# Patient Record
Sex: FEMALE | Race: WHITE | NOT HISPANIC OR LATINO | Employment: UNEMPLOYED | URBAN - METROPOLITAN AREA
[De-identification: names, ages, dates, MRNs, and addresses within clinical notes are randomized per-mention and may not be internally consistent; named-entity substitution may affect disease eponyms.]

---

## 2017-01-17 ENCOUNTER — OFFICE VISIT (OUTPATIENT)
Dept: URGENT CARE | Facility: PHYSICIAN GROUP | Age: 47
End: 2017-01-17
Payer: COMMERCIAL

## 2017-01-17 VITALS
HEIGHT: 66 IN | TEMPERATURE: 99 F | DIASTOLIC BLOOD PRESSURE: 62 MMHG | SYSTOLIC BLOOD PRESSURE: 134 MMHG | HEART RATE: 92 BPM | OXYGEN SATURATION: 97 % | BODY MASS INDEX: 23.46 KG/M2 | RESPIRATION RATE: 18 BRPM | WEIGHT: 146 LBS

## 2017-01-17 DIAGNOSIS — J20.9 ACUTE BRONCHITIS, UNSPECIFIED ORGANISM: ICD-10-CM

## 2017-01-17 PROCEDURE — 99214 OFFICE O/P EST MOD 30 MIN: CPT | Performed by: NURSE PRACTITIONER

## 2017-01-17 RX ORDER — AZITHROMYCIN 250 MG/1
TABLET, FILM COATED ORAL
Qty: 6 TAB | Refills: 0 | Status: SHIPPED | OUTPATIENT
Start: 2017-01-17 | End: 2017-10-12

## 2017-01-17 RX ORDER — PROMETHAZINE HYDROCHLORIDE AND CODEINE PHOSPHATE 6.25; 1 MG/5ML; MG/5ML
5 SYRUP ORAL
Qty: 120 ML | Refills: 0 | Status: SHIPPED | OUTPATIENT
Start: 2017-01-17 | End: 2017-10-12

## 2017-01-17 ASSESSMENT — ENCOUNTER SYMPTOMS
FEVER: 0
COUGH: 1
SORE THROAT: 1
CHILLS: 0
RHINORRHEA: 1

## 2017-01-17 NOTE — MR AVS SNAPSHOT
"        Terri Krishnan   2017 12:15 PM   Office Visit   MRN: [id number]    Department:  [facility]   Dept Phone:  [phone]    Description:  Female : 1970   Provider:  NISHA PereiraPJOEY.           Reason for Visit     Cough cough, nasal and chest congestion, SOB and [id number]      Allergies as of 2017     Allergen Noted Reactions    Shrimp Flavor 2014   Anaphylaxis      You were diagnosed with     Acute bronchitis, unspecified organism   [[id number]]         Vital Signs     Blood Pressure Pulse Temperature Respirations Height Weight    134/62 mmHg 92 37.2 °C (99 °F) 18 1.676 m (5' 5.98\") 66.225 kg (146 lb)    Body Mass Index Oxygen Saturation Smoking Status             23.58 kg/m2 97% Current Every Day Smoker         Basic Information     Date Of Birth Sex Race Ethnicity Preferred Language    1970 Female White Non- English      Problem List              ICD-10-CM Priority Class Noted - Resolved    Surgical menopause E89.40   3/6/2014 - Present    Tobacco dependence F17.200   3/6/2014 - Present    Insomnia secondary to situational depression F43.21, F51.05   2016 - Present    Bilateral acute otitis media H66.93   10/19/2016 - Present    Need for vaccination Z23   10/19/2016 - Present      Health Maintenance        Date Due Completion Dates    IMM INFLUENZA (1) 2016 ---    MAMMOGRAM 2017, 2014    IMM DTaP/Tdap/Td Vaccine (2 - Td) 2025            Current Immunizations     Pneumococcal polysaccharide vaccine (PPSV-23) 10/19/2016  4:43 PM    Tdap Vaccine 2015      Below and/or attached are the medications your provider expects you to take. Review all of your home medications and newly ordered medications with your provider and/or pharmacist. Follow medication instructions as directed by your provider and/or pharmacist. Please keep your medication list with you and share with your provider. Update the information when " medications are discontinued, doses are changed, or new medications (including over-the-counter products) are added; and carry medication information at all times in the event of emergency situations     Allergies:  SHRIMP FLAVOR - Anaphylaxis               Medications  Valid as of: January 17, 2017 -  1:05 PM    Generic Name Brand Name Tablet Size Instructions for use    Amoxicillin (Tab) AMOXIL 875 MG Take 1 Tab by mouth 2 times a day.        Azithromycin (Tab) ZITHROMAX 250 MG Take as directed        Benzocaine-Menthol (Lozenge) CEPACOL 15-2.6 MG Spray 1 Lozenge in mouth/throat every 2 hours as needed.        Benzonatate (Cap) TESSALON 100 MG Take 1 Cap by mouth 3 times a day as needed for Cough.        Doxycycline Hyclate (Tab) VIBRAMYCIN 100 MG Take 1 Tab by mouth 2 times a day.        Estradiol (Tab) ESTRACE 1 MG Take 1 Tab by mouth every day.        Promethazine-Codeine (Syrup) PHENERGAN-CODEINE 6.25-10 MG/5ML Take 5 mL by mouth every bedtime.        Pseudoephedrine-APAP-DM   Take  by mouth.        .                 Medicines prescribed today were sent to:     [id number] - [place] - [address]    [address] [place] [address]    Phone: [phone] Fax: [phone]    Open 24 Hours?: No      Medication refill instructions:       If your prescription bottle indicates you have medication refills left, it is not necessary to call your provider’s office. Please contact your pharmacy and they will refill your medication.    If your prescription bottle indicates you do not have any refills left, you may request refills at any time through one of the following ways: The online Tryouts system (except Urgent Care), by calling your provider’s office, or by asking your pharmacy to contact your provider’s office with a refill request. Medication refills are processed only during regular business hours and may not be available until the next business day. Your provider may request  additional information or to have a follow-up visit with you prior to refilling your medication.   *Please Note: Medication refills are assigned a new Rx number when refilled electronically. Your pharmacy may indicate that no refills were authorized even though a new prescription for the same medication is available at the pharmacy. Please request the medicine by name with the pharmacy before contacting your provider for a refill.           BirdDog[employer] Access Code: Activation code not generated  Current Red-rabbitt Status: Active

## 2017-01-17 NOTE — PROGRESS NOTES
"Subjective:      Terri Krishnan is a 46 y.o. female who presents with Cough            Cough  This is a new problem. The current episode started 1 to 4 weeks ago. The problem has been unchanged. The problem occurs constantly. The cough is non-productive. Associated symptoms include nasal congestion, postnasal drip, rhinorrhea and a sore throat. Pertinent negatives include no chills or fever. She has tried OTC cough suppressant for the symptoms. The treatment provided mild relief. Her past medical history is significant for bronchitis.     Allergies, medications and history reviewed by me today    Review of Systems   Constitutional: Negative for fever and chills.   HENT: Positive for postnasal drip, rhinorrhea and sore throat.    Respiratory: Positive for cough.           Objective:     /62 mmHg  Pulse 92  Temp(Src) 37.2 °C (99 °F)  Resp 18  Ht 1.676 m (5' 5.98\")  Wt 66.225 kg (146 lb)  BMI 23.58 kg/m2  SpO2 97%     Physical Exam   Constitutional: She is oriented to person, place, and time. She appears well-developed and well-nourished. No distress.   HENT:   Head: Normocephalic and atraumatic.   Right Ear: External ear and ear canal normal. Tympanic membrane is not injected and not perforated. No middle ear effusion.   Left Ear: External ear and ear canal normal. Tympanic membrane is not injected and not perforated.  No middle ear effusion.   Nose: Mucosal edema present.   Mouth/Throat: Posterior oropharyngeal erythema present. No oropharyngeal exudate.   Eyes: Conjunctivae are normal. Right eye exhibits no discharge. Left eye exhibits no discharge.   Neck: Normal range of motion. Neck supple.   Cardiovascular: Normal rate, regular rhythm and normal heart sounds.    No murmur heard.  Pulmonary/Chest: Effort normal and breath sounds normal. No respiratory distress. She has no wheezes. She has no rales.   Musculoskeletal: Normal range of motion.   Normal movement of all 4 extremities.   Lymphadenopathy: "     She has no cervical adenopathy.        Right: No supraclavicular adenopathy present.        Left: No supraclavicular adenopathy present.   Neurological: She is alert and oriented to person, place, and time. Gait normal.   Skin: Skin is warm and dry.   Psychiatric: She has a normal mood and affect. Her behavior is normal. Thought content normal.               Assessment/Plan:     1. Acute bronchitis, unspecified organism  azithromycin (ZITHROMAX) 250 MG Tab    promethazine-codeine (PHENERGAN-CODEINE) 6.25-10 MG/5ML Syrup   Patient is cautioned on sedation potential of narcotic medication; no drinking, driving or operating heavy machinery while on this medication.  Differential diagnosis, natural history, supportive care, and indications for immediate follow-up discussed at length.

## 2017-02-02 RX ORDER — ESTRADIOL 1 MG/1
TABLET ORAL
Qty: 90 TAB | Refills: 1 | Status: SHIPPED | OUTPATIENT
Start: 2017-02-02 | End: 2017-09-21 | Stop reason: SDUPTHER

## 2017-09-21 RX ORDER — ESTRADIOL 1 MG/1
TABLET ORAL
Qty: 30 TAB | Refills: 1 | Status: SHIPPED | OUTPATIENT
Start: 2017-09-21 | End: 2017-10-12 | Stop reason: SDUPTHER

## 2017-09-22 NOTE — TELEPHONE ENCOUNTER
Phone Number Called: [phone] (home)     Message: called pt left message for pt to call back.     Left Message for patient to call back: yes

## 2017-09-25 NOTE — TELEPHONE ENCOUNTER
Phone Number Called: [phone] (home)     Message: Pt notified that medication was refilled. She I snot available to come in on Monday's or Tuesdays. So she scheduled an appointment with Dr. Enriquez.    Left Message for patient to call back: no

## 2017-09-26 ENCOUNTER — OFFICE VISIT (OUTPATIENT)
Dept: URGENT CARE | Facility: PHYSICIAN GROUP | Age: 47
End: 2017-09-26
Payer: COMMERCIAL

## 2017-09-26 VITALS
TEMPERATURE: 97.8 F | DIASTOLIC BLOOD PRESSURE: 72 MMHG | OXYGEN SATURATION: 97 % | HEART RATE: 137 BPM | BODY MASS INDEX: 19.7 KG/M2 | HEIGHT: 68 IN | SYSTOLIC BLOOD PRESSURE: 118 MMHG | RESPIRATION RATE: 12 BRPM | WEIGHT: 130 LBS

## 2017-09-26 DIAGNOSIS — R11.2 NON-INTRACTABLE VOMITING WITH NAUSEA, UNSPECIFIED VOMITING TYPE: ICD-10-CM

## 2017-09-26 DIAGNOSIS — R00.0 TACHYCARDIA: ICD-10-CM

## 2017-09-26 DIAGNOSIS — A08.4 VIRAL GASTROENTERITIS: ICD-10-CM

## 2017-09-26 LAB
APPEARANCE UR: NORMAL
BILIRUB UR STRIP-MCNC: NEGATIVE MG/DL
COLOR UR AUTO: NORMAL
FLUAV+FLUBV AG SPEC QL IA: NEGATIVE
GLUCOSE UR STRIP.AUTO-MCNC: NEGATIVE MG/DL
INT CON NEG: NEGATIVE
INT CON POS: POSITIVE
KETONES UR STRIP.AUTO-MCNC: 80 MG/DL
LEUKOCYTE ESTERASE UR QL STRIP.AUTO: NORMAL
NITRITE UR QL STRIP.AUTO: NEGATIVE
PH UR STRIP.AUTO: 7.5 [PH] (ref 5–8)
PROT UR QL STRIP: 30 MG/DL
RBC UR QL AUTO: NORMAL
SP GR UR STRIP.AUTO: 1.01
UROBILINOGEN UR STRIP-MCNC: 1 MG/DL

## 2017-09-26 PROCEDURE — 99999 PR NO CHARGE: CPT | Performed by: PHYSICIAN ASSISTANT

## 2017-09-26 PROCEDURE — 99214 OFFICE O/P EST MOD 30 MIN: CPT | Performed by: PHYSICIAN ASSISTANT

## 2017-09-26 PROCEDURE — 87804 INFLUENZA ASSAY W/OPTIC: CPT | Performed by: PHYSICIAN ASSISTANT

## 2017-09-26 PROCEDURE — 93000 ELECTROCARDIOGRAM COMPLETE: CPT | Performed by: PHYSICIAN ASSISTANT

## 2017-09-26 PROCEDURE — 81002 URINALYSIS NONAUTO W/O SCOPE: CPT | Performed by: PHYSICIAN ASSISTANT

## 2017-09-26 RX ORDER — ONDANSETRON 4 MG/1
4 TABLET, ORALLY DISINTEGRATING ORAL EVERY 8 HOURS PRN
Qty: 15 TAB | Refills: 0 | Status: SHIPPED | OUTPATIENT
Start: 2017-09-26 | End: 2017-10-03

## 2017-09-26 RX ORDER — ONDANSETRON 4 MG/1
4 TABLET, ORALLY DISINTEGRATING ORAL ONCE
Status: COMPLETED | OUTPATIENT
Start: 2017-09-26 | End: 2017-09-26

## 2017-09-26 RX ADMIN — ONDANSETRON 4 MG: 4 TABLET, ORALLY DISINTEGRATING ORAL at 15:06

## 2017-09-26 ASSESSMENT — ENCOUNTER SYMPTOMS
VISUAL CHANGE: 0
VERTIGO: 0
VOMITING: 1
DIZZINESS: 0
NUMBNESS: 0
FEVER: 1
ABDOMINAL PAIN: 0
CHILLS: 1
JOINT SWELLING: 0
CHANGE IN BOWEL HABIT: 1
ARTHRALGIAS: 1
CONSTIPATION: 0
BLOOD IN STOOL: 0
NECK PAIN: 0
SORE THROAT: 0
WEAKNESS: 1
COUGH: 1
SWOLLEN GLANDS: 0
FATIGUE: 1
MYALGIAS: 1
ANOREXIA: 1
DIARRHEA: 1
NAUSEA: 1

## 2017-09-26 ASSESSMENT — PAIN SCALES - GENERAL: PAINLEVEL: NO PAIN

## 2017-09-26 NOTE — PROGRESS NOTES
"Subjective:      Terri Krishnan is a 47 y.o. female who presents with Flu Like Symptoms (x 3 days; vomitting, diarrhea, fever, chills, lethargy)            Nausea   This is a new problem. Episode onset: 3 days. The problem occurs constantly. The problem has been unchanged. Associated symptoms include anorexia, arthralgias, a change in bowel habit, chills, coughing, fatigue, a fever, myalgias, nausea, vomiting and weakness. Pertinent negatives include no abdominal pain, chest pain, congestion, joint swelling, neck pain, numbness, rash, sore throat, swollen glands, urinary symptoms, vertigo or visual change. Nothing aggravates the symptoms. She has tried nothing for the symptoms. The treatment provided no relief.   Several co-workers with similar symptoms.  No suspected bad food, no recent antibiotics or foreign travel.      Review of Systems   Constitutional: Positive for chills, fatigue and fever.   HENT: Negative for congestion and sore throat.    Respiratory: Positive for cough.    Cardiovascular: Negative for chest pain.   Gastrointestinal: Positive for anorexia, change in bowel habit, diarrhea, nausea and vomiting. Negative for abdominal pain, blood in stool, constipation and melena.   Genitourinary: Negative.    Musculoskeletal: Positive for arthralgias and myalgias. Negative for joint swelling and neck pain.   Skin: Negative for rash.   Neurological: Positive for weakness. Negative for dizziness, vertigo and numbness.          Objective:     /72   Pulse (!) 137   Temp 36.6 °C (97.8 °F)   Resp 12   Ht 1.727 m (5' 8\")   Wt 59 kg (130 lb)   SpO2 97%   BMI 19.77 kg/m²      Physical Exam   Constitutional: She is oriented to person, place, and time. She appears well-developed and well-nourished.   HENT:   Head: Normocephalic and atraumatic.   Right Ear: External ear normal.   Left Ear: External ear normal.   Nose: Nose normal.   Mouth/Throat: Oropharynx is clear and moist. No oropharyngeal exudate. "   Tongue is moist   Eyes: Conjunctivae and EOM are normal. Pupils are equal, round, and reactive to light.   Neck: Normal range of motion. Neck supple.   Cardiovascular: Regular rhythm, normal heart sounds and intact distal pulses.  Exam reveals no gallop and no friction rub.    No murmur heard.  Elevated rate   Pulmonary/Chest: Effort normal and breath sounds normal. No respiratory distress. She has no wheezes. She has no rales. She exhibits no tenderness.   Abdominal: Soft. She exhibits no distension and no mass. There is no rebound and no guarding. No hernia.   Mild diffuse tenderness in all quadrants   Musculoskeletal: Normal range of motion.   Lymphadenopathy:     She has no cervical adenopathy.   Neurological: She is alert and oriented to person, place, and time.   Skin: Skin is warm and dry.   Good skin turgor.     Psychiatric: She has a normal mood and affect. Her behavior is normal. Judgment and thought content normal.   Nursing note and vitals reviewed.         Urinalysis:  Trace leukocytes, trace blood, no nitrates, spec gravity 1.015  Rapid Influenza:  Negative  EKG:  Rate 89, normal rhythm, No ST changes, no acute abnormalities     Assessment/Plan:     1. Non-intractable vomiting with nausea, unspecified vomiting type  Patient is feeling better after dose of Zofran in the clinic today.  Will discharge home to continue Zofran as needed.  Rest, increase fluids-recommend water/smart water, no evidence of severe dehydration.  Follow-up if symptoms change, get worse or new symptoms develop.      - ondansetron (ZOFRAN ODT) dispertab 4 mg; Take 1 Tab by mouth Once.  - POCT Urinalysis    2. Viral gastroenteritis      3. Tachycardia  Patient admits to increased anxiety when in a medical setting.  EKG with normal rate, rhythm, no concerning acute abnormalities noted.    - VITAL SIGNS  - EKG - Clinic performed

## 2017-09-26 NOTE — LETTER
September 26, 2017         Patient: Terri Krishnan   YOB: 1970   Date of Visit: 9/26/2017           To Whom it May Concern:    Terri Krishnan was seen in my clinic on 9/26/2017. She may return to work on 09/27/17.    If you have any questions or concerns, please don't hesitate to call.        Sincerely,           Ella Mccoy P.A.-C.  Electronically Signed

## 2017-10-04 ENCOUNTER — TELEPHONE (OUTPATIENT)
Dept: MEDICAL GROUP | Age: 47
End: 2017-10-04

## 2017-10-04 NOTE — TELEPHONE ENCOUNTER
ESTABLISHED PATIENT PRE-VISIT PLANNING     Note: Patient will not be contacted if there is no indication to call.     1.  Reviewed notes from the last few office visits within the medical group: Yes    2.  If any orders were placed at last visit or intended to be done for this visit (i.e. 6 mos follow-up), do we have Results/Consult Notes?        •  Labs - Labs were not ordered at last office visit.       •  Imaging - Imaging was not ordered at last office visit.       •  Referrals - No referrals were ordered at last office visit.    3. Is this appointment scheduled as a Hospital Follow-Up? No    4.  Immunizations were updated in [facility] using WebIZ?: Yes       •  Web Iz Recommendations: FLU    5.  Patient is due for the following Health Maintenance Topics:   Health Maintenance Due   Topic Date Due   • IMM INFLUENZA (1) 09/01/2017           6.  Patient was NOT informed to arrive 15 min prior to their scheduled appointment and bring in their medication bottles.

## 2017-10-05 ENCOUNTER — APPOINTMENT (OUTPATIENT)
Dept: MEDICAL GROUP | Age: 47
End: 2017-10-05
Payer: COMMERCIAL

## 2017-10-11 ENCOUNTER — TELEPHONE (OUTPATIENT)
Dept: MEDICAL GROUP | Age: 47
End: 2017-10-11

## 2017-10-11 NOTE — TELEPHONE ENCOUNTER
ESTABLISHED PATIENT PRE-VISIT PLANNING     Note: Patient will not be contacted if there is no indication to call.     1.  Reviewed notes from the last few office visits within the medical group: Yes    2.  If any orders were placed at last visit or intended to be done for this visit (i.e. 6 mos follow-up), do we have Results/Consult Notes?        •  Labs - Labs were not ordered at last office visit.   Note: If patient appointment is for lab review and patient did not complete labs,                check with provider if OK to reschedule patient until labs completed.       •  Imaging - Imaging was not ordered at last office visit.       •  Referrals - No referrals were ordered at last office visit.    3. Is this appointment scheduled as a Hospital Follow-Up? No    4.  Immunizations were updated in [employer] using WebIZ?: Yes       •  Web Iz Recommendations: FLU    5.  Patient is due for the following Health Maintenance Topics:   Health Maintenance Due   Topic Date Due   • IMM INFLUENZA (1) 09/01/2017           6.  Patient was NOT informed to arrive 15 min prior to their scheduled appointment and bring in their medication bottles.

## 2017-10-12 ENCOUNTER — OFFICE VISIT (OUTPATIENT)
Dept: MEDICAL GROUP | Age: 47
End: 2017-10-12
Payer: COMMERCIAL

## 2017-10-12 VITALS
HEIGHT: 66 IN | HEART RATE: 92 BPM | DIASTOLIC BLOOD PRESSURE: 58 MMHG | OXYGEN SATURATION: 96 % | WEIGHT: 132.2 LBS | BODY MASS INDEX: 21.24 KG/M2 | TEMPERATURE: 97.6 F | SYSTOLIC BLOOD PRESSURE: 92 MMHG

## 2017-10-12 DIAGNOSIS — F41.1 GAD (GENERALIZED ANXIETY DISORDER): ICD-10-CM

## 2017-10-12 DIAGNOSIS — F17.200 TOBACCO DEPENDENCE: ICD-10-CM

## 2017-10-12 DIAGNOSIS — E89.40 SURGICAL MENOPAUSE: ICD-10-CM

## 2017-10-12 DIAGNOSIS — I95.1 ORTHOSTATIC HYPOTENSION: ICD-10-CM

## 2017-10-12 PROBLEM — I95.0 IDIOPATHIC HYPOTENSION: Status: ACTIVE | Noted: 2017-10-12

## 2017-10-12 PROCEDURE — 99214 OFFICE O/P EST MOD 30 MIN: CPT | Performed by: FAMILY MEDICINE

## 2017-10-12 RX ORDER — IBUPROFEN 800 MG/1
800 TABLET ORAL EVERY 8 HOURS PRN
COMMUNITY
End: 2018-03-04

## 2017-10-12 RX ORDER — CITALOPRAM HYDROBROMIDE 10 MG/1
10 TABLET ORAL DAILY
Qty: 90 TAB | Refills: 0 | Status: SHIPPED | OUTPATIENT
Start: 2017-10-12 | End: 2018-03-04

## 2017-10-12 RX ORDER — AMOXICILLIN 500 MG/1
500 CAPSULE ORAL 3 TIMES DAILY
COMMUNITY
End: 2018-03-04

## 2017-10-12 RX ORDER — ESTRADIOL 1 MG/1
1 TABLET ORAL
Qty: 90 TAB | Refills: 0 | Status: SHIPPED | OUTPATIENT
Start: 2017-10-12 | End: 2018-03-04

## 2017-10-12 ASSESSMENT — PATIENT HEALTH QUESTIONNAIRE - PHQ9: CLINICAL INTERPRETATION OF PHQ2 SCORE: 0

## 2017-10-12 NOTE — PROGRESS NOTES
Subjective:   CC: annual wellness visit    HPI:     Terri Krishnan is a 47 y.o. female, established patient of the clinic, presents with the following concerns:     1. Surgical menopause  Hx of BETH-BSO at the age of 27 secondary to severe endometriosis. Pt has been taking Estrace 1 mg qd for quite some time.   Denies hx of CVD, hx of Breast/GYN cancer, hx thrombophilia. Pt has hx of chronic tobacco abuse. She smokes 0.5 ppd x 26 years.     2. Tobacco dependence  Chronic, smokes 0.5 ppd x 26 years, able to quit for 4 years but went into relapse after the death of her best friend.   She is not ready to quit at this time.     3. CARSON (generalized anxiety disorder)  Pt c/o acute worsening anxiety symptoms over the past 6 months. She c/o nervousness, feeling on edge, constantly worry about different things, afraid of going outside the house, teeth clenching, trouble with sleep initiation.     4. Dizziness  Pt had oral surgery last week. She is taking Amoxicillin 500 mg TID. She c/o severe dizziness with standing. She states that she has been trying to drink more fluid.   She denies fever, chills, worsening pain from surgical site, CP, chest palpitation, SOB, ZELAYA, hx of active bleeding. Pt has BETH/BSO for 26 years, so she does not have menstruation.     Current medicines (including changes today)  Current Outpatient Prescriptions   Medication Sig Dispense Refill   • amoxicillin (AMOXIL) 500 MG Cap Take 500 mg by mouth 3 times a day.     • ibuprofen (MOTRIN) 800 MG Tab Take 800 mg by mouth every 8 hours as needed.     • estradiol (ESTRACE) 1 MG Tab Take 1 Tab by mouth every day. 90 Tab 0   • citalopram (CELEXA) 10 MG tablet Take 1 Tab by mouth every day. 90 Tab 0     No current facility-administered medications for this visit.      She  has a past medical history of Surgical menopause and Tobacco dependence.    I personally reviewed patient's problem list, allergies, medications, family hx, social hx with patient and  "update EPIC.     REVIEW OF SYSTEMS:  CONSTITUTIONAL:  Denies night sweats, fatigue, malaise, lethargy, fever or chills.  RESPIRATORY:  Denies cough, wheeze, hemoptysis, or shortness of breath.  CARDIOVASCULAR:  Denies chest pains, palpitations, pedal edema     Objective:     Blood pressure (!) 92/58, pulse 92, temperature 36.4 °C (97.6 °F), height 1.683 m (5' 6.25\"), weight 60 kg (132 lb 3.2 oz), SpO2 96 %. Body mass index is 21.18 kg/m².    Physical Exam:  Constitutional: awake, alert, in no distress.  Skin: Warm, dry, good turgor, no rashes, bruises, ulcers in visible areas.  Eye: conjunctiva clear, lids neg for edema or lesions.  ENMT: TM and auditory canals wnl. Oral and nasal mucosa wnl. Lips without lesions, good dentition, oropharynx clear.  Neck: Trachea midline, no masses, no thyromegaly. No cervical or supraclavicular lymphadenopathy  Respiratory: Unlabored respiratory effort, lungs clear to auscultation, no wheezes, no rhonchi.  Cardiovascular: Normal S1, S2, no murmur, no pedal edema.  Abdomen: Soft, non-tender to palpation, no hernia, no hepatosplenomegaly.  Neuro: CN2-12 grossly intact.    Psych: Oriented x3, affect and mood wnl, intact judgement and insight.       Assessment and Plan:   The following treatment plan was discussed    1. Surgical menopause  Hx of surgical menopause, s/p BETH-BSO, pt is taking estrace 1 mg qd, will continue.   - encouraged smoking cessation to reduce risk of clotting with HRT.   - continue Estrace 1 mg qd.     2. Tobacco dependence  Chronic tobacco abuse, smokes 0.5 ppd x 26 years, able to quit for 4 years, but went into relapse due to recent death of a close friend.   Discussed risk and health complications with chronic tobacco abuse. Encouraged pt to quit.   She is to follow up with PCP should she needs assistance with smoking cessation.     3. CARSON (generalized anxiety disorder)  Hx consistent of CARSON, symptoms are bothersome to patient, she is willing to try medication, " but not therapy. Plan:   - citalopram (CELEXA) 10 MG tablet; Take 1 Tab by mouth every day.  Dispense: 90 Tab; Refill: 0  - exercise, stress reduction, mediation.   - f/u with PCP in 6-8 weeks      4. Orthostatic hypotension  Pt c/o dizziness with standing or sitting up. Positive orthostatic vital sign. Dizziness is reproducible with standing during OV. Her BP 92/58. Plan:   - pt was counseled on self management and prevention of orthostatic hypotension.   - f/u with PCP AMBER Irby M.D.      Followup: Return for As needed.    Please note that this dictation was created using voice recognition software. I have made every reasonable attempt to correct obvious errors, but I expect that there are errors of grammar and possibly content that I did not discover before finalizing the note.

## 2017-11-16 ENCOUNTER — TELEPHONE (OUTPATIENT)
Dept: MEDICAL GROUP | Age: 47
End: 2017-11-16

## 2017-11-16 NOTE — TELEPHONE ENCOUNTER
1. Caller Name: Terri Krishnan                                         Call Back Number: [phone] (home)       Patient approves a detailed voicemail message: N\A    Patient called and left a message that could not be understood.  I believe she needs an RX filled.  Called Patient back and left message that I could not understand her message and that I need details as to what she is looking for.

## 2017-11-22 NOTE — TELEPHONE ENCOUNTER
Pt advise pt to stop taking medication and schedule follow up appointment with PCP.   Ratna Irby M.D.

## 2017-11-22 NOTE — TELEPHONE ENCOUNTER
Phone Number Called: [phone] (home)       Message: LM for patient regarding note below.    Left Message for patient to call back: yes

## 2017-12-10 ENCOUNTER — APPOINTMENT (OUTPATIENT)
Dept: RADIOLOGY | Facility: IMAGING CENTER | Age: 47
End: 2017-12-10
Attending: NURSE PRACTITIONER
Payer: COMMERCIAL

## 2017-12-10 ENCOUNTER — OFFICE VISIT (OUTPATIENT)
Dept: URGENT CARE | Facility: PHYSICIAN GROUP | Age: 47
End: 2017-12-10
Payer: COMMERCIAL

## 2017-12-10 VITALS
HEIGHT: 66 IN | WEIGHT: 134 LBS | SYSTOLIC BLOOD PRESSURE: 122 MMHG | TEMPERATURE: 97.5 F | DIASTOLIC BLOOD PRESSURE: 78 MMHG | HEART RATE: 120 BPM | BODY MASS INDEX: 21.53 KG/M2 | OXYGEN SATURATION: 97 %

## 2017-12-10 DIAGNOSIS — S50.12XA CONTUSION OF LEFT FOREARM, INITIAL ENCOUNTER: ICD-10-CM

## 2017-12-10 DIAGNOSIS — S60.222A CONTUSION OF LEFT HAND, INITIAL ENCOUNTER: Primary | ICD-10-CM

## 2017-12-10 DIAGNOSIS — S60.222A CONTUSION OF LEFT HAND, INITIAL ENCOUNTER: ICD-10-CM

## 2017-12-10 PROCEDURE — 99214 OFFICE O/P EST MOD 30 MIN: CPT | Performed by: NURSE PRACTITIONER

## 2017-12-10 PROCEDURE — 73130 X-RAY EXAM OF HAND: CPT | Mod: 26,LT | Performed by: NURSE PRACTITIONER

## 2017-12-10 PROCEDURE — 73090 X-RAY EXAM OF FOREARM: CPT | Mod: TC,LT | Performed by: NURSE PRACTITIONER

## 2017-12-10 RX ORDER — HYDROCODONE BITARTRATE AND ACETAMINOPHEN 5; 325 MG/1; MG/1
1-2 TABLET ORAL EVERY 6 HOURS PRN
Qty: 20 TAB | Refills: 0 | Status: SHIPPED | OUTPATIENT
Start: 2017-12-10 | End: 2018-03-04

## 2017-12-10 NOTE — LETTER
December 10, 2017         Patient: Terri Krishnan   YOB: 1970   Date of Visit: 12/10/2017           To Whom it May Concern:    Terri Krishnan was seen in my clinic on 12/10/2017. She should be off work due to injury for 2-3 days.     If you have any questions or concerns, please don't hesitate to call.        Sincerely,           SALVADOR Purcell.  Electronically Signed

## 2017-12-10 NOTE — LETTER
December 10, 2017         Patient: Terri Krishnan   YOB: 1970   Date of Visit: 12/10/2017           To Whom it May Concern:    Terri Krishnan was seen in my clinic on 12/10/2017. She was here due to injury. She was brought in by her boyfriend    If you have any questions or concerns, please don't hesitate to call.        Sincerely,           SALVADOR Purcell.  Electronically Signed

## 2017-12-18 ENCOUNTER — TELEPHONE (OUTPATIENT)
Dept: URGENT CARE | Facility: PHYSICIAN GROUP | Age: 47
End: 2017-12-18

## 2017-12-18 NOTE — TELEPHONE ENCOUNTER
1. Caller Name: Terri Krishnan                                         Call Back Number: [phone] (home)       Patient approves a detailed voicemail message: yes    Pt called stating she still has hand swelling and thumb numbness.  She states you told her if it did not get better that she should go somewhere on [place] but cant remember what you said?  Please advise.

## 2017-12-28 ASSESSMENT — ENCOUNTER SYMPTOMS
MYALGIAS: 0
TINGLING: 0
LOSS OF CONSCIOUSNESS: 0
NUMBNESS: 0
FEVER: 0
NECK PAIN: 0
SENSORY CHANGE: 0
CHILLS: 0
BACK PAIN: 0
FOCAL WEAKNESS: 0

## 2017-12-28 NOTE — PROGRESS NOTES
"Subjective:      Terri Krishnan is a 47 y.o. female who presents with Hand Injury (Left hand injury/pt tripped over dog yesterday ) and Ear Fullness (Right ear fullness )            Medications, Allergies and Prior Medical Hx reviewed and updated in [facility].with patient/family today         Fall   The accident occurred 12 to 24 hours ago. The fall occurred while walking. She landed on hard floor. There was no blood loss. The point of impact was the left wrist. The pain is present in the left hand, left lower arm and left wrist. The pain is moderate. The symptoms are aggravated by flexion, use of injured limb, pressure on injury and rotation. Pertinent negatives include no fever, loss of consciousness, numbness or tingling. She has tried elevation and ice for the symptoms. The treatment provided mild relief.       Review of Systems   Constitutional: Negative for chills, fever and malaise/fatigue.   Musculoskeletal: Positive for joint pain. Negative for back pain, myalgias and neck pain.   Skin: Negative for rash.   Neurological: Negative for tingling, sensory change, focal weakness, loss of consciousness and numbness.          Objective:     /78   Pulse (!) 120   Temp 36.4 °C (97.5 °F)   Ht 1.683 m (5' 6.25\")   Wt 60.8 kg (134 lb)   SpO2 97%   BMI 21.47 kg/m²      Physical Exam   Constitutional: She appears well-developed and well-nourished. No distress.   HENT:   Head: Normocephalic and atraumatic.   Eyes: Conjunctivae are normal. Pupils are equal, round, and reactive to light.   Neck: Neck supple.   Cardiovascular: Normal rate.    Pulmonary/Chest: Effort normal. No respiratory distress.   Musculoskeletal:        Left wrist: She exhibits decreased range of motion, tenderness, bony tenderness and swelling (slight). She exhibits no effusion, no crepitus, no deformity and no laceration.        Left forearm: She exhibits tenderness, bony tenderness and swelling (slight). She exhibits no edema, no deformity " and no laceration.        Left hand: She exhibits decreased range of motion, tenderness and bony tenderness. She exhibits no deformity, no laceration and no swelling.   Circulation, sensation, movement distal to injury intact     Neurological: She is alert.   Awake, alert, answering questions appropriately, moving all extremeties   Skin: Skin is warm and dry. Capillary refill takes less than 2 seconds. No erythema.   Psychiatric: She has a normal mood and affect. Her behavior is normal.   Vitals reviewed.              Assessment/Plan:       1. Contusion of left hand, initial encounter  DX-FOREARM LEFT    DX-HAND 3+ LEFT    hydrocodone-acetaminophen (NORCO) 5-325 MG Tab per tablet   2. Contusion of left forearm, initial encounter  DX-FOREARM LEFT    DX-HAND 3+ LEFT    hydrocodone-acetaminophen (NORCO) 5-325 MG Tab per tablet     Do not drink alcohol or operate machinery with this medication  Pt reviewed on [place]  Aware,  no remarkable controlled substance prescription documentation noted    Xray of left forearm -  Reviewed film and radiology interpretation:   Negative left forearm series    Xray of left hand-  Reviewed film and radiology interpretation:    Negative left hand series    Rest, Ice, Elevation, Ibuprofen, Pt will go to the ER for worsening or changing symptoms as discussed,  Follow-up with orthopedics at the next available appt.  Follow-up with your primary care provider or return here if not improving in 7 days   Discharge instructions discussed with pt/family who verbalize understanding and agreement with poc

## 2018-03-04 ENCOUNTER — RESOLUTE PROFESSIONAL BILLING HOSPITAL PROF FEE (OUTPATIENT)
Dept: HOSPITALIST | Facility: MEDICAL CENTER | Age: 48
End: 2018-03-04
Payer: COMMERCIAL

## 2018-03-04 ENCOUNTER — HOSPITAL ENCOUNTER (INPATIENT)
Facility: MEDICAL CENTER | Age: 48
LOS: 16 days | DRG: 083 | End: 2018-03-20
Attending: EMERGENCY MEDICINE | Admitting: SURGERY
Payer: COMMERCIAL

## 2018-03-04 ENCOUNTER — APPOINTMENT (OUTPATIENT)
Dept: RADIOLOGY | Facility: MEDICAL CENTER | Age: 48
DRG: 083 | End: 2018-03-04
Attending: EMERGENCY MEDICINE
Payer: COMMERCIAL

## 2018-03-04 ENCOUNTER — APPOINTMENT (OUTPATIENT)
Dept: RADIOLOGY | Facility: MEDICAL CENTER | Age: 48
DRG: 083 | End: 2018-03-04
Attending: SURGERY
Payer: COMMERCIAL

## 2018-03-04 DIAGNOSIS — F10.931 ACUTE HYPERACTIVE ALCOHOL WITHDRAWAL DELIRIUM (HCC): ICD-10-CM

## 2018-03-04 DIAGNOSIS — I62.9 INTRACRANIAL HEMORRHAGE (HCC): ICD-10-CM

## 2018-03-04 PROBLEM — S06.30AA TRAUMATIC INTRACRANIAL HEMORRHAGE (HCC): Status: ACTIVE | Noted: 2018-03-04

## 2018-03-04 PROBLEM — F10.929 ACUTE ALCOHOL INTOXICATION (HCC): Status: ACTIVE | Noted: 2018-03-04

## 2018-03-04 PROBLEM — D69.6 THROMBOCYTOPENIA (HCC): Status: ACTIVE | Noted: 2018-03-04

## 2018-03-04 LAB
ALBUMIN SERPL BCP-MCNC: 3.8 G/DL (ref 3.2–4.9)
ALBUMIN/GLOB SERPL: 1.5 G/DL
ALP SERPL-CCNC: 204 U/L (ref 30–99)
ALT SERPL-CCNC: 202 U/L (ref 2–50)
ANION GAP SERPL CALC-SCNC: 22 MMOL/L (ref 0–11.9)
AST SERPL-CCNC: 1011 U/L (ref 12–45)
BASOPHILS # BLD AUTO: 1.3 % (ref 0–1.8)
BASOPHILS # BLD: 0.04 K/UL (ref 0–0.12)
BILIRUB SERPL-MCNC: 1.5 MG/DL (ref 0.1–1.5)
BUN SERPL-MCNC: 19 MG/DL (ref 8–22)
CALCIUM SERPL-MCNC: 8.2 MG/DL (ref 8.5–10.5)
CHLORIDE SERPL-SCNC: 98 MMOL/L (ref 96–112)
CO2 SERPL-SCNC: 19 MMOL/L (ref 20–33)
CREAT SERPL-MCNC: 0.68 MG/DL (ref 0.5–1.4)
EOSINOPHIL # BLD AUTO: 0.01 K/UL (ref 0–0.51)
EOSINOPHIL NFR BLD: 0.3 % (ref 0–6.9)
ERYTHROCYTE [DISTWIDTH] IN BLOOD BY AUTOMATED COUNT: 55 FL (ref 35.9–50)
ETHANOL BLD-MCNC: 0.43 G/DL
GLOBULIN SER CALC-MCNC: 2.6 G/DL (ref 1.9–3.5)
GLUCOSE SERPL-MCNC: 60 MG/DL (ref 65–99)
HCT VFR BLD AUTO: 31.7 % (ref 37–47)
HGB BLD-MCNC: 10.4 G/DL (ref 12–16)
IMM GRANULOCYTES # BLD AUTO: 0.02 K/UL (ref 0–0.11)
IMM GRANULOCYTES NFR BLD AUTO: 0.7 % (ref 0–0.9)
LYMPHOCYTES # BLD AUTO: 0.82 K/UL (ref 1–4.8)
LYMPHOCYTES NFR BLD: 27.3 % (ref 22–41)
MCH RBC QN AUTO: 33.9 PG (ref 27–33)
MCHC RBC AUTO-ENTMCNC: 32.8 G/DL (ref 33.6–35)
MCV RBC AUTO: 103.3 FL (ref 81.4–97.8)
MONOCYTES # BLD AUTO: 0.25 K/UL (ref 0–0.85)
MONOCYTES NFR BLD AUTO: 8.3 % (ref 0–13.4)
NEUTROPHILS # BLD AUTO: 1.86 K/UL (ref 2–7.15)
NEUTROPHILS NFR BLD: 62.1 % (ref 44–72)
NRBC # BLD AUTO: 0 K/UL
NRBC BLD-RTO: 0 /100 WBC
PLATELET # BLD AUTO: 51 K/UL (ref 164–446)
PMV BLD AUTO: 10.1 FL (ref 9–12.9)
POTASSIUM SERPL-SCNC: 3.2 MMOL/L (ref 3.6–5.5)
PROT SERPL-MCNC: 6.4 G/DL (ref 6–8.2)
RBC # BLD AUTO: 3.07 M/UL (ref 4.2–5.4)
SODIUM SERPL-SCNC: 139 MMOL/L (ref 135–145)
WBC # BLD AUTO: 3 K/UL (ref 4.8–10.8)

## 2018-03-04 PROCEDURE — 85730 THROMBOPLASTIN TIME PARTIAL: CPT

## 2018-03-04 PROCEDURE — 72125 CT NECK SPINE W/O DYE: CPT

## 2018-03-04 PROCEDURE — 85610 PROTHROMBIN TIME: CPT

## 2018-03-04 PROCEDURE — 30233R1 TRANSFUSION OF NONAUTOLOGOUS PLATELETS INTO PERIPHERAL VEIN, PERCUTANEOUS APPROACH: ICD-10-PCS | Performed by: SURGERY

## 2018-03-04 PROCEDURE — 770022 HCHG ROOM/CARE - ICU (200)

## 2018-03-04 PROCEDURE — 99291 CRITICAL CARE FIRST HOUR: CPT

## 2018-03-04 PROCEDURE — 85347 COAGULATION TIME ACTIVATED: CPT

## 2018-03-04 PROCEDURE — 80053 COMPREHEN METABOLIC PANEL: CPT

## 2018-03-04 PROCEDURE — 85025 COMPLETE CBC W/AUTO DIFF WBC: CPT

## 2018-03-04 PROCEDURE — 85576 BLOOD PLATELET AGGREGATION: CPT | Mod: 91

## 2018-03-04 PROCEDURE — 70450 CT HEAD/BRAIN W/O DYE: CPT

## 2018-03-04 PROCEDURE — 70486 CT MAXILLOFACIAL W/O DYE: CPT

## 2018-03-04 PROCEDURE — 36415 COLL VENOUS BLD VENIPUNCTURE: CPT

## 2018-03-04 PROCEDURE — 80307 DRUG TEST PRSMV CHEM ANLYZR: CPT

## 2018-03-04 PROCEDURE — 85384 FIBRINOGEN ACTIVITY: CPT

## 2018-03-04 RX ORDER — DOCUSATE SODIUM 100 MG/1
100 CAPSULE, LIQUID FILLED ORAL 2 TIMES DAILY
Status: DISCONTINUED | OUTPATIENT
Start: 2018-03-04 | End: 2018-03-13

## 2018-03-04 RX ORDER — SODIUM CHLORIDE 9 MG/ML
INJECTION, SOLUTION INTRAVENOUS CONTINUOUS
Status: DISCONTINUED | OUTPATIENT
Start: 2018-03-04 | End: 2018-03-09

## 2018-03-04 RX ORDER — OXYCODONE HYDROCHLORIDE 10 MG/1
10 TABLET ORAL
Status: DISCONTINUED | OUTPATIENT
Start: 2018-03-04 | End: 2018-03-20 | Stop reason: HOSPADM

## 2018-03-04 RX ORDER — OXYCODONE HYDROCHLORIDE 5 MG/1
5-10 TABLET ORAL
Status: DISCONTINUED | OUTPATIENT
Start: 2018-03-04 | End: 2018-03-04

## 2018-03-04 RX ORDER — BISACODYL 10 MG
10 SUPPOSITORY, RECTAL RECTAL
Status: DISCONTINUED | OUTPATIENT
Start: 2018-03-04 | End: 2018-03-14

## 2018-03-04 RX ORDER — AMOXICILLIN 250 MG
1 CAPSULE ORAL
Status: DISCONTINUED | OUTPATIENT
Start: 2018-03-04 | End: 2018-03-14

## 2018-03-04 RX ORDER — POLYETHYLENE GLYCOL 3350 17 G/17G
1 POWDER, FOR SOLUTION ORAL 2 TIMES DAILY
Status: DISCONTINUED | OUTPATIENT
Start: 2018-03-05 | End: 2018-03-14

## 2018-03-04 RX ORDER — ENEMA 19; 7 G/133ML; G/133ML
1 ENEMA RECTAL
Status: DISCONTINUED | OUTPATIENT
Start: 2018-03-04 | End: 2018-03-14

## 2018-03-04 RX ORDER — FAMOTIDINE 20 MG/1
20 TABLET, FILM COATED ORAL 2 TIMES DAILY
Status: DISCONTINUED | OUTPATIENT
Start: 2018-03-04 | End: 2018-03-04

## 2018-03-04 RX ORDER — OXYCODONE HYDROCHLORIDE 5 MG/1
5 TABLET ORAL
Status: DISCONTINUED | OUTPATIENT
Start: 2018-03-04 | End: 2018-03-20 | Stop reason: HOSPADM

## 2018-03-04 RX ORDER — ONDANSETRON 2 MG/ML
4 INJECTION INTRAMUSCULAR; INTRAVENOUS EVERY 4 HOURS PRN
Status: DISCONTINUED | OUTPATIENT
Start: 2018-03-04 | End: 2018-03-20 | Stop reason: HOSPADM

## 2018-03-04 RX ORDER — AMOXICILLIN 250 MG
1 CAPSULE ORAL NIGHTLY
Status: DISCONTINUED | OUTPATIENT
Start: 2018-03-05 | End: 2018-03-14

## 2018-03-04 ASSESSMENT — LIFESTYLE VARIABLES: DO YOU DRINK ALCOHOL: YES

## 2018-03-04 ASSESSMENT — PAIN SCALES - GENERAL: PAINLEVEL_OUTOF10: 10

## 2018-03-05 ENCOUNTER — APPOINTMENT (OUTPATIENT)
Dept: RADIOLOGY | Facility: MEDICAL CENTER | Age: 48
DRG: 083 | End: 2018-03-05
Attending: SURGERY
Payer: COMMERCIAL

## 2018-03-05 ENCOUNTER — APPOINTMENT (OUTPATIENT)
Dept: RADIOLOGY | Facility: MEDICAL CENTER | Age: 48
DRG: 083 | End: 2018-03-05
Attending: NEUROLOGICAL SURGERY
Payer: COMMERCIAL

## 2018-03-05 PROBLEM — Z53.09: Status: ACTIVE | Noted: 2018-03-05

## 2018-03-05 LAB
ABO GROUP BLD: NORMAL
ALBUMIN SERPL BCP-MCNC: 3.3 G/DL (ref 3.2–4.9)
ALBUMIN/GLOB SERPL: 1.6 G/DL
ALP SERPL-CCNC: 134 U/L (ref 30–99)
ALT SERPL-CCNC: 147 U/L (ref 2–50)
ANION GAP SERPL CALC-SCNC: 18 MMOL/L (ref 0–11.9)
APTT PPP: 28.5 SEC (ref 24.7–36)
AST SERPL-CCNC: 671 U/L (ref 12–45)
BASOPHILS # BLD AUTO: 0.5 % (ref 0–1.8)
BASOPHILS # BLD: 0.02 K/UL (ref 0–0.12)
BILIRUB SERPL-MCNC: 1.2 MG/DL (ref 0.1–1.5)
BUN SERPL-MCNC: 18 MG/DL (ref 8–22)
CALCIUM SERPL-MCNC: 7.3 MG/DL (ref 8.5–10.5)
CFT BLD TEG: 4.9 MIN (ref 5–10)
CFT BLD TEG: 5.2 MIN (ref 5–10)
CFT BLD TEG: 6.4 MIN (ref 5–10)
CHLORIDE SERPL-SCNC: 103 MMOL/L (ref 96–112)
CLOT ANGLE BLD TEG: 63.8 DEGREES (ref 53–72)
CLOT ANGLE BLD TEG: 71.5 DEGREES (ref 53–72)
CLOT ANGLE BLD TEG: 72.4 DEGREES (ref 53–72)
CLOT LYSIS 30M P MA LENFR BLD TEG: 0 % (ref 0–8)
CO2 SERPL-SCNC: 19 MMOL/L (ref 20–33)
CREAT SERPL-MCNC: 0.58 MG/DL (ref 0.5–1.4)
CT.EXTRINSIC BLD ROTEM: 1.2 MIN (ref 1–3)
CT.EXTRINSIC BLD ROTEM: 1.3 MIN (ref 1–3)
CT.EXTRINSIC BLD ROTEM: 2.1 MIN (ref 1–3)
EKG IMPRESSION: NORMAL
EOSINOPHIL # BLD AUTO: 0 K/UL (ref 0–0.51)
EOSINOPHIL NFR BLD: 0 % (ref 0–6.9)
ERYTHROCYTE [DISTWIDTH] IN BLOOD BY AUTOMATED COUNT: 57.8 FL (ref 35.9–50)
GLOBULIN SER CALC-MCNC: 2.1 G/DL (ref 1.9–3.5)
GLUCOSE BLD-MCNC: 125 MG/DL (ref 65–99)
GLUCOSE BLD-MCNC: 140 MG/DL (ref 65–99)
GLUCOSE BLD-MCNC: 54 MG/DL (ref 65–99)
GLUCOSE BLD-MCNC: 57 MG/DL (ref 65–99)
GLUCOSE BLD-MCNC: 75 MG/DL (ref 65–99)
GLUCOSE BLD-MCNC: 81 MG/DL (ref 65–99)
GLUCOSE SERPL-MCNC: 55 MG/DL (ref 65–99)
HCT VFR BLD AUTO: 23.1 % (ref 37–47)
HGB BLD-MCNC: 7.4 G/DL (ref 12–16)
IMM GRANULOCYTES # BLD AUTO: 0.02 K/UL (ref 0–0.11)
IMM GRANULOCYTES NFR BLD AUTO: 0.5 % (ref 0–0.9)
INR PPP: 1.02 (ref 0.87–1.13)
INR PPP: 1.16 (ref 0.87–1.13)
LYMPHOCYTES # BLD AUTO: 0.84 K/UL (ref 1–4.8)
LYMPHOCYTES NFR BLD: 22 % (ref 22–41)
MCF BLD TEG: 50.4 MM (ref 50–70)
MCF BLD TEG: 65 MM (ref 50–70)
MCF BLD TEG: 66.4 MM (ref 50–70)
MCH RBC QN AUTO: 34.3 PG (ref 27–33)
MCHC RBC AUTO-ENTMCNC: 32 G/DL (ref 33.6–35)
MCV RBC AUTO: 106.9 FL (ref 81.4–97.8)
MONOCYTES # BLD AUTO: 0.29 K/UL (ref 0–0.85)
MONOCYTES NFR BLD AUTO: 7.6 % (ref 0–13.4)
NEUTROPHILS # BLD AUTO: 2.64 K/UL (ref 2–7.15)
NEUTROPHILS NFR BLD: 69.4 % (ref 44–72)
NRBC # BLD AUTO: 0 K/UL
NRBC BLD-RTO: 0 /100 WBC
PA AA BLD-ACNC: 78.9 %
PA AA BLD-ACNC: 95.9 %
PA ADP BLD-ACNC: 65.2 %
PA ADP BLD-ACNC: 89.6 %
PLATELET # BLD AUTO: 130 K/UL (ref 164–446)
PMV BLD AUTO: 10.9 FL (ref 9–12.9)
POTASSIUM SERPL-SCNC: 3.6 MMOL/L (ref 3.6–5.5)
PROT SERPL-MCNC: 5.4 G/DL (ref 6–8.2)
PROTHROMBIN TIME: 13.1 SEC (ref 12–14.6)
PROTHROMBIN TIME: 14.5 SEC (ref 12–14.6)
RBC # BLD AUTO: 2.16 M/UL (ref 4.2–5.4)
RH BLD: NORMAL
SODIUM SERPL-SCNC: 140 MMOL/L (ref 135–145)
TEG ALGORITHM TGALG: ABNORMAL
TEG ALGORITHM TGALG: NORMAL
TEG ALGORITHM TGALG: NORMAL
WBC # BLD AUTO: 3.8 K/UL (ref 4.8–10.8)

## 2018-03-05 PROCEDURE — 86901 BLOOD TYPING SEROLOGIC RH(D): CPT

## 2018-03-05 PROCEDURE — 85025 COMPLETE CBC W/AUTO DIFF WBC: CPT

## 2018-03-05 PROCEDURE — 85384 FIBRINOGEN ACTIVITY: CPT

## 2018-03-05 PROCEDURE — 85610 PROTHROMBIN TIME: CPT

## 2018-03-05 PROCEDURE — 85576 BLOOD PLATELET AGGREGATION: CPT

## 2018-03-05 PROCEDURE — 93010 ELECTROCARDIOGRAM REPORT: CPT | Performed by: INTERNAL MEDICINE

## 2018-03-05 PROCEDURE — 700111 HCHG RX REV CODE 636 W/ 250 OVERRIDE (IP): Performed by: NEUROLOGICAL SURGERY

## 2018-03-05 PROCEDURE — 86900 BLOOD TYPING SEROLOGIC ABO: CPT

## 2018-03-05 PROCEDURE — 82962 GLUCOSE BLOOD TEST: CPT | Mod: 91

## 2018-03-05 PROCEDURE — 85347 COAGULATION TIME ACTIVATED: CPT

## 2018-03-05 PROCEDURE — A9270 NON-COVERED ITEM OR SERVICE: HCPCS | Performed by: SURGERY

## 2018-03-05 PROCEDURE — 700111 HCHG RX REV CODE 636 W/ 250 OVERRIDE (IP): Performed by: SURGERY

## 2018-03-05 PROCEDURE — 80053 COMPREHEN METABOLIC PANEL: CPT

## 2018-03-05 PROCEDURE — 700102 HCHG RX REV CODE 250 W/ 637 OVERRIDE(OP): Performed by: SURGERY

## 2018-03-05 PROCEDURE — 700101 HCHG RX REV CODE 250: Performed by: SURGERY

## 2018-03-05 PROCEDURE — 70450 CT HEAD/BRAIN W/O DYE: CPT

## 2018-03-05 PROCEDURE — 700105 HCHG RX REV CODE 258: Performed by: SURGERY

## 2018-03-05 PROCEDURE — 93005 ELECTROCARDIOGRAM TRACING: CPT | Performed by: SURGERY

## 2018-03-05 PROCEDURE — 700105 HCHG RX REV CODE 258: Performed by: NEUROLOGICAL SURGERY

## 2018-03-05 PROCEDURE — P9034 PLATELETS, PHERESIS: HCPCS | Mod: 91

## 2018-03-05 PROCEDURE — 700101 HCHG RX REV CODE 250

## 2018-03-05 PROCEDURE — 36430 TRANSFUSION BLD/BLD COMPNT: CPT

## 2018-03-05 PROCEDURE — 99291 CRITICAL CARE FIRST HOUR: CPT | Performed by: SURGERY

## 2018-03-05 PROCEDURE — 51798 US URINE CAPACITY MEASURE: CPT

## 2018-03-05 PROCEDURE — 73130 X-RAY EXAM OF HAND: CPT | Mod: LT

## 2018-03-05 PROCEDURE — 770022 HCHG ROOM/CARE - ICU (200)

## 2018-03-05 RX ORDER — LORAZEPAM 2 MG/ML
1 INJECTION INTRAMUSCULAR
Status: DISCONTINUED | OUTPATIENT
Start: 2018-03-05 | End: 2018-03-05

## 2018-03-05 RX ORDER — LORAZEPAM 2 MG/ML
1 INJECTION INTRAMUSCULAR
Status: DISCONTINUED | OUTPATIENT
Start: 2018-03-05 | End: 2018-03-14

## 2018-03-05 RX ORDER — LORAZEPAM 2 MG/ML
3 INJECTION INTRAMUSCULAR
Status: DISCONTINUED | OUTPATIENT
Start: 2018-03-05 | End: 2018-03-14

## 2018-03-05 RX ORDER — THIAMINE MONONITRATE (VIT B1) 100 MG
100 TABLET ORAL DAILY
Status: COMPLETED | OUTPATIENT
Start: 2018-03-06 | End: 2018-03-09

## 2018-03-05 RX ORDER — DEXTROSE MONOHYDRATE 25 G/50ML
25 INJECTION, SOLUTION INTRAVENOUS
Status: DISCONTINUED | OUTPATIENT
Start: 2018-03-05 | End: 2018-03-13

## 2018-03-05 RX ORDER — LORAZEPAM 1 MG/1
2 TABLET ORAL
Status: DISCONTINUED | OUTPATIENT
Start: 2018-03-05 | End: 2018-03-05

## 2018-03-05 RX ORDER — LORAZEPAM 2 MG/ML
2 INJECTION INTRAMUSCULAR
Status: DISCONTINUED | OUTPATIENT
Start: 2018-03-05 | End: 2018-03-05

## 2018-03-05 RX ORDER — DEXTROSE MONOHYDRATE 25 G/50ML
INJECTION, SOLUTION INTRAVENOUS
Status: COMPLETED
Start: 2018-03-05 | End: 2018-03-05

## 2018-03-05 RX ORDER — LORAZEPAM 2 MG/ML
1.5 INJECTION INTRAMUSCULAR
Status: DISCONTINUED | OUTPATIENT
Start: 2018-03-05 | End: 2018-03-05

## 2018-03-05 RX ORDER — DEXTROSE MONOHYDRATE 50 MG/ML
INJECTION, SOLUTION INTRAVENOUS CONTINUOUS
Status: DISCONTINUED | OUTPATIENT
Start: 2018-03-05 | End: 2018-03-07

## 2018-03-05 RX ORDER — LORAZEPAM 1 MG/1
0.5 TABLET ORAL EVERY 4 HOURS PRN
Status: DISCONTINUED | OUTPATIENT
Start: 2018-03-05 | End: 2018-03-05

## 2018-03-05 RX ORDER — FOLIC ACID 1 MG/1
1 TABLET ORAL DAILY
Status: COMPLETED | OUTPATIENT
Start: 2018-03-06 | End: 2018-03-09

## 2018-03-05 RX ORDER — LORAZEPAM 1 MG/1
1 TABLET ORAL EVERY 4 HOURS PRN
Status: DISCONTINUED | OUTPATIENT
Start: 2018-03-05 | End: 2018-03-05

## 2018-03-05 RX ORDER — LORAZEPAM 2 MG/ML
0.5 INJECTION INTRAMUSCULAR EVERY 4 HOURS PRN
Status: DISCONTINUED | OUTPATIENT
Start: 2018-03-05 | End: 2018-03-05

## 2018-03-05 RX ORDER — HYDROMORPHONE HYDROCHLORIDE 2 MG/ML
0.5 INJECTION, SOLUTION INTRAMUSCULAR; INTRAVENOUS; SUBCUTANEOUS
Status: DISCONTINUED | OUTPATIENT
Start: 2018-03-05 | End: 2018-03-11

## 2018-03-05 RX ORDER — LORAZEPAM 1 MG/1
3 TABLET ORAL
Status: DISCONTINUED | OUTPATIENT
Start: 2018-03-05 | End: 2018-03-05

## 2018-03-05 RX ORDER — LORAZEPAM 2 MG/ML
4 INJECTION INTRAMUSCULAR
Status: DISCONTINUED | OUTPATIENT
Start: 2018-03-05 | End: 2018-03-14

## 2018-03-05 RX ORDER — LORAZEPAM 2 MG/ML
2 INJECTION INTRAMUSCULAR
Status: DISCONTINUED | OUTPATIENT
Start: 2018-03-05 | End: 2018-03-14

## 2018-03-05 RX ORDER — LORAZEPAM 1 MG/1
4 TABLET ORAL
Status: DISCONTINUED | OUTPATIENT
Start: 2018-03-05 | End: 2018-03-05

## 2018-03-05 RX ADMIN — DEXTROSE MONOHYDRATE: 50 INJECTION, SOLUTION INTRAVENOUS at 14:41

## 2018-03-05 RX ADMIN — OXYCODONE HYDROCHLORIDE 10 MG: 10 TABLET ORAL at 12:35

## 2018-03-05 RX ADMIN — FOLIC ACID: 5 INJECTION, SOLUTION INTRAMUSCULAR; INTRAVENOUS; SUBCUTANEOUS at 13:35

## 2018-03-05 RX ADMIN — LORAZEPAM 1 MG: 2 INJECTION INTRAMUSCULAR; INTRAVENOUS at 21:35

## 2018-03-05 RX ADMIN — HYDROMORPHONE HYDROCHLORIDE 0.5 MG: 2 INJECTION INTRAMUSCULAR; INTRAVENOUS; SUBCUTANEOUS at 04:06

## 2018-03-05 RX ADMIN — DEXTROSE MONOHYDRATE 25 ML: 25 INJECTION, SOLUTION INTRAVENOUS at 08:15

## 2018-03-05 RX ADMIN — SODIUM CHLORIDE 500 MG: 9 INJECTION, SOLUTION INTRAVENOUS at 15:55

## 2018-03-05 RX ADMIN — SODIUM CHLORIDE 500 MG: 9 INJECTION, SOLUTION INTRAVENOUS at 09:30

## 2018-03-05 RX ADMIN — LORAZEPAM 4 MG: 2 INJECTION INTRAMUSCULAR; INTRAVENOUS at 15:27

## 2018-03-05 RX ADMIN — SODIUM CHLORIDE 1000 MG: 9 INJECTION, SOLUTION INTRAVENOUS at 21:25

## 2018-03-05 RX ADMIN — DEXTROSE MONOHYDRATE 25 ML: 25 INJECTION, SOLUTION INTRAVENOUS at 08:14

## 2018-03-05 RX ADMIN — SODIUM CHLORIDE: 9 INJECTION, SOLUTION INTRAVENOUS at 00:13

## 2018-03-05 RX ADMIN — LORAZEPAM 2 MG: 2 INJECTION INTRAMUSCULAR; INTRAVENOUS at 23:06

## 2018-03-05 RX ADMIN — SODIUM CHLORIDE 500 MG: 9 INJECTION, SOLUTION INTRAVENOUS at 00:14

## 2018-03-05 ASSESSMENT — LIFESTYLE VARIABLES
NAUSEA AND VOMITING: MILD NAUSEA WITH NO VOMITING
NAUSEA AND VOMITING: MILD NAUSEA WITH NO VOMITING
AUDITORY DISTURBANCES: NOT PRESENT
TOTAL SCORE: 8
DO YOU DRINK ALCOHOL: YES
TREMOR: MODERATE TREMOR WITH ARMS EXTENDED
VISUAL DISTURBANCES: NOT PRESENT
PAROXYSMAL SWEATS: NO SWEAT VISIBLE
EVER_SMOKED: YES
ORIENTATION AND CLOUDING OF SENSORIUM: ORIENTED AND CAN DO SERIAL ADDITIONS
AUDITORY DISTURBANCES: NOT PRESENT
AGITATION: NORMAL ACTIVITY
NAUSEA AND VOMITING: MILD NAUSEA WITH NO VOMITING
HEADACHE, FULLNESS IN HEAD: MODERATE
ANXIETY: NO ANXIETY (AT EASE)
TREMOR: MODERATE TREMOR WITH ARMS EXTENDED
ORIENTATION AND CLOUDING OF SENSORIUM: ORIENTED AND CAN DO SERIAL ADDITIONS
PAROXYSMAL SWEATS: NO SWEAT VISIBLE
ANXIETY: NO ANXIETY (AT EASE)
AGITATION: NORMAL ACTIVITY
AUDITORY DISTURBANCES: NOT PRESENT
AGITATION: NORMAL ACTIVITY
HEADACHE, FULLNESS IN HEAD: MODERATE
TOTAL SCORE: 8
TREMOR: MODERATE TREMOR WITH ARMS EXTENDED
VISUAL DISTURBANCES: NOT PRESENT
PAROXYSMAL SWEATS: NO SWEAT VISIBLE
TOTAL SCORE: 8
ANXIETY: NO ANXIETY (AT EASE)
VISUAL DISTURBANCES: NOT PRESENT
ORIENTATION AND CLOUDING OF SENSORIUM: ORIENTED AND CAN DO SERIAL ADDITIONS
HEADACHE, FULLNESS IN HEAD: MODERATE

## 2018-03-05 ASSESSMENT — PAIN SCALES - GENERAL
PAINLEVEL_OUTOF10: 3
PAINLEVEL_OUTOF10: 9
PAINLEVEL_OUTOF10: 3
PAINLEVEL_OUTOF10: 3
PAINLEVEL_OUTOF10: 10
PAINLEVEL_OUTOF10: 5
PAINLEVEL_OUTOF10: 3
PAINLEVEL_OUTOF10: 9
PAINLEVEL_OUTOF10: 9

## 2018-03-05 ASSESSMENT — COPD QUESTIONNAIRES
DO YOU EVER COUGH UP ANY MUCUS OR PHLEGM?: NO/ONLY WITH OCCASIONAL COLDS OR INFECTIONS
COPD SCREENING SCORE: 2
HAVE YOU SMOKED AT LEAST 100 CIGARETTES IN YOUR ENTIRE LIFE: YES
DURING THE PAST 4 WEEKS HOW MUCH DID YOU FEEL SHORT OF BREATH: NONE/LITTLE OF THE TIME
DO YOU EVER COUGH UP ANY MUCUS OR PHLEGM?: NO/ONLY WITH OCCASIONAL COLDS OR INFECTIONS
DURING THE PAST 4 WEEKS HOW MUCH DID YOU FEEL SHORT OF BREATH: NONE/LITTLE OF THE TIME
HAVE YOU SMOKED AT LEAST 100 CIGARETTES IN YOUR ENTIRE LIFE: YES

## 2018-03-05 ASSESSMENT — ENCOUNTER SYMPTOMS
FOCAL WEAKNESS: 0
HEADACHES: 1

## 2018-03-05 NOTE — PROGRESS NOTES
LATE ENTRY:    Neurosx, Nikolai, rounded on pt this AM, additional 1 unit of platelets given     Dr. Varela updated on pt during rounds this AM regarding pts hypoglycemia and potential for ETOH withdrawal    RASS scale for withdraw, tx with ativan  Rally bag infusion in place, pt with regular diet    Will continue to assess pts FSBG and RASS at minimum q2hr

## 2018-03-05 NOTE — PROGRESS NOTES
1510: pt had witnessed seizure, 4mg ativan given, suctioned PRN, on 6L NC, RT called    Dr. Varela updated and at bedside, orders for keppra infusion received    Dr. Menchaca called and orders for STAT CT received

## 2018-03-05 NOTE — H&P
Trauma History and Physical  3/4/2018    Attending Physician: Parveen Roque MD.     CC: Trauma The patient was triaged as a T-5000 in accordance with established pre hospital protols. An expeditious primary and secondary survey with required adjuncts was conducted. See Trauma Narrator for full details.    HPI: This is a 49 yo female who says she tripped over her dog this evening while drinking alcohol, sustaining a ground level fall in the process and striking her head and face on the ground. She did lose consciousness and was brought to [facility] for evaluation.    Past Medical History:   Diagnosis Date   • Surgical menopause    • Tobacco dependence        Past Surgical History:   Procedure Laterality Date   • ABDOMINAL HYSTERECTOMY TOTAL  age 27    Total       Current Facility-Administered Medications   Medication Dose Route Frequency Provider Last Rate Last Dose   • Respiratory Care per Protocol   Nebulization Continuous RT Parveen Roque M.D.       • Pharmacy Consult Request ...Pain Management Review 1 Each  1 Each Other PRN Parveen Roque M.D.       • docusate sodium (COLACE) capsule 100 mg  100 mg Oral BID Parveen Roque M.D.       • [START ON 3/5/2018] senna-docusate (PERICOLACE or SENOKOT S) 8.6-50 MG per tablet 1 Tab  1 Tab Oral Nightly Parveen Roque M.D.       • senna-docusate (PERICOLACE or SENOKOT S) 8.6-50 MG per tablet 1 Tab  1 Tab Oral Q24HRS PRN Parveen Roque M.D.       • [START ON 3/5/2018] polyethylene glycol/lytes (MIRALAX) PACKET 1 Packet  1 Packet Oral BID Parveen Roque M.D.       • [START ON 3/5/2018] magnesium hydroxide (MILK OF MAGNESIA) suspension 30 mL  30 mL Oral DAILY Parveen Roque M.D.       • bisacodyl (DULCOLAX) suppository 10 mg  10 mg Rectal Q24HRS PRN Parveen Roque M.D.       • fleet enema 133 mL  1 Each Rectal Once PRN Parveen Roque M.D.       • NS infusion   Intravenous Continuous Parveen Roque M.D.       • HYDROmorphone  (DILAUDID) injection 0.5 mg  0.5 mg Intravenous Q2HRS PRN Parveen Roque M.D.       • ondansetron (ZOFRAN) syringe/vial injection 4 mg  4 mg Intravenous Q4HRS PRN Parveen Roque M.D.       • levETIRAcetam (KEPPRA) 500 mg in  mL IVPB  500 mg Intravenous BID Parveen Roque M.D.       • oxyCODONE immediate-release (ROXICODONE) tablet 5 mg  5 mg Oral Q3HRS PRN Parveen Roque M.D.        Or   • oxyCODONE immediate-release (ROXICODONE) tablet 10 mg  10 mg Oral Q3HRS PRPETTY Roque M.D.         No current outpatient prescriptions on file.       Social History     Social History   • Marital status: Single     Spouse name: N/A   • Number of children: N/A   • Years of education: N/A     Occupational History   • Not on file.     Social History Main Topics   • Smoking status: Current Every Day Smoker     Packs/day: 0.50     Years: 15.00   • Smokeless tobacco: Never Used   • Alcohol use 0.5 oz/week     1 Glasses of wine per week   • Drug use: No   • Sexual activity: Yes     Partners: Male     Birth control/ protection: Surgical     Other Topics Concern   • Not on file     Social History Narrative   • No narrative on file       Family History   Problem Relation Age of Onset   • Cancer Neg Hx    • Diabetes Neg Hx    • Heart Disease Neg Hx    • Stroke Neg Hx    • Hyperlipidemia Neg Hx    • Hypertension Neg Hx        Allergies:  Shrimp flavor    Review of Systems:  Constitutional: Negative for fever, chills, weight loss, malaise/fatigue and diaphoresis.   HENT: Negative for hearing loss, ear pain, nosebleeds, congestion, sore throat, neck pain, and ear discharge.    Eyes: Negative for blurred vision, double vision, and redness.   Respiratory: Negative for cough, sputum production, shortness of breath, wheezing and stridor.    Cardiovascular: Negative for chest pain, palpitations.   Gastrointestinal: Negative for heartburn, nausea, vomiting, abdominal pain, diarrhea, constipation.  Genitourinary:  "Negative for dysuria, urgency, frequency.   Musculoskeletal: Negative for myalgias, back pain, joint pain and falls.   Skin: Negative for itching and rash.  Neurological: Negative for dizziness, loss of consciousness, weakness and headaches.   Endo/Heme/Allergies: Negative for environmental allergies. Does not bruise/bleed easily.   Psychiatric/Behavioral: Denies depression and substance abuse. The patient is not nervous/anxious.    Physical Exam:  Blood pressure (!) 99/57, pulse 100, temperature 36 °C (96.8 °F), resp. rate 18, height 1.727 m (5' 8\"), weight 69 kg (152 lb 1.9 oz), SpO2 100 %.    Constitutional: Awake, alert, oriented x3. Appears acutely inxociated. No acute distress. GCS 15. E4 V5 M6.  Head: No cephalohematoma. Right periorbital swelling and ecchymosis. Pupils 4-3 reactive bilaterally. Midface stable. No malocclusion.   Neck: No tracheal deviation. No midline cervical spine tenderness.  No cervical seatbelt sign.  Cardiovascular: Normal rate, regular rhythm, normal heart sounds and intact distal pulses.  Exam reveals no gallop and no friction rub.  No murmur heard.  Pulmonary/Chest: Clavicles nontender to palpation. There is not any chest wall tenderness bilaterally.  No crepitus. Positive breath sounds bilaterally.   Abdominal: Soft, nondistended. Nontender to palpation. Pelvis is stable to anterior-posterior compression. No abdominal seatbelt sign.   Musculoskeletal: Right upper extremity grossly atraumatic, palpable radial pulse. 5/5  strength. Full ROM and strength at elbow.  Left upper extremity grossly atraumatic, palpable radial pulse. 5/5  strength. Full ROM and strength at elbow.  Right lower extremity grossly atraumatic. 5/5 strength in ankle plantar flexion and dorsiflexion. No pain and full ROM at right knee and hip. 2+ DP pulse.  Left  lower extremity grossly atraumatic. 5/5 strength in ankle plantar flexion and dorsiflexion. No pain and full ROM at left knee and hip. 2+ DP " pulse.  Back: Midline thoracic and lumbar spines are nontender to palpation. No step-offs. Mild sacral erythema present.  : Normal female external genitalia. Rectal exam not done. No blood visible at urethral meatus.   Neurological: Sensation intact to light touch dorsum and plantar surfaces of both feet and the medial and lateral aspects of both lower legs.  Sensation intact to light touch dorsum and plantar surfaces of both hands.   Skin: Skin is warm and dry.  No diaphoresis. No erythema. No pallor.   Psychiatric:  Normal mood and affect.  Behavior is appropriate.       Labs:  Recent Labs      03/04/18 2104   WBC  3.0*   RBC  3.07*   HEMOGLOBIN  10.4*   HEMATOCRIT  31.7*   MCV  103.3*   MCH  33.9*   MCHC  32.8*   RDW  55.0*   PLATELETCT  51*   MPV  10.1     Recent Labs      03/04/18 2104   SODIUM  139   POTASSIUM  3.2*   CHLORIDE  98   CO2  19*   GLUCOSE  60*   BUN  19   CREATININE  0.68   CALCIUM  8.2*         Recent Labs      03/04/18 2104   ASTSGOT  1011*   ALTSGPT  202*   TBILIRUBIN  1.5   ALKPHOSPHAT  204*   GLOBULIN  2.6       Radiology:  CT-MAXILLOFACIAL W/O PLUS RECONS   Final Result         1.  No acute traumatic facial bony injuries identified.   2.  Mild right proptosis   3.  Mild bilateral maxillary sinus mucosal disease.   4.  Intracranial hemorrhage, see dedicated CT head for further characterization.      CT-CSPINE WITHOUT PLUS RECONS   Final Result         1.  No acute traumatic bony injury of the cervical spine is apparent.   2.  Head rotation limits evaluation of the C1/C2 axis. Additional imaging for further evaluation of the atlantoaxial joint recommended as clinically appropriate.      CT-HEAD W/O   Final Result         1.  Subarachnoid hemorrhages in the right sylvian fissure.   2.  Layering subarachnoid hemorrhage along the left tentorium.   3.  Right parafalcine subdural hematoma   4.  4 mm left frontal subdural hematoma   5.  Subarachnoid hemorrhage adjacent to the brainstem on  the right   6.  4.1 mm right to left midline shift      These findings were discussed with the patient's clinician, PAUL GANDHI, on 3/4/2018 10:49 PM.      CT-HEAD W/O    (Results Pending)         Assessment: This is a 48 y.o. Female with what appears to be pancytopenia from heavy alcohol use who has significant intracranial hemorrhage after a ground level fall. I am going to give her 1 U platelets empirically for thrombocytopenia. I will follow up her platelet mapping TEG and INR once they are completed    Plan: Admit to ICU  Active Hospital Problems    Diagnosis   • Traumatic intracranial hemorrhage (CMS-[facility]) [S06.309A]     Priority: High     Multiple areas of SAH and SDH, 4.1mm shift  Repeat CT imaging of the brain ordered.  Keppra x 1 week ordered  Cog eval ordered  Non-operative management.  Lorne Menchaca MD. Neurosurgery.       • Thrombocytopenia (CMS-[facility]) [D69.6]     Priority: High     Platelet count 51 on admission, presumably from alcoholism  1 U platelets given empirically  Trend exam, head CT, platelet count and TEG     • Acute alcohol intoxication (CMS-[facility]) [F10.929]     Priority: High     DEYANIRA 0.43 on admission; LFTs all elevated, thrombocytopenic  Monitor closely for signs of withdrawal           The patient is/remains critically ill with significant traumatic brain injury, coagulopathy, thrombocytopenia, acute alcohol intoxication.    I provided the following critical care services: management of above, communication with consulting physicians.    Critical care time spent exclusive of procedures: 85 minutes thus far      Parveen Roque MD  [facility]  [phone]

## 2018-03-05 NOTE — PROGRESS NOTES
FSBG checked at change of shift, = 54, 1 amp D50 given per hypoglycemia protocol, recheck = 140, will check in 1 hour.

## 2018-03-05 NOTE — ED NOTES
Med rec updated and complete.  Allergies reviewed.  Pt should be taking citalopram 10 mg  And estradiol 1 mg .  Pt is currently out of these medications.

## 2018-03-05 NOTE — PROGRESS NOTES
"  Trauma/Surgical Progress Note    Author: Ken Varela Date & Time created: 3/5/2018   3:33 PM     Interval Events:  New admit tertiary survey   Resting tachycardia   Sudden seizure   correcting platelets and TEG   Hypersplenism / ETOH  Alcohol withdrawal likely   Repeating head CT , post seizure   Intubation watch  Deteriorating significant chi    Review of Systems   Musculoskeletal: Positive for joint pain.   Neurological: Positive for headaches. Negative for focal weakness.   All other systems reviewed and are negative.    Hemodynamics:  Blood pressure 131/73, pulse (!) 109, temperature 37.1 °C (98.8 °F), resp. rate 16, height 1.727 m (5' 8\"), weight 60.3 kg (132 lb 15 oz), SpO2 96 %.     Respiratory:    Respiration: 16, Pulse Oximetry: 96 %, O2 Daily Delivery Respiratory : Silicone Nasal Cannula        RUL Breath Sounds: Clear, RML Breath Sounds: Clear, RLL Breath Sounds: Diminished, CARLI Breath Sounds: Clear, LLL Breath Sounds: Diminished  Fluids:    Intake/Output Summary (Last 24 hours) at 03/05/18 1533  Last data filed at 03/05/18 1400   Gross per 24 hour   Intake             1534 ml   Output             1700 ml   Net             -166 ml     Admit Weight: 69 kg (152 lb 1.9 oz)  Current Weight: 60.3 kg (132 lb 15 oz)    Physical Exam   Constitutional: She is oriented to person, place, and time. She appears well-developed and well-nourished.   HENT:   Extensive STS, bruising right face   Eyes: Pupils are equal, round, and reactive to light. No scleral icterus.   Neck: Normal range of motion. Neck supple. No JVD present. No tracheal deviation present. No thyromegaly present.   Cardiovascular: Regular rhythm.    Sinus tach   Pulmonary/Chest: No respiratory distress. She exhibits no tenderness.   Abdominal: Soft. Bowel sounds are normal. She exhibits no distension. There is no tenderness.   Musculoskeletal: She exhibits edema.   Lymphadenopathy:     She has no cervical adenopathy.   Neurological: She is alert and " oriented to person, place, and time. No cranial nerve deficit.   Had seizure subsequently   Skin: Skin is warm and dry.   Psychiatric: She has a normal mood and affect.   Nursing note and vitals reviewed.      Medical Decision Making/Problem List:    Active Hospital Problems    Diagnosis   • Traumatic intracranial hemorrhage (CMS-HCC) [S06.309A]     Priority: High     Multiple areas of SAH and SDH, 4.1mm shift  Repeat CT imaging of the brain stable to slightly worse  Keppra x 1 week ordered  3/5 seizure @1515, REPEAT CT WHEN SAFE TO PROCEED, INTUBATION WATCH  Cog eval ordered  Non-operative management.  Lorne Menchaca MD. Neurosurgery.       • Thrombocytopenia (CMS-HCC) [D69.6]     Priority: High     Platelet count 51 on admission, presumably from alcoholism  1 U platelets given empirically  Initial TEG normal but with evidence of platelet inhibition, repeat TEG slightly better after platelet transfusion.  Repeat CT head slightly worse, 2nd unit of platelets given.  Trend platelet count,     • Acute alcohol intoxication (CMS-HCC) [F10.929]     Priority: High     DEYANIRA 0.43 on admission; LFTs all elevated, thrombocytopenic  Monitor closely for signs of withdrawal     • Pharmacologic contraindication to deep vein thrombosis (DVT) prophylaxis [Z53.09]     Priority: Low     Prophylactic Lovenox initially contraindicated due to elevated bleeding risk  Duplex ordered for 3/7/18       Core Measures & Quality Metrics:  Labs reviewed, Medications reviewed and Radiology images reviewed  Pemberton catheter: No Pemberton      DVT Prophylaxis: Contraindicated - High bleeding risk  DVT prophylaxis - mechanical: SCDs  Ulcer prophylaxis: Yes        LUNA Score  Discussed patient condition with RN, RT, Pharmacy, Dietary,  and neurosurgery.  CRITICAL CARE TIME EXCLUDING PROCEDURES: 41    minutes  I independently reviewed pertinent clinical lab tests from the last 48 hours and ordered additional follow up clinical lab tests.  I  independently reviewed pertinent radiographic images and the radiologist's reports from the last 48 hours and ordered additional follow up radiographic studies.  I reviewed the details of the available patient records and documentation by consulting physicians in [facility] up to today, summated the information, and utilized the information as warranted in today's medical decision making for this patient.  I personally evaluated the patient condition at bedside and discussed the daily plan(s) with available nursing staff, dieticians, social workers, pharmacists on rounds.    I am addressing seizures , managing hypocoagulability and frequently reevaluating for possible intubation   Managing alcohol withdrawal

## 2018-03-05 NOTE — CONSULTS
DATE OF SERVICE:  03/05/2018    NEUROSURGICAL CONSULTATION    HISTORY OF PRESENT ILLNESS:  Patient is a pleasant 48-year-old woman who fell   yesterday.  No recollection of the event.  She believes she probably passed   out.  She has a headache.  No weakness, numbness, or tingling.  No nausea or   vomiting.  Her right eye is swollen shut, but she is not complaining of any   vision changes.  She was drinking at the time of the event.  She has a history   of alcohol abuse.    PAST MEDICAL HISTORY:  Menopause, tobacco dependency, ETOH dependency.    PAST SURGICAL HISTORY:  Total abdominal hysterectomy.    MEDICATIONS ON PRESENTATION:  None.    SOCIAL HISTORY:  She has a history of ETOH abuse.  She smokes, and no   illicits.    PHYSICAL EXAMINATION:  GENERAL:  A and O x3.  GCS of 15.  HEENT:  Left pupil extraocular muscles intact and pupil is reactive.  Right   eye is swollen shut.  She has severe facial swelling.  She has a right droop,   likely secondary to severe facial swelling.  Tongue is midline.  NEUROLOGIC:  Motor is 5/5 strength in all muscle groups.  No drift.  Sensory   grossly intact to light touch.    LABORATORY VALUES:  CBC from 9:00 p.m. last night shows a white count of 3.0,   hemoglobin 10.4, platelets of 51.  Basic metabolic panel from 4:30 a.m. is   within normal limits except for glucose of 55, , , alkaline   phosphatase 134, all elevated.  ETOH on presentation at 9:00 p.m. last night   was 0.43.  Last night before any platelet transfusion, INR 1.02, PTT 28.5.    TEG is normal with the exception of having AA and ADP severe inhibition.    After 1 unit of platelets, INR 1.16, AA inhibition is 78.9, ADP is 65.2.    IMAGING:  CT scan of the brain, noncontrast, on repeat at 5:40 this morning   compared to 22:35 last night shows multiple areas of traumatic subarachnoid   hemorrhage and falcine subdural and convexity subdural, all of which are quite   small.  There is mass effect, but no  significant shift, no fracture.    ASSESSMENT AND PLAN:  I have discussed plan with trauma.  Given the continued   platelet inhibition and slightly worsening CAT scan on repeat, we will order 1   more unit of platelets.  Keep the patient in the intensive care unit.  We   will watch her carefully.  I think it is unlikely that she will need surgical   intervention given how good her physical exam is despite imaging findings.  We   will follow closely.       ____________________________________     EULALIA HALL MD    CPD / NTS    DD:  03/05/2018 08:59:37  DT:  03/05/2018 12:37:44    D#:  [id number]  Job#:  [id number]

## 2018-03-05 NOTE — PROGRESS NOTES
Spoke to Dr. Roque regarding critical glucose lab results of 60, and recheck was 57. Per MD, OK to give one cup of orange juice now, once, and place in orders for hypoglycemic protocol. New orders applied and implemented.

## 2018-03-05 NOTE — PROGRESS NOTES
Pt arrived to S108, attched to ICU monitors.    2 RN skin assessment complete. Scattered bruising throughout, R eye hematoma and brusing, R cheek laceration. No other areas of concern, all preventative measures in place.      HR: 115  BP: 111/63  O2: 99% on 2L  Temp: 98.3

## 2018-03-05 NOTE — ED NOTES
Bedside report given to Suzy ROSA. Pt transported to S 108 with RN and ICU tech on monitor and O2. A&Ox4. Belongings with patient.

## 2018-03-05 NOTE — PROGRESS NOTES
Spoke to Dr. Roque regarding TEG results. New orders received to draw new TEG with morning labs. Also, OK to d/c order regarding C-Collar. Not indicated upon arrival to unit per MD. New orders applied and implemented.

## 2018-03-05 NOTE — THERAPY
Orders received for cognitive evaluation.  RN reports eval is not pending discharge today.  SLP will complete evaluation as soon as possible.  Thank you.

## 2018-03-05 NOTE — ED PROVIDER NOTES
ED Provider Note    Scribed for Dr. Meng Lim M.D. by Dione Rojas. 3/4/2018  9:14 PM    Primary care provider: Earlene Rebolledo P.A.-C.  Means of arrival: EMS  History obtained from: Patient  History limited by: None    CHIEF COMPLAINT  Chief Complaint   Patient presents with   • GLF     Pt states tripped over dogs. Fell on face. Hematoma to R face, R eye swollen shut.        HPI  Terri Krishnan is a 48 y.o. female who presents to the Emergency Department for evaluation of her right eye after experiencing a ground level fall just prior to arrival. Patient reports she was walking when she tripped over her dog and hit the tile. She states cannot see out her right eye and confirms her vision was normal prior to the accident. She has associated swelling and bruising. Patient reports she takes Estradiol and denies taking any blood thinners. Patient denies any other trauma to her body and neck pain.    REVIEW OF SYSTEMS  Pertinent positives include swelling and bruising over right eye, vision loss in her right eye.   Pertinent negatives include no other trauma to her body and neck pain.   As above, all other systems reviewed and are negative. C.    See HPI for further details.     PAST MEDICAL HISTORY   has a past medical history of Surgical menopause and Tobacco dependence.    SURGICAL HISTORY   has a past surgical history that includes abdominal hysterectomy total (age 27).    SOCIAL HISTORY  Social History   Substance Use Topics   • Smoking status: Current Every Day Smoker     Packs/day: 0.50     Years: 15.00   • Smokeless tobacco: Never Used   • Alcohol use 0.5 oz/week     1 Glasses of wine per week      History   Drug Use No       FAMILY HISTORY  Family History   Problem Relation Age of Onset   • Cancer Neg Hx    • Diabetes Neg Hx    • Heart Disease Neg Hx    • Stroke Neg Hx    • Hyperlipidemia Neg Hx    • Hypertension Neg Hx        CURRENT MEDICATIONS  Home Medications     Reviewed by Nimisha Grier PhT  "(Pharmacy Tech) on 03/04/18 at 2336  Med List Status: Complete   Medication Last Dose Status        Patient Chris Taking any Medications                       ALLERGIES  Allergies   Allergen Reactions   • Shrimp Flavor Anaphylaxis       PHYSICAL EXAM  VITAL SIGNS: BP (!) 99/57   Pulse 89   Temp 36 °C (96.8 °F)   Resp 18   Ht 1.727 m (5' 8\")   Wt 69 kg (152 lb 1.9 oz)   BMI 23.13 kg/m²     Constitutional: Somewhat chronically ill-appearing,  , mild distress, appears intoxicated. Drowsy and slurred speech.  HENT: Normocephalic, Atraumatic, Bilateral external ears normal, Oropharynx moist, No oral exudates. Large swelling on the right side of her face. Right eye is completely shut and cannot tell if there are any deformities.   Eyes: No Right eye swollen shut cannot visualize  Neck: No tenderness, Supple, No stridor.   Lymphatic: No lymphadenopathy noted.   Cardiovascular: Normal heart rate, Normal rhythm.   Thorax & Lungs: Clear to auscultation bilaterally, No respiratory distress, No wheezing, No crackles.   Abdomen: Soft, No tenderness, No masses, No pulsatile masses.   Skin: Warm, Dry, No erythema, No rash.   Extremities:, No edema No cyanosis.   Musculoskeletal: No tenderness to palpation or major deformities noted.  Intact distal pulses  Neurologic: Awake, alert. Moves all extremities spontaneously.  Psychiatric: Affect normal, Judgment normal, Mood normal.     LABS  Results for orders placed or performed during the hospital encounter of 03/04/18   CBC WITH DIFFERENTIAL   Result Value Ref Range    WBC 3.0 (L) 4.8 - 10.8 K/uL    RBC 3.07 (L) 4.20 - 5.40 M/uL    Hemoglobin 10.4 (L) 12.0 - 16.0 g/dL    Hematocrit 31.7 (L) 37.0 - 47.0 %    .3 (H) 81.4 - 97.8 fL    MCH 33.9 (H) 27.0 - 33.0 pg    MCHC 32.8 (L) 33.6 - 35.0 g/dL    RDW 55.0 (H) 35.9 - 50.0 fL    Platelet Count 51 (L) 164 - 446 K/uL    MPV 10.1 9.0 - 12.9 fL    Neutrophils-Polys 62.10 44.00 - 72.00 %    Lymphocytes 27.30 22.00 - 41.00 %    " Monocytes 8.30 0.00 - 13.40 %    Eosinophils 0.30 0.00 - 6.90 %    Basophils 1.30 0.00 - 1.80 %    Immature Granulocytes 0.70 0.00 - 0.90 %    Nucleated RBC 0.00 /100 WBC    Neutrophils (Absolute) 1.86 (L) 2.00 - 7.15 K/uL    Lymphs (Absolute) 0.82 (L) 1.00 - 4.80 K/uL    Monos (Absolute) 0.25 0.00 - 0.85 K/uL    Eos (Absolute) 0.01 0.00 - 0.51 K/uL    Baso (Absolute) 0.04 0.00 - 0.12 K/uL    Immature Granulocytes (abs) 0.02 0.00 - 0.11 K/uL    NRBC (Absolute) 0.00 K/uL   COMP METABOLIC PANEL   Result Value Ref Range    Sodium 139 135 - 145 mmol/L    Potassium 3.2 (L) 3.6 - 5.5 mmol/L    Chloride 98 96 - 112 mmol/L    Co2 19 (L) 20 - 33 mmol/L    Anion Gap 22.0 (H) 0.0 - 11.9    Glucose 60 (L) 65 - 99 mg/dL    Bun 19 8 - 22 mg/dL    Creatinine 0.68 0.50 - 1.40 mg/dL    Calcium 8.2 (L) 8.5 - 10.5 mg/dL    AST(SGOT) 1011 (H) 12 - 45 U/L    ALT(SGPT) 202 (H) 2 - 50 U/L    Alkaline Phosphatase 204 (H) 30 - 99 U/L    Total Bilirubin 1.5 0.1 - 1.5 mg/dL    Albumin 3.8 3.2 - 4.9 g/dL    Total Protein 6.4 6.0 - 8.2 g/dL    Globulin 2.6 1.9 - 3.5 g/dL    A-G Ratio 1.5 g/dL   DIAGNOSTIC ALCOHOL   Result Value Ref Range    Diagnostic Alcohol 0.43 (H) 0.00 g/dL   ESTIMATED GFR   Result Value Ref Range    GFR If African American >60 >60 mL/min/1.73 m 2    GFR If Non African American >60 >60 mL/min/1.73 m 2       All labs reviewed by me.      RADIOLOGY  CT-MAXILLOFACIAL W/O PLUS RECONS   Final Result         1.  No acute traumatic facial bony injuries identified.   2.  Mild right proptosis   3.  Mild bilateral maxillary sinus mucosal disease.   4.  Intracranial hemorrhage, see dedicated CT head for further characterization.      CT-CSPINE WITHOUT PLUS RECONS   Final Result         1.  No acute traumatic bony injury of the cervical spine is apparent.   2.  Head rotation limits evaluation of the C1/C2 axis. Additional imaging for further evaluation of the atlantoaxial joint recommended as clinically appropriate.      CT-HEAD W/O    Final Result         1.  Subarachnoid hemorrhages in the right sylvian fissure.   2.  Layering subarachnoid hemorrhage along the left tentorium.   3.  Right parafalcine subdural hematoma   4.  4 mm left frontal subdural hematoma   5.  Subarachnoid hemorrhage adjacent to the brainstem on the right   6.  4.1 mm right to left midline shift      These findings were discussed with the patient's clinician, MENG LIM, on 3/4/2018 10:49 PM.      CT-HEAD W/O    (Results Pending)     All radiology reviewed by me.     COURSE & MEDICAL DECISION MAKING  Pertinent Labs & Imaging studies reviewed. (See chart for details)    9:14 PM - Patient seen and examined at bedside. Patient will be treated with Norco 5-325 mg, Amoxil 500 mg, Motrin 800 mg, Estrace 1 mg, Celexa 10 mg. Ordered CT-Maxillofacial W/O Plus Reconds, CT-Cspine W/Out Plus Recons, CT-Head W/O to evaluate her symptoms. The differential diagnoses include but are not limited to: Facial fractures intracranial hemorrhage alcohol intoxication    10:57 PM  [facility] and Trauma surgeons     11:06 PM I discussed the patient's case and the above findings with Dr. Menchaca (Neuro Surgery) who agrees to see the patient.  Discussed with trauma surgeon Dr. ku who will admit  11:11 PM Ordered Platelet Mapping with Basic TEG      Decision Making:  Patient with significant intracranial hemorrhage will be admitted to ICU    DISPOSITION:  Patient will be admitted       FINAL IMPRESSION  1. Intracranial hemorrhage (CMS-[facility])          Dione HATCH (Scribe), am scribing for, and in the presence of, Meng Lim M.D..    Electronically signed by: Dione Rojas (Shaina), 3/4/2018    Meng HATCH M.D. personally performed the services described in this documentation, as scribed by Dione Rojas in my presence, and it is both accurate and complete.    The note accurately reflects work and decisions made by me.  Meng Lim  3/4/2018  11:52 PM
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
## 2018-03-05 NOTE — PROGRESS NOTES
Dr. Roque at bedside. New orders received to draw TEG, and give 1 unit of platelets now. Repeat CT in am, and order x-ray for left wrist. MD placed orders in Epic.

## 2018-03-05 NOTE — CARE PLAN
Problem: Safety  Goal: Will remain free from injury    Intervention: Collaborate with Interdisciplinary Team for safe transfer and mobilization techniques  Pt updated on POc and reasoning behind therapies and interventions. Pt understands, is AOx4 and encouraged to ask questions      Problem: Pain Management  Goal: Pain level will decrease to patient's comfort goal  Pt pain assessed using 1-10 scale and treated per MAR. Non-pharm interventions in place such as ice packs

## 2018-03-05 NOTE — CARE PLAN
Problem: Safety  Goal: Will remain free from injury  Call light within reach, bed in low position, non skid socks on pt, bed alarm on.  Pt educated on importance of calling for assistance, pt verbalized understanding.     Problem: Knowledge Deficit  Goal: Knowledge of disease process/condition, treatment plan, diagnostic tests, and medications will improve  Pt educated on plan of care, verbalized understanding.

## 2018-03-06 ENCOUNTER — APPOINTMENT (OUTPATIENT)
Dept: RADIOLOGY | Facility: MEDICAL CENTER | Age: 48
DRG: 083 | End: 2018-03-06
Attending: SURGERY
Payer: COMMERCIAL

## 2018-03-06 PROBLEM — D62 ANEMIA DUE TO ACUTE BLOOD LOSS: Status: ACTIVE | Noted: 2018-03-06

## 2018-03-06 LAB
ALBUMIN SERPL BCP-MCNC: 3.7 G/DL (ref 3.2–4.9)
ALBUMIN/GLOB SERPL: 1.5 G/DL
ALP SERPL-CCNC: 175 U/L (ref 30–99)
ALT SERPL-CCNC: 127 U/L (ref 2–50)
ANION GAP SERPL CALC-SCNC: 8 MMOL/L (ref 0–11.9)
AST SERPL-CCNC: 425 U/L (ref 12–45)
BASOPHILS # BLD AUTO: 0.3 % (ref 0–1.8)
BASOPHILS # BLD: 0.01 K/UL (ref 0–0.12)
BILIRUB SERPL-MCNC: 1.9 MG/DL (ref 0.1–1.5)
BUN SERPL-MCNC: 8 MG/DL (ref 8–22)
CALCIUM SERPL-MCNC: 7.7 MG/DL (ref 8.5–10.5)
CFT BLD TEG: 4.1 MIN (ref 5–10)
CHLORIDE SERPL-SCNC: 96 MMOL/L (ref 96–112)
CLOT ANGLE BLD TEG: 75.8 DEGREES (ref 53–72)
CLOT LYSIS 30M P MA LENFR BLD TEG: 1.1 % (ref 0–8)
CO2 SERPL-SCNC: 28 MMOL/L (ref 20–33)
CREAT SERPL-MCNC: 0.44 MG/DL (ref 0.5–1.4)
CT.EXTRINSIC BLD ROTEM: 1 MIN (ref 1–3)
EKG IMPRESSION: NORMAL
EOSINOPHIL # BLD AUTO: 0.05 K/UL (ref 0–0.51)
EOSINOPHIL NFR BLD: 1.3 % (ref 0–6.9)
ERYTHROCYTE [DISTWIDTH] IN BLOOD BY AUTOMATED COUNT: 52.6 FL (ref 35.9–50)
GLOBULIN SER CALC-MCNC: 2.4 G/DL (ref 1.9–3.5)
GLUCOSE BLD-MCNC: 143 MG/DL (ref 65–99)
GLUCOSE SERPL-MCNC: 119 MG/DL (ref 65–99)
HCT VFR BLD AUTO: 21.4 % (ref 37–47)
HGB BLD-MCNC: 7 G/DL (ref 12–16)
IMM GRANULOCYTES # BLD AUTO: 0.03 K/UL (ref 0–0.11)
IMM GRANULOCYTES NFR BLD AUTO: 0.8 % (ref 0–0.9)
LYMPHOCYTES # BLD AUTO: 0.64 K/UL (ref 1–4.8)
LYMPHOCYTES NFR BLD: 16.2 % (ref 22–41)
MCF BLD TEG: 66.8 MM (ref 50–70)
MCH RBC QN AUTO: 34 PG (ref 27–33)
MCHC RBC AUTO-ENTMCNC: 32.7 G/DL (ref 33.6–35)
MCV RBC AUTO: 103.9 FL (ref 81.4–97.8)
MONOCYTES # BLD AUTO: 0.31 K/UL (ref 0–0.85)
MONOCYTES NFR BLD AUTO: 7.8 % (ref 0–13.4)
NEUTROPHILS # BLD AUTO: 2.91 K/UL (ref 2–7.15)
NEUTROPHILS NFR BLD: 73.6 % (ref 44–72)
NRBC # BLD AUTO: 0.02 K/UL
NRBC BLD-RTO: 0.5 /100 WBC
PA AA BLD-ACNC: 19.3 %
PA ADP BLD-ACNC: 87.6 %
PLATELET # BLD AUTO: 186 K/UL (ref 164–446)
PMV BLD AUTO: 10.5 FL (ref 9–12.9)
POTASSIUM SERPL-SCNC: 3.4 MMOL/L (ref 3.6–5.5)
PROT SERPL-MCNC: 6.1 G/DL (ref 6–8.2)
RBC # BLD AUTO: 2.06 M/UL (ref 4.2–5.4)
SODIUM SERPL-SCNC: 132 MMOL/L (ref 135–145)
TEG ALGORITHM TGALG: ABNORMAL
WBC # BLD AUTO: 4 K/UL (ref 4.8–10.8)

## 2018-03-06 PROCEDURE — 700111 HCHG RX REV CODE 636 W/ 250 OVERRIDE (IP): Performed by: NEUROLOGICAL SURGERY

## 2018-03-06 PROCEDURE — HZ2ZZZZ DETOXIFICATION SERVICES FOR SUBSTANCE ABUSE TREATMENT: ICD-10-PCS | Performed by: SURGERY

## 2018-03-06 PROCEDURE — 700102 HCHG RX REV CODE 250 W/ 637 OVERRIDE(OP): Performed by: SURGERY

## 2018-03-06 PROCEDURE — 93010 ELECTROCARDIOGRAM REPORT: CPT | Performed by: INTERNAL MEDICINE

## 2018-03-06 PROCEDURE — A9270 NON-COVERED ITEM OR SERVICE: HCPCS | Performed by: SURGERY

## 2018-03-06 PROCEDURE — 93005 ELECTROCARDIOGRAM TRACING: CPT | Performed by: SURGERY

## 2018-03-06 PROCEDURE — 80053 COMPREHEN METABOLIC PANEL: CPT

## 2018-03-06 PROCEDURE — A9270 NON-COVERED ITEM OR SERVICE: HCPCS | Performed by: NURSE PRACTITIONER

## 2018-03-06 PROCEDURE — 82962 GLUCOSE BLOOD TEST: CPT

## 2018-03-06 PROCEDURE — 700111 HCHG RX REV CODE 636 W/ 250 OVERRIDE (IP): Performed by: NURSE PRACTITIONER

## 2018-03-06 PROCEDURE — 85025 COMPLETE CBC W/AUTO DIFF WBC: CPT

## 2018-03-06 PROCEDURE — 700105 HCHG RX REV CODE 258: Performed by: NEUROLOGICAL SURGERY

## 2018-03-06 PROCEDURE — 85347 COAGULATION TIME ACTIVATED: CPT

## 2018-03-06 PROCEDURE — 770022 HCHG ROOM/CARE - ICU (200)

## 2018-03-06 PROCEDURE — 85384 FIBRINOGEN ACTIVITY: CPT

## 2018-03-06 PROCEDURE — 99291 CRITICAL CARE FIRST HOUR: CPT | Performed by: SURGERY

## 2018-03-06 PROCEDURE — 700105 HCHG RX REV CODE 258: Performed by: SURGERY

## 2018-03-06 PROCEDURE — 74177 CT ABD & PELVIS W/CONTRAST: CPT

## 2018-03-06 PROCEDURE — 700111 HCHG RX REV CODE 636 W/ 250 OVERRIDE (IP): Performed by: SURGERY

## 2018-03-06 PROCEDURE — 700117 HCHG RX CONTRAST REV CODE 255: Performed by: SURGERY

## 2018-03-06 PROCEDURE — C9113 INJ PANTOPRAZOLE SODIUM, VIA: HCPCS | Performed by: SURGERY

## 2018-03-06 PROCEDURE — 85576 BLOOD PLATELET AGGREGATION: CPT

## 2018-03-06 PROCEDURE — 700102 HCHG RX REV CODE 250 W/ 637 OVERRIDE(OP): Performed by: NURSE PRACTITIONER

## 2018-03-06 RX ORDER — METHYLPREDNISOLONE SODIUM SUCCINATE 40 MG/ML
40 INJECTION, POWDER, LYOPHILIZED, FOR SOLUTION INTRAMUSCULAR; INTRAVENOUS
Status: DISPENSED | OUTPATIENT
Start: 2018-03-06 | End: 2018-03-06

## 2018-03-06 RX ORDER — DIPHENHYDRAMINE HYDROCHLORIDE 50 MG/ML
50 INJECTION INTRAMUSCULAR; INTRAVENOUS ONCE
Status: COMPLETED | OUTPATIENT
Start: 2018-03-06 | End: 2018-03-06

## 2018-03-06 RX ORDER — SODIUM CHLORIDE 1 G/1
1 TABLET ORAL
Status: DISCONTINUED | OUTPATIENT
Start: 2018-03-06 | End: 2018-03-07

## 2018-03-06 RX ORDER — PANTOPRAZOLE SODIUM 40 MG/10ML
40 INJECTION, POWDER, LYOPHILIZED, FOR SOLUTION INTRAVENOUS DAILY
Status: DISCONTINUED | OUTPATIENT
Start: 2018-03-06 | End: 2018-03-08

## 2018-03-06 RX ADMIN — SODIUM CHLORIDE TAB 1 GM 1 G: 1 TAB at 13:02

## 2018-03-06 RX ADMIN — POTASSIUM BICARBONATE 50 MEQ: 25 TABLET, EFFERVESCENT ORAL at 13:02

## 2018-03-06 RX ADMIN — IOHEXOL 100 ML: 350 INJECTION, SOLUTION INTRAVENOUS at 16:20

## 2018-03-06 RX ADMIN — SODIUM CHLORIDE TAB 1 GM 1 G: 1 TAB at 16:58

## 2018-03-06 RX ADMIN — DEXTROSE MONOHYDRATE: 50 INJECTION, SOLUTION INTRAVENOUS at 03:57

## 2018-03-06 RX ADMIN — SODIUM CHLORIDE 1000 MG: 9 INJECTION, SOLUTION INTRAVENOUS at 11:35

## 2018-03-06 RX ADMIN — DIPHENHYDRAMINE HYDROCHLORIDE 50 MG: 50 INJECTION, SOLUTION INTRAMUSCULAR; INTRAVENOUS at 16:00

## 2018-03-06 RX ADMIN — LORAZEPAM 2 MG: 2 INJECTION INTRAMUSCULAR; INTRAVENOUS at 21:29

## 2018-03-06 RX ADMIN — SODIUM CHLORIDE 1000 MG: 9 INJECTION, SOLUTION INTRAVENOUS at 20:21

## 2018-03-06 RX ADMIN — THERA TABS 1 TABLET: TAB at 13:02

## 2018-03-06 RX ADMIN — LORAZEPAM 2 MG: 2 INJECTION INTRAMUSCULAR; INTRAVENOUS at 16:54

## 2018-03-06 RX ADMIN — Medication 100 MG: at 13:02

## 2018-03-06 RX ADMIN — LORAZEPAM 3 MG: 2 INJECTION INTRAMUSCULAR; INTRAVENOUS at 08:17

## 2018-03-06 RX ADMIN — LORAZEPAM 2 MG: 2 INJECTION INTRAMUSCULAR; INTRAVENOUS at 14:03

## 2018-03-06 RX ADMIN — LORAZEPAM 2 MG: 2 INJECTION INTRAMUSCULAR; INTRAVENOUS at 20:02

## 2018-03-06 RX ADMIN — PANTOPRAZOLE SODIUM 40 MG: 40 INJECTION, POWDER, LYOPHILIZED, FOR SOLUTION INTRAVENOUS at 13:02

## 2018-03-06 RX ADMIN — FOLIC ACID 1 MG: 1 TABLET ORAL at 13:02

## 2018-03-06 RX ADMIN — LORAZEPAM 2 MG: 2 INJECTION INTRAMUSCULAR; INTRAVENOUS at 15:30

## 2018-03-06 RX ADMIN — LORAZEPAM 2 MG: 2 INJECTION INTRAMUSCULAR; INTRAVENOUS at 05:27

## 2018-03-06 RX ADMIN — LORAZEPAM 2 MG: 2 INJECTION INTRAMUSCULAR; INTRAVENOUS at 02:23

## 2018-03-06 RX ADMIN — POTASSIUM BICARBONATE 50 MEQ: 25 TABLET, EFFERVESCENT ORAL at 20:19

## 2018-03-06 ASSESSMENT — LIFESTYLE VARIABLES
HEADACHE, FULLNESS IN HEAD: NOT PRESENT
TREMOR: *
AGITATION: SOMEWHAT MORE THAN NORMAL ACTIVITY
AUDITORY DISTURBANCES: MILD HARSHNESS OR ABILITY TO FRIGHTEN
TOTAL SCORE: 7
TOTAL SCORE: 14
AUDITORY DISTURBANCES: MILD HARSHNESS OR ABILITY TO FRIGHTEN
VISUAL DISTURBANCES: NOT PRESENT
HEADACHE, FULLNESS IN HEAD: NOT PRESENT
PAROXYSMAL SWEATS: BARELY PERCEPTIBLE SWEATING. PALMS MOIST
ORIENTATION AND CLOUDING OF SENSORIUM: CANNOT DO SERIAL ADDITIONS OR IS UNCERTAIN ABOUT DATE
AGITATION: *
NAUSEA AND VOMITING: NO NAUSEA AND NO VOMITING
NAUSEA AND VOMITING: NO NAUSEA AND NO VOMITING
VISUAL DISTURBANCES: MILD SENSITIVITY
PAROXYSMAL SWEATS: BARELY PERCEPTIBLE SWEATING. PALMS MOIST
TREMOR: TREMOR NOT VISIBLE BUT CAN BE FELT, FINGERTIP TO FINGERTIP
ANXIETY: MILDLY ANXIOUS
ORIENTATION AND CLOUDING OF SENSORIUM: CANNOT DO SERIAL ADDITIONS OR IS UNCERTAIN ABOUT DATE
ANXIETY: *

## 2018-03-06 ASSESSMENT — PAIN SCALES - GENERAL
PAINLEVEL_OUTOF10: 0

## 2018-03-06 NOTE — PROGRESS NOTES
Spoke with Radiology TechJose Luis, regarding this patient's allergy to shrimp and shrimp flavor. Jose Luis spoke with Dr Reveles, radiologist, who approved this patient to receive a CT with contrast without pre-medicating prophylacticly because of the allergy to shrimp. This was also discussed with Dr Varela and pharmacy. All agree this patient is safe to receive this CT with contrast. Consent was signed and discussed with the POA of the patient Kenneth (nessa)

## 2018-03-06 NOTE — PROGRESS NOTES
Pt to CT and back on monitor with ACLS RN and CCT. VSS aside from tachycardia, pt slightly more arousable and responsive to voice, SHELTON, and nods appropriately.     See adult OBS for in detail assessment/change in status

## 2018-03-06 NOTE — THERAPY
Orders received and acknowledged for a cognitive-linguistic evaluation.  Per MD, hold cog eval today as the patient is lethargic and unable to maintain arousal.  SLP will reattempt as the patient is able and appropriate.

## 2018-03-06 NOTE — DIETARY
"Nutrition Support Assessment     Nutrition services:   Day 2 of admit.  49 yo female with admitting diagnosis: ground level fall, tripped over dog    Current problem list:  1. ICH  2. ETOH withdrawal     Assessment:  Estimated Nutritional Needs: based on: height 5'8\", weight 59.6 kg, IBW 63.5 kg, BMI 19.98    Calculation/Equation MSJ x 1.2 = 1537 kcals  Calories/day: 1550 - 1700 kcals (25 - 29 kcals/kg)   Protein/day: 72 - 83 g (1.2 - 1.4 g/kg)     Evaluation:   1. ETOH withdrawal and confusion. Unable to safely take oral diet. TF to begin.  2. Cortrak placed for enteral access - awaiting confirmation of placement  3. Rally bag secondary to ETOH discontinued - now provided as oral supplements      Recommendations/Plan:  1. When feeding tube is placed and confirmed start and advance TF per protocol  2. Diabetisource goal 60 ml/hr will provide 1728 kcals, 86 g protein, 1169 ml H20/day    "

## 2018-03-06 NOTE — PROGRESS NOTES
Neurosurgery Progress Note    Subjective:  Pt just given 3 mg ativan for agitation  Awake, restrained  Oriented x 2-3 for RN prior to ativan, not answering questions for me    Exam:  Maribelbowen Munoz toes to commands   Tongue ML  Left pupil 5 mm, right 3mm reactive  Significant ecchymosis and edema right eye/face      BP  Min: 115/76  Max: 131/73  Pulse  Av.9  Min: 71  Max: 138  Resp  Av  Min: 13  Max: 48  Temp  Av.2 °C (98.9 °F)  Min: 37.1 °C (98.8 °F)  Max: 37.2 °C (98.9 °F)  Monitored Temp  Av.6 °C (99.7 °F)  Min: 37 °C (98.6 °F)  Max: 37.9 °C (100.2 °F)  SpO2  Av.5 %  Min: 94 %  Max: 100 %    No Data Recorded    Recent Labs      18   WBC  3.0*  3.8*  4.0*   RBC  3.07*  2.16*  2.06*   HEMOGLOBIN  10.4*  7.4*  7.0*   HEMATOCRIT  31.7*  23.1*  21.4*   MCV  103.3*  106.9*  103.9*   MCH  33.9*  34.3*  34.0*   MCHC  32.8*  32.0*  32.7*   RDW  55.0*  57.8*  52.6*   PLATELETCT  51*  130*  186   MPV  10.1  10.9  10.5     Recent Labs      18   0418   SODIUM  139  140  132*   POTASSIUM  3.2*  3.6  3.4*   CHLORIDE  98  103  96   CO2  19*  19*  28   GLUCOSE  60*  55*  119*   BUN  19  18  8     Recent Labs      18   APTT  28.5   --    INR  1.02  1.16*     Recent Labs      18   2345  18   0435  18   1723   REACTMIN  6.4  4.9*  5.2   CLOTKINET  2.1  1.2  1.3   CLOTANGL  63.8  72.4*  71.5   MAXCLOTS  50.4  65.0  66.4   [id number]  0.0  0.0  0.0   PRCINADP  89.6  65.2   --    PRCINAA  95.9  78.9   --        Intake/Output       18 - 18 0659 18 - 18 0659      1325-7327 6624-3032 Total  Total       Intake    P.O.  240  -- 240  --  -- --    P.O. 240 -- 240 -- -- --    I.V.  1296  984 2280  --  -- --    IV Volume (NS)  -- -- --    IV Volume (RALLY BAG) 336 066 840 -- -- --    Blood  250  164 414  --  -- --    Volume  (RELEASE PLATELET PHERESIS) 250 -- 250 -- -- --    Volume (RELEASE PLATELET PHERESIS) -- 164 164 -- -- --    Total Intake 1786 1148 2934 -- -- --       Output    Urine  2600  1200 3800  --  -- --    Indwelling Cathether -- 1200 1200 -- -- --    Void (ml) 2600 -- 2600 -- -- --    Stool  --  -- --  --  -- --    Number of Times Stooled -- 2 x 2 x -- -- --    Total Output 2600 1200 3800 -- -- --       Net I/O     -814 -52 -866 -- -- --            Intake/Output Summary (Last 24 hours) at 03/06/18 0848  Last data filed at 03/06/18 0600   Gross per 24 hour   Intake             2734 ml   Output             2850 ml   Net             -116 ml            • HYDROmorphone  0.5 mg Q2HRS PRN   • glucose 4 g  16 g Q15 MIN PRN    And   • dextrose 50%  25 mL Q15 MIN PRN   • thiamine  100 mg DAILY    And   • folic acid  1 mg DAILY    And   • multivitamin  1 Tab DAILY   • LORazepam  4 mg Q15 MIN PRN    Or   • LORazepam  3 mg Q15 MIN PRN    Or   • LORazepam  2 mg Q15 MIN PRN    Or   • LORazepam  1 mg Q15 MIN PRN   • D5W   Continuous   • levETIRAcetam (KEPPRA) IV  1,000 mg BID   • Respiratory Care per Protocol   Continuous RT   • Pharmacy Consult Request  1 Each PRN   • docusate sodium  100 mg BID   • senna-docusate  1 Tab Nightly   • senna-docusate  1 Tab Q24HRS PRN   • polyethylene glycol/lytes  1 Packet BID   • magnesium hydroxide  30 mL DAILY   • bisacodyl  10 mg Q24HRS PRN   • fleet  1 Each Once PRN   • NS   Continuous   • ondansetron  4 mg Q4HRS PRN   • oxyCODONE immediate-release  5 mg Q3HRS PRN    Or   • oxyCODONE immediate-release  10 mg Q3HRS PRN       Assessment and Plan:  Hospital day # 3 lin FRANCIS SDH  Prophylactic anticoagulation: no         Start date/time: tbd  Head ct 3/5 1610 stable  Seizure - yesterday - keppra 1000 mg bid  - will start salt tabs  Hx ETOH   Thrombocytopenia - improved s/p transfusions]  H/H decreased - trauma managing  Cont ICU care

## 2018-03-06 NOTE — PROGRESS NOTES
"  Trauma/Surgical Progress Note    Author: Ken Varela Date & Time created: 3/6/2018   3:58 PM     Interval Events:  Has developed florid alcohol withdrawal  Currently managing with Ativan  Patient is on intubation watch  Significant hemoglobin drop abdominal CT scan pending  Possible occult internal injury  Condition is unstable  ICU observation and monitoring  Review of Systems   Unable to perform ROS: medical condition     Hemodynamics:  Blood pressure 123/73, pulse 74, temperature 37.2 °C (98.9 °F), resp. rate (!) 46, height 1.727 m (5' 8\"), weight 62.1 kg (136 lb 14.5 oz), SpO2 89 %.     Respiratory:    Respiration: (!) 46, Pulse Oximetry: 89 %        RUL Breath Sounds: Clear, RML Breath Sounds: Clear, RLL Breath Sounds: Diminished, CARLI Breath Sounds: Clear, LLL Breath Sounds: Diminished  Fluids:    Intake/Output Summary (Last 24 hours) at 03/06/18 1558  Last data filed at 03/06/18 1400   Gross per 24 hour   Intake             2298 ml   Output             2575 ml   Net             -277 ml     Admit Weight: 69 kg (152 lb 1.9 oz)  Current Weight: 62.1 kg (136 lb 14.5 oz)    Physical Exam   Constitutional: She appears well-developed and well-nourished.   HENT:   Extensive STS, bruising right face   Eyes: Pupils are equal, round, and reactive to light. No scleral icterus.   Neck: Normal range of motion. Neck supple. No JVD present. No tracheal deviation present. No thyromegaly present.   Cardiovascular: Regular rhythm.    Sinus tach   Pulmonary/Chest: No respiratory distress. She exhibits no tenderness.   Abdominal: Soft. Bowel sounds are normal. She exhibits no distension. There is no tenderness.   Musculoskeletal: She exhibits edema.   Lymphadenopathy:     She has no cervical adenopathy.   Neurological: No cranial nerve deficit.   No further seizures  Has developed delirium secondary to alcohol withdrawal  Lethargic but will follow   Skin: Skin is warm and dry.   Nursing note and vitals reviewed.      Medical " Decision Making/Problem List:    Active Hospital Problems    Diagnosis   • Traumatic intracranial hemorrhage (CMS-HCC) [S06.309A]     Priority: High     Multiple areas of SAH and SDH, 4.1mm shift  Repeat CT imaging of the brain stable to slightly worse  Keppra x 1 week ordered  3/5 seizure @1515, repeat CT scan was stable  Further seizures 3/6, assessment complicated by alcohol withdrawal  Cog eval ordered  Non-operative management.  Lorne Menchaca MD. Neurosurgery.       • Thrombocytopenia (CMS-HCC) [D69.6]     Priority: High     Platelet count 51 on admission, presumably from alcoholism  1 U platelets given empirically  Initial TEG normal but with evidence of platelet inhibition, repeat TEG slightly better after platelet transfusion.  Repeat CT head slightly worse, 2nd unit of platelets given.  Trend platelet count,     • Acute alcohol intoxication (CMS-HCC) [F10.929]     Priority: High     DEYANIRA 0.43 on admission; LFTs all elevated, thrombocytopenic  Has developed florid alcohol withdrawal, Ciwa protocol     • Pharmacologic contraindication to deep vein thrombosis (DVT) prophylaxis [Z53.09]     Priority: Low     Prophylactic Lovenox initially contraindicated due to elevated bleeding risk  Duplex ordered for 3/7/18     • Anemia due to acute blood loss [D62]     Significant hemoglobin drop  No evidence of gastrointestinal hemorrhage, abdomen examination is benign  Considering liver function tests abdominal CT scan has been ordered       Core Measures & Quality Metrics:  Labs reviewed, Medications reviewed and Radiology images reviewed  Pemberton catheter: No Pemberton      DVT Prophylaxis: Contraindicated - High bleeding risk  DVT prophylaxis - mechanical: SCDs  Ulcer prophylaxis: Yes        LUNA Score  Discussed patient condition with RN, RT and Pharmacy.  CRITICAL CARE TIME EXCLUDING PROCEDURES: 35    minutes  I independently reviewed pertinent clinical lab tests from the last 48 hours and ordered additional follow up  clinical lab tests.  I independently reviewed pertinent radiographic images and the radiologist's reports from the last 48 hours and ordered additional follow up radiographic studies.  I reviewed the details of the available patient records and documentation by consulting physicians in EPIC up to today, summated the information, and utilized the information as warranted in today's medical decision making for this patient.  I personally evaluated the patient condition at bedside and discussed the daily plan(s) with available nursing staff, dieticians, social workers, pharmacists on rounds.    Patient has developed alcoholic delirium withdrawal. Patient is on intubation watch. Significant hemoglobin drop  Condition is unstable with significant deterioration possible requiring frequent reevaluation

## 2018-03-06 NOTE — PROGRESS NOTES
Cortrak Placement    Tube Team verified patient name and medical record number prior to tube placement.  Cortrak tube (55 inches, 10 Spanish) placed at 75cm in left nare.  Per Cortrak picture, tube appears to be in the stomach.  Nursing Instructions: Awaiting KUB to confirm placement before use for medications or feeding. Once placement confirmed, flush tube with 30 ml of water, and then remove and save stylet, in patient medication drawer.

## 2018-03-07 PROBLEM — H10.9 BACTERIAL CONJUNCTIVITIS OF BOTH EYES: Status: ACTIVE | Noted: 2018-03-07

## 2018-03-07 PROBLEM — B96.89 BACTERIAL CONJUNCTIVITIS OF BOTH EYES: Status: ACTIVE | Noted: 2018-03-07

## 2018-03-07 LAB
ALBUMIN SERPL BCP-MCNC: 3.4 G/DL (ref 3.2–4.9)
ALBUMIN/GLOB SERPL: 1.4 G/DL
ALP SERPL-CCNC: 187 U/L (ref 30–99)
ALT SERPL-CCNC: 140 U/L (ref 2–50)
ANION GAP SERPL CALC-SCNC: 7 MMOL/L (ref 0–11.9)
AST SERPL-CCNC: 660 U/L (ref 12–45)
BARCODED ABORH UBTYP: 5100
BARCODED ABORH UBTYP: 600
BARCODED ABORH UBTYP: 6200
BARCODED ABORH UBTYP: 9500
BARCODED PRD CODE UBPRD: NORMAL
BARCODED UNIT NUM UBUNT: NORMAL
BASOPHILS # BLD AUTO: 0.7 % (ref 0–1.8)
BASOPHILS # BLD: 0.02 K/UL (ref 0–0.12)
BILIRUB SERPL-MCNC: 2.6 MG/DL (ref 0.1–1.5)
BUN SERPL-MCNC: 5 MG/DL (ref 8–22)
CALCIUM SERPL-MCNC: 8.3 MG/DL (ref 8.5–10.5)
CFT BLD TEG: 4.2 MIN (ref 5–10)
CHLORIDE SERPL-SCNC: 101 MMOL/L (ref 96–112)
CLOT ANGLE BLD TEG: 76 DEGREES (ref 53–72)
CLOT LYSIS 30M P MA LENFR BLD TEG: 1.9 % (ref 0–8)
CO2 SERPL-SCNC: 24 MMOL/L (ref 20–33)
COMPONENT P 8504P: NORMAL
CREAT SERPL-MCNC: 0.51 MG/DL (ref 0.5–1.4)
CT.EXTRINSIC BLD ROTEM: 0.9 MIN (ref 1–3)
EKG IMPRESSION: NORMAL
EOSINOPHIL # BLD AUTO: 0.07 K/UL (ref 0–0.51)
EOSINOPHIL NFR BLD: 2.5 % (ref 0–6.9)
ERYTHROCYTE [DISTWIDTH] IN BLOOD BY AUTOMATED COUNT: 50.1 FL (ref 35.9–50)
GLOBULIN SER CALC-MCNC: 2.5 G/DL (ref 1.9–3.5)
GLUCOSE BLD-MCNC: 135 MG/DL (ref 65–99)
GLUCOSE SERPL-MCNC: 121 MG/DL (ref 65–99)
HCT VFR BLD AUTO: 20.5 % (ref 37–47)
HGB BLD-MCNC: 7 G/DL (ref 12–16)
IMM GRANULOCYTES # BLD AUTO: 0.02 K/UL (ref 0–0.11)
IMM GRANULOCYTES NFR BLD AUTO: 0.7 % (ref 0–0.9)
LYMPHOCYTES # BLD AUTO: 0.69 K/UL (ref 1–4.8)
LYMPHOCYTES NFR BLD: 24.6 % (ref 22–41)
MAGNESIUM SERPL-MCNC: 1.4 MG/DL (ref 1.5–2.5)
MCF BLD TEG: 67.6 MM (ref 50–70)
MCH RBC QN AUTO: 35.7 PG (ref 27–33)
MCHC RBC AUTO-ENTMCNC: 34.1 G/DL (ref 33.6–35)
MCV RBC AUTO: 104.6 FL (ref 81.4–97.8)
MONOCYTES # BLD AUTO: 0.27 K/UL (ref 0–0.85)
MONOCYTES NFR BLD AUTO: 9.6 % (ref 0–13.4)
NEUTROPHILS # BLD AUTO: 1.73 K/UL (ref 2–7.15)
NEUTROPHILS NFR BLD: 61.9 % (ref 44–72)
NRBC # BLD AUTO: 0.03 K/UL
NRBC BLD-RTO: 1.1 /100 WBC
PA AA BLD-ACNC: 83.1 %
PA ADP BLD-ACNC: 95 %
PLATELET # BLD AUTO: 155 K/UL (ref 164–446)
PMV BLD AUTO: 11.3 FL (ref 9–12.9)
POTASSIUM SERPL-SCNC: 3.5 MMOL/L (ref 3.6–5.5)
PRODUCT TYPE UPROD: NORMAL
PROT SERPL-MCNC: 5.9 G/DL (ref 6–8.2)
RBC # BLD AUTO: 1.96 M/UL (ref 4.2–5.4)
SODIUM SERPL-SCNC: 132 MMOL/L (ref 135–145)
TEG ALGORITHM TGALG: ABNORMAL
UNIT STATUS USTAT: NORMAL
WBC # BLD AUTO: 2.8 K/UL (ref 4.8–10.8)

## 2018-03-07 PROCEDURE — 83735 ASSAY OF MAGNESIUM: CPT

## 2018-03-07 PROCEDURE — 700112 HCHG RX REV CODE 229: Performed by: SURGERY

## 2018-03-07 PROCEDURE — P9034 PLATELETS, PHERESIS: HCPCS

## 2018-03-07 PROCEDURE — 93010 ELECTROCARDIOGRAM REPORT: CPT | Performed by: INTERNAL MEDICINE

## 2018-03-07 PROCEDURE — 700102 HCHG RX REV CODE 250 W/ 637 OVERRIDE(OP): Performed by: SURGERY

## 2018-03-07 PROCEDURE — 99291 CRITICAL CARE FIRST HOUR: CPT | Performed by: SURGERY

## 2018-03-07 PROCEDURE — A9270 NON-COVERED ITEM OR SERVICE: HCPCS | Performed by: SURGERY

## 2018-03-07 PROCEDURE — 85384 FIBRINOGEN ACTIVITY: CPT

## 2018-03-07 PROCEDURE — A9270 NON-COVERED ITEM OR SERVICE: HCPCS | Performed by: CLINICAL NURSE SPECIALIST

## 2018-03-07 PROCEDURE — 85576 BLOOD PLATELET AGGREGATION: CPT

## 2018-03-07 PROCEDURE — 700111 HCHG RX REV CODE 636 W/ 250 OVERRIDE (IP): Performed by: SURGERY

## 2018-03-07 PROCEDURE — 93971 EXTREMITY STUDY: CPT

## 2018-03-07 PROCEDURE — 700105 HCHG RX REV CODE 258: Performed by: NEUROLOGICAL SURGERY

## 2018-03-07 PROCEDURE — 700111 HCHG RX REV CODE 636 W/ 250 OVERRIDE (IP): Performed by: NEUROLOGICAL SURGERY

## 2018-03-07 PROCEDURE — 85025 COMPLETE CBC W/AUTO DIFF WBC: CPT

## 2018-03-07 PROCEDURE — 85347 COAGULATION TIME ACTIVATED: CPT

## 2018-03-07 PROCEDURE — 700102 HCHG RX REV CODE 250 W/ 637 OVERRIDE(OP): Performed by: CLINICAL NURSE SPECIALIST

## 2018-03-07 PROCEDURE — C9113 INJ PANTOPRAZOLE SODIUM, VIA: HCPCS | Performed by: SURGERY

## 2018-03-07 PROCEDURE — 770022 HCHG ROOM/CARE - ICU (200)

## 2018-03-07 PROCEDURE — 700101 HCHG RX REV CODE 250: Performed by: SURGERY

## 2018-03-07 PROCEDURE — 82962 GLUCOSE BLOOD TEST: CPT

## 2018-03-07 PROCEDURE — 700105 HCHG RX REV CODE 258: Performed by: SURGERY

## 2018-03-07 PROCEDURE — 36430 TRANSFUSION BLD/BLD COMPNT: CPT

## 2018-03-07 PROCEDURE — 93005 ELECTROCARDIOGRAM TRACING: CPT | Performed by: SURGERY

## 2018-03-07 PROCEDURE — 80053 COMPREHEN METABOLIC PANEL: CPT

## 2018-03-07 RX ORDER — MAGNESIUM SULFATE HEPTAHYDRATE 40 MG/ML
2 INJECTION, SOLUTION INTRAVENOUS ONCE
Status: COMPLETED | OUTPATIENT
Start: 2018-03-07 | End: 2018-03-08

## 2018-03-07 RX ORDER — CIPROFLOXACIN HYDROCHLORIDE 3.5 MG/ML
1 SOLUTION/ DROPS TOPICAL
Status: DISPENSED | OUTPATIENT
Start: 2018-03-07 | End: 2018-03-14

## 2018-03-07 RX ORDER — SODIUM CHLORIDE 1 G/1
2 TABLET ORAL
Status: DISCONTINUED | OUTPATIENT
Start: 2018-03-07 | End: 2018-03-09

## 2018-03-07 RX ORDER — DIAZEPAM 5 MG/1
5 TABLET ORAL EVERY 6 HOURS
Status: DISCONTINUED | OUTPATIENT
Start: 2018-03-07 | End: 2018-03-10

## 2018-03-07 RX ADMIN — POTASSIUM BICARBONATE 50 MEQ: 25 TABLET, EFFERVESCENT ORAL at 12:44

## 2018-03-07 RX ADMIN — LORAZEPAM 2 MG: 2 INJECTION INTRAMUSCULAR; INTRAVENOUS at 20:00

## 2018-03-07 RX ADMIN — SODIUM CHLORIDE: 9 INJECTION, SOLUTION INTRAVENOUS at 08:39

## 2018-03-07 RX ADMIN — CIPROFLOXACIN HYDROCHLORIDE 1 DROP: 3 SOLUTION/ DROPS OPHTHALMIC at 12:30

## 2018-03-07 RX ADMIN — LORAZEPAM 2 MG: 2 INJECTION INTRAMUSCULAR; INTRAVENOUS at 05:27

## 2018-03-07 RX ADMIN — CIPROFLOXACIN HYDROCHLORIDE 1 DROP: 3 SOLUTION/ DROPS OPHTHALMIC at 18:28

## 2018-03-07 RX ADMIN — LORAZEPAM 3 MG: 2 INJECTION INTRAMUSCULAR; INTRAVENOUS at 00:05

## 2018-03-07 RX ADMIN — SODIUM CHLORIDE TAB 1 GM 2 G: 1 TAB at 12:44

## 2018-03-07 RX ADMIN — STANDARDIZED SENNA CONCENTRATE AND DOCUSATE SODIUM 1 TABLET: 8.6; 5 TABLET, FILM COATED ORAL at 19:59

## 2018-03-07 RX ADMIN — LORAZEPAM 3 MG: 2 INJECTION INTRAMUSCULAR; INTRAVENOUS at 08:04

## 2018-03-07 RX ADMIN — POTASSIUM BICARBONATE 50 MEQ: 25 TABLET, EFFERVESCENT ORAL at 19:59

## 2018-03-07 RX ADMIN — DIAZEPAM 5 MG: 5 TABLET ORAL at 23:49

## 2018-03-07 RX ADMIN — DOCUSATE SODIUM 100 MG: 100 CAPSULE ORAL at 19:59

## 2018-03-07 RX ADMIN — SODIUM CHLORIDE TAB 1 GM 2 G: 1 TAB at 17:41

## 2018-03-07 RX ADMIN — CIPROFLOXACIN HYDROCHLORIDE 1 DROP: 3 SOLUTION/ DROPS OPHTHALMIC at 21:00

## 2018-03-07 RX ADMIN — SODIUM CHLORIDE 1000 MG: 9 INJECTION, SOLUTION INTRAVENOUS at 08:59

## 2018-03-07 RX ADMIN — LORAZEPAM 2 MG: 2 INJECTION INTRAMUSCULAR; INTRAVENOUS at 11:03

## 2018-03-07 RX ADMIN — MAGNESIUM SULFATE IN WATER 2 G: 40 INJECTION, SOLUTION INTRAVENOUS at 15:39

## 2018-03-07 RX ADMIN — POTASSIUM BICARBONATE 50 MEQ: 25 TABLET, EFFERVESCENT ORAL at 08:18

## 2018-03-07 RX ADMIN — DIAZEPAM 5 MG: 5 TABLET ORAL at 12:43

## 2018-03-07 RX ADMIN — Medication 100 MG: at 08:18

## 2018-03-07 RX ADMIN — FOLIC ACID 1 MG: 1 TABLET ORAL at 08:18

## 2018-03-07 RX ADMIN — THERA TABS 1 TABLET: TAB at 08:17

## 2018-03-07 RX ADMIN — PANTOPRAZOLE SODIUM 40 MG: 40 INJECTION, POWDER, LYOPHILIZED, FOR SOLUTION INTRAVENOUS at 08:59

## 2018-03-07 RX ADMIN — DIAZEPAM 5 MG: 5 TABLET ORAL at 18:27

## 2018-03-07 RX ADMIN — SODIUM CHLORIDE 1000 MG: 9 INJECTION, SOLUTION INTRAVENOUS at 20:01

## 2018-03-07 RX ADMIN — LORAZEPAM 3 MG: 2 INJECTION INTRAMUSCULAR; INTRAVENOUS at 03:14

## 2018-03-07 ASSESSMENT — LIFESTYLE VARIABLES
HEADACHE, FULLNESS IN HEAD: NOT PRESENT
NAUSEA AND VOMITING: NO NAUSEA AND NO VOMITING
AGITATION: SOMEWHAT MORE THAN NORMAL ACTIVITY
ANXIETY: *
TREMOR: *
TOTAL SCORE: 8
ANXIETY: *
HEADACHE, FULLNESS IN HEAD: NOT PRESENT
DO YOU DRINK ALCOHOL: YES
NAUSEA AND VOMITING: NO NAUSEA AND NO VOMITING
ANXIETY: MODERATELY ANXIOUS OR GUARDED, SO ANXIETY IS INFERRED
NAUSEA AND VOMITING: NO NAUSEA AND NO VOMITING
NAUSEA AND VOMITING: NO NAUSEA AND NO VOMITING
AUDITORY DISTURBANCES: NOT PRESENT
AUDITORY DISTURBANCES: NOT PRESENT
AGITATION: *
TOTAL SCORE: 14
VISUAL DISTURBANCES: VERY MILD SENSITIVITY
ORIENTATION AND CLOUDING OF SENSORIUM: DATE DISORIENTATION BY MORE THAN TWO CALENDAR DAYS
VISUAL DISTURBANCES: VERY MILD SENSITIVITY
VISUAL DISTURBANCES: VERY MILD SENSITIVITY
AUDITORY DISTURBANCES: NOT PRESENT
TREMOR: *
TOTAL SCORE: 14
TOTAL SCORE: 8
HEADACHE, FULLNESS IN HEAD: NOT PRESENT
ORIENTATION AND CLOUDING OF SENSORIUM: DISORIENTED FOR PLACE AND / OR PERSON
TREMOR: NO TREMOR
ANXIETY: MODERATELY ANXIOUS OR GUARDED, SO ANXIETY IS INFERRED
VISUAL DISTURBANCES: VERY MILD SENSITIVITY
PAROXYSMAL SWEATS: BARELY PERCEPTIBLE SWEATING. PALMS MOIST
AGITATION: SOMEWHAT MORE THAN NORMAL ACTIVITY
TREMOR: NO TREMOR
PAROXYSMAL SWEATS: BARELY PERCEPTIBLE SWEATING. PALMS MOIST
PAROXYSMAL SWEATS: BARELY PERCEPTIBLE SWEATING. PALMS MOIST
AUDITORY DISTURBANCES: NOT PRESENT
AGITATION: *
PAROXYSMAL SWEATS: BARELY PERCEPTIBLE SWEATING. PALMS MOIST
HEADACHE, FULLNESS IN HEAD: NOT PRESENT
ORIENTATION AND CLOUDING OF SENSORIUM: DATE DISORIENTATION BY MORE THAN TWO CALENDAR DAYS
ORIENTATION AND CLOUDING OF SENSORIUM: DISORIENTED FOR PLACE AND / OR PERSON

## 2018-03-07 ASSESSMENT — COPD QUESTIONNAIRES
DO YOU EVER COUGH UP ANY MUCUS OR PHLEGM?: NO/ONLY WITH OCCASIONAL COLDS OR INFECTIONS
HAVE YOU SMOKED AT LEAST 100 CIGARETTES IN YOUR ENTIRE LIFE: YES
COPD SCREENING SCORE: 2
DURING THE PAST 4 WEEKS HOW MUCH DID YOU FEEL SHORT OF BREATH: NONE/LITTLE OF THE TIME

## 2018-03-07 NOTE — CARE PLAN
Problem: Safety  Goal: Will remain free from injury  Bed controls locked and lowered. Call light within reach. Bed alarm on. Restraints verified and assessed. Seizure precautions in place.    Problem: Venous Thromboembolism (VTW)/Deep Vein Thrombosis (DVT) Prevention:  Goal: Patient will participate in Venous Thrombosis (VTE)/Deep Vein Thrombosis (DVT)Prevention Measures  SCDs in place on lower extremities.

## 2018-03-07 NOTE — CARE PLAN
Problem: Nutritional:  Goal: Nutrition support tolerated and meeting greater than 85% of estimated needs  Outcome: MET Date Met: 03/07/18  Diabetisource at full goal 60 ml/hr

## 2018-03-07 NOTE — THERAPY
SPEECH PATHOLOGY:  Spoke with RN regarding orders for cognitive evaluation.  RN reporting that patient will also need swallow evaluation, but that patient is not appropriate at all for either evaluation due to DT's.  RN asked to try again tomorrow.

## 2018-03-07 NOTE — PROGRESS NOTES
Spoke to Dr. Varela regarding patient. New orders received to draw TEG now. Also, OK to transfuse 1 unit PRBC if hemoglobin less than 7. New orders applied and implemented.

## 2018-03-07 NOTE — CARE PLAN
Problem: Safety  Goal: Will remain free from falls    Intervention: Assess risk factors for falls  Patient assessed Qhour and as needed, safety measures in place: bed in low position, call bell in reach, appropriate side rails up (2 lower), bed alarm on, patient room near nursing station. Will continue to monitor.      Problem: Respiratory:  Goal: Respiratory status will improve    Intervention: Administer and titrate oxygen therapy  RT following. NC at 3-5L. Sp02 >90%.
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
## 2018-03-07 NOTE — PROGRESS NOTES
"  Trauma/Surgical Progress Note    Author: Ken Varela Date & Time created: 3/7/2018   3:12 PM     Interval Events:  Continues in florid alcohol withdrawal  Neurologic examination limited but unchanged  At risk for pulmonary deterioration  Intubation watch with frequent reassessment  Platelet inhibition refractory to platelets  Nutrition support  Correction of electrolyte abnormalities  Infection surveillance  Review of Systems   Unable to perform ROS: medical condition     Hemodynamics:  Blood pressure 123/96, pulse (!) 118, temperature (!) 38.2 °C (100.8 °F), resp. rate (!) 28, height 1.727 m (5' 8\"), weight 62.6 kg (138 lb 0.1 oz), SpO2 100 %.     Respiratory:    Respiration: (!) 28, Pulse Oximetry: 100 %        RUL Breath Sounds: Clear, RML Breath Sounds: Clear, RLL Breath Sounds: Diminished, CARLI Breath Sounds: Clear, LLL Breath Sounds: Diminished  Fluids:    Intake/Output Summary (Last 24 hours) at 03/07/18 1512  Last data filed at 03/07/18 1200   Gross per 24 hour   Intake             2460 ml   Output             2170 ml   Net              290 ml     Admit Weight: 69 kg (152 lb 1.9 oz)  Current Weight: 62.6 kg (138 lb 0.1 oz)    Physical Exam   Constitutional: She appears well-developed and well-nourished.   HENT:   Extensive STS, bruising right face   Eyes: Pupils are equal, round, and reactive to light. No scleral icterus.   Neck: Normal range of motion. Neck supple. No JVD present. No tracheal deviation present. No thyromegaly present.   Cardiovascular: Regular rhythm.    Sinus tach   Pulmonary/Chest: No respiratory distress. She exhibits no tenderness.   Abdominal: Soft. Bowel sounds are normal. She exhibits no distension. There is no tenderness.   Musculoskeletal: She exhibits edema.   Lymphadenopathy:     She has no cervical adenopathy.   Neurological: No cranial nerve deficit.   No further seizures  Has developed delirium secondary to alcohol withdrawal  Lethargic but will follow   Skin: Skin is warm " and dry.   Nursing note and vitals reviewed.      Medical Decision Making/Problem List:    Active Hospital Problems    Diagnosis   • Traumatic intracranial hemorrhage (CMS-HCC) [S06.309A]     Priority: High     Multiple areas of SAH and SDH, 4.1mm shift  Repeat CT imaging of the brain stable to slightly worse  Keppra x 1 week ordered  3/5 seizure @1515, repeat CT scan was stable  Further seizures 3/6, assessment complicated by alcohol withdrawal  Cog eval ordered  Non-operative management.  Lorne Menchaca MD. Neurosurgery.       • Thrombocytopenia (CMS-HCC) [D69.6]     Priority: High     Platelet count 51 on admission, presumably from alcoholism  1 U platelets given empirically  Initial TEG normal but with evidence of platelet inhibition, repeat TEG slightly better after platelet transfusion.  Repeat CT head slightly worse, 2nd unit of platelets given.  Trend platelet count,     • Acute alcohol intoxication (CMS-HCC) [F10.929]     Priority: High     DEYANIRA 0.43 on admission; LFTs all elevated, thrombocytopenic  Has developed florid alcohol withdrawal, Ciwa protocol.       • Pharmacologic contraindication to deep vein thrombosis (DVT) prophylaxis [Z53.09]     Priority: Low     Prophylactic Lovenox initially contraindicated due to elevated bleeding risk  Duplex ordered for 3/7/18     • Anemia due to acute blood loss [D62]     Significant hemoglobin drop  No evidence of gastrointestinal hemorrhage, abdomen examination is benign  Considering liver function tests abdominal CT scan was completed and no showed no evidence of intra-abdominal bleeding  Hemoglobin stable today, check iron and replete as indicated       Core Measures & Quality Metrics:  Labs reviewed, Medications reviewed and Radiology images reviewed  Pemberton catheter: No Pemberton      DVT Prophylaxis: Contraindicated - High bleeding risk  DVT prophylaxis - mechanical: SCDs  Ulcer prophylaxis: Yes        LUNA Score  Discussed patient condition with RN, RT and  Pharmacy.  CRITICAL CARE TIME EXCLUDING PROCEDURES: 32    minutes

## 2018-03-07 NOTE — PROGRESS NOTES
Neurosurgery Progress Note    Subjective:  No seizures overnoc  DT's  In restraints    Exam:  FC x 4 and sticks out tongue to command  non-verbal for me  Did not attempt to check pupils d/t significant ecchymosis and edema right eye/face      BP  Min: 120/64  Max: 142/63  Pulse  Av.1  Min: 74  Max: 125  Resp  Av.3  Min: 16  Max: 66  Monitored Temp  Av.2 °C (100.8 °F)  Min: 37.2 °C (99 °F)  Max: 38.5 °C (101.3 °F)  SpO2  Av.7 %  Min: 89 %  Max: 100 %    No Data Recorded    Recent Labs      18   WBC  3.0*  3.8*  4.0*   RBC  3.07*  2.16*  2.06*   HEMOGLOBIN  10.4*  7.4*  7.0*   HEMATOCRIT  31.7*  23.1*  21.4*   MCV  103.3*  106.9*  103.9*   MCH  33.9*  34.3*  34.0*   MCHC  32.8*  32.0*  32.7*   RDW  55.0*  57.8*  52.6*   PLATELETCT  51*  130*  186   MPV  10.1  10.9  10.5     Recent Labs      18   0535   SODIUM  140  132*  132*   POTASSIUM  3.6  3.4*  3.5*   CHLORIDE  103  96  101   CO2  19*  28  24   GLUCOSE  55*  119*  121*   BUN  18  8  5*     Recent Labs      18   APTT  28.5   --    INR  1.02  1.16*     Recent Labs      18   2345  185  18   1723  18   REACTMIN  6.4  4.9*  5.2  4.1*   CLOTKINET  2.1  1.2  1.3  1.0   CLOTANGL  63.8  72.4*  71.5  75.8*   MAXCLOTS  50.4  65.0  66.4  66.8   [id number]  0.0  0.0  0.0  1.1   PRCINADP  89.6  65.2   --   87.6   PRCINAA  95.9  78.9   --   19.3       Intake/Output       18 - 18 0659 18 - 18 Total  Total       Intake    I.V.  872  600 1472  --  -- --    IV Volume (RALLY BAG) 252 -- 252 -- -- --    IV Volume (D5) 320 -- 320 -- -- --    IV Volume (NS) 300 600 900 -- -- --    Other  120  90 210  --  -- --    Medications (P.O./ Enteral Liquids) 120 90 210 -- -- --    Enteral  135  510 645  --  -- --    Enteral Volume 75 450 525 --  -- --    Free Water / Tube Flush 60 60 120 -- -- --    Total Intake 1127 1200 2327 -- -- --       Output    Urine  975  1150 2125  --  -- --    Indwelling Cathether 975 1150 2125 -- -- --    Stool  --  -- --  --  -- --    Number of Times Stooled 1 x -- 1 x -- -- --    Total Output 975 1150 2125 -- -- --       Net I/O     152 50 202 -- -- --            Intake/Output Summary (Last 24 hours) at 03/07/18 0756  Last data filed at 03/07/18 0600   Gross per 24 hour   Intake             2327 ml   Output             2125 ml   Net              202 ml            • sodium chloride  1 g TID WITH MEALS   • potassium bicarbonate  50 mEq BID   • pantoprazole  40 mg DAILY   • HYDROmorphone  0.5 mg Q2HRS PRN   • glucose 4 g  16 g Q15 MIN PRN    And   • dextrose 50%  25 mL Q15 MIN PRN   • thiamine  100 mg DAILY    And   • folic acid  1 mg DAILY    And   • multivitamin  1 Tab DAILY   • LORazepam  4 mg Q15 MIN PRN    Or   • LORazepam  3 mg Q15 MIN PRN    Or   • LORazepam  2 mg Q15 MIN PRN    Or   • LORazepam  1 mg Q15 MIN PRN   • D5W   Continuous   • levETIRAcetam (KEPPRA) IV  1,000 mg BID   • Respiratory Care per Protocol   Continuous RT   • Pharmacy Consult Request  1 Each PRN   • docusate sodium  100 mg BID   • senna-docusate  1 Tab Nightly   • senna-docusate  1 Tab Q24HRS PRN   • polyethylene glycol/lytes  1 Packet BID   • magnesium hydroxide  30 mL DAILY   • bisacodyl  10 mg Q24HRS PRN   • fleet  1 Each Once PRN   • NS   Continuous   • ondansetron  4 mg Q4HRS PRN   • oxyCODONE immediate-release  5 mg Q3HRS PRN    Or   • oxyCODONE immediate-release  10 mg Q3HRS PRN       Assessment and Plan:  Hospital day # 3 lin FRANCIS SDH  Prophylactic anticoagulation: no         Start date/time: tbd  Head ct 3/5 1610 stable  Seizure 2 days ago- keppra 1000 mg bid  - same today on salt tabs- will increase dose  Hx ETOH - in DT's on ativan  Thrombocytopenia - plts normal today after transfusions]  H/H decreased - trauma managing  Cont ICU  care    ATTENDING ADDENDUM:  Patient seen independently and agree with above note

## 2018-03-08 PROBLEM — E44.0 MODERATE PROTEIN-CALORIE MALNUTRITION (HCC): Status: ACTIVE | Noted: 2018-03-08

## 2018-03-08 LAB
ANION GAP SERPL CALC-SCNC: 6 MMOL/L (ref 0–11.9)
BUN SERPL-MCNC: 7 MG/DL (ref 8–22)
CALCIUM SERPL-MCNC: 8.5 MG/DL (ref 8.5–10.5)
CHLORIDE SERPL-SCNC: 100 MMOL/L (ref 96–112)
CO2 SERPL-SCNC: 25 MMOL/L (ref 20–33)
CREAT SERPL-MCNC: 0.43 MG/DL (ref 0.5–1.4)
EKG IMPRESSION: NORMAL
ERYTHROCYTE [DISTWIDTH] IN BLOOD BY AUTOMATED COUNT: 51.6 FL (ref 35.9–50)
GLUCOSE SERPL-MCNC: 111 MG/DL (ref 65–99)
HCT VFR BLD AUTO: 22.7 % (ref 37–47)
HGB BLD-MCNC: 7.4 G/DL (ref 12–16)
IRON SATN MFR SERPL: 11 % (ref 15–55)
IRON SERPL-MCNC: 37 UG/DL (ref 40–170)
MCH RBC QN AUTO: 34.6 PG (ref 27–33)
MCHC RBC AUTO-ENTMCNC: 32.6 G/DL (ref 33.6–35)
MCV RBC AUTO: 106.1 FL (ref 81.4–97.8)
PLATELET # BLD AUTO: 222 K/UL (ref 164–446)
PMV BLD AUTO: 10.6 FL (ref 9–12.9)
POTASSIUM SERPL-SCNC: 4.1 MMOL/L (ref 3.6–5.5)
RBC # BLD AUTO: 2.14 M/UL (ref 4.2–5.4)
SODIUM SERPL-SCNC: 131 MMOL/L (ref 135–145)
TIBC SERPL-MCNC: 326 UG/DL (ref 250–450)
WBC # BLD AUTO: 3.7 K/UL (ref 4.8–10.8)

## 2018-03-08 PROCEDURE — A9270 NON-COVERED ITEM OR SERVICE: HCPCS | Performed by: SURGERY

## 2018-03-08 PROCEDURE — 700111 HCHG RX REV CODE 636 W/ 250 OVERRIDE (IP): Performed by: SURGERY

## 2018-03-08 PROCEDURE — 700102 HCHG RX REV CODE 250 W/ 637 OVERRIDE(OP): Performed by: CLINICAL NURSE SPECIALIST

## 2018-03-08 PROCEDURE — 93010 ELECTROCARDIOGRAM REPORT: CPT | Performed by: INTERNAL MEDICINE

## 2018-03-08 PROCEDURE — 85027 COMPLETE CBC AUTOMATED: CPT

## 2018-03-08 PROCEDURE — 80048 BASIC METABOLIC PNL TOTAL CA: CPT

## 2018-03-08 PROCEDURE — 93005 ELECTROCARDIOGRAM TRACING: CPT | Performed by: SURGERY

## 2018-03-08 PROCEDURE — A9270 NON-COVERED ITEM OR SERVICE: HCPCS | Performed by: CLINICAL NURSE SPECIALIST

## 2018-03-08 PROCEDURE — 99233 SBSQ HOSP IP/OBS HIGH 50: CPT | Performed by: SURGERY

## 2018-03-08 PROCEDURE — 770022 HCHG ROOM/CARE - ICU (200)

## 2018-03-08 PROCEDURE — 700112 HCHG RX REV CODE 229: Performed by: SURGERY

## 2018-03-08 PROCEDURE — 700111 HCHG RX REV CODE 636 W/ 250 OVERRIDE (IP): Performed by: NEUROLOGICAL SURGERY

## 2018-03-08 PROCEDURE — 700102 HCHG RX REV CODE 250 W/ 637 OVERRIDE(OP): Performed by: SURGERY

## 2018-03-08 PROCEDURE — 83540 ASSAY OF IRON: CPT

## 2018-03-08 PROCEDURE — 83550 IRON BINDING TEST: CPT

## 2018-03-08 PROCEDURE — 700105 HCHG RX REV CODE 258: Performed by: SURGERY

## 2018-03-08 PROCEDURE — 700105 HCHG RX REV CODE 258: Performed by: NEUROLOGICAL SURGERY

## 2018-03-08 PROCEDURE — C9113 INJ PANTOPRAZOLE SODIUM, VIA: HCPCS | Performed by: SURGERY

## 2018-03-08 RX ORDER — DIPHENHYDRAMINE HCL 25 MG
25 TABLET ORAL ONCE
Status: COMPLETED | OUTPATIENT
Start: 2018-03-08 | End: 2018-03-08

## 2018-03-08 RX ORDER — DIPHENHYDRAMINE HYDROCHLORIDE 50 MG/ML
25 INJECTION INTRAMUSCULAR; INTRAVENOUS ONCE
Status: COMPLETED | OUTPATIENT
Start: 2018-03-08 | End: 2018-03-08

## 2018-03-08 RX ORDER — ACETAMINOPHEN 325 MG/1
650 TABLET ORAL ONCE
Status: COMPLETED | OUTPATIENT
Start: 2018-03-08 | End: 2018-03-08

## 2018-03-08 RX ADMIN — SODIUM CHLORIDE TAB 1 GM 2 G: 1 TAB at 11:26

## 2018-03-08 RX ADMIN — CIPROFLOXACIN HYDROCHLORIDE 1 DROP: 3 SOLUTION/ DROPS OPHTHALMIC at 05:00

## 2018-03-08 RX ADMIN — LORAZEPAM 2 MG: 2 INJECTION INTRAMUSCULAR; INTRAVENOUS at 09:04

## 2018-03-08 RX ADMIN — CIPROFLOXACIN HYDROCHLORIDE 1 DROP: 3 SOLUTION/ DROPS OPHTHALMIC at 17:38

## 2018-03-08 RX ADMIN — DIPHENHYDRAMINE HCL 25 MG: 25 TABLET ORAL at 14:00

## 2018-03-08 RX ADMIN — CIPROFLOXACIN HYDROCHLORIDE 1 DROP: 3 SOLUTION/ DROPS OPHTHALMIC at 14:01

## 2018-03-08 RX ADMIN — POTASSIUM BICARBONATE 50 MEQ: 25 TABLET, EFFERVESCENT ORAL at 08:45

## 2018-03-08 RX ADMIN — SODIUM CHLORIDE 1000 MG: 9 INJECTION, SOLUTION INTRAVENOUS at 08:45

## 2018-03-08 RX ADMIN — THERA TABS 1 TABLET: TAB at 08:45

## 2018-03-08 RX ADMIN — ENOXAPARIN SODIUM 30 MG: 100 INJECTION SUBCUTANEOUS at 21:14

## 2018-03-08 RX ADMIN — CIPROFLOXACIN HYDROCHLORIDE 1 DROP: 3 SOLUTION/ DROPS OPHTHALMIC at 21:25

## 2018-03-08 RX ADMIN — SODIUM CHLORIDE TAB 1 GM 2 G: 1 TAB at 08:45

## 2018-03-08 RX ADMIN — FOLIC ACID 1 MG: 1 TABLET ORAL at 08:45

## 2018-03-08 RX ADMIN — CIPROFLOXACIN HYDROCHLORIDE 1 DROP: 3 SOLUTION/ DROPS OPHTHALMIC at 08:46

## 2018-03-08 RX ADMIN — LORAZEPAM 2 MG: 2 INJECTION INTRAMUSCULAR; INTRAVENOUS at 21:42

## 2018-03-08 RX ADMIN — Medication 100 MG: at 08:45

## 2018-03-08 RX ADMIN — POLYETHYLENE GLYCOL 3350 1 PACKET: 17 POWDER, FOR SOLUTION ORAL at 08:46

## 2018-03-08 RX ADMIN — LORAZEPAM 2 MG: 2 INJECTION INTRAMUSCULAR; INTRAVENOUS at 18:18

## 2018-03-08 RX ADMIN — SODIUM CHLORIDE 1000 MG: 9 INJECTION, SOLUTION INTRAVENOUS at 21:14

## 2018-03-08 RX ADMIN — DIAZEPAM 5 MG: 5 TABLET ORAL at 12:28

## 2018-03-08 RX ADMIN — DIAZEPAM 5 MG: 5 TABLET ORAL at 05:00

## 2018-03-08 RX ADMIN — IRON DEXTRAN 25 MG: 50 INJECTION INTRAMUSCULAR; INTRAVENOUS at 14:17

## 2018-03-08 RX ADMIN — STANDARDIZED SENNA CONCENTRATE AND DOCUSATE SODIUM 1 TABLET: 8.6; 5 TABLET, FILM COATED ORAL at 21:14

## 2018-03-08 RX ADMIN — SODIUM CHLORIDE TAB 1 GM 2 G: 1 TAB at 17:38

## 2018-03-08 RX ADMIN — IRON DEXTRAN 1875 MG: 50 INJECTION INTRAMUSCULAR; INTRAVENOUS at 17:12

## 2018-03-08 RX ADMIN — POTASSIUM BICARBONATE 50 MEQ: 25 TABLET, EFFERVESCENT ORAL at 21:14

## 2018-03-08 RX ADMIN — ACETAMINOPHEN 650 MG: 325 TABLET, FILM COATED ORAL at 14:00

## 2018-03-08 RX ADMIN — DIAZEPAM 5 MG: 5 TABLET ORAL at 17:37

## 2018-03-08 RX ADMIN — PANTOPRAZOLE SODIUM 40 MG: 40 INJECTION, POWDER, LYOPHILIZED, FOR SOLUTION INTRAVENOUS at 08:46

## 2018-03-08 RX ADMIN — POLYETHYLENE GLYCOL 3350 1 PACKET: 17 POWDER, FOR SOLUTION ORAL at 21:14

## 2018-03-08 RX ADMIN — DOCUSATE SODIUM 100 MG: 100 CAPSULE ORAL at 08:45

## 2018-03-08 RX ADMIN — LORAZEPAM 1 MG: 2 INJECTION INTRAMUSCULAR; INTRAVENOUS at 11:24

## 2018-03-08 ASSESSMENT — LIFESTYLE VARIABLES
TREMOR: NO TREMOR
PAROXYSMAL SWEATS: BARELY PERCEPTIBLE SWEATING. PALMS MOIST
ANXIETY: *
ORIENTATION AND CLOUDING OF SENSORIUM: DATE DISORIENTATION BY MORE THAN TWO CALENDAR DAYS
NAUSEA AND VOMITING: NO NAUSEA AND NO VOMITING
ORIENTATION AND CLOUDING OF SENSORIUM: DATE DISORIENTATION BY MORE THAN TWO CALENDAR DAYS
HEADACHE, FULLNESS IN HEAD: NOT PRESENT
NAUSEA AND VOMITING: NO NAUSEA AND NO VOMITING
AGITATION: SOMEWHAT MORE THAN NORMAL ACTIVITY
AUDITORY DISTURBANCES: NOT PRESENT
AUDITORY DISTURBANCES: NOT PRESENT
VISUAL DISTURBANCES: VERY MILD SENSITIVITY
PAROXYSMAL SWEATS: BARELY PERCEPTIBLE SWEATING. PALMS MOIST
TOTAL SCORE: 8
HEADACHE, FULLNESS IN HEAD: NOT PRESENT
TREMOR: TREMOR NOT VISIBLE BUT CAN BE FELT, FINGERTIP TO FINGERTIP
TOTAL SCORE: 10
VISUAL DISTURBANCES: VERY MILD SENSITIVITY
AGITATION: *
ANXIETY: *

## 2018-03-08 NOTE — PROGRESS NOTES
IV Iron Per Pharmacy Note    Patient Lean Body Weight:  65 kg  Reason for Iron Replacement: Iron Deficiency Anemia      Lab Results   Component Value Date/Time    WBC 3.7 (L) 03/08/2018 06:07 AM    RBC 2.14 (L) 03/08/2018 06:07 AM    HEMOGLOBIN 7.4 (L) 03/08/2018 06:07 AM    HEMATOCRIT 22.7 (L) 03/08/2018 06:07 AM    .1 (H) 03/08/2018 06:07 AM    MCH 34.6 (H) 03/08/2018 06:07 AM    MCHC 32.6 (L) 03/08/2018 06:07 AM    MPV 10.6 03/08/2018 06:07 AM       Recent Labs      03/08/18   0607   IRON  37*         Recent Labs      03/08/18   0607   CREATININE  0.43*          Assessment/Plan:  1. IV Iron Indicated.   2. Give Iron Dextran 25 mg IV test dose following diphenhydramine/acetaminophen premeds over 30 minutes per protocol.  3. If no reaction (Anaphylaxis, Hypotension/Hypertension, N/V/D, Chest pain/Back Pain, Urticaria/Pruritis) in the next hour, proceed to full dose. Nursing to call the pharmacy IV room at ext. [phone] for full dose.  4. Full dose: Iron Dextran 1900 mg IV over 4 hours. Continue to monitor for delayed ADR including: Arthralgia/myalgia, Headache/backache, chills/dizziness/malaise, moderate to high fever and n/v.      Doris Rivera, PharmD

## 2018-03-08 NOTE — PROGRESS NOTES
Neurosurgery Progress Note    Subjective:  No events overnoc, no further sz  DT's  In restraints    Exam:  FC x 4   Saying a few words, confused  Did not attempt to check pupils d/t significant ecchymosis and edema right eye/face      BP  Min: 123/96  Max: 123/96  Pulse  Av.1  Min: 100  Max: 131  Resp  Av.7  Min: 17  Max: 45  Temp  Av.2 °C (100.8 °F)  Min: 38.2 °C (100.8 °F)  Max: 38.2 °C (100.8 °F)  Monitored Temp  Av.9 °C (100.2 °F)  Min: 37.4 °C (99.3 °F)  Max: 38.4 °C (101.1 °F)  SpO2  Av.3 %  Min: 94 %  Max: 100 %    No Data Recorded    Recent Labs      18   0535  18   0607   WBC  4.0*  2.8*  3.7*   RBC  2.06*  1.96*  2.14*   HEMOGLOBIN  7.0*  7.0*  7.4*   HEMATOCRIT  21.4*  20.5*  22.7*   MCV  103.9*  104.6*  106.1*   MCH  34.0*  35.7*  34.6*   MCHC  32.7*  34.1  32.6*   RDW  52.6*  50.1*  51.6*   PLATELETCT  186  155*  222   MPV  10.5  11.3  10.6     Recent Labs      18   0535  18   0607   SODIUM  132*  132*  131*   POTASSIUM  3.4*  3.5*  4.1   CHLORIDE  96  101  100   CO2  28  24  25   GLUCOSE  119*  121*  111*   BUN  8  5*  7*         Recent Labs      18   1723  18   1210   REACTMIN  5.2  4.1*  4.2*   CLOTKINET  1.3  1.0  0.9*   CLOTANGL  71.5  75.8*  76.0*   MAXCLOTS  66.4  66.8  67.6   [id number]  0.0  1.1  1.9   PRCINADP   --   87.6  95.0   PRCINAA   --   19.3  83.1       Intake/Output       18 07 - 18 0659 18 - 1859       Total  Total       Intake    I.V.  600  300 900  --  -- --    IV Volume (NS) 600 300 900 -- -- --    Blood  160  -- 160  --  -- --    Volume (RELEASE PLATELET PHERESIS) 160 -- 160 -- -- --    Other  160  120 280  --  -- --    Medications (P.O./ Enteral Liquids) 160 120 280 -- -- --    Enteral  870  780 1650  --  -- --    Enteral Volume  -- -- --    Free Water / Tube Flush 150 60 210 -- -- --     Total Intake 1790 1200 2990 -- -- --       Output    Urine  1635  1855 3490  --  -- --    Indwelling Cathether 1635 1855 3490 -- -- --    Total Output 1635 1855 3490 -- -- --       Net I/O     155 -655 -500 -- -- --            Intake/Output Summary (Last 24 hours) at 03/08/18 0753  Last data filed at 03/08/18 0600   Gross per 24 hour   Intake             2990 ml   Output             3490 ml   Net             -500 ml            • sodium chloride  2 g TID WITH MEALS   • ciprofloxacin  1 Drop Q4HRS WHILE AWAKE   • diazePAM  5 mg Q6HRS   • IRON REPLACEMENT   PRN   • potassium bicarbonate  50 mEq BID   • pantoprazole  40 mg DAILY   • HYDROmorphone  0.5 mg Q2HRS PRN   • glucose 4 g  16 g Q15 MIN PRN    And   • dextrose 50%  25 mL Q15 MIN PRN   • thiamine  100 mg DAILY    And   • folic acid  1 mg DAILY    And   • multivitamin  1 Tab DAILY   • LORazepam  4 mg Q15 MIN PRN    Or   • LORazepam  3 mg Q15 MIN PRN    Or   • LORazepam  2 mg Q15 MIN PRN    Or   • LORazepam  1 mg Q15 MIN PRN   • levETIRAcetam (KEPPRA) IV  1,000 mg BID   • Respiratory Care per Protocol   Continuous RT   • Pharmacy Consult Request  1 Each PRN   • docusate sodium  100 mg BID   • senna-docusate  1 Tab Nightly   • senna-docusate  1 Tab Q24HRS PRN   • polyethylene glycol/lytes  1 Packet BID   • magnesium hydroxide  30 mL DAILY   • bisacodyl  10 mg Q24HRS PRN   • fleet  1 Each Once PRN   • NS   Continuous   • ondansetron  4 mg Q4HRS PRN   • oxyCODONE immediate-release  5 mg Q3HRS PRN    Or   • oxyCODONE immediate-release  10 mg Q3HRS PRN       Assessment and Plan:  Hospital day # 4 lin FRANCIS SDH, DTs  Exam slowly improving  Prophylactic anticoagulation: yes        Start date/time: tonight  Head ct 3/5 stable  keppra 1000 mg bid- should continue for 3 months given sz  - cont na tabs  Hx ETOH - in DT's on ativan  plt stable  H/H stable  Cont ICU care

## 2018-03-08 NOTE — PROGRESS NOTES
Neurosurgery Progress Note    Subjective:  No events  Still in restraints    Exam:  A&ox1  Perrl, conjugate  Face symm, tongue midline  FC x 4      BP  Min: 123/96  Max: 123/96  Pulse  Av.1  Min: 100  Max: 131  Resp  Av.7  Min: 17  Max: 45  Temp  Av.2 °C (100.8 °F)  Min: 38.2 °C (100.8 °F)  Max: 38.2 °C (100.8 °F)  Monitored Temp  Av.9 °C (100.2 °F)  Min: 37.4 °C (99.3 °F)  Max: 38.4 °C (101.1 °F)  SpO2  Av.3 %  Min: 94 %  Max: 100 %    No Data Recorded    Recent Labs      18   0418  18   0535  18   0607   WBC  4.0*  2.8*  3.7*   RBC  2.06*  1.96*  2.14*   HEMOGLOBIN  7.0*  7.0*  7.4*   HEMATOCRIT  21.4*  20.5*  22.7*   MCV  103.9*  104.6*  106.1*   MCH  34.0*  35.7*  34.6*   MCHC  32.7*  34.1  32.6*   RDW  52.6*  50.1*  51.6*   PLATELETCT  186  155*  222   MPV  10.5  11.3  10.6     Recent Labs      18   0418  18   0535  18   0607   SODIUM  132*  132*  131*   POTASSIUM  3.4*  3.5*  4.1   CHLORIDE  96  101  100   CO2  28  24  25   GLUCOSE  119*  121*  111*   BUN  8  5*  7*         Recent Labs      18   1723  18   1210   REACTMIN  5.2  4.1*  4.2*   CLOTKINET  1.3  1.0  0.9*   CLOTANGL  71.5  75.8*  76.0*   MAXCLOTS  66.4  66.8  67.6   CST89PDR  0.0  1.1  1.9   PRCINADP   --   87.6  95.0   PRCINAA   --   19.3  83.1       Intake/Output       18 0700 - 18 0659 18 - 1859       Total  Total       Intake    I.V.  600  300 900  --  -- --    IV Volume (NS) 600 300 900 -- -- --    Blood  160  -- 160  --  -- --    Volume (RELEASE PLATELET PHERESIS) 160 -- 160 -- -- --    Other  160  120 280  --  -- --    Medications (P.O./ Enteral Liquids) 160 120 280 -- -- --    Enteral  870  780 1650  --  -- --    Enteral Volume  -- -- --    Free Water / Tube Flush 150 60 210 -- -- --    Total Intake 1790 1200 2990 -- -- --       Output    Urine  3812 7508 0960  --  -- --     Indwelling Cathether 1635 1855 3490 -- -- --    Total Output 1635 1855 3490 -- -- --       Net I/O     701 -488 -095 -- -- --            Intake/Output Summary (Last 24 hours) at 03/08/18 0752  Last data filed at 03/08/18 0600   Gross per 24 hour   Intake             2990 ml   Output             3490 ml   Net             -500 ml            • sodium chloride  2 g TID WITH MEALS   • ciprofloxacin  1 Drop Q4HRS WHILE AWAKE   • diazePAM  5 mg Q6HRS   • IRON REPLACEMENT   PRN   • potassium bicarbonate  50 mEq BID   • pantoprazole  40 mg DAILY   • HYDROmorphone  0.5 mg Q2HRS PRN   • glucose 4 g  16 g Q15 MIN PRN    And   • dextrose 50%  25 mL Q15 MIN PRN   • thiamine  100 mg DAILY    And   • folic acid  1 mg DAILY    And   • multivitamin  1 Tab DAILY   • LORazepam  4 mg Q15 MIN PRN    Or   • LORazepam  3 mg Q15 MIN PRN    Or   • LORazepam  2 mg Q15 MIN PRN    Or   • LORazepam  1 mg Q15 MIN PRN   • levETIRAcetam (KEPPRA) IV  1,000 mg BID   • Respiratory Care per Protocol   Continuous RT   • Pharmacy Consult Request  1 Each PRN   • docusate sodium  100 mg BID   • senna-docusate  1 Tab Nightly   • senna-docusate  1 Tab Q24HRS PRN   • polyethylene glycol/lytes  1 Packet BID   • magnesium hydroxide  30 mL DAILY   • bisacodyl  10 mg Q24HRS PRN   • fleet  1 Each Once PRN   • NS   Continuous   • ondansetron  4 mg Q4HRS PRN   • oxyCODONE immediate-release  5 mg Q3HRS PRN    Or   • oxyCODONE immediate-release  10 mg Q3HRS PRN       Assessment and Plan:  Hospital day # 4 TSAH, falcine SDH, DTs  Exam slowly improving  Prophylactic anticoagulation: yes        Start date/time: tonight  Head ct 3/5 stable  keppra 1000 mg bid- should continue for 3 months given sz  - cont na tabs  Hx ETOH - in DT's on ativan  plt stable  H/H stable  Cont ICU care

## 2018-03-08 NOTE — CARE PLAN
Problem: Venous Thromboembolism (VTW)/Deep Vein Thrombosis (DVT) Prevention:  Goal: Patient will participate in Venous Thrombosis (VTE)/Deep Vein Thrombosis (DVT)Prevention Measures  Outcome: PROGRESSING AS EXPECTED  SCD's are in place and inflating    Problem: Pain Management  Goal: Pain level will decrease to patient's comfort goal  Outcome: PROGRESSING AS EXPECTED  Pt assessed for pain and medications administered as needed per the MAR.

## 2018-03-08 NOTE — CARE PLAN
Problem: Safety  Goal: Will remain free from falls  Outcome: PROGRESSING AS EXPECTED  Bed in lowest position, bed alarm on, non-skid socks on, side rails up X3, bed rails padded, call light within reach.    Problem: Venous Thromboembolism (VTW)/Deep Vein Thrombosis (DVT) Prevention:  Goal: Patient will participate in Venous Thrombosis (VTE)/Deep Vein Thrombosis (DVT)Prevention Measures  Outcome: PROGRESSING AS EXPECTED  SCD's on and inflating for VTE, anticoagulation medication given per mar

## 2018-03-08 NOTE — PROGRESS NOTES
RN confirmed with Dr. Varela that it is okay to administer tylenol for iron therapy per protocol (re CMP).

## 2018-03-09 PROBLEM — F10.931 ACUTE HYPERACTIVE ALCOHOL WITHDRAWAL DELIRIUM (HCC): Status: ACTIVE | Noted: 2018-03-04

## 2018-03-09 LAB
ALBUMIN SERPL BCP-MCNC: 3.6 G/DL (ref 3.2–4.9)
ALBUMIN/GLOB SERPL: 1.2 G/DL
ALP SERPL-CCNC: 187 U/L (ref 30–99)
ALT SERPL-CCNC: 119 U/L (ref 2–50)
ANION GAP SERPL CALC-SCNC: 7 MMOL/L (ref 0–11.9)
AST SERPL-CCNC: 286 U/L (ref 12–45)
BASOPHILS # BLD AUTO: 0.6 % (ref 0–1.8)
BASOPHILS # BLD: 0.03 K/UL (ref 0–0.12)
BILIRUB SERPL-MCNC: 2.1 MG/DL (ref 0.1–1.5)
BUN SERPL-MCNC: 10 MG/DL (ref 8–22)
CALCIUM SERPL-MCNC: 9.2 MG/DL (ref 8.5–10.5)
CHLORIDE SERPL-SCNC: 99 MMOL/L (ref 96–112)
CO2 SERPL-SCNC: 24 MMOL/L (ref 20–33)
CREAT SERPL-MCNC: 0.4 MG/DL (ref 0.5–1.4)
EOSINOPHIL # BLD AUTO: 0.1 K/UL (ref 0–0.51)
EOSINOPHIL NFR BLD: 2 % (ref 0–6.9)
ERYTHROCYTE [DISTWIDTH] IN BLOOD BY AUTOMATED COUNT: 53.1 FL (ref 35.9–50)
GLOBULIN SER CALC-MCNC: 3 G/DL (ref 1.9–3.5)
GLUCOSE SERPL-MCNC: 107 MG/DL (ref 65–99)
HCT VFR BLD AUTO: 24.6 % (ref 37–47)
HGB BLD-MCNC: 8.2 G/DL (ref 12–16)
IMM GRANULOCYTES # BLD AUTO: 0.07 K/UL (ref 0–0.11)
IMM GRANULOCYTES NFR BLD AUTO: 1.4 % (ref 0–0.9)
LYMPHOCYTES # BLD AUTO: 0.74 K/UL (ref 1–4.8)
LYMPHOCYTES NFR BLD: 14.9 % (ref 22–41)
MCH RBC QN AUTO: 36.1 PG (ref 27–33)
MCHC RBC AUTO-ENTMCNC: 33.3 G/DL (ref 33.6–35)
MCV RBC AUTO: 108.4 FL (ref 81.4–97.8)
MONOCYTES # BLD AUTO: 0.56 K/UL (ref 0–0.85)
MONOCYTES NFR BLD AUTO: 11.2 % (ref 0–13.4)
NEUTROPHILS # BLD AUTO: 3.48 K/UL (ref 2–7.15)
NEUTROPHILS NFR BLD: 69.9 % (ref 44–72)
NRBC # BLD AUTO: 0.02 K/UL
NRBC BLD-RTO: 0.4 /100 WBC
PLATELET # BLD AUTO: 227 K/UL (ref 164–446)
PMV BLD AUTO: 10.6 FL (ref 9–12.9)
POTASSIUM SERPL-SCNC: 4 MMOL/L (ref 3.6–5.5)
PROT SERPL-MCNC: 6.6 G/DL (ref 6–8.2)
RBC # BLD AUTO: 2.27 M/UL (ref 4.2–5.4)
SODIUM SERPL-SCNC: 130 MMOL/L (ref 135–145)
WBC # BLD AUTO: 5 K/UL (ref 4.8–10.8)

## 2018-03-09 PROCEDURE — 700102 HCHG RX REV CODE 250 W/ 637 OVERRIDE(OP): Performed by: SURGERY

## 2018-03-09 PROCEDURE — A9270 NON-COVERED ITEM OR SERVICE: HCPCS | Performed by: CLINICAL NURSE SPECIALIST

## 2018-03-09 PROCEDURE — 770022 HCHG ROOM/CARE - ICU (200)

## 2018-03-09 PROCEDURE — 700111 HCHG RX REV CODE 636 W/ 250 OVERRIDE (IP): Performed by: NEUROLOGICAL SURGERY

## 2018-03-09 PROCEDURE — 99291 CRITICAL CARE FIRST HOUR: CPT | Performed by: SURGERY

## 2018-03-09 PROCEDURE — 700105 HCHG RX REV CODE 258: Performed by: SURGERY

## 2018-03-09 PROCEDURE — 700111 HCHG RX REV CODE 636 W/ 250 OVERRIDE (IP): Performed by: SURGERY

## 2018-03-09 PROCEDURE — 80053 COMPREHEN METABOLIC PANEL: CPT

## 2018-03-09 PROCEDURE — A9270 NON-COVERED ITEM OR SERVICE: HCPCS | Performed by: SURGERY

## 2018-03-09 PROCEDURE — 700105 HCHG RX REV CODE 258: Performed by: NEUROLOGICAL SURGERY

## 2018-03-09 PROCEDURE — 700102 HCHG RX REV CODE 250 W/ 637 OVERRIDE(OP): Performed by: CLINICAL NURSE SPECIALIST

## 2018-03-09 PROCEDURE — 85025 COMPLETE CBC W/AUTO DIFF WBC: CPT

## 2018-03-09 RX ORDER — SODIUM CHLORIDE 1 G/1
2 TABLET ORAL EVERY 6 HOURS
Status: DISCONTINUED | OUTPATIENT
Start: 2018-03-09 | End: 2018-03-20 | Stop reason: HOSPADM

## 2018-03-09 RX ADMIN — CIPROFLOXACIN HYDROCHLORIDE 1 DROP: 3 SOLUTION/ DROPS OPHTHALMIC at 21:16

## 2018-03-09 RX ADMIN — FOLIC ACID 1 MG: 1 TABLET ORAL at 07:38

## 2018-03-09 RX ADMIN — SODIUM CHLORIDE TAB 1 GM 2 G: 1 TAB at 11:04

## 2018-03-09 RX ADMIN — POTASSIUM BICARBONATE 50 MEQ: 25 TABLET, EFFERVESCENT ORAL at 07:38

## 2018-03-09 RX ADMIN — OMEPRAZOLE 20 MG: 20 CAPSULE, DELAYED RELEASE ORAL at 07:39

## 2018-03-09 RX ADMIN — DIAZEPAM 5 MG: 5 TABLET ORAL at 11:28

## 2018-03-09 RX ADMIN — LORAZEPAM 4 MG: 2 INJECTION INTRAMUSCULAR; INTRAVENOUS at 03:11

## 2018-03-09 RX ADMIN — CIPROFLOXACIN HYDROCHLORIDE 1 DROP: 3 SOLUTION/ DROPS OPHTHALMIC at 13:45

## 2018-03-09 RX ADMIN — POTASSIUM BICARBONATE 50 MEQ: 25 TABLET, EFFERVESCENT ORAL at 21:14

## 2018-03-09 RX ADMIN — LORAZEPAM 2 MG: 2 INJECTION INTRAMUSCULAR; INTRAVENOUS at 11:52

## 2018-03-09 RX ADMIN — LORAZEPAM 2 MG: 2 INJECTION INTRAMUSCULAR; INTRAVENOUS at 11:05

## 2018-03-09 RX ADMIN — CIPROFLOXACIN HYDROCHLORIDE 1 DROP: 3 SOLUTION/ DROPS OPHTHALMIC at 09:48

## 2018-03-09 RX ADMIN — SODIUM CHLORIDE TAB 1 GM 2 G: 1 TAB at 07:39

## 2018-03-09 RX ADMIN — DIAZEPAM 5 MG: 5 TABLET ORAL at 05:52

## 2018-03-09 RX ADMIN — LORAZEPAM 2 MG: 2 INJECTION INTRAMUSCULAR; INTRAVENOUS at 22:11

## 2018-03-09 RX ADMIN — DIAZEPAM 5 MG: 5 TABLET ORAL at 00:24

## 2018-03-09 RX ADMIN — SODIUM CHLORIDE 1000 MG: 9 INJECTION, SOLUTION INTRAVENOUS at 08:17

## 2018-03-09 RX ADMIN — DIAZEPAM 5 MG: 5 TABLET ORAL at 23:57

## 2018-03-09 RX ADMIN — THERA TABS 1 TABLET: TAB at 07:39

## 2018-03-09 RX ADMIN — LORAZEPAM 2 MG: 2 INJECTION INTRAMUSCULAR; INTRAVENOUS at 13:43

## 2018-03-09 RX ADMIN — ENOXAPARIN SODIUM 30 MG: 100 INJECTION SUBCUTANEOUS at 07:39

## 2018-03-09 RX ADMIN — SODIUM CHLORIDE TAB 1 GM 2 G: 1 TAB at 21:14

## 2018-03-09 RX ADMIN — CIPROFLOXACIN HYDROCHLORIDE 1 DROP: 3 SOLUTION/ DROPS OPHTHALMIC at 17:45

## 2018-03-09 RX ADMIN — ENOXAPARIN SODIUM 30 MG: 100 INJECTION SUBCUTANEOUS at 21:14

## 2018-03-09 RX ADMIN — DIAZEPAM 5 MG: 5 TABLET ORAL at 17:45

## 2018-03-09 RX ADMIN — SODIUM CHLORIDE 1000 MG: 9 INJECTION, SOLUTION INTRAVENOUS at 21:15

## 2018-03-09 RX ADMIN — LORAZEPAM 1 MG: 2 INJECTION INTRAMUSCULAR; INTRAVENOUS at 07:41

## 2018-03-09 RX ADMIN — SODIUM CHLORIDE: 9 INJECTION, SOLUTION INTRAVENOUS at 04:54

## 2018-03-09 RX ADMIN — LORAZEPAM 2 MG: 2 INJECTION INTRAMUSCULAR; INTRAVENOUS at 16:30

## 2018-03-09 RX ADMIN — CIPROFLOXACIN HYDROCHLORIDE 1 DROP: 3 SOLUTION/ DROPS OPHTHALMIC at 05:51

## 2018-03-09 RX ADMIN — SODIUM CHLORIDE TAB 1 GM 2 G: 1 TAB at 16:30

## 2018-03-09 RX ADMIN — Medication 100 MG: at 07:38

## 2018-03-09 RX ADMIN — LORAZEPAM 2 MG: 2 INJECTION INTRAMUSCULAR; INTRAVENOUS at 02:00

## 2018-03-09 RX ADMIN — LORAZEPAM 2 MG: 2 INJECTION INTRAMUSCULAR; INTRAVENOUS at 08:16

## 2018-03-09 NOTE — CARE PLAN
Problem: Infection  Goal: Will remain free from infection    Intervention: Assess signs and symptoms of infection  No current signs of infection, will continue to assess patient throughout the shift.

## 2018-03-09 NOTE — CARE PLAN
Problem: Safety  Goal: Will remain free from injury  Bed controls locked and lowered. Bed alarm on. Call light within reach. Treaded socks on patient. Seizure cautions in place.    Problem: Bowel/Gastric:  Goal: Normal bowel function is maintained or improved  Tube feedings at goal.

## 2018-03-09 NOTE — PROGRESS NOTES
"  Trauma/Surgical Progress Note    Author: Ken Varela Date & Time created: 3/8/2018   5:11 PM     Interval Events:  Alcohol withdrawal management  Neuro observation   Infection surveillance   Cleared for lovenox  By NS   Risk for deterioration   Continue ICU  Intubation watcch  Review of Systems   Unable to perform ROS: medical condition     Hemodynamics:  Blood pressure 123/96, pulse (!) 113, temperature (!) 38.2 °C (100.8 °F), resp. rate 19, height 1.727 m (5' 8\"), weight 66 kg (145 lb 8.1 oz), SpO2 100 %.     Respiratory:    Respiration: 19, Pulse Oximetry: 100 %     Work Of Breathing / Effort: Mild  RUL Breath Sounds: Clear, RML Breath Sounds: Clear, RLL Breath Sounds: Diminished, CARLI Breath Sounds: Clear, LLL Breath Sounds: Diminished  Fluids:    Intake/Output Summary (Last 24 hours) at 03/08/18 1711  Last data filed at 03/08/18 1400   Gross per 24 hour   Intake             2690 ml   Output             3230 ml   Net             -540 ml     Admit Weight: 69 kg (152 lb 1.9 oz)  Current Weight: 66 kg (145 lb 8.1 oz)    Physical Exam   Constitutional: She appears well-developed and well-nourished.   HENT:   Extensive STS, bruising right face   Eyes: Pupils are equal, round, and reactive to light. No scleral icterus.   Neck: Normal range of motion. Neck supple. No JVD present. No tracheal deviation present. No thyromegaly present.   Cardiovascular: Regular rhythm.    Sinus tach   Pulmonary/Chest: No respiratory distress. She exhibits no tenderness.   Abdominal: Soft. Bowel sounds are normal. She exhibits no distension. There is no tenderness.   Musculoskeletal: She exhibits edema.   Lymphadenopathy:     She has no cervical adenopathy.   Neurological: No cranial nerve deficit.   No further seizures  Has developed delirium secondary to alcohol withdrawal  Lethargic but will follow   Skin: Skin is warm and dry.   Nursing note and vitals reviewed.      Medical Decision Making/Problem List:    Active Hospital Problems "    Diagnosis   • Traumatic intracranial hemorrhage (CMS-HCC) [S06.309A]     Priority: High     Multiple areas of SAH and SDH, 4.1mm shift  Repeat CT imaging of the brain stable to slightly worse  Keppra x 1 week ordered  3/5 seizure @1515, repeat CT scan was stable  Further seizures 3/6, assessment complicated by alcohol withdrawal  Cog eval ordered    3/8 cleared for lovenox  Non-operative management.  Lorne Menchaca MD. Neurosurgery.       • Thrombocytopenia (CMS-HCC) [D69.6]     Priority: High     Platelet count 51 on admission, presumably from alcoholism  1 U platelets given empirically  Initial TEG normal but with evidence of platelet inhibition, repeat TEG slightly better after platelet transfusion.  Repeat CT head slightly worse, 2nd unit of platelets given.  Trend platelet count,  Inhibition of platelets refractory to platelet transfusion      • Acute alcohol intoxication (CMS-HCC) [F10.929]     Priority: High     DEYANIRA 0.43 on admission; LFTs all elevated, thrombocytopenic  Has developed florid alcohol withdrawal, Ciwa protocol.  Scheduled diazepam. + Prn ativan       • Pharmacologic contraindication to deep vein thrombosis (DVT) prophylaxis [Z53.09]     Priority: Low     Prophylactic Lovenox initially contraindicated due to elevated bleeding risk  Duplex ordered for 3/7/18     • Bacterial conjunctivitis of both eyes [H10.9]     Ciprofloxacin eyedrops     • Anemia due to acute blood loss [D62]     Significant hemoglobin drop  No evidence of gastrointestinal hemorrhage, abdomen examination is benign  Considering liver function tests abdominal CT scan was completed and no showed no evidence of intra-abdominal bleeding  Hemoglobin stable today, iron repletion        Core Measures & Quality Metrics:  Labs reviewed, Medications reviewed and Radiology images reviewed  Pemberton catheter: No Pemberton      DVT Prophylaxis: Contraindicated - High bleeding risk  DVT prophylaxis - mechanical: SCDs  Ulcer prophylaxis:  Yes        LUNA Score  Discussed patient condition with RN, RT and Pharmacy.  CRITICAL CARE TIME EXCLUDING PROCEDURES: 25    Minutes  I independently reviewed pertinent clinical lab tests from the last 48 hours and ordered additional follow up clinical lab tests.  I independently reviewed pertinent radiographic images and the radiologist's reports from the last 48 hours and ordered additional follow up radiographic studies.  I reviewed the details of the available patient records and documentation by consulting physicians in [facility] up to today, summated the information, and utilized the information as warranted in today's medical decision making for this patient.  I personally evaluated the patient condition at bedside and discussed the daily plan(s) with available nursing staff, dieticians, social workers, pharmacists on rounds.  Frequent reassessment for airway protection and work of breathing

## 2018-03-09 NOTE — CARE PLAN
Problem: Mobility  Goal: Risk for activity intolerance will decrease    Intervention: Assess and monitor signs of activity intolerance  Educated patient on importance of mobilizing and need to ambulate today.

## 2018-03-09 NOTE — PROGRESS NOTES
Neurosurgery Progress Note    Subjective:  No changes, no further sz  DT's  In restraints    Exam:  FC x 4   Saying a few words, confused  Did not attempt to check pupils d/t significant ecchymosis and edema right eye/face      BP  Min: 129/78  Max: 137/86  Pulse  Av.1  Min: 103  Max: 130  Resp  Av.5  Min: 16  Max: 56  Monitored Temp  Av.6 °C (99.7 °F)  Min: 30.2 °C (86.4 °F)  Max: 38.2 °C (100.8 °F)  SpO2  Av.9 %  Min: 88 %  Max: 100 %    No Data Recorded    Recent Labs      18   0535  18   0607  18   0455   WBC  2.8*  3.7*  5.0   RBC  1.96*  2.14*  2.27*   HEMOGLOBIN  7.0*  7.4*  8.2*   HEMATOCRIT  20.5*  22.7*  24.6*   MCV  104.6*  106.1*  108.4*   MCH  35.7*  34.6*  36.1*   MCHC  34.1  32.6*  33.3*   RDW  50.1*  51.6*  53.1*   PLATELETCT  155*  222  227   MPV  11.3  10.6  10.6     Recent Labs      18   0535  18   0607  18   0455   SODIUM  132*  131*  130*   POTASSIUM  3.5*  4.1  4.0   CHLORIDE  101  100  99   CO2  24  25  24   GLUCOSE  121*  111*  107*   BUN  5*  7*  10         Recent Labs      18   1210   REACTMIN  4.1*  4.2*   CLOTKINET  1.0  0.9*   CLOTANGL  75.8*  76.0*   MAXCLOTS  66.8  67.6   MGE50LDU  1.1  1.9   PRCINADP  87.6  95.0   PRCINAA  19.3  83.1       Intake/Output       18 - 1859 18 - 03/10/18 0659      [id number] 1300-2405 Total  Total       Intake    I.V.  975  600 4775  --  -- --    IV Piggyback Volume (Keppra) 100 100 200 -- -- --    IV Volume (NS)  -- -- --    IV Volume (Iron Dextran) 275 -- 275 -- -- --    Other  300  120 420  --  -- --    Medications (P.O./ Enteral Liquids) 300 120 420 -- -- --    Enteral  840  780 1620  --  -- --    Enteral Volume  -- -- --    Free Water / Tube Flush 120 60 180 -- -- --    Total Intake 2115 1600 3715 -- -- --       Output    Urine  1625  1725 3350  --  -- --    Indwelling Cathether 1626 1725 3350 -- --  --    Stool  --  -- --  --  -- --    Number of Times Stooled -- -- -- 1 x -- 1 x    Total Output 1625 1725 3350 -- -- --       Net I/O     490 -125 365 -- -- --            Intake/Output Summary (Last 24 hours) at 03/09/18 0812  Last data filed at 03/09/18 0600   Gross per 24 hour   Intake             3285 ml   Output             3125 ml   Net              160 ml            • levETIRAcetam (KEPPRA) IV  1,000 mg BID   • enoxaparin (LOVENOX) injection  30 mg Q12HRS   • omeprazole 2 mg/mL in sodium bicarbonate  20 mg DAILY   • sodium chloride  2 g TID WITH MEALS   • ciprofloxacin  1 Drop Q4HRS WHILE AWAKE   • diazePAM  5 mg Q6HRS   • potassium bicarbonate  50 mEq BID   • HYDROmorphone  0.5 mg Q2HRS PRN   • glucose 4 g  16 g Q15 MIN PRN    And   • dextrose 50%  25 mL Q15 MIN PRN   • LORazepam  4 mg Q15 MIN PRN    Or   • LORazepam  3 mg Q15 MIN PRN    Or   • LORazepam  2 mg Q15 MIN PRN    Or   • LORazepam  1 mg Q15 MIN PRN   • Respiratory Care per Protocol   Continuous RT   • Pharmacy Consult Request  1 Each PRN   • docusate sodium  100 mg BID   • senna-docusate  1 Tab Nightly   • senna-docusate  1 Tab Q24HRS PRN   • polyethylene glycol/lytes  1 Packet BID   • magnesium hydroxide  30 mL DAILY   • bisacodyl  10 mg Q24HRS PRN   • fleet  1 Each Once PRN   • NS   Continuous   • ondansetron  4 mg Q4HRS PRN   • oxyCODONE immediate-release  5 mg Q3HRS PRN    Or   • oxyCODONE immediate-release  10 mg Q3HRS PRN       Assessment and Plan:  Hospital day # 5 TSAH, falcquan SDH, DTs  Exam slowly improving  Prophylactic anticoagulation: yes        Start date/time: tonight  Head ct 3/5 stable  keppra 1000 mg bid- should continue for 3 months given sz  - cont na tabs, may need 3% or florinef if drops below 130  Hx ETOH - in DT's on ativan  plt stable  H/H stable  Cont ICU care

## 2018-03-09 NOTE — PROGRESS NOTES
Spoke with patient regarding safety and to not get out of bed with out help nor pull at any of the medical devices.

## 2018-03-09 NOTE — THERAPY
Speech Therapy Contact Note  Orders for cognitive-linguistic evaluation received. Per RN, patient is not appropriate on this date. Will attempt at a later time as patient is appropriate. Thank you.

## 2018-03-10 PROBLEM — D64.9 ANEMIA: Status: ACTIVE | Noted: 2018-03-06

## 2018-03-10 PROBLEM — E87.1 HYPONATREMIA: Status: ACTIVE | Noted: 2018-03-10

## 2018-03-10 PROBLEM — D69.6 THROMBOCYTOPENIA (HCC): Status: RESOLVED | Noted: 2018-03-04 | Resolved: 2018-03-10

## 2018-03-10 LAB
ANION GAP SERPL CALC-SCNC: 5 MMOL/L (ref 0–11.9)
ANION GAP SERPL CALC-SCNC: 5 MMOL/L (ref 0–11.9)
ANISOCYTOSIS BLD QL SMEAR: ABNORMAL
BASOPHILS # BLD AUTO: 1.7 % (ref 0–1.8)
BASOPHILS # BLD: 0.1 K/UL (ref 0–0.12)
BUN SERPL-MCNC: 12 MG/DL (ref 8–22)
BUN SERPL-MCNC: 13 MG/DL (ref 8–22)
CALCIUM SERPL-MCNC: 9.2 MG/DL (ref 8.5–10.5)
CALCIUM SERPL-MCNC: 9.3 MG/DL (ref 8.5–10.5)
CHLORIDE SERPL-SCNC: 96 MMOL/L (ref 96–112)
CHLORIDE SERPL-SCNC: 97 MMOL/L (ref 96–112)
CO2 SERPL-SCNC: 25 MMOL/L (ref 20–33)
CO2 SERPL-SCNC: 25 MMOL/L (ref 20–33)
CREAT SERPL-MCNC: 0.44 MG/DL (ref 0.5–1.4)
CREAT SERPL-MCNC: 0.46 MG/DL (ref 0.5–1.4)
EOSINOPHIL # BLD AUTO: 0.26 K/UL (ref 0–0.51)
EOSINOPHIL NFR BLD: 4.3 % (ref 0–6.9)
ERYTHROCYTE [DISTWIDTH] IN BLOOD BY AUTOMATED COUNT: 53.5 FL (ref 35.9–50)
GLUCOSE SERPL-MCNC: 106 MG/DL (ref 65–99)
GLUCOSE SERPL-MCNC: 109 MG/DL (ref 65–99)
HCT VFR BLD AUTO: 24.8 % (ref 37–47)
HGB BLD-MCNC: 8.2 G/DL (ref 12–16)
LG PLATELETS BLD QL SMEAR: NORMAL
LYMPHOCYTES # BLD AUTO: 1.2 K/UL (ref 1–4.8)
LYMPHOCYTES NFR BLD: 19.7 % (ref 22–41)
MACROCYTES BLD QL SMEAR: ABNORMAL
MANUAL DIFF BLD: NORMAL
MCH RBC QN AUTO: 35 PG (ref 27–33)
MCHC RBC AUTO-ENTMCNC: 33.1 G/DL (ref 33.6–35)
MCV RBC AUTO: 106 FL (ref 81.4–97.8)
MONOCYTES # BLD AUTO: 0.41 K/UL (ref 0–0.85)
MONOCYTES NFR BLD AUTO: 6.8 % (ref 0–13.4)
MORPHOLOGY BLD-IMP: NORMAL
MYELOCYTES NFR BLD MANUAL: 0.8 %
NEUTROPHILS # BLD AUTO: 4.07 K/UL (ref 2–7.15)
NEUTROPHILS NFR BLD: 65 % (ref 44–72)
NEUTS BAND NFR BLD MANUAL: 1.7 % (ref 0–10)
NRBC # BLD AUTO: 0.22 K/UL
NRBC BLD-RTO: 3.6 /100 WBC
PLATELET # BLD AUTO: 226 K/UL (ref 164–446)
PLATELET BLD QL SMEAR: NORMAL
PMV BLD AUTO: 10.1 FL (ref 9–12.9)
POLYCHROMASIA BLD QL SMEAR: NORMAL
POTASSIUM SERPL-SCNC: 3.8 MMOL/L (ref 3.6–5.5)
POTASSIUM SERPL-SCNC: 4 MMOL/L (ref 3.6–5.5)
RBC # BLD AUTO: 2.34 M/UL (ref 4.2–5.4)
RBC BLD AUTO: PRESENT
SODIUM SERPL-SCNC: 126 MMOL/L (ref 135–145)
SODIUM SERPL-SCNC: 127 MMOL/L (ref 135–145)
WBC # BLD AUTO: 6.1 K/UL (ref 4.8–10.8)

## 2018-03-10 PROCEDURE — 700111 HCHG RX REV CODE 636 W/ 250 OVERRIDE (IP): Performed by: SURGERY

## 2018-03-10 PROCEDURE — 700102 HCHG RX REV CODE 250 W/ 637 OVERRIDE(OP): Performed by: SURGERY

## 2018-03-10 PROCEDURE — 770022 HCHG ROOM/CARE - ICU (200)

## 2018-03-10 PROCEDURE — A9270 NON-COVERED ITEM OR SERVICE: HCPCS | Performed by: SURGERY

## 2018-03-10 PROCEDURE — 85007 BL SMEAR W/DIFF WBC COUNT: CPT

## 2018-03-10 PROCEDURE — 85027 COMPLETE CBC AUTOMATED: CPT

## 2018-03-10 PROCEDURE — 700105 HCHG RX REV CODE 258: Performed by: SURGERY

## 2018-03-10 PROCEDURE — 80048 BASIC METABOLIC PNL TOTAL CA: CPT

## 2018-03-10 PROCEDURE — 99291 CRITICAL CARE FIRST HOUR: CPT | Performed by: SURGERY

## 2018-03-10 RX ORDER — 3% SODIUM CHLORIDE 3 G/100ML
INJECTION, SOLUTION INTRAVENOUS CONTINUOUS
Status: DISCONTINUED | OUTPATIENT
Start: 2018-03-10 | End: 2018-03-16

## 2018-03-10 RX ORDER — LEVETIRACETAM 500 MG/1
1000 TABLET ORAL 2 TIMES DAILY
Status: DISCONTINUED | OUTPATIENT
Start: 2018-03-10 | End: 2018-03-13

## 2018-03-10 RX ADMIN — LORAZEPAM 3 MG: 2 INJECTION INTRAMUSCULAR; INTRAVENOUS at 02:19

## 2018-03-10 RX ADMIN — SODIUM CHLORIDE TAB 1 GM 2 G: 1 TAB at 05:24

## 2018-03-10 RX ADMIN — CIPROFLOXACIN HYDROCHLORIDE 1 DROP: 3 SOLUTION/ DROPS OPHTHALMIC at 08:29

## 2018-03-10 RX ADMIN — CIPROFLOXACIN HYDROCHLORIDE 1 DROP: 3 SOLUTION/ DROPS OPHTHALMIC at 17:00

## 2018-03-10 RX ADMIN — OXYCODONE HYDROCHLORIDE 10 MG: 10 TABLET ORAL at 19:53

## 2018-03-10 RX ADMIN — DIAZEPAM 5 MG: 5 TABLET ORAL at 05:31

## 2018-03-10 RX ADMIN — CIPROFLOXACIN HYDROCHLORIDE 1 DROP: 3 SOLUTION/ DROPS OPHTHALMIC at 05:25

## 2018-03-10 RX ADMIN — SODIUM CHLORIDE TAB 1 GM 2 G: 1 TAB at 16:56

## 2018-03-10 RX ADMIN — OMEPRAZOLE 20 MG: 20 CAPSULE, DELAYED RELEASE ORAL at 07:41

## 2018-03-10 RX ADMIN — OXYCODONE HYDROCHLORIDE 10 MG: 10 TABLET ORAL at 12:27

## 2018-03-10 RX ADMIN — SODIUM CHLORIDE: 234 INJECTION, SOLUTION INTRAVENOUS at 08:36

## 2018-03-10 RX ADMIN — SODIUM CHLORIDE TAB 1 GM 2 G: 1 TAB at 12:06

## 2018-03-10 RX ADMIN — DIAZEPAM 5 MG: 5 TABLET ORAL at 12:07

## 2018-03-10 RX ADMIN — OXYCODONE HYDROCHLORIDE 10 MG: 10 TABLET ORAL at 09:30

## 2018-03-10 RX ADMIN — POTASSIUM BICARBONATE 50 MEQ: 25 TABLET, EFFERVESCENT ORAL at 07:41

## 2018-03-10 RX ADMIN — LORAZEPAM 3 MG: 2 INJECTION INTRAMUSCULAR; INTRAVENOUS at 07:36

## 2018-03-10 RX ADMIN — LEVETIRACETAM 1000 MG: 500 TABLET, FILM COATED ORAL at 19:53

## 2018-03-10 RX ADMIN — LORAZEPAM 3 MG: 2 INJECTION INTRAMUSCULAR; INTRAVENOUS at 16:44

## 2018-03-10 RX ADMIN — POTASSIUM BICARBONATE 50 MEQ: 25 TABLET, EFFERVESCENT ORAL at 19:52

## 2018-03-10 RX ADMIN — LORAZEPAM 2 MG: 2 INJECTION INTRAMUSCULAR; INTRAVENOUS at 20:35

## 2018-03-10 RX ADMIN — LORAZEPAM 3 MG: 2 INJECTION INTRAMUSCULAR; INTRAVENOUS at 13:05

## 2018-03-10 RX ADMIN — CIPROFLOXACIN HYDROCHLORIDE 1 DROP: 3 SOLUTION/ DROPS OPHTHALMIC at 13:12

## 2018-03-10 RX ADMIN — ENOXAPARIN SODIUM 30 MG: 100 INJECTION SUBCUTANEOUS at 07:42

## 2018-03-10 RX ADMIN — ENOXAPARIN SODIUM 30 MG: 100 INJECTION SUBCUTANEOUS at 19:53

## 2018-03-10 RX ADMIN — SODIUM CHLORIDE TAB 1 GM 2 G: 1 TAB at 22:51

## 2018-03-10 RX ADMIN — CIPROFLOXACIN HYDROCHLORIDE 1 DROP: 3 SOLUTION/ DROPS OPHTHALMIC at 22:49

## 2018-03-10 RX ADMIN — LEVETIRACETAM 1000 MG: 500 TABLET, FILM COATED ORAL at 08:29

## 2018-03-10 ASSESSMENT — LIFESTYLE VARIABLES
NAUSEA AND VOMITING: NO NAUSEA AND NO VOMITING
ORIENTATION AND CLOUDING OF SENSORIUM: CANNOT DO SERIAL ADDITIONS OR IS UNCERTAIN ABOUT DATE
VISUAL DISTURBANCES: NOT PRESENT
TREMOR: TREMOR NOT VISIBLE BUT CAN BE FELT, FINGERTIP TO FINGERTIP
AGITATION: *
ANXIETY: *
TOTAL SCORE: 8
PAROXYSMAL SWEATS: BARELY PERCEPTIBLE SWEATING. PALMS MOIST
HEADACHE, FULLNESS IN HEAD: NOT PRESENT
AUDITORY DISTURBANCES: NOT PRESENT

## 2018-03-10 NOTE — PROGRESS NOTES
"  Trauma/Surgical Progress Note      Interval Events:  Psychomotor agitation secondary to alcohol withdrawal persists  Requiring frequent dosing of IV benzodiazepine per protocol  NaCl tabs adjusted for hyponatremia  At risk for sudden neurologic decompensation    Hemodynamics:  Blood pressure 137/86, pulse (!) 120, temperature (!) 38.2 °C (100.8 °F), resp. rate (!) 48, height 1.727 m (5' 8\"), weight 61.6 kg (135 lb 12.9 oz), SpO2 (!) 76 %.     Respiratory:    Respiration: (!) 48, Pulse Oximetry: (!) 76 %     Work Of Breathing / Effort: Mild  RUL Breath Sounds: Clear, RML Breath Sounds: Diminished, RLL Breath Sounds: Diminished, CARLI Breath Sounds: Clear, LLL Breath Sounds: Diminished  Fluids:    Intake/Output Summary (Last 24 hours) at 03/09/18 1823  Last data filed at 03/09/18 1800   Gross per 24 hour   Intake             3170 ml   Output             2925 ml   Net              245 ml     Admit Weight: 69 kg (152 lb 1.9 oz)  Current Weight: 61.6 kg (135 lb 12.9 oz)    Physical Exam   Constitutional: She appears well-developed and well-nourished.   HENT:   Extensive STS, bruising right face   Eyes: Pupils are equal, round, and reactive to light. No scleral icterus.   Neck: Normal range of motion. Neck supple. No JVD present. No tracheal deviation present. No thyromegaly present.   Cardiovascular: Regular rhythm.    Sinus tach   Pulmonary/Chest: No respiratory distress. She exhibits no tenderness.   Abdominal: Soft. Bowel sounds are normal. She exhibits no distension. There is no tenderness.   Musculoskeletal: She exhibits edema.   Lymphadenopathy:     She has no cervical adenopathy.   Neurological: No cranial nerve deficit.   No further seizure   Skin: Skin is warm and dry.   Nursing note and vitals reviewed.      Medical Decision Making/Problem List:    Active Hospital Problems    Diagnosis   • Traumatic intracranial hemorrhage (CMS-HCC) [S06.309A]     Priority: High     Multiple areas of SAH and SDH, 4.1mm " shift  Repeat CT imaging of the brain stable to slightly worse  Keppra x 1 week ordered  3/5 seizure @1515, repeat CT scan was stable  Further seizures 3/6, assessment complicated by alcohol withdrawal  Cog eval ordered    3/8 cleared for lovenox  Non-operative management.  Lorne Menchaca MD. Neurosurgery.       • Thrombocytopenia (CMS-HCC) [D69.6]     Priority: High     Platelet count 51 on admission, presumably from alcoholism  1 U platelets given empirically  Initial TEG normal but with evidence of platelet inhibition, repeat TEG slightly better after platelet transfusion.  Repeat CT head slightly worse, 2nd unit of platelets given.  Trend platelet count,  Inhibition of platelets refractory to platelet transfusion      • Acute hyperactive alcohol withdrawal delirium (CMS-HCC) [F10.231]     Priority: High     DEYANIRA 0.43 on admission; LFTs all elevated, thrombocytopenic  Has developed florid alcohol withdrawal, Ciwa protocol.  Scheduled diazepam. + Prn ativan       • Pharmacologic contraindication to deep vein thrombosis (DVT) prophylaxis [Z53.09]     Priority: Low     Prophylactic Lovenox initially contraindicated due to elevated bleeding risk  Duplex ordered for 3/7/18     • Moderate protein-calorie malnutrition (CMS-HCC) [E44.0]     Tube feeding per protocol     • Bacterial conjunctivitis of both eyes [H10.9]     Ciprofloxacin eyedrops     • Anemia due to acute blood loss [D62]     Significant hemoglobin drop  No evidence of gastrointestinal hemorrhage, abdomen examination is benign  Considering liver function tests abdominal CT scan was completed and no showed no evidence of intra-abdominal bleeding  Hemoglobin stable today, iron repletion        Core Measures & Quality Metrics:  Labs reviewed, Radiology images reviewed and Medications reviewed  Pemberton catheter: Critically Ill - Requiring Accurate Measurement of Urinary Output      DVT Prophylaxis: Enoxaparin (Lovenox)  DVT prophylaxis - mechanical: SCDs  Ulcer  prophylaxis: Not indicated        LUNA Score    Psychomotor agitation and delirium secondary to acute alcohol withdrawal requiring parenteral controlled substances involving high complexity decision making initiated in an urgent manner by assessing, manipulating, and supporting central nervous system function given the high probability of further deterioration in the patient's condition and threat to life.    I independently reviewed pertinent clinical lab tests from the last 48 hours and ordered additional follow up clinical lab tests.  I independently reviewed pertinent radiographic images and the radiologist's reports from the last 48 hours and ordered additional follow up radiographic studies.  I reviewed the details of the available patient records and documentation by consulting physicians in EP[facility] up to today, summated the information, and utilized the information as warranted in today's medical decision making for this patient.  I personally evaluated the patient condition at bedside and discussed the daily plan(s) with available nursing staff, dieticians, social workers, pharmacists on rounds.    Aggregated critical care time spent evaluating, reviewing documentation, providing care, and managing this patient exclusive of procedures: 35 minutes    Jeff Shay MD    DATE OF SERVICE: 3/9/2018

## 2018-03-10 NOTE — PROGRESS NOTES
Brought up sodium level during rounds and that neuro wants to add meds because they don't want it lower than 130.

## 2018-03-10 NOTE — PROGRESS NOTES
Spoke with Trauma NP and Dr. Shay and informed them of rectal trumpet placed during night shift due to the continuation of large loose stools in order to protect patient skin.

## 2018-03-10 NOTE — PROGRESS NOTES
Neurosurgery Progress Note  Day 6  Dts  tSAH  Restrained  Mumbles  Eyes open  FC all 4 limbs  Eyes hard to pen but open L>R  Bruised +++  Na 126- 30cc/hr 3% started        BP  Min: 119/89  Max: 123/69  Pulse  Av.4  Min: 72  Max: 134  Resp  Av.8  Min: 12  Max: 63  Monitored Temp  Av.1 °C (100.6 °F)  Min: 37.4 °C (99.3 °F)  Max: 38.5 °C (101.3 °F)  SpO2  Av.6 %  Min: 76 %  Max: 100 %    No Data Recorded    Recent Labs      18   0607  18   0455  03/10/18   0520   WBC  3.7*  5.0  6.1   RBC  2.14*  2.27*  2.34*   HEMOGLOBIN  7.4*  8.2*  8.2*   HEMATOCRIT  22.7*  24.6*  24.8*   MCV  106.1*  108.4*  106.0*   MCH  34.6*  36.1*  35.0*   MCHC  32.6*  33.3*  33.1*   RDW  51.6*  53.1*  53.5*   PLATELETCT  222  227  226   MPV  10.6  10.6  10.1     Recent Labs      18   0607  18   0455  03/10/18   0520   SODIUM  131*  130*  126*   POTASSIUM  4.1  4.0  3.8   CHLORIDE  100  99  96   CO2  25  24  25   GLUCOSE  111*  107*  109*   BUN  7*  10  12         Recent Labs      18   1210   REACTMIN  4.2*   CLOTKINET  0.9*   CLOTANGL  76.0*   MAXCLOTS  67.6   [id number]  1.9   PRCINADP  95.0   PRCINAA  83.1       Intake/Output       18 - 03/10/18 0659 03/10/18 0700 - 18 0659       Total  Total       Intake    I.V.  700  100 800  --  -- --    IV Piggyback Volume (Keppra) 100 100 200 -- -- --    IV Volume (NS) 600 0 600 -- -- --    Other  90  135 225  60  -- 60    Medications (P.O./ Enteral Liquids) 90 135 225 60 -- 60    Enteral  780  825 1605  150  -- 150    Enteral Volume  120 -- 120    Free Water / Tube Flush 60 105 165 30 -- 30    Total Intake 1570 1060 2630 210 -- 210       Output    Urine  1200  875 2075  250  -- 250    Indwelling Cathether 4860 875 4556 250 -- 250    Stool  --  -- --  --  -- --    Number of Times Stooled 3 x -- 3 x -- -- --    Total Output 7542 275 3457 250 -- 250       Net I/O     370 185 555 -40 -- -40             Intake/Output Summary (Last 24 hours) at 03/10/18 0833  Last data filed at 03/10/18 0800   Gross per 24 hour   Intake             2430 ml   Output             2150 ml   Net              280 ml            • 3% sodium chloride   Continuous   • levETIRAcetam  1,000 mg BID   • sodium chloride  2 g Q6HRS   • enoxaparin (LOVENOX) injection  30 mg Q12HRS   • omeprazole 2 mg/mL in sodium bicarbonate  20 mg DAILY   • ciprofloxacin  1 Drop Q4HRS WHILE AWAKE   • diazePAM  5 mg Q6HRS   • potassium bicarbonate  50 mEq BID   • HYDROmorphone  0.5 mg Q2HRS PRN   • glucose 4 g  16 g Q15 MIN PRN    And   • dextrose 50%  25 mL Q15 MIN PRN   • LORazepam  4 mg Q15 MIN PRN    Or   • LORazepam  3 mg Q15 MIN PRN    Or   • LORazepam  2 mg Q15 MIN PRN    Or   • LORazepam  1 mg Q15 MIN PRN   • Respiratory Care per Protocol   Continuous RT   • Pharmacy Consult Request  1 Each PRN   • docusate sodium  100 mg BID   • senna-docusate  1 Tab Nightly   • senna-docusate  1 Tab Q24HRS PRN   • polyethylene glycol/lytes  1 Packet BID   • magnesium hydroxide  30 mL DAILY   • bisacodyl  10 mg Q24HRS PRN   • fleet  1 Each Once PRN   • ondansetron  4 mg Q4HRS PRN   • oxyCODONE immediate-release  5 mg Q3HRS PRN    Or   • oxyCODONE immediate-release  10 mg Q3HRS PRN       Assessment and Plan:     Hospital day # 6 lin FRANCIS SDH, DTs  Exam slowly improving  Prophylactic anticoagulation: yes        Start date/time: tonight  Watch Na  Cont ICU care

## 2018-03-10 NOTE — PROGRESS NOTES
"  Trauma/Surgical Progress Note      Interval Events:  Psychomotor agitation secondary to alcohol withdrawal persists  Requiring frequent dosing of IV benzodiazepine per CIWA protocol  Severe hyponatremia addressed with 3% hypertonic saline infusion  Discontinued scheduled valium  At risk for sudden neurologic decompensation    Hemodynamics:  Blood pressure 123/69, pulse (!) 111, temperature (!) 38.2 °C (100.8 °F), resp. rate 15, height 1.727 m (5' 8\"), weight 61.6 kg (135 lb 12.9 oz), SpO2 100 %.     Respiratory:    Respiration: 15, Pulse Oximetry: 100 %     Work Of Breathing / Effort: Mild  RUL Breath Sounds: Clear, RML Breath Sounds: Clear, RLL Breath Sounds: Diminished, CARLI Breath Sounds: Clear, LLL Breath Sounds: Diminished  Fluids:    Intake/Output Summary (Last 24 hours) at 03/09/18 1823  Last data filed at 03/09/18 1800   Gross per 24 hour   Intake             3170 ml   Output             2925 ml   Net              245 ml     Admit Weight: 69 kg (152 lb 1.9 oz)  Current      Physical Exam   Constitutional: She appears well-developed and well-nourished.   HENT:   Extensive STS, bruising right face   Eyes: Pupils are equal, round, and reactive to light. No scleral icterus.   Neck: Normal range of motion. Neck supple. No JVD present. No tracheal deviation present. No thyromegaly present.   Cardiovascular: Regular rhythm.    Sinus tach   Pulmonary/Chest: No respiratory distress. She exhibits no tenderness.   Abdominal: Soft. Bowel sounds are normal. She exhibits no distension. There is no tenderness.   Musculoskeletal: She exhibits edema.   Lymphadenopathy:     She has no cervical adenopathy.   Neurological: No cranial nerve deficit. GCS eye subscore is 3. GCS verbal subscore is 4. GCS motor subscore is 5.   No further seizure   Skin: Skin is warm and dry.   Nursing note and vitals reviewed.      Medical Decision Making/Problem List:    Active Hospital Problems    Diagnosis   • Traumatic intracranial hemorrhage " (CMS-HCC) [S06.309A]     Priority: High     Multiple areas of SAH and SDH, 4.1mm shift  Repeat CT imaging of the brain stable to slightly worse  3/5 seizure @1515, repeat CT scan was stable  Further seizures 3/6, assessment complicated by alcohol withdrawal  Continue Keppra  Cog eval ordered  3/8 cleared for lovenox  Non-operative management.  Lorne Menchaca MD. Neurosurgery.       • Acute hyperactive alcohol withdrawal delirium (CMS-HCC) [F10.231]     Priority: High     DEYANIRA 0.43 on admission; LFTs elevated, thrombocytopenic  Has developed alcohol withdrawal  Ativan [facility] protocol.       • Moderate protein-calorie malnutrition (CMS-HCC) [E44.0]     Priority: Medium     Tube feeding per protocol.     • Bacterial conjunctivitis of both eyes [H10.9]     Priority: Low     Ciprofloxacin eyedrops x 7 days     • Anemia [D64.9]     Priority: Low     Significant hemoglobin drop after admission  No evidence of gastrointestinal hemorrhage, abdomen examination is benign  Considering liver function tests abdominal CT scan was completed and no showed no evidence of intra-abdominal bleeding  Serial hemoglobin stable   Iron replacement  Likely chronic     • Pharmacologic contraindication to deep vein thrombosis (DVT) prophylaxis [Z53.09]     Priority: Low     Prophylactic Lovenox initially contraindicated due to elevated bleeding risk  Duplex ordered for 3/7/18 - read PENDING  3/8 Lovenox initiated       Core Measures & Quality Metrics:  Labs reviewed, Radiology images reviewed and Medications reviewed  Pemberton catheter: Critically Ill - Requiring Accurate Measurement of Urinary Output      DVT Prophylaxis: Enoxaparin (Lovenox)  DVT prophylaxis - mechanical: SCDs  Ulcer prophylaxis: Not indicated        LUNA Score    Psychomotor agitation and delirium secondary to acute alcohol withdrawal requiring parenteral controlled substances involving high complexity decision making initiated in an urgent manner by assessing, manipulating, and  supporting central nervous system function given the high probability of further deterioration in the patient's condition and threat to life.    I independently reviewed pertinent clinical lab tests from the last 48 hours and ordered additional follow up clinical lab tests.  I independently reviewed pertinent radiographic images and the radiologist's reports from the last 48 hours and ordered additional follow up radiographic studies.  I reviewed the details of the available patient records and documentation by consulting physicians in EPIC up to today, summated the information, and utilized the information as warranted in today's medical decision making for this patient.  I personally evaluated the patient condition at bedside and discussed the daily plan(s) with available nursing staff, dieticians, social workers, pharmacists on rounds.    Aggregated critical care time spent evaluating, reviewing documentation, providing care, and managing this patient exclusive of procedures: 35 minutes    Jeff Shay MD    DATE OF SERVICE: 3/10/2018

## 2018-03-10 NOTE — CARE PLAN
Problem: Pain Management  Goal: Pain level will decrease to patient's comfort goal    Intervention: Follow pain managment plan developed in collaboration with patient and Interdisciplinary Team  Educated patient on pain scale and assessed patient using pain scale.

## 2018-03-10 NOTE — CARE PLAN
Problem: Safety  Goal: Will remain free from injury  Bed controls locked and lowered. Bed alarm on. Call light within reach. Treaded socks on patient. Soft restraints assessed and verified.    Problem: Venous Thromboembolism (VTW)/Deep Vein Thrombosis (DVT) Prevention:  Goal: Patient will participate in Venous Thrombosis (VTE)/Deep Vein Thrombosis (DVT)Prevention Measures  SCDs on lower extremities. Lovenox administered per MAR.

## 2018-03-11 LAB
ANION GAP SERPL CALC-SCNC: 6 MMOL/L (ref 0–11.9)
ANION GAP SERPL CALC-SCNC: 6 MMOL/L (ref 0–11.9)
BASOPHILS # BLD AUTO: 0.9 % (ref 0–1.8)
BASOPHILS # BLD: 0.04 K/UL (ref 0–0.12)
BUN SERPL-MCNC: 14 MG/DL (ref 8–22)
BUN SERPL-MCNC: 14 MG/DL (ref 8–22)
CALCIUM SERPL-MCNC: 8.9 MG/DL (ref 8.5–10.5)
CALCIUM SERPL-MCNC: 9.5 MG/DL (ref 8.5–10.5)
CHLORIDE SERPL-SCNC: 100 MMOL/L (ref 96–112)
CHLORIDE SERPL-SCNC: 99 MMOL/L (ref 96–112)
CO2 SERPL-SCNC: 24 MMOL/L (ref 20–33)
CO2 SERPL-SCNC: 25 MMOL/L (ref 20–33)
CREAT SERPL-MCNC: 0.45 MG/DL (ref 0.5–1.4)
CREAT SERPL-MCNC: 0.47 MG/DL (ref 0.5–1.4)
EOSINOPHIL # BLD AUTO: 0.08 K/UL (ref 0–0.51)
EOSINOPHIL NFR BLD: 1.7 % (ref 0–6.9)
ERYTHROCYTE [DISTWIDTH] IN BLOOD BY AUTOMATED COUNT: 53.9 FL (ref 35.9–50)
GLUCOSE SERPL-MCNC: 108 MG/DL (ref 65–99)
GLUCOSE SERPL-MCNC: 114 MG/DL (ref 65–99)
HCT VFR BLD AUTO: 25.2 % (ref 37–47)
HGB BLD-MCNC: 8.2 G/DL (ref 12–16)
IMM GRANULOCYTES # BLD AUTO: 0.18 K/UL (ref 0–0.11)
IMM GRANULOCYTES NFR BLD AUTO: 3.9 % (ref 0–0.9)
LYMPHOCYTES # BLD AUTO: 0.97 K/UL (ref 1–4.8)
LYMPHOCYTES NFR BLD: 20.8 % (ref 22–41)
MCH RBC QN AUTO: 34.9 PG (ref 27–33)
MCHC RBC AUTO-ENTMCNC: 32.5 G/DL (ref 33.6–35)
MCV RBC AUTO: 107.2 FL (ref 81.4–97.8)
MONOCYTES # BLD AUTO: 0.59 K/UL (ref 0–0.85)
MONOCYTES NFR BLD AUTO: 12.7 % (ref 0–13.4)
NEUTROPHILS # BLD AUTO: 2.8 K/UL (ref 2–7.15)
NEUTROPHILS NFR BLD: 60 % (ref 44–72)
NRBC # BLD AUTO: 0.11 K/UL
NRBC BLD-RTO: 2.4 /100 WBC
PLATELET # BLD AUTO: 236 K/UL (ref 164–446)
PMV BLD AUTO: 9.8 FL (ref 9–12.9)
POTASSIUM SERPL-SCNC: 4.2 MMOL/L (ref 3.6–5.5)
POTASSIUM SERPL-SCNC: 4.4 MMOL/L (ref 3.6–5.5)
RBC # BLD AUTO: 2.35 M/UL (ref 4.2–5.4)
SODIUM SERPL-SCNC: 130 MMOL/L (ref 135–145)
SODIUM SERPL-SCNC: 130 MMOL/L (ref 135–145)
WBC # BLD AUTO: 4.7 K/UL (ref 4.8–10.8)

## 2018-03-11 PROCEDURE — A9270 NON-COVERED ITEM OR SERVICE: HCPCS | Performed by: SURGERY

## 2018-03-11 PROCEDURE — 99291 CRITICAL CARE FIRST HOUR: CPT | Performed by: SURGERY

## 2018-03-11 PROCEDURE — 700102 HCHG RX REV CODE 250 W/ 637 OVERRIDE(OP): Performed by: SURGERY

## 2018-03-11 PROCEDURE — 700112 HCHG RX REV CODE 229: Performed by: SURGERY

## 2018-03-11 PROCEDURE — 80048 BASIC METABOLIC PNL TOTAL CA: CPT

## 2018-03-11 PROCEDURE — 700111 HCHG RX REV CODE 636 W/ 250 OVERRIDE (IP): Performed by: SURGERY

## 2018-03-11 PROCEDURE — 700105 HCHG RX REV CODE 258: Performed by: SURGERY

## 2018-03-11 PROCEDURE — 770022 HCHG ROOM/CARE - ICU (200)

## 2018-03-11 PROCEDURE — 85025 COMPLETE CBC W/AUTO DIFF WBC: CPT

## 2018-03-11 RX ORDER — CHLORDIAZEPOXIDE HYDROCHLORIDE 25 MG/1
25 CAPSULE, GELATIN COATED ORAL EVERY 8 HOURS
Status: DISCONTINUED | OUTPATIENT
Start: 2018-03-11 | End: 2018-03-20 | Stop reason: HOSPADM

## 2018-03-11 RX ADMIN — POTASSIUM BICARBONATE 50 MEQ: 25 TABLET, EFFERVESCENT ORAL at 07:10

## 2018-03-11 RX ADMIN — CIPROFLOXACIN HYDROCHLORIDE 1 DROP: 3 SOLUTION/ DROPS OPHTHALMIC at 12:56

## 2018-03-11 RX ADMIN — CIPROFLOXACIN HYDROCHLORIDE 1 DROP: 3 SOLUTION/ DROPS OPHTHALMIC at 09:20

## 2018-03-11 RX ADMIN — LORAZEPAM 2 MG: 2 INJECTION INTRAMUSCULAR; INTRAVENOUS at 15:09

## 2018-03-11 RX ADMIN — SODIUM CHLORIDE: 234 INJECTION, SOLUTION INTRAVENOUS at 20:46

## 2018-03-11 RX ADMIN — CIPROFLOXACIN HYDROCHLORIDE 1 DROP: 3 SOLUTION/ DROPS OPHTHALMIC at 05:32

## 2018-03-11 RX ADMIN — CHLORDIAZEPOXIDE HYDROCHLORIDE 25 MG: 25 CAPSULE ORAL at 13:03

## 2018-03-11 RX ADMIN — LORAZEPAM 2 MG: 2 INJECTION INTRAMUSCULAR; INTRAVENOUS at 23:05

## 2018-03-11 RX ADMIN — SODIUM CHLORIDE TAB 1 GM 2 G: 1 TAB at 05:31

## 2018-03-11 RX ADMIN — LORAZEPAM 2 MG: 2 INJECTION INTRAMUSCULAR; INTRAVENOUS at 01:48

## 2018-03-11 RX ADMIN — LORAZEPAM 2 MG: 2 INJECTION INTRAMUSCULAR; INTRAVENOUS at 10:21

## 2018-03-11 RX ADMIN — OXYCODONE HYDROCHLORIDE 10 MG: 10 TABLET ORAL at 07:08

## 2018-03-11 RX ADMIN — POTASSIUM BICARBONATE 50 MEQ: 25 TABLET, EFFERVESCENT ORAL at 20:29

## 2018-03-11 RX ADMIN — LORAZEPAM 2 MG: 2 INJECTION INTRAMUSCULAR; INTRAVENOUS at 15:36

## 2018-03-11 RX ADMIN — OMEPRAZOLE 20 MG: 20 CAPSULE, DELAYED RELEASE ORAL at 07:10

## 2018-03-11 RX ADMIN — ENOXAPARIN SODIUM 30 MG: 100 INJECTION SUBCUTANEOUS at 07:08

## 2018-03-11 RX ADMIN — LORAZEPAM 2 MG: 2 INJECTION INTRAMUSCULAR; INTRAVENOUS at 09:38

## 2018-03-11 RX ADMIN — OXYCODONE HYDROCHLORIDE 10 MG: 10 TABLET ORAL at 04:02

## 2018-03-11 RX ADMIN — CIPROFLOXACIN HYDROCHLORIDE 1 DROP: 3 SOLUTION/ DROPS OPHTHALMIC at 17:50

## 2018-03-11 RX ADMIN — SODIUM CHLORIDE: 234 INJECTION, SOLUTION INTRAVENOUS at 01:30

## 2018-03-11 RX ADMIN — LORAZEPAM 2 MG: 2 INJECTION INTRAMUSCULAR; INTRAVENOUS at 07:08

## 2018-03-11 RX ADMIN — CHLORDIAZEPOXIDE HYDROCHLORIDE 25 MG: 25 CAPSULE ORAL at 22:09

## 2018-03-11 RX ADMIN — LORAZEPAM 4 MG: 2 INJECTION INTRAMUSCULAR; INTRAVENOUS at 16:15

## 2018-03-11 RX ADMIN — CIPROFLOXACIN HYDROCHLORIDE 1 DROP: 3 SOLUTION/ DROPS OPHTHALMIC at 20:29

## 2018-03-11 RX ADMIN — SODIUM CHLORIDE TAB 1 GM 2 G: 1 TAB at 11:14

## 2018-03-11 RX ADMIN — DOCUSATE SODIUM 100 MG: 100 CAPSULE ORAL at 07:08

## 2018-03-11 RX ADMIN — LEVETIRACETAM 1000 MG: 500 TABLET, FILM COATED ORAL at 20:29

## 2018-03-11 RX ADMIN — LORAZEPAM 2 MG: 2 INJECTION INTRAMUSCULAR; INTRAVENOUS at 06:24

## 2018-03-11 RX ADMIN — OXYCODONE HYDROCHLORIDE 10 MG: 10 TABLET ORAL at 15:08

## 2018-03-11 RX ADMIN — LORAZEPAM 2 MG: 2 INJECTION INTRAMUSCULAR; INTRAVENOUS at 08:32

## 2018-03-11 RX ADMIN — POLYETHYLENE GLYCOL 3350 1 PACKET: 17 POWDER, FOR SOLUTION ORAL at 07:08

## 2018-03-11 RX ADMIN — OXYCODONE HYDROCHLORIDE 10 MG: 10 TABLET ORAL at 11:14

## 2018-03-11 RX ADMIN — ENOXAPARIN SODIUM 30 MG: 100 INJECTION SUBCUTANEOUS at 20:28

## 2018-03-11 RX ADMIN — LEVETIRACETAM 1000 MG: 500 TABLET, FILM COATED ORAL at 07:11

## 2018-03-11 RX ADMIN — SODIUM CHLORIDE TAB 1 GM 2 G: 1 TAB at 17:49

## 2018-03-11 RX ADMIN — MAGNESIUM HYDROXIDE 30 ML: 400 SUSPENSION ORAL at 07:08

## 2018-03-11 RX ADMIN — OXYCODONE HYDROCHLORIDE 10 MG: 10 TABLET ORAL at 20:36

## 2018-03-11 ASSESSMENT — PAIN SCALES - GENERAL
PAINLEVEL_OUTOF10: 8
PAINLEVEL_OUTOF10: 0
PAINLEVEL_OUTOF10: 8

## 2018-03-11 ASSESSMENT — LIFESTYLE VARIABLES
TOTAL SCORE: 8
AGITATION: *
HEADACHE, FULLNESS IN HEAD: NOT PRESENT
TREMOR: TREMOR NOT VISIBLE BUT CAN BE FELT, FINGERTIP TO FINGERTIP
AGITATION: *
ANXIETY: *
NAUSEA AND VOMITING: NO NAUSEA AND NO VOMITING
ANXIETY: *
NAUSEA AND VOMITING: NO NAUSEA AND NO VOMITING
VISUAL DISTURBANCES: NOT PRESENT
HEADACHE, FULLNESS IN HEAD: NOT PRESENT
AUDITORY DISTURBANCES: NOT PRESENT
ORIENTATION AND CLOUDING OF SENSORIUM: CANNOT DO SERIAL ADDITIONS OR IS UNCERTAIN ABOUT DATE
TREMOR: TREMOR NOT VISIBLE BUT CAN BE FELT, FINGERTIP TO FINGERTIP
PAROXYSMAL SWEATS: BARELY PERCEPTIBLE SWEATING. PALMS MOIST
AUDITORY DISTURBANCES: NOT PRESENT
VISUAL DISTURBANCES: NOT PRESENT
PAROXYSMAL SWEATS: BARELY PERCEPTIBLE SWEATING. PALMS MOIST
TOTAL SCORE: 8
ORIENTATION AND CLOUDING OF SENSORIUM: CANNOT DO SERIAL ADDITIONS OR IS UNCERTAIN ABOUT DATE

## 2018-03-11 NOTE — CARE PLAN
Problem: Safety  Goal: Will remain free from injury  Outcome: PROGRESSING AS EXPECTED  Room close to nurses station. Enc'd to call with any needs.

## 2018-03-11 NOTE — PROGRESS NOTES
Neurosurgery Progress Note    Subjective:  No acute events    Exam:  Awake, looking around, limited right eye opening  Not speaking  Following commands x 4 extremites      Pulse  Av.7  Min: 108  Max: 131  Resp  Av  Min: 14  Max: 57  Monitored Temp  Av.2 °C (100.8 °F)  Min: 37.6 °C (99.7 °F)  Max: 38.6 °C (101.5 °F)  SpO2  Av.8 %  Min: 96 %  Max: 100 %    No Data Recorded    Recent Labs      18   0455  03/10/18   0520  18   0359   WBC  5.0  6.1  4.7*   RBC  2.27*  2.34*  2.35*   HEMOGLOBIN  8.2*  8.2*  8.2*   HEMATOCRIT  24.6*  24.8*  25.2*   MCV  108.4*  106.0*  107.2*   MCH  36.1*  35.0*  34.9*   MCHC  33.3*  33.1*  32.5*   RDW  53.1*  53.5*  53.9*   PLATELETCT  227  226  236   MPV  10.6  10.1  9.8     Recent Labs      03/10/18   0520  03/10/18   1433  18   0359   SODIUM  126*  127*  130*   POTASSIUM  3.8  4.0  4.4   CHLORIDE  96  97  99   CO2  25  25  25   GLUCOSE  109*  106*  108*   BUN  12  13  14               Intake/Output       03/10/18 07 - 18 0659 18 07 - 18 0659      1900-0659 Total 1900-0659 Total       Intake    P.O.  --  0 0  --  -- --    P.O. -- 0 0 -- -- --    I.V.  300  360 660  60  -- 60    IV Volume (3%) 300 360 660 60 -- 60    Other  60  60 120  60  -- 60    Medications (P.O./ Enteral Liquids) 60 60 120 60 -- 60    Enteral  780  810 1590  150  -- 150    Enteral Volume  120 -- 120    Free Water / Tube Flush 60 90 150 30 -- 30    Total Intake 1140 1230 2370 270 -- 270       Output    Urine  705  1150 1855  225  -- 225    Indwelling Cathether 705 4824 6992 225 -- 225    Stool  --  -- --  --  -- --    Number of Times Stooled -- -- -- 1 x -- 1 x    Total Output 127 4941 9768  -- 225       Net I/O     435 80 515 45 -- 45            Intake/Output Summary (Last 24 hours) at 18 0949  Last data filed at 18 0800   Gross per 24 hour   Intake             2430 ml   Output             1830 ml   Net               600 ml            • 3% sodium chloride   Continuous   • levETIRAcetam  1,000 mg BID   • sodium chloride  2 g Q6HRS   • enoxaparin (LOVENOX) injection  30 mg Q12HRS   • omeprazole 2 mg/mL in sodium bicarbonate  20 mg DAILY   • ciprofloxacin  1 Drop Q4HRS WHILE AWAKE   • potassium bicarbonate  50 mEq BID   • glucose 4 g  16 g Q15 MIN PRN    And   • dextrose 50%  25 mL Q15 MIN PRN   • LORazepam  4 mg Q15 MIN PRN    Or   • LORazepam  3 mg Q15 MIN PRN    Or   • LORazepam  2 mg Q15 MIN PRN    Or   • LORazepam  1 mg Q15 MIN PRN   • Respiratory Care per Protocol   Continuous RT   • Pharmacy Consult Request  1 Each PRN   • docusate sodium  100 mg BID   • senna-docusate  1 Tab Nightly   • senna-docusate  1 Tab Q24HRS PRN   • polyethylene glycol/lytes  1 Packet BID   • magnesium hydroxide  30 mL DAILY   • bisacodyl  10 mg Q24HRS PRN   • fleet  1 Each Once PRN   • ondansetron  4 mg Q4HRS PRN   • oxyCODONE immediate-release  5 mg Q3HRS PRN    Or   • oxyCODONE immediate-release  10 mg Q3HRS PRN       Assessment and Plan:   Hospital day # 7 lin FRANCIS SDH, DTs  Exam slowly improving  Prophylactic anticoagulation: yes        Start date/time: yesterday cleared by DANIKA  Na slightly improved at 130, on 3% NaCl

## 2018-03-11 NOTE — CARE PLAN
Problem: Infection  Goal: Will remain free from infection  Outcome: PROGRESSING AS EXPECTED  Continue to monitor labs and vital signs. No s/sx of infection noted.

## 2018-03-11 NOTE — CARE PLAN
Problem: Knowledge Deficit  Goal: Knowledge of disease process/condition, treatment plan, diagnostic tests, and medications will improve    Intervention: Assess knowledge level of disease process/condition, treatment plan, diagnostic tests, and medications  Educated patient on disease process of seizure and what we are doing to treat them.

## 2018-03-11 NOTE — PROGRESS NOTES
"  Trauma/Surgical Progress Note      Interval Events:  Psychomotor agitation secondary to alcohol withdrawal persists  Requiring frequent dosing of IV benzodiazepine per CIWA protocol  Added scheduled Librium today  Severe hyponatremia addressed with 3% hypertonic saline infusion persists  At risk for sudden neurologic decompensation    Hemodynamics:  Blood pressure 123/69, pulse (!) 116, temperature (!) 38.2 °C (100.8 °F), resp. rate 13, height 1.727 m (5' 8\"), weight 60.4 kg (133 lb 2.5 oz), SpO2 100 %.     Respiratory:    Respiration: 13, Pulse Oximetry: 100 %     Work Of Breathing / Effort: Mild  RUL Breath Sounds: Clear, RML Breath Sounds: Clear, RLL Breath Sounds: Diminished, CARLI Breath Sounds: Clear, LLL Breath Sounds: Diminished  Fluids:    Intake/Output Summary (Last 24 hours) at 03/09/18 1823  Last data filed at 03/09/18 1800   Gross per 24 hour   Intake             3170 ml   Output             2925 ml   Net              245 ml     Admit Weight: 69 kg (152 lb 1.9 oz)  Current Weight: 60.4 kg (133 lb 2.5 oz)    Physical Exam   Constitutional: She appears well-developed and well-nourished.   HENT:   Extensive STS, bruising right face   Eyes: Pupils are equal, round, and reactive to light. No scleral icterus.   Neck: Normal range of motion. Neck supple. No JVD present. No tracheal deviation present. No thyromegaly present.   Cardiovascular: Regular rhythm.    Sinus tach   Pulmonary/Chest: No respiratory distress. She exhibits no tenderness.   Abdominal: Soft. Bowel sounds are normal. She exhibits no distension. There is no tenderness.   Musculoskeletal: She exhibits edema.   Lymphadenopathy:     She has no cervical adenopathy.   Neurological: No cranial nerve deficit. GCS eye subscore is 3. GCS verbal subscore is 4. GCS motor subscore is 5.   No further seizure   Skin: Skin is warm and dry.   Nursing note and vitals reviewed.      Medical Decision Making/Problem List:    Active Hospital Problems    Diagnosis "   • Hyponatremia [E87.1]     Priority: High     Progressive downward trend to critical levels  NaCl tabs and 3% infusion  Trend serially     • Traumatic intracranial hemorrhage (CMS-HCC) [S06.309A]     Priority: High     Multiple areas of SAH and SDH, 4.1mm shift  Repeat CT imaging of the brain stable to slightly worse  3/5 seizure @1515, repeat CT scan was stable  Further seizures 3/6, assessment complicated by alcohol withdrawal  Continue Keppra  Cog eval ordered  3/8 cleared for lovenox  Non-operative management.  Lorne Menchaca MD. Neurosurgery.       • Acute hyperactive alcohol withdrawal delirium (CMS-HCC) [F10.231]     Priority: High     DEYANIRA 0.43 on admission; LFTs elevated, thrombocytopenic  [facility] developed [facility]  [facility]  [facility]       • Moderate protein-calorie malnutrition (CMS-HCC) [E44.0]     Priority: Medium     Tube feeding per protocol.     • Bacterial conjunctivitis of both eyes [H10.9]     Priority: Low     Ciprofloxacin eyedrops x 7 days     • Anemia [D64.9]     Priority: Low     Significant hemoglobin drop after admission  No evidence of gastrointestinal hemorrhage, abdomen examination is benign  Considering liver function tests abdominal CT scan was completed and no showed no evidence of intra-abdominal bleeding  Serial hemoglobin stable   Iron replacement  Likely chronic     • Pharmacologic contraindication to deep vein thrombosis (DVT) prophylaxis [Z53.09]     Priority: Low     Prophylactic Lovenox initially contraindicated due to elevated bleeding risk  Duplex ordered for 3/7/18 - read PENDING  3/8 Lovenox initiated       Core Measures & Quality Metrics:  Labs reviewed, Radiology images reviewed and Medications reviewed  Pemberton catheter: Critically Ill - Requiring Accurate Measurement of Urinary Output      DVT Prophylaxis: Enoxaparin (Lovenox)  DVT prophylaxis - mechanical: SCDs  Ulcer prophylaxis: Not indicated        LUNA Score    Psychomotor agitation and  delirium secondary to acute alcohol withdrawal requiring parenteral controlled substances involving high complexity decision making initiated in an urgent manner by assessing, manipulating, and supporting central nervous system function given the high probability of further deterioration in the patient's condition and threat to life.    I independently reviewed pertinent clinical lab tests from the last 48 hours and ordered additional follow up clinical lab tests.  I independently reviewed pertinent radiographic images and the radiologist's reports from the last 48 hours and ordered additional follow up radiographic studies.  I reviewed the details of the available patient records and documentation by consulting physicians in [facility] up to today, summated the information, and utilized the information as warranted in today's medical decision making for this patient.  I personally evaluated the patient condition at bedside and discussed the daily plan(s) with available nursing staff, dieticians, social workers, pharmacists on rounds.    Aggregated critical care time spent evaluating, reviewing documentation, providing care, and managing this patient exclusive of procedures: 35 minutes    Jeff Shay MD    DATE OF SERVICE: 3/11/2018

## 2018-03-12 ENCOUNTER — APPOINTMENT (OUTPATIENT)
Dept: RADIOLOGY | Facility: MEDICAL CENTER | Age: 48
DRG: 083 | End: 2018-03-12
Attending: CLINICAL NURSE SPECIALIST
Payer: COMMERCIAL

## 2018-03-12 LAB
ANION GAP SERPL CALC-SCNC: 5 MMOL/L (ref 0–11.9)
ANION GAP SERPL CALC-SCNC: 6 MMOL/L (ref 0–11.9)
ANION GAP SERPL CALC-SCNC: 8 MMOL/L (ref 0–11.9)
ANISOCYTOSIS BLD QL SMEAR: ABNORMAL
BASOPHILS # BLD AUTO: 1.7 % (ref 0–1.8)
BASOPHILS # BLD: 0.08 K/UL (ref 0–0.12)
BUN SERPL-MCNC: 11 MG/DL (ref 8–22)
BUN SERPL-MCNC: 13 MG/DL (ref 8–22)
BUN SERPL-MCNC: 13 MG/DL (ref 8–22)
CALCIUM SERPL-MCNC: 8.4 MG/DL (ref 8.5–10.5)
CALCIUM SERPL-MCNC: 8.7 MG/DL (ref 8.5–10.5)
CALCIUM SERPL-MCNC: 8.9 MG/DL (ref 8.5–10.5)
CHLORIDE SERPL-SCNC: 96 MMOL/L (ref 96–112)
CHLORIDE SERPL-SCNC: 97 MMOL/L (ref 96–112)
CHLORIDE SERPL-SCNC: 97 MMOL/L (ref 96–112)
CO2 SERPL-SCNC: 24 MMOL/L (ref 20–33)
CO2 SERPL-SCNC: 25 MMOL/L (ref 20–33)
CO2 SERPL-SCNC: 25 MMOL/L (ref 20–33)
CREAT SERPL-MCNC: 0.32 MG/DL (ref 0.5–1.4)
CREAT SERPL-MCNC: 0.4 MG/DL (ref 0.5–1.4)
CREAT SERPL-MCNC: 0.4 MG/DL (ref 0.5–1.4)
CRP SERPL HS-MCNC: 2.34 MG/DL (ref 0–0.75)
EOSINOPHIL # BLD AUTO: 0.12 K/UL (ref 0–0.51)
EOSINOPHIL NFR BLD: 2.6 % (ref 0–6.9)
ERYTHROCYTE [DISTWIDTH] IN BLOOD BY AUTOMATED COUNT: 55.8 FL (ref 35.9–50)
GLUCOSE SERPL-MCNC: 114 MG/DL (ref 65–99)
GLUCOSE SERPL-MCNC: 117 MG/DL (ref 65–99)
GLUCOSE SERPL-MCNC: 119 MG/DL (ref 65–99)
HCT VFR BLD AUTO: 23.4 % (ref 37–47)
HGB BLD-MCNC: 7.9 G/DL (ref 12–16)
HYPOCHROMIA BLD QL SMEAR: ABNORMAL
LYMPHOCYTES # BLD AUTO: 1.4 K/UL (ref 1–4.8)
LYMPHOCYTES NFR BLD: 29.8 % (ref 22–41)
MACROCYTES BLD QL SMEAR: ABNORMAL
MAGNESIUM SERPL-MCNC: 2 MG/DL (ref 1.5–2.5)
MANUAL DIFF BLD: NORMAL
MCH RBC QN AUTO: 36.6 PG (ref 27–33)
MCHC RBC AUTO-ENTMCNC: 33.8 G/DL (ref 33.6–35)
MCV RBC AUTO: 108.3 FL (ref 81.4–97.8)
METAMYELOCYTES NFR BLD MANUAL: 0.9 %
MONOCYTES # BLD AUTO: 0.54 K/UL (ref 0–0.85)
MONOCYTES NFR BLD AUTO: 11.4 % (ref 0–13.4)
MORPHOLOGY BLD-IMP: NORMAL
MYELOCYTES NFR BLD MANUAL: 0.9 %
NEUTROPHILS # BLD AUTO: 2.48 K/UL (ref 2–7.15)
NEUTROPHILS NFR BLD: 50.9 % (ref 44–72)
NEUTS BAND NFR BLD MANUAL: 1.8 % (ref 0–10)
NRBC # BLD AUTO: 0.07 K/UL
NRBC BLD-RTO: 1.5 /100 WBC
PHOSPHATE SERPL-MCNC: 1.1 MG/DL (ref 2.5–4.5)
PLATELET # BLD AUTO: 263 K/UL (ref 164–446)
PLATELET BLD QL SMEAR: NORMAL
PMV BLD AUTO: 10.3 FL (ref 9–12.9)
POLYCHROMASIA BLD QL SMEAR: NORMAL
POTASSIUM SERPL-SCNC: 3.8 MMOL/L (ref 3.6–5.5)
POTASSIUM SERPL-SCNC: 4.2 MMOL/L (ref 3.6–5.5)
POTASSIUM SERPL-SCNC: 4.4 MMOL/L (ref 3.6–5.5)
PREALB SERPL-MCNC: 15 MG/DL (ref 18–38)
RBC # BLD AUTO: 2.16 M/UL (ref 4.2–5.4)
RBC BLD AUTO: PRESENT
SODIUM SERPL-SCNC: 127 MMOL/L (ref 135–145)
SODIUM SERPL-SCNC: 128 MMOL/L (ref 135–145)
SODIUM SERPL-SCNC: 128 MMOL/L (ref 135–145)
WBC # BLD AUTO: 4.7 K/UL (ref 4.8–10.8)

## 2018-03-12 PROCEDURE — 80048 BASIC METABOLIC PNL TOTAL CA: CPT

## 2018-03-12 PROCEDURE — 85007 BL SMEAR W/DIFF WBC COUNT: CPT

## 2018-03-12 PROCEDURE — A9270 NON-COVERED ITEM OR SERVICE: HCPCS | Performed by: SURGERY

## 2018-03-12 PROCEDURE — 86140 C-REACTIVE PROTEIN: CPT

## 2018-03-12 PROCEDURE — 700102 HCHG RX REV CODE 250 W/ 637 OVERRIDE(OP): Performed by: SURGERY

## 2018-03-12 PROCEDURE — 700101 HCHG RX REV CODE 250: Performed by: SURGERY

## 2018-03-12 PROCEDURE — 70450 CT HEAD/BRAIN W/O DYE: CPT

## 2018-03-12 PROCEDURE — 99291 CRITICAL CARE FIRST HOUR: CPT | Performed by: SURGERY

## 2018-03-12 PROCEDURE — 700102 HCHG RX REV CODE 250 W/ 637 OVERRIDE(OP): Performed by: NEUROLOGICAL SURGERY

## 2018-03-12 PROCEDURE — A9270 NON-COVERED ITEM OR SERVICE: HCPCS | Performed by: NEUROLOGICAL SURGERY

## 2018-03-12 PROCEDURE — 700105 HCHG RX REV CODE 258: Performed by: SURGERY

## 2018-03-12 PROCEDURE — 770022 HCHG ROOM/CARE - ICU (200)

## 2018-03-12 PROCEDURE — 84134 ASSAY OF PREALBUMIN: CPT

## 2018-03-12 PROCEDURE — 85027 COMPLETE CBC AUTOMATED: CPT

## 2018-03-12 PROCEDURE — 84100 ASSAY OF PHOSPHORUS: CPT

## 2018-03-12 PROCEDURE — 700111 HCHG RX REV CODE 636 W/ 250 OVERRIDE (IP): Performed by: SURGERY

## 2018-03-12 PROCEDURE — 83735 ASSAY OF MAGNESIUM: CPT

## 2018-03-12 RX ORDER — FLUDROCORTISONE ACETATE 0.1 MG/1
0.1 TABLET ORAL 2 TIMES DAILY
Status: DISCONTINUED | OUTPATIENT
Start: 2018-03-12 | End: 2018-03-20 | Stop reason: HOSPADM

## 2018-03-12 RX ADMIN — CHLORDIAZEPOXIDE HYDROCHLORIDE 25 MG: 25 CAPSULE ORAL at 13:34

## 2018-03-12 RX ADMIN — FLUDROCORTISONE ACETATE 0.1 MG: 0.1 TABLET ORAL at 21:00

## 2018-03-12 RX ADMIN — FLUDROCORTISONE ACETATE 0.1 MG: 0.1 TABLET ORAL at 10:40

## 2018-03-12 RX ADMIN — OMEPRAZOLE 20 MG: 20 CAPSULE, DELAYED RELEASE ORAL at 08:37

## 2018-03-12 RX ADMIN — ENOXAPARIN SODIUM 30 MG: 100 INJECTION SUBCUTANEOUS at 21:00

## 2018-03-12 RX ADMIN — POTASSIUM BICARBONATE 50 MEQ: 25 TABLET, EFFERVESCENT ORAL at 08:36

## 2018-03-12 RX ADMIN — SODIUM CHLORIDE TAB 1 GM 2 G: 1 TAB at 06:15

## 2018-03-12 RX ADMIN — CHLORDIAZEPOXIDE HYDROCHLORIDE 25 MG: 25 CAPSULE ORAL at 20:59

## 2018-03-12 RX ADMIN — SODIUM CHLORIDE TAB 1 GM 2 G: 1 TAB at 00:17

## 2018-03-12 RX ADMIN — OXYCODONE HYDROCHLORIDE 10 MG: 10 TABLET ORAL at 00:17

## 2018-03-12 RX ADMIN — SODIUM PHOSPHATE, MONOBASIC, MONOHYDRATE AND SODIUM PHOSPHATE, DIBASIC, ANHYDROUS 30 MMOL: 276; 142 INJECTION, SOLUTION INTRAVENOUS at 10:40

## 2018-03-12 RX ADMIN — OXYCODONE HYDROCHLORIDE 10 MG: 10 TABLET ORAL at 20:01

## 2018-03-12 RX ADMIN — SODIUM CHLORIDE TAB 1 GM 2 G: 1 TAB at 18:23

## 2018-03-12 RX ADMIN — LEVETIRACETAM 1000 MG: 500 TABLET, FILM COATED ORAL at 08:36

## 2018-03-12 RX ADMIN — CIPROFLOXACIN HYDROCHLORIDE 1 DROP: 3 SOLUTION/ DROPS OPHTHALMIC at 13:34

## 2018-03-12 RX ADMIN — CIPROFLOXACIN HYDROCHLORIDE 1 DROP: 3 SOLUTION/ DROPS OPHTHALMIC at 06:14

## 2018-03-12 RX ADMIN — SODIUM CHLORIDE: 234 INJECTION, SOLUTION INTRAVENOUS at 16:08

## 2018-03-12 RX ADMIN — ENOXAPARIN SODIUM 30 MG: 100 INJECTION SUBCUTANEOUS at 08:36

## 2018-03-12 RX ADMIN — CIPROFLOXACIN HYDROCHLORIDE 1 DROP: 3 SOLUTION/ DROPS OPHTHALMIC at 18:23

## 2018-03-12 RX ADMIN — SODIUM CHLORIDE TAB 1 GM 2 G: 1 TAB at 13:34

## 2018-03-12 RX ADMIN — POTASSIUM BICARBONATE 50 MEQ: 25 TABLET, EFFERVESCENT ORAL at 20:59

## 2018-03-12 RX ADMIN — CIPROFLOXACIN HYDROCHLORIDE 1 DROP: 3 SOLUTION/ DROPS OPHTHALMIC at 21:00

## 2018-03-12 RX ADMIN — LEVETIRACETAM 1000 MG: 500 TABLET, FILM COATED ORAL at 20:59

## 2018-03-12 RX ADMIN — OXYCODONE HYDROCHLORIDE 10 MG: 10 TABLET ORAL at 06:15

## 2018-03-12 RX ADMIN — CHLORDIAZEPOXIDE HYDROCHLORIDE 25 MG: 25 CAPSULE ORAL at 06:15

## 2018-03-12 RX ADMIN — CIPROFLOXACIN HYDROCHLORIDE 1 DROP: 3 SOLUTION/ DROPS OPHTHALMIC at 10:30

## 2018-03-12 ASSESSMENT — PAIN SCALES - GENERAL
PAINLEVEL_OUTOF10: 10
PAINLEVEL_OUTOF10: 5
PAINLEVEL_OUTOF10: 0
PAINLEVEL_OUTOF10: 10
PAINLEVEL_OUTOF10: 8
PAINLEVEL_OUTOF10: 8

## 2018-03-12 NOTE — PROGRESS NOTES
"  Trauma/Surgical Progress Note      Interval Events:  Psychomotor agitation secondary to alcohol withdrawal persists  Requiring continued dosing of IV benzodiazepine per CIWA protocol, but less since the addition of scheduled Librium  At risk for sudden neurologic decompensation    Hemodynamics:  Blood pressure 123/69, pulse (!) 112, temperature (!) 38.2 °C (100.8 °F), resp. rate (!) 21, height 1.727 m (5' 8\"), weight 62.4 kg (137 lb 9.1 oz), SpO2 99 %.     Respiratory:    Respiration: (!) 21, Pulse Oximetry: 99 %     Work Of Breathing / Effort: Mild  RUL Breath Sounds: Clear, RML Breath Sounds: Clear, RLL Breath Sounds: Diminished, CARLI Breath Sounds: Clear, LLL Breath Sounds: Diminished  Fluids:    Intake/Output Summary (Last 24 hours) at 03/09/18 1823  Last data filed at 03/09/18 1800   Gross per 24 hour   Intake             3170 ml   Output             2925 ml   Net              245 ml     Admit Weight: 69 kg (152 lb 1.9 oz)  Current Weight: 62.4 kg (137 lb 9.1 oz)    Physical Exam   Constitutional: She appears well-developed and well-nourished.   HENT:   Extensive STS, bruising right face   Eyes: Pupils are equal, round, and reactive to light. No scleral icterus.   Neck: Normal range of motion. Neck supple. No JVD present. No tracheal deviation present. No thyromegaly present.   Cardiovascular: Regular rhythm.    Sinus tach   Pulmonary/Chest: No respiratory distress. She exhibits no tenderness.   Abdominal: Soft. Bowel sounds are normal. She exhibits no distension. There is no tenderness.   Musculoskeletal: She exhibits edema.   Lymphadenopathy:     She has no cervical adenopathy.   Neurological: No cranial nerve deficit. GCS eye subscore is 3. GCS verbal subscore is 4. GCS motor subscore is 5.   No further seizure   Skin: Skin is warm and dry.   Nursing note and vitals reviewed.      Medical Decision Making/Problem List:    Active Hospital Problems    Diagnosis   • Hyponatremia [E87.1]     Priority: High     " Progressive downward trend to critical levels  NaCl tabs, Florinef, and 3% infusion  Trend serially     • Traumatic intracranial hemorrhage (CMS-HCC) [S06.309A]     Priority: High     Multiple areas of SAH and SDH, 4.1mm shift  Repeat CT imaging of the brain stable to slightly worse  3/5 seizure @1515, repeat CT scan was stable  Further seizures 3/6, assessment complicated by alcohol withdrawal  Continue Keppra  Cog eval ordered  3/8 cleared for lovenox  Non-operative management.  Lorne Menchaca MD. Neurosurgery.       • Acute hyperactive alcohol withdrawal delirium (CMS-HCC) [F10.231]     Priority: High     DEYANIRA 0.43 on admission; LFTs elevated, thrombocytopenic  Has developed alcohol withdrawal  [facility] protocol,  Schedule Librium       • Moderate protein-calorie malnutrition (CMS-HCC) [E44.0]     Priority: Medium     Tube feeding per protocol.     • Bacterial conjunctivitis of both eyes [H10.9]     Priority: Low     Ciprofloxacin eyedrops x 7 days     • Anemia [D64.9]     Priority: Low     Significant hemoglobin drop after admission  No evidence of gastrointestinal hemorrhage, abdomen examination is benign  Considering liver function tests abdominal CT scan was completed and no showed no evidence of intra-abdominal bleeding  Serial hemoglobin stable   Iron replacement  Likely chronic     • Pharmacologic contraindication to deep vein thrombosis (DVT) prophylaxis [Z53.09]     Priority: Low     Prophylactic Lovenox initially contraindicated due to elevated bleeding risk  Duplex ordered for 3/7/18 - read PENDING  3/8 Lovenox initiated       Core Measures & Quality Metrics:  Labs reviewed, Radiology images reviewed and Medications reviewed  Pemberton catheter: Critically Ill - Requiring Accurate Measurement of Urinary Output      DVT Prophylaxis: Enoxaparin (Lovenox)  DVT prophylaxis - mechanical: SCDs  Ulcer prophylaxis: Not indicated        LUNA Score    Psychomotor agitation and delirium secondary to acute alcohol  withdrawal requiring parenteral controlled substances involving high complexity decision making initiated in an urgent manner by assessing, manipulating, and supporting central nervous system function given the high probability of further deterioration in the patient's condition and threat to life.    I independently reviewed pertinent clinical lab tests from the last 48 hours and ordered additional follow up clinical lab tests.  I independently reviewed pertinent radiographic images and the radiologist's reports from the last 48 hours and ordered additional follow up radiographic studies.  I reviewed the details of the available patient records and documentation by consulting physicians in [facility] up to today, summated the information, and utilized the information as warranted in today's medical decision making for this patient.  I personally evaluated the patient condition at bedside and discussed the daily plan(s) with available nursing staff, dieticians, social workers, pharmacists on rounds.    Aggregated critical care time spent evaluating, reviewing documentation, providing care, and managing this patient exclusive of procedures: 35 minutes    Jeff Shay MD    DATE OF SERVICE: 3/12/2018

## 2018-03-12 NOTE — THERAPY
SPEECH PATHOLOGY:  Spoke with RN this am--patient is either restless or very lethargic.  She is reporting that patient is not yet ready for swallow evaluation, and given her poor ability to maintain adequate arousal, will hold cognitive evaluation at this time.  SLP will continue to follow as appropriate.  Once ready, will need orders for swallow evaluation.

## 2018-03-12 NOTE — CARE PLAN
Problem: Safety  Goal: Will remain free from injury  Outcome: PROGRESSING SLOWER THAN EXPECTED      Problem: Knowledge Deficit  Goal: Knowledge of disease process/condition, treatment plan, diagnostic tests, and medications will improve  Outcome: PROGRESSING SLOWER THAN EXPECTED      Problem: Respiratory:  Goal: Respiratory status will improve  Outcome: PROGRESSING SLOWER THAN EXPECTED

## 2018-03-12 NOTE — CARE PLAN
Problem: Safety  Goal: Will remain free from injury    Intervention: Provide assistance with mobility  Patient assessed Qhour and as needed, safety measures in place: bed in low position, call bell in reach, appropriate side rails up (2 lower), bed alarm on, patient room near nursing station. Pt is in Bilateral soft wrist restraints and mitts for her protection and to keep her from pulling out lines and tubes, education provided, verbal understanding received.  Will continue to monitor.      Problem: Psychosocial Needs:  Goal: Level of anxiety will decrease    Intervention: Identify and develop with patient strategies to cope with anxiety triggers  Increase wakefulness today is the goal. Patient also needs a cog eval. Patient seems to be more awake today than previously.

## 2018-03-12 NOTE — PROGRESS NOTES
Neurosurgery Progress Note    Subjective:  No acute events overnoc, salt tabs and 3%- na 128    Exam:  Sleeping, awakens to voice  Not speaking for me but has been ox2 for nsg overnoc  Following commands x 4 extremites      Pulse  Av.1  Min: 104  Max: 116  Resp  Av.2  Min: 13  Max: 83  Monitored Temp  Av.7 °C (99.8 °F)  Min: 33 °C (91.4 °F)  Max: 38.6 °C (101.5 °F)  SpO2  Av.5 %  Min: 94 %  Max: 100 %    No Data Recorded    Recent Labs      03/10/18   0520  18   0359  18   0412   WBC  6.1  4.7*  4.7*   RBC  2.34*  2.35*  2.16*   HEMOGLOBIN  8.2*  8.2*  7.9*   HEMATOCRIT  24.8*  25.2*  23.4*   MCV  106.0*  107.2*  108.3*   MCH  35.0*  34.9*  36.6*   MCHC  33.1*  32.5*  33.8   RDW  53.5*  53.9*  55.8*   PLATELETCT  226  236  263   MPV  10.1  9.8  10.3     Recent Labs      18   0359  18   1518  18   0538   SODIUM  130*  130*  128*   POTASSIUM  4.4  4.2  4.2   CHLORIDE  99  100  97   CO2  25  24  25   GLUCOSE  108*  114*  117*   BUN  14  14  13               Intake/Output       18 07 - 18 0659 18 07 - 18 0659      1900-0659 Total 1900-0659 Total       Intake    I.V.  360  360 720  --  -- --    IV Volume (3%) 360 360 720 -- -- --    Other  90  120 210  --  -- --    Medications (P.O./ Enteral Liquids) 90 120 210 -- -- --    Enteral  660  840 1500  --  -- --    Enteral Volume  -- -- --    Free Water / Tube Flush 60 120 180 -- -- --    Total Intake 1110 1320 2430 -- -- --       Output    Urine  1150  1300 2450  --  -- --    Indwelling Cathether 1150 1300 2450 -- -- --    Stool  --  -- --  --  -- --    Number of Times Stooled 1 x -- 1 x -- -- --    Total Output 1150 1300 2450 -- -- --       Net I/O     -40 20 -20 -- -- --            Intake/Output Summary (Last 24 hours) at 18 0813  Last data filed at 18 0600   Gross per 24 hour   Intake             2160 ml   Output             2225 ml   Net               -65 ml            • chlordiazePOXIDE  25 mg Q8HRS   • 3% sodium chloride   Continuous   • levETIRAcetam  1,000 mg BID   • sodium chloride  2 g Q6HRS   • enoxaparin (LOVENOX) injection  30 mg Q12HRS   • omeprazole 2 mg/mL in sodium bicarbonate  20 mg DAILY   • ciprofloxacin  1 Drop Q4HRS WHILE AWAKE   • potassium bicarbonate  50 mEq BID   • glucose 4 g  16 g Q15 MIN PRN    And   • dextrose 50%  25 mL Q15 MIN PRN   • LORazepam  4 mg Q15 MIN PRN    Or   • LORazepam  3 mg Q15 MIN PRN    Or   • LORazepam  2 mg Q15 MIN PRN    Or   • LORazepam  1 mg Q15 MIN PRN   • Respiratory Care per Protocol   Continuous RT   • Pharmacy Consult Request  1 Each PRN   • docusate sodium  100 mg BID   • senna-docusate  1 Tab Nightly   • senna-docusate  1 Tab Q24HRS PRN   • polyethylene glycol/lytes  1 Packet BID   • magnesium hydroxide  30 mL DAILY   • bisacodyl  10 mg Q24HRS PRN   • fleet  1 Each Once PRN   • ondansetron  4 mg Q4HRS PRN   • oxyCODONE immediate-release  5 mg Q3HRS PRN    Or   • oxyCODONE immediate-release  10 mg Q3HRS PRN       Assessment and Plan:   Hospital day # 8 lin FRANCSI SDH, DTs  Exam slowly improving  Prophylactic anticoagulation: yes        Start date/time: yesterday cleared by DANIKA  hyponatremia- 128, on 3% NaCl and salt tabs--- stable  Check repeat head CT today  Change neuro checks to Q4    ATTENDING ADDENDUM:  Patient seen independently and agree with above note  Added florinef today

## 2018-03-13 LAB
ANION GAP SERPL CALC-SCNC: 7 MMOL/L (ref 0–11.9)
ANION GAP SERPL CALC-SCNC: 7 MMOL/L (ref 0–11.9)
ANION GAP SERPL CALC-SCNC: 8 MMOL/L (ref 0–11.9)
BASOPHILS # BLD AUTO: 0.8 % (ref 0–1.8)
BASOPHILS # BLD: 0.05 K/UL (ref 0–0.12)
BUN SERPL-MCNC: 10 MG/DL (ref 8–22)
BUN SERPL-MCNC: 12 MG/DL (ref 8–22)
BUN SERPL-MCNC: 12 MG/DL (ref 8–22)
CALCIUM SERPL-MCNC: 8.6 MG/DL (ref 8.5–10.5)
CALCIUM SERPL-MCNC: 8.9 MG/DL (ref 8.5–10.5)
CALCIUM SERPL-MCNC: 9 MG/DL (ref 8.5–10.5)
CHLORIDE SERPL-SCNC: 98 MMOL/L (ref 96–112)
CHLORIDE SERPL-SCNC: 98 MMOL/L (ref 96–112)
CHLORIDE SERPL-SCNC: 99 MMOL/L (ref 96–112)
CO2 SERPL-SCNC: 23 MMOL/L (ref 20–33)
CO2 SERPL-SCNC: 24 MMOL/L (ref 20–33)
CO2 SERPL-SCNC: 24 MMOL/L (ref 20–33)
CREAT SERPL-MCNC: 0.36 MG/DL (ref 0.5–1.4)
CREAT SERPL-MCNC: 0.36 MG/DL (ref 0.5–1.4)
CREAT SERPL-MCNC: 0.43 MG/DL (ref 0.5–1.4)
EOSINOPHIL # BLD AUTO: 0.04 K/UL (ref 0–0.51)
EOSINOPHIL NFR BLD: 0.7 % (ref 0–6.9)
ERYTHROCYTE [DISTWIDTH] IN BLOOD BY AUTOMATED COUNT: 53.8 FL (ref 35.9–50)
GLUCOSE SERPL-MCNC: 114 MG/DL (ref 65–99)
GLUCOSE SERPL-MCNC: 115 MG/DL (ref 65–99)
GLUCOSE SERPL-MCNC: 120 MG/DL (ref 65–99)
HCT VFR BLD AUTO: 24.3 % (ref 37–47)
HGB BLD-MCNC: 8.3 G/DL (ref 12–16)
IMM GRANULOCYTES # BLD AUTO: 0.07 K/UL (ref 0–0.11)
IMM GRANULOCYTES NFR BLD AUTO: 1.1 % (ref 0–0.9)
LYMPHOCYTES # BLD AUTO: 1.13 K/UL (ref 1–4.8)
LYMPHOCYTES NFR BLD: 18.6 % (ref 22–41)
MCH RBC QN AUTO: 36.4 PG (ref 27–33)
MCHC RBC AUTO-ENTMCNC: 34.2 G/DL (ref 33.6–35)
MCV RBC AUTO: 106.6 FL (ref 81.4–97.8)
MONOCYTES # BLD AUTO: 1.42 K/UL (ref 0–0.85)
MONOCYTES NFR BLD AUTO: 23.3 % (ref 0–13.4)
NEUTROPHILS # BLD AUTO: 3.38 K/UL (ref 2–7.15)
NEUTROPHILS NFR BLD: 55.5 % (ref 44–72)
NRBC # BLD AUTO: 0.03 K/UL
NRBC BLD-RTO: 0.5 /100 WBC
PLATELET # BLD AUTO: 296 K/UL (ref 164–446)
PMV BLD AUTO: 10.2 FL (ref 9–12.9)
POTASSIUM SERPL-SCNC: 3.5 MMOL/L (ref 3.6–5.5)
POTASSIUM SERPL-SCNC: 4.4 MMOL/L (ref 3.6–5.5)
POTASSIUM SERPL-SCNC: 4.7 MMOL/L (ref 3.6–5.5)
RBC # BLD AUTO: 2.28 M/UL (ref 4.2–5.4)
SODIUM SERPL-SCNC: 129 MMOL/L (ref 135–145)
SODIUM SERPL-SCNC: 129 MMOL/L (ref 135–145)
SODIUM SERPL-SCNC: 130 MMOL/L (ref 135–145)
WBC # BLD AUTO: 6.1 K/UL (ref 4.8–10.8)

## 2018-03-13 PROCEDURE — A9270 NON-COVERED ITEM OR SERVICE: HCPCS | Performed by: SURGERY

## 2018-03-13 PROCEDURE — 700102 HCHG RX REV CODE 250 W/ 637 OVERRIDE(OP): Performed by: SURGERY

## 2018-03-13 PROCEDURE — 700105 HCHG RX REV CODE 258: Performed by: SURGERY

## 2018-03-13 PROCEDURE — 700111 HCHG RX REV CODE 636 W/ 250 OVERRIDE (IP): Performed by: SURGERY

## 2018-03-13 PROCEDURE — 80048 BASIC METABOLIC PNL TOTAL CA: CPT | Mod: 91

## 2018-03-13 PROCEDURE — 700102 HCHG RX REV CODE 250 W/ 637 OVERRIDE(OP): Performed by: NEUROLOGICAL SURGERY

## 2018-03-13 PROCEDURE — A9270 NON-COVERED ITEM OR SERVICE: HCPCS | Performed by: NEUROLOGICAL SURGERY

## 2018-03-13 PROCEDURE — 770022 HCHG ROOM/CARE - ICU (200)

## 2018-03-13 PROCEDURE — 700112 HCHG RX REV CODE 229: Performed by: SURGERY

## 2018-03-13 PROCEDURE — 99233 SBSQ HOSP IP/OBS HIGH 50: CPT | Performed by: SURGERY

## 2018-03-13 PROCEDURE — 85025 COMPLETE CBC W/AUTO DIFF WBC: CPT

## 2018-03-13 RX ORDER — LEVETIRACETAM 500 MG/1
1000 TABLET ORAL 2 TIMES DAILY
Status: DISCONTINUED | OUTPATIENT
Start: 2018-03-14 | End: 2018-03-15

## 2018-03-13 RX ORDER — DOCUSATE SODIUM 50 MG/5ML
100 LIQUID ORAL 2 TIMES DAILY
Status: DISCONTINUED | OUTPATIENT
Start: 2018-03-13 | End: 2018-03-14

## 2018-03-13 RX ADMIN — CIPROFLOXACIN HYDROCHLORIDE 1 DROP: 3 SOLUTION/ DROPS OPHTHALMIC at 05:35

## 2018-03-13 RX ADMIN — OXYCODONE HYDROCHLORIDE 10 MG: 10 TABLET ORAL at 05:28

## 2018-03-13 RX ADMIN — LEVETIRACETAM 1000 MG: 500 TABLET, FILM COATED ORAL at 09:54

## 2018-03-13 RX ADMIN — POTASSIUM BICARBONATE 50 MEQ: 25 TABLET, EFFERVESCENT ORAL at 15:15

## 2018-03-13 RX ADMIN — CIPROFLOXACIN HYDROCHLORIDE 1 DROP: 3 SOLUTION/ DROPS OPHTHALMIC at 17:51

## 2018-03-13 RX ADMIN — DOCUSATE SODIUM 100 MG: 50 LIQUID ORAL at 11:44

## 2018-03-13 RX ADMIN — CIPROFLOXACIN HYDROCHLORIDE 1 DROP: 3 SOLUTION/ DROPS OPHTHALMIC at 20:37

## 2018-03-13 RX ADMIN — DOCUSATE SODIUM 100 MG: 50 LIQUID ORAL at 20:36

## 2018-03-13 RX ADMIN — FLUDROCORTISONE ACETATE 0.1 MG: 0.1 TABLET ORAL at 11:44

## 2018-03-13 RX ADMIN — OMEPRAZOLE 20 MG: 20 CAPSULE, DELAYED RELEASE ORAL at 09:58

## 2018-03-13 RX ADMIN — ENOXAPARIN SODIUM 30 MG: 100 INJECTION SUBCUTANEOUS at 09:54

## 2018-03-13 RX ADMIN — POTASSIUM CHLORIDE 40 MEQ: 2 INJECTION, SOLUTION, CONCENTRATE INTRAVENOUS at 11:44

## 2018-03-13 RX ADMIN — SODIUM CHLORIDE TAB 1 GM 2 G: 1 TAB at 17:52

## 2018-03-13 RX ADMIN — LEVETIRACETAM 1000 MG: 500 TABLET, FILM COATED ORAL at 20:36

## 2018-03-13 RX ADMIN — SODIUM CHLORIDE TAB 1 GM 2 G: 1 TAB at 00:50

## 2018-03-13 RX ADMIN — SODIUM CHLORIDE: 234 INJECTION, SOLUTION INTRAVENOUS at 19:27

## 2018-03-13 RX ADMIN — SODIUM CHLORIDE 30 ML: 234 INJECTION, SOLUTION INTRAVENOUS at 08:57

## 2018-03-13 RX ADMIN — ENOXAPARIN SODIUM 30 MG: 100 INJECTION SUBCUTANEOUS at 20:35

## 2018-03-13 RX ADMIN — CHLORDIAZEPOXIDE HYDROCHLORIDE 25 MG: 25 CAPSULE ORAL at 20:36

## 2018-03-13 RX ADMIN — OXYCODONE HYDROCHLORIDE 10 MG: 10 TABLET ORAL at 22:25

## 2018-03-13 RX ADMIN — CHLORDIAZEPOXIDE HYDROCHLORIDE 25 MG: 25 CAPSULE ORAL at 05:27

## 2018-03-13 RX ADMIN — POTASSIUM BICARBONATE 50 MEQ: 25 TABLET, EFFERVESCENT ORAL at 09:54

## 2018-03-13 RX ADMIN — CIPROFLOXACIN HYDROCHLORIDE 1 DROP: 3 SOLUTION/ DROPS OPHTHALMIC at 09:54

## 2018-03-13 RX ADMIN — CIPROFLOXACIN HYDROCHLORIDE 1 DROP: 3 SOLUTION/ DROPS OPHTHALMIC at 14:58

## 2018-03-13 RX ADMIN — SODIUM CHLORIDE TAB 1 GM 2 G: 1 TAB at 11:45

## 2018-03-13 RX ADMIN — OXYCODONE HYDROCHLORIDE 10 MG: 10 TABLET ORAL at 11:45

## 2018-03-13 RX ADMIN — SODIUM CHLORIDE TAB 1 GM 2 G: 1 TAB at 05:27

## 2018-03-13 RX ADMIN — POTASSIUM BICARBONATE 50 MEQ: 25 TABLET, EFFERVESCENT ORAL at 20:36

## 2018-03-13 RX ADMIN — FLUDROCORTISONE ACETATE 0.1 MG: 0.1 TABLET ORAL at 20:37

## 2018-03-13 RX ADMIN — CHLORDIAZEPOXIDE HYDROCHLORIDE 25 MG: 25 CAPSULE ORAL at 14:58

## 2018-03-13 ASSESSMENT — PAIN SCALES - GENERAL
PAINLEVEL_OUTOF10: 0
PAINLEVEL_OUTOF10: 10
PAINLEVEL_OUTOF10: 5

## 2018-03-13 ASSESSMENT — LIFESTYLE VARIABLES
ORIENTATION AND CLOUDING OF SENSORIUM: CANNOT DO SERIAL ADDITIONS OR IS UNCERTAIN ABOUT DATE
TREMOR: NO TREMOR
VISUAL DISTURBANCES: NOT PRESENT
AUDITORY DISTURBANCES: NOT PRESENT
AGITATION: NORMAL ACTIVITY
PAROXYSMAL SWEATS: NO SWEAT VISIBLE
NAUSEA AND VOMITING: MILD NAUSEA WITH NO VOMITING
ANXIETY: NO ANXIETY (AT EASE)
TOTAL SCORE: 4
HEADACHE, FULLNESS IN HEAD: MILD

## 2018-03-13 NOTE — CARE PLAN
Problem: Safety  Goal: Will remain free from injury  Outcome: PROGRESSING SLOWER THAN EXPECTED    Goal: Will remain free from falls  Outcome: PROGRESSING SLOWER THAN EXPECTED      Problem: Respiratory:  Goal: Respiratory status will improve  Outcome: PROGRESSING SLOWER THAN EXPECTED

## 2018-03-13 NOTE — PROGRESS NOTES
Neurosurgery Progress Note    Subjective:  No events; Na slightly improved on florinef and Na tabs    Exam:  Opens eyes to deep stim and keeps open  Perrl, conjugate  Face symm  Romero spont and symm  Not f/c  Mumbles      Pulse  Av.9  Min: 101  Max: 123  Resp  Av.2  Min: 13  Max: 52  Monitored Temp  Av.6 °C (99.6 °F)  Min: 35.6 °C (96.1 °F)  Max: 38.1 °C (100.6 °F)  SpO2  Av.1 %  Min: 94 %  Max: 100 %    No Data Recorded    Recent Labs      18   0359  18   0412  18   0533   WBC  4.7*  4.7*  6.1   RBC  2.35*  2.16*  2.28*   HEMOGLOBIN  8.2*  7.9*  8.3*   HEMATOCRIT  25.2*  23.4*  24.3*   MCV  107.2*  108.3*  106.6*   MCH  34.9*  36.6*  36.4*   MCHC  32.5*  33.8  34.2   RDW  53.9*  55.8*  53.8*   PLATELETCT  236  263  296   MPV  9.8  10.3  10.2     Recent Labs      18   1200  18   1740  18   0533   SODIUM  127*  128*  129*   POTASSIUM  4.4  3.8  3.5*   CHLORIDE  97  96  98   CO2  25  24  24   GLUCOSE  119*  114*  120*   BUN  13  11  12               Intake/Output       18 0700 - 18 0659 18 0700 - 18 0659      1602-4974 4943-3435 Total 5998-2523 0118-1410 Total       Intake    I.V.  1107  360 1467  --  -- --    IV Volume (3%) 360 360 720 -- -- --    Na Phos 747 -- 747 -- -- --    Other  90  210 300  --  -- --    Medications (P.O./ Enteral Liquids) 90 210 300 -- -- --    Enteral  840  810 1650  --  -- --    Enteral Volume  -- -- --    Free Water / Tube Flush 120 90 210 -- -- --    Total Intake 2037 1380 3417 -- -- --       Output    Urine  2250  1850 4100  --  -- --    Indwelling Cathether 2250 1850 4100 -- -- --    Total Output 2250 1850 4100 -- -- --       Net I/O     -213 -470 -683 -- -- --            Intake/Output Summary (Last 24 hours) at 18 0754  Last data filed at 18 0600   Gross per 24 hour   Intake             3417 ml   Output             4100 ml   Net             -683 ml            • fludrocortisone  0.1  mg BID   • chlordiazePOXIDE  25 mg Q8HRS   • 3% sodium chloride   Continuous   • levETIRAcetam  1,000 mg BID   • sodium chloride  2 g Q6HRS   • enoxaparin (LOVENOX) injection  30 mg Q12HRS   • omeprazole 2 mg/mL in sodium bicarbonate  20 mg DAILY   • ciprofloxacin  1 Drop Q4HRS WHILE AWAKE   • potassium bicarbonate  50 mEq BID   • glucose 4 g  16 g Q15 MIN PRN    And   • dextrose 50%  25 mL Q15 MIN PRN   • LORazepam  4 mg Q15 MIN PRN    Or   • LORazepam  3 mg Q15 MIN PRN    Or   • LORazepam  2 mg Q15 MIN PRN    Or   • LORazepam  1 mg Q15 MIN PRN   • Respiratory Care per Protocol   Continuous RT   • Pharmacy Consult Request  1 Each PRN   • docusate sodium  100 mg BID   • senna-docusate  1 Tab Nightly   • senna-docusate  1 Tab Q24HRS PRN   • polyethylene glycol/lytes  1 Packet BID   • magnesium hydroxide  30 mL DAILY   • bisacodyl  10 mg Q24HRS PRN   • fleet  1 Each Once PRN   • ondansetron  4 mg Q4HRS PRN   • oxyCODONE immediate-release  5 mg Q3HRS PRN    Or   • oxyCODONE immediate-release  10 mg Q3HRS PRN       Assessment and Plan:  Hospital day # 9 lin FRANCIS SDH, DTs  CT yesterday significantly improved  Exam slowly improving  Prophylactic anticoagulation: yes        Start date/time: yesterday cleared by DANIKA  hyponatremia- 129, on 3%, salt tabs and florinef --- stable  neuro checks to Q4

## 2018-03-13 NOTE — DIETARY
Nutrition support weekly update:  Day 9 of admit.  49 yo female admitted after tripping over her dog and +ETOH.  Tube feeding initiated on 3/6. Current TF Diabetisource at full goal 60 ml/hr providing 1728 kcals, 86 g protein, 1169 ml H20/day.     Assessment:  Weight today 61.76 is increased 2.1 kg since admit weight of 59.6 kg.     Evaluation:   1. Pt continues to have ETOH withdrawal symptoms  2. Hyponatremia with sodium 129 - pt is receivng 3% @ 50 ml/hr.  3. Prealbumin 15 and CRP 2.34 - will follow for trending  4. Seen by SLP today and found inappropriate for cognitive or swallow evaluation secondary to inability to arouse.  5. Current feeding meeting nutritional needs for healing.     Recommendations/Plan:  1. Continue same TF formula and rate   2. Po diet when safe/appropriate and pt can adequately consume

## 2018-03-14 PROBLEM — T14.90XA TRAUMA: Status: ACTIVE | Noted: 2018-03-04

## 2018-03-14 LAB
ANION GAP SERPL CALC-SCNC: 7 MMOL/L (ref 0–11.9)
ANION GAP SERPL CALC-SCNC: 8 MMOL/L (ref 0–11.9)
ANION GAP SERPL CALC-SCNC: 8 MMOL/L (ref 0–11.9)
ANION GAP SERPL CALC-SCNC: 9 MMOL/L (ref 0–11.9)
BASOPHILS # BLD AUTO: 1.3 % (ref 0–1.8)
BASOPHILS # BLD: 0.09 K/UL (ref 0–0.12)
BUN SERPL-MCNC: 10 MG/DL (ref 8–22)
BUN SERPL-MCNC: 11 MG/DL (ref 8–22)
BUN SERPL-MCNC: 12 MG/DL (ref 8–22)
BUN SERPL-MCNC: 13 MG/DL (ref 8–22)
CALCIUM SERPL-MCNC: 8.6 MG/DL (ref 8.5–10.5)
CALCIUM SERPL-MCNC: 8.8 MG/DL (ref 8.5–10.5)
CALCIUM SERPL-MCNC: 8.9 MG/DL (ref 8.5–10.5)
CALCIUM SERPL-MCNC: 9.1 MG/DL (ref 8.5–10.5)
CHLORIDE SERPL-SCNC: 101 MMOL/L (ref 96–112)
CHLORIDE SERPL-SCNC: 101 MMOL/L (ref 96–112)
CHLORIDE SERPL-SCNC: 102 MMOL/L (ref 96–112)
CHLORIDE SERPL-SCNC: 98 MMOL/L (ref 96–112)
CO2 SERPL-SCNC: 23 MMOL/L (ref 20–33)
CO2 SERPL-SCNC: 23 MMOL/L (ref 20–33)
CO2 SERPL-SCNC: 24 MMOL/L (ref 20–33)
CO2 SERPL-SCNC: 24 MMOL/L (ref 20–33)
CREAT SERPL-MCNC: 0.35 MG/DL (ref 0.5–1.4)
CREAT SERPL-MCNC: 0.37 MG/DL (ref 0.5–1.4)
CREAT SERPL-MCNC: 0.4 MG/DL (ref 0.5–1.4)
CREAT SERPL-MCNC: 0.41 MG/DL (ref 0.5–1.4)
EOSINOPHIL # BLD AUTO: 0.07 K/UL (ref 0–0.51)
EOSINOPHIL NFR BLD: 1 % (ref 0–6.9)
ERYTHROCYTE [DISTWIDTH] IN BLOOD BY AUTOMATED COUNT: 57.1 FL (ref 35.9–50)
GLUCOSE SERPL-MCNC: 109 MG/DL (ref 65–99)
GLUCOSE SERPL-MCNC: 110 MG/DL (ref 65–99)
GLUCOSE SERPL-MCNC: 114 MG/DL (ref 65–99)
GLUCOSE SERPL-MCNC: 118 MG/DL (ref 65–99)
HCT VFR BLD AUTO: 27 % (ref 37–47)
HGB BLD-MCNC: 9 G/DL (ref 12–16)
IMM GRANULOCYTES # BLD AUTO: 0.12 K/UL (ref 0–0.11)
IMM GRANULOCYTES NFR BLD AUTO: 1.8 % (ref 0–0.9)
LYMPHOCYTES # BLD AUTO: 1.3 K/UL (ref 1–4.8)
LYMPHOCYTES NFR BLD: 19.5 % (ref 22–41)
MCH RBC QN AUTO: 36.3 PG (ref 27–33)
MCHC RBC AUTO-ENTMCNC: 33.3 G/DL (ref 33.6–35)
MCV RBC AUTO: 108.9 FL (ref 81.4–97.8)
MONOCYTES # BLD AUTO: 1.36 K/UL (ref 0–0.85)
MONOCYTES NFR BLD AUTO: 20.4 % (ref 0–13.4)
NEUTROPHILS # BLD AUTO: 3.74 K/UL (ref 2–7.15)
NEUTROPHILS NFR BLD: 56 % (ref 44–72)
NRBC # BLD AUTO: 0.04 K/UL
NRBC BLD-RTO: 0.6 /100 WBC
PLATELET # BLD AUTO: 368 K/UL (ref 164–446)
PMV BLD AUTO: 10 FL (ref 9–12.9)
POTASSIUM SERPL-SCNC: 4 MMOL/L (ref 3.6–5.5)
POTASSIUM SERPL-SCNC: 4.4 MMOL/L (ref 3.6–5.5)
POTASSIUM SERPL-SCNC: 4.4 MMOL/L (ref 3.6–5.5)
POTASSIUM SERPL-SCNC: 5.1 MMOL/L (ref 3.6–5.5)
RBC # BLD AUTO: 2.48 M/UL (ref 4.2–5.4)
SODIUM SERPL-SCNC: 129 MMOL/L (ref 135–145)
SODIUM SERPL-SCNC: 133 MMOL/L (ref 135–145)
WBC # BLD AUTO: 6.7 K/UL (ref 4.8–10.8)

## 2018-03-14 PROCEDURE — 770001 HCHG ROOM/CARE - MED/SURG/GYN PRIV*

## 2018-03-14 PROCEDURE — 700102 HCHG RX REV CODE 250 W/ 637 OVERRIDE(OP): Performed by: NEUROLOGICAL SURGERY

## 2018-03-14 PROCEDURE — A9270 NON-COVERED ITEM OR SERVICE: HCPCS | Performed by: SURGERY

## 2018-03-14 PROCEDURE — 85025 COMPLETE CBC W/AUTO DIFF WBC: CPT

## 2018-03-14 PROCEDURE — 80048 BASIC METABOLIC PNL TOTAL CA: CPT | Mod: 91

## 2018-03-14 PROCEDURE — 700102 HCHG RX REV CODE 250 W/ 637 OVERRIDE(OP): Performed by: SURGERY

## 2018-03-14 PROCEDURE — A9270 NON-COVERED ITEM OR SERVICE: HCPCS | Performed by: NEUROLOGICAL SURGERY

## 2018-03-14 PROCEDURE — 700112 HCHG RX REV CODE 229: Performed by: SURGERY

## 2018-03-14 PROCEDURE — 700105 HCHG RX REV CODE 258: Performed by: SURGERY

## 2018-03-14 PROCEDURE — 99233 SBSQ HOSP IP/OBS HIGH 50: CPT | Performed by: SURGERY

## 2018-03-14 PROCEDURE — 700111 HCHG RX REV CODE 636 W/ 250 OVERRIDE (IP): Performed by: SURGERY

## 2018-03-14 RX ADMIN — ENOXAPARIN SODIUM 30 MG: 100 INJECTION SUBCUTANEOUS at 21:42

## 2018-03-14 RX ADMIN — POTASSIUM BICARBONATE 50 MEQ: 25 TABLET, EFFERVESCENT ORAL at 21:44

## 2018-03-14 RX ADMIN — ENOXAPARIN SODIUM 30 MG: 100 INJECTION SUBCUTANEOUS at 09:00

## 2018-03-14 RX ADMIN — SODIUM CHLORIDE TAB 1 GM 2 G: 1 TAB at 00:48

## 2018-03-14 RX ADMIN — CHLORDIAZEPOXIDE HYDROCHLORIDE 25 MG: 25 CAPSULE ORAL at 05:20

## 2018-03-14 RX ADMIN — OMEPRAZOLE 20 MG: 20 CAPSULE, DELAYED RELEASE ORAL at 09:00

## 2018-03-14 RX ADMIN — POTASSIUM BICARBONATE 50 MEQ: 25 TABLET, EFFERVESCENT ORAL at 13:53

## 2018-03-14 RX ADMIN — MAGNESIUM HYDROXIDE 30 ML: 400 SUSPENSION ORAL at 09:00

## 2018-03-14 RX ADMIN — OXYCODONE HYDROCHLORIDE 10 MG: 10 TABLET ORAL at 06:33

## 2018-03-14 RX ADMIN — SODIUM CHLORIDE 30 ML: 234 INJECTION, SOLUTION INTRAVENOUS at 16:23

## 2018-03-14 RX ADMIN — LEVETIRACETAM 1000 MG: 500 TABLET, FILM COATED ORAL at 09:00

## 2018-03-14 RX ADMIN — FLUDROCORTISONE ACETATE 0.1 MG: 0.1 TABLET ORAL at 09:00

## 2018-03-14 RX ADMIN — SODIUM CHLORIDE TAB 1 GM 2 G: 1 TAB at 05:20

## 2018-03-14 RX ADMIN — CIPROFLOXACIN HYDROCHLORIDE 1 DROP: 3 SOLUTION/ DROPS OPHTHALMIC at 05:22

## 2018-03-14 RX ADMIN — CHLORDIAZEPOXIDE HYDROCHLORIDE 25 MG: 25 CAPSULE ORAL at 21:42

## 2018-03-14 RX ADMIN — SODIUM CHLORIDE TAB 1 GM 2 G: 1 TAB at 11:56

## 2018-03-14 RX ADMIN — FLUDROCORTISONE ACETATE 0.1 MG: 0.1 TABLET ORAL at 21:42

## 2018-03-14 RX ADMIN — CHLORDIAZEPOXIDE HYDROCHLORIDE 25 MG: 25 CAPSULE ORAL at 13:53

## 2018-03-14 RX ADMIN — POTASSIUM BICARBONATE 50 MEQ: 25 TABLET, EFFERVESCENT ORAL at 05:20

## 2018-03-14 RX ADMIN — SODIUM CHLORIDE TAB 1 GM 2 G: 1 TAB at 23:55

## 2018-03-14 RX ADMIN — DOCUSATE SODIUM 100 MG: 50 LIQUID ORAL at 09:00

## 2018-03-14 RX ADMIN — SODIUM CHLORIDE: 234 INJECTION, SOLUTION INTRAVENOUS at 05:41

## 2018-03-14 RX ADMIN — LEVETIRACETAM 1000 MG: 500 TABLET, FILM COATED ORAL at 21:42

## 2018-03-14 ASSESSMENT — LIFESTYLE VARIABLES
NAUSEA AND VOMITING: MILD NAUSEA WITH NO VOMITING
TOTAL SCORE: 4
ORIENTATION AND CLOUDING OF SENSORIUM: CANNOT DO SERIAL ADDITIONS OR IS UNCERTAIN ABOUT DATE
ORIENTATION AND CLOUDING OF SENSORIUM: CANNOT DO SERIAL ADDITIONS OR IS UNCERTAIN ABOUT DATE
ANXIETY: NO ANXIETY (AT EASE)
VISUAL DISTURBANCES: NOT PRESENT
PAROXYSMAL SWEATS: NO SWEAT VISIBLE
AGITATION: NORMAL ACTIVITY
ANXIETY: NO ANXIETY (AT EASE)
HEADACHE, FULLNESS IN HEAD: MILD
TOTAL SCORE: 4
PAROXYSMAL SWEATS: NO SWEAT VISIBLE
AUDITORY DISTURBANCES: NOT PRESENT
AGITATION: NORMAL ACTIVITY
AUDITORY DISTURBANCES: NOT PRESENT
NAUSEA AND VOMITING: MILD NAUSEA WITH NO VOMITING
VISUAL DISTURBANCES: NOT PRESENT
TREMOR: NO TREMOR
HEADACHE, FULLNESS IN HEAD: MILD
TREMOR: NO TREMOR

## 2018-03-14 ASSESSMENT — PAIN SCALES - GENERAL
PAINLEVEL_OUTOF10: 6
PAINLEVEL_OUTOF10: 4
PAINLEVEL_OUTOF10: 10
PAINLEVEL_OUTOF10: 6
PAINLEVEL_OUTOF10: 5
PAINLEVEL_OUTOF10: 4
PAINLEVEL_OUTOF10: 4

## 2018-03-14 ASSESSMENT — ENCOUNTER SYMPTOMS: FEVER: 0

## 2018-03-14 NOTE — PROGRESS NOTES
"  Trauma/Surgical Progress Note    Author: Martin Gong Date & Time created: 3/13/2018   9:19 PM     Interval Events:  Less agitated, Cooperative  Sodium low  Tolerating feeding  Low-grade temp    Hemodynamics:  Blood pressure 123/69, pulse (!) 109, temperature (!) 38.2 °C (100.8 °F), resp. rate 16, height 1.727 m (5' 8\"), weight 61.7 kg (136 lb 0.4 oz), SpO2 98 %.     Respiratory:    Respiration: 16, Pulse Oximetry: 98 %     Work Of Breathing / Effort: Mild  RUL Breath Sounds: Clear, RML Breath Sounds: Clear, RLL Breath Sounds: Diminished, CARLI Breath Sounds: Clear, LLL Breath Sounds: Diminished  Fluids:    Intake/Output Summary (Last 24 hours) at 03/13/18 2119  Last data filed at 03/13/18 2000   Gross per 24 hour   Intake             2590 ml   Output             3450 ml   Net             -860 ml     Admit Weight: 69 kg (152 lb 1.9 oz)  Current Weight: 61.7 kg (136 lb 0.4 oz)    Physical Exam   Constitutional: She appears well-developed and well-nourished. She is sleeping and cooperative. She is easily aroused. No distress. She is restrained. Nasal cannula in place.   HENT:   Mouth/Throat: Oropharynx is clear and moist.   Facial bruising involving  Wounds healing   Eyes: EOM are normal. Pupils are equal, round, and reactive to light. No scleral icterus.   Neck: Normal range of motion. Neck supple.   Cardiovascular: Regular rhythm and intact distal pulses.  Tachycardia present.    Sinus tach   Pulmonary/Chest: No respiratory distress. She has no decreased breath sounds. She has no wheezes. She exhibits no tenderness.   Abdominal: Soft. She exhibits no distension. There is no tenderness.   Musculoskeletal: Normal range of motion. She exhibits edema.   Neurological: She is easily aroused. No cranial nerve deficit. GCS eye subscore is 3. GCS verbal subscore is 4. GCS motor subscore is 5.   Skin: Skin is warm and dry. No pallor.   Nursing note and vitals reviewed.      Medical Decision Making/Problem List:    Active " Hospital Problems    Diagnosis   • Hyponatremia [E87.1]     Priority: High     Progressive downward trend to critical levels  NaCl tabs, Florinef, and 3% infusion  3/13 3% NaCl increase to 50 mL/h  Sodium trending up: If we need to increase drip will need central line   Continue salt tablets as well as Florinef     • Traumatic intracranial hemorrhage (CMS-HCC) [S06.309A]     Priority: High     Multiple areas of SAH and SDH, 4.1mm shift  Repeat CT imaging of the brain stable to slightly worse  3/5 seizure @1515, repeat CT scan was stable  Further seizures 3/6, assessment complicated by alcohol withdrawal  3/8 cleared for lovenox  3/13 mental status improving slowly  Continue Keppra because of seizures  Non-operative management.  Lorne Menchaca MD. Neurosurgery.       • Acute hyperactive alcohol withdrawal delirium (CMS-[facility]) [F10.231]     Priority: High     DEYANIRA 0.43 on admission; LFTs elevated, thrombocytopenic  Has developed alcohol withdrawal  [facility],  Schedule Librium  3/13 agitation reasonably controlled on Librium  RASS score is appropriate  Continue to trend exam     • Moderate protein-calorie malnutrition (CMS-[facility]) [E44.0]     Priority: Medium     Tube feeding per protocol.     • Bacterial conjunctivitis of both eyes [H10.9]     Priority: Low     Ciprofloxacin eyedrops x 7 days     • Anemia [D64.9]     Priority: Low     Significant hemoglobin drop after admission  No evidence of gastrointestinal hemorrhage, abdomen examination is benign  Considering liver function tests abdominal CT scan was completed and no showed no evidence of intra-abdominal bleeding  Serial hemoglobin stable   Iron replacement  Likely chronic     • Pharmacologic contraindication to deep vein thrombosis (DVT) prophylaxis [Z53.09]     Priority: Low     Prophylactic Lovenox initially contraindicated due to elevated bleeding risk  Duplex ordered for 3/7/18 - read PENDING  3/8 Lovenox initiated       Core Measures & Quality  Metrics:  Labs reviewed, Radiology images reviewed and Medications reviewed  Pemberton catheter: Critically Ill - Requiring Accurate Measurement of Urinary Output      DVT Prophylaxis: Enoxaparin (Lovenox)  DVT prophylaxis - mechanical: SCDs  Ulcer prophylaxis: Not indicated    Assessed for rehab: Patient was assess for and/or received rehabilitation services during this hospitalization    LUNA Score  Discussed patient condition with RN, RT, Therapies, Pharmacy, Dietary and .

## 2018-03-14 NOTE — CARE PLAN
Problem: Infection  Goal: Will remain free from infection  Outcome: PROGRESSING AS EXPECTED  Continue to monitor labs and V.S. No s/sx of infection noted.

## 2018-03-14 NOTE — CARE PLAN
Problem: Safety  Goal: Will remain free from injury  Outcome: PROGRESSING AS EXPECTED  Pt educated, fall precautions in place .    Problem: Infection  Goal: Will remain free from infection  Outcome: PROGRESSING AS EXPECTED  Pt educated, standard precautions in place.

## 2018-03-14 NOTE — PROGRESS NOTES
"  Trauma/Surgical Progress Note    Author: Maxi Huffman Date & Time created: 3/14/2018   10:34 AM     Interval Events:  Transfer to Neurosurgery Unit  Limited tertiary survey completed, due to pts willingness to cooperate.   Continue 3%, florinef, and salt tabs.  RAP completed, SBIRT limited due to lack of cooperation.      Review of Systems   Unable to perform ROS: medical condition   Constitutional: Negative for fever.   Skin: Negative for rash.     Hemodynamics:  Blood pressure 123/69, pulse (!) 108, temperature (!) 38.2 °C (100.8 °F), resp. rate 20, height 1.727 m (5' 8\"), weight 60.1 kg (132 lb 7.9 oz), SpO2 96 %.     Respiratory:    Respiration: 20, Pulse Oximetry: 96 %     Work Of Breathing / Effort: Mild  RUL Breath Sounds: Clear, RML Breath Sounds: Clear, RLL Breath Sounds: Diminished, CARLI Breath Sounds: Clear, LLL Breath Sounds: Diminished  Fluids:    Intake/Output Summary (Last 24 hours) at 03/14/18 1034  Last data filed at 03/14/18 0600   Gross per 24 hour   Intake             2250 ml   Output             3445 ml   Net            -1195 ml     Admit Weight: 69 kg (152 lb 1.9 oz)  Current Weight: 60.1 kg (132 lb 7.9 oz)    Physical Exam   Constitutional: She appears well-developed and well-nourished. She is sleeping and cooperative. She is easily aroused. No distress. Nasal cannula in place.   HENT:   Mouth/Throat: Oropharynx is clear and moist.   Facial bruising involving  Wounds healing   Eyes: Pupils are equal, round, and reactive to light. No scleral icterus.   Neck: Normal range of motion. Neck supple.   Cardiovascular: Regular rhythm and intact distal pulses.  Tachycardia present.    Sinus tach   Pulmonary/Chest: No respiratory distress. She has no decreased breath sounds. She has no wheezes. She exhibits no tenderness.   Abdominal: Soft. She exhibits no distension. There is no tenderness.   Musculoskeletal: Normal range of motion. She exhibits edema.   Neurological: She is easily aroused. No " cranial nerve deficit. GCS eye subscore is 3. GCS verbal subscore is 4. GCS motor subscore is 5.   Skin: Skin is warm and dry. No pallor.   Nursing note and vitals reviewed.      Medical Decision Making/Problem List:    Active Hospital Problems    Diagnosis   • Hyponatremia [E87.1]     Priority: High     Progressive downward trend to critical levels  NaCl tabs, Florinef, and 3% infusion  3/13 3% NaCl increase to 50 mL/h  Sodium trending up: If we need to increase drip will need central line   Continue salt tablets as well as Florinef  3/14 trending up     • Traumatic intracranial hemorrhage (CMS-HCC) [S06.309A]     Priority: High     Multiple areas of SAH and SDH, 4.1mm shift  Repeat CT imaging of the brain stable to slightly worse  3/5 seizure @1515, repeat CT scan was stable  Further seizures 3/6, assessment complicated by alcohol withdrawal  Continue Keppra  Cog eval ordered  3/8 cleared for lovenox  3/14 mental status improving slowly  Course of Keppra completed  Non-operative management.  Lorne Menchaca MD. Neurosurgery.       • Acute hyperactive alcohol withdrawal delirium (CMS-HCC) [F10.231]     Priority: High     DEYANIRA 0.43 on admission; LFTs elevated, thrombocytopenic  Has developed alcohol withdrawal  At[facility] protocol,  Schedule Librium  3/13 agitation reasonably controlled on Librium  RASS score is appropriate  CIWA for unit      • Moderate protein-calorie malnutrition (CMS-HCC) [E44.0]     Priority: Medium     Tube feeding per protocol.     • Bacterial conjunctivitis of both eyes [H10.9]     Priority: Low     Ciprofloxacin eyedrops x 7 days     • Anemia [D64.9]     Priority: Low     Significant hemoglobin drop after admission  No evidence of gastrointestinal hemorrhage, abdomen examination is benign  Considering liver function tests abdominal CT scan was completed and no showed no evidence of intra-abdominal bleeding  Serial hemoglobin stable   Iron replacement  Likely chronic     • Pharmacologic  contraindication to deep vein thrombosis (DVT) prophylaxis [Z53.09]     Priority: Low     Prophylactic Lovenox initially contraindicated due to elevated bleeding risk  Duplex ordered for 3/7/18 - read PENDING  3/8 Lovenox initiated       Core Measures & Quality Metrics:  Labs reviewed, Radiology images reviewed and Medications reviewed  Pemberton catheter: Critically Ill - Requiring Accurate Measurement of Urinary Output      DVT Prophylaxis: Enoxaparin (Lovenox)  DVT prophylaxis - mechanical: SCDs  Ulcer prophylaxis: Not indicated    Assessed for rehab: Patient was assess for and/or received rehabilitation services during this hospitalization    Total Score: 7  ETOH Screening     Intervention complete date: 3/14/2018  Patient response to intervention: No coopertive with answering questions, concerning substance abuse..   Patient does not demonstrat understanding of intervention.Plan of care:    has not been contacted.Follow up with: Clinic  Total ETOH intervention time: 15 - 30 mintues    Discussed patient condition with RN, Patient and trauma surgery. Dr. Gong.

## 2018-03-14 NOTE — PROGRESS NOTES
Neurosurgery Progress Note    Subjective:  No events; Na 133 on florinef and Na tabs and 3%    Exam:  Better today  Eye opening to voice  Perrl, conjugate  Face symm  Oriented to place and year  Fc x 4      Pulse  Av.2  Min: 93  Max: 122  Resp  Av  Min: 14  Max: 48  Monitored Temp  Av.8 °C (100.1 °F)  Min: 37.4 °C (99.3 °F)  Max: 38.3 °C (100.9 °F)  SpO2  Av.7 %  Min: 93 %  Max: 99 %    No Data Recorded    Recent Labs      18   0412  18   0533  18   0535   WBC  4.7*  6.1  6.7   RBC  2.16*  2.28*  2.48*   HEMOGLOBIN  7.9*  8.3*  9.0*   HEMATOCRIT  23.4*  24.3*  27.0*   MCV  108.3*  106.6*  108.9*   MCH  36.6*  36.4*  36.3*   MCHC  33.8  34.2  33.3*   RDW  55.8*  53.8*  57.1*   PLATELETCT  263  296  368   MPV  10.3  10.2  10.0     Recent Labs      18   1755  18   2340  18   0535   SODIUM  130*  129*  133*   POTASSIUM  4.7  5.1  4.4   CHLORIDE  99  98  101   CO2  24  24  23   GLUCOSE  114*  110*  109*   BUN  10  10  13               Intake/Output       18 0700 - 18 0659 18 07 - 03/15/18 0659      1900-0659 Total 1900-0659 Total       Intake    I.V.  60  600 660  --  -- --    IV Volume (3%) 60 600 660 -- -- --    Other  270  90 360  --  -- --    Medications (P.O./ Enteral Liquids) 270 90 360 -- -- --    Enteral  870  810 1680  --  -- --    Enteral Volume  -- -- --    Free Water / Tube Flush 150 90 240 -- -- --    Total Intake 1200 1500 2700 -- -- --       Output    Urine  1800  1645 3445  --  -- --    Indwelling Cathether 1800 1645 3445 -- -- --    Total Output 1800 1645 3445 -- -- --       Net I/O     -320 -567 -017 -- -- --            Intake/Output Summary (Last 24 hours) at 18 0843  Last data filed at 18 0600   Gross per 24 hour   Intake             2370 ml   Output             3445 ml   Net            -1075 ml            • docusate sodium 100mg/10mL  100 mg BID   • potassium bicarbonate  50 mEq  Q8HRS   • levETIRAcetam  1,000 mg BID   • fludrocortisone  0.1 mg BID   • chlordiazePOXIDE  25 mg Q8HRS   • 3% sodium chloride   Continuous   • sodium chloride  2 g Q6HRS   • enoxaparin (LOVENOX) injection  30 mg Q12HRS   • omeprazole 2 mg/mL in sodium bicarbonate  20 mg DAILY   • ciprofloxacin  1 Drop Q4HRS WHILE AWAKE   • LORazepam  4 mg Q15 MIN PRN    Or   • LORazepam  3 mg Q15 MIN PRN    Or   • LORazepam  2 mg Q15 MIN PRN    Or   • LORazepam  1 mg Q15 MIN PRN   • Respiratory Care per Protocol   Continuous RT   • Pharmacy Consult Request  1 Each PRN   • senna-docusate  1 Tab Nightly   • senna-docusate  1 Tab Q24HRS PRN   • polyethylene glycol/lytes  1 Packet BID   • magnesium hydroxide  30 mL DAILY   • bisacodyl  10 mg Q24HRS PRN   • fleet  1 Each Once PRN   • ondansetron  4 mg Q4HRS PRN   • oxyCODONE immediate-release  5 mg Q3HRS PRN    Or   • oxyCODONE immediate-release  10 mg Q3HRS PRN       Assessment and Plan:  Hospital day # 10 TSAH, falcine SDH, DTs  CT 2 days ago significantly improved  Exam improving  Prophylactic anticoagulation: yes        Start date/time: yesterday cleared by DANIKA  hyponatremia- 133, on 3%, salt tabs and florinef --- improved  neuro checks to Q4    ATTENDING ADDENDUM:  Patient seen independently and agree with above note

## 2018-03-14 NOTE — CARE PLAN
Problem: Pain Management  Goal: Pain level will decrease to patient's comfort goal  Outcome: PROGRESSING AS EXPECTED  Continue to monitor pain levels, giving PRN pain medications per MAR. Repositioning and relaxation techniques used as well.

## 2018-03-14 NOTE — THERAPY
SPEECH PATHOLOGY: Spoke with RN regarding cognitive evaluation and possible swallow evaluation--they are working on medication regimen and patient's level of arousal is slowly improving.  Still not at level for comprehensive cog evaluation.  RN asking about swallow evaluation; however, we still do not have orders for swallow.  RN to speak with MD/APRN and will obtain orders as appropriate.  SLP will continue to monitor status and will follow as appropriate.  Thank you.

## 2018-03-14 NOTE — CARE PLAN
Problem: Safety  Goal: Will remain free from injury  Outcome: PROGRESSING AS EXPECTED  Pt educated, fall precautions in place.     Problem: Infection  Goal: Will remain free from infection  Outcome: PROGRESSING AS EXPECTED  Pt educated, standard precautions in place.

## 2018-03-15 LAB
ANION GAP SERPL CALC-SCNC: 10 MMOL/L (ref 0–11.9)
ANION GAP SERPL CALC-SCNC: 7 MMOL/L (ref 0–11.9)
ANION GAP SERPL CALC-SCNC: 7 MMOL/L (ref 0–11.9)
ANION GAP SERPL CALC-SCNC: 8 MMOL/L (ref 0–11.9)
ANISOCYTOSIS BLD QL SMEAR: ABNORMAL
BASOPHILS # BLD AUTO: 2.6 % (ref 0–1.8)
BASOPHILS # BLD: 0.23 K/UL (ref 0–0.12)
BUN SERPL-MCNC: 13 MG/DL (ref 8–22)
BUN SERPL-MCNC: 14 MG/DL (ref 8–22)
BUN SERPL-MCNC: 14 MG/DL (ref 8–22)
BUN SERPL-MCNC: 16 MG/DL (ref 8–22)
CALCIUM SERPL-MCNC: 8.7 MG/DL (ref 8.5–10.5)
CALCIUM SERPL-MCNC: 8.8 MG/DL (ref 8.5–10.5)
CALCIUM SERPL-MCNC: 9 MG/DL (ref 8.5–10.5)
CALCIUM SERPL-MCNC: 9.1 MG/DL (ref 8.5–10.5)
CHLORIDE SERPL-SCNC: 100 MMOL/L (ref 96–112)
CHLORIDE SERPL-SCNC: 101 MMOL/L (ref 96–112)
CHLORIDE SERPL-SCNC: 102 MMOL/L (ref 96–112)
CHLORIDE SERPL-SCNC: 104 MMOL/L (ref 96–112)
CO2 SERPL-SCNC: 22 MMOL/L (ref 20–33)
CO2 SERPL-SCNC: 22 MMOL/L (ref 20–33)
CO2 SERPL-SCNC: 23 MMOL/L (ref 20–33)
CO2 SERPL-SCNC: 24 MMOL/L (ref 20–33)
CREAT SERPL-MCNC: 0.42 MG/DL (ref 0.5–1.4)
CREAT SERPL-MCNC: 0.42 MG/DL (ref 0.5–1.4)
CREAT SERPL-MCNC: 0.44 MG/DL (ref 0.5–1.4)
CREAT SERPL-MCNC: 0.45 MG/DL (ref 0.5–1.4)
EOSINOPHIL # BLD AUTO: 0.08 K/UL (ref 0–0.51)
EOSINOPHIL NFR BLD: 0.9 % (ref 0–6.9)
ERYTHROCYTE [DISTWIDTH] IN BLOOD BY AUTOMATED COUNT: 58.4 FL (ref 35.9–50)
GLUCOSE SERPL-MCNC: 111 MG/DL (ref 65–99)
GLUCOSE SERPL-MCNC: 112 MG/DL (ref 65–99)
GLUCOSE SERPL-MCNC: 114 MG/DL (ref 65–99)
GLUCOSE SERPL-MCNC: 117 MG/DL (ref 65–99)
HCT VFR BLD AUTO: 27.3 % (ref 37–47)
HGB BLD-MCNC: 8.7 G/DL (ref 12–16)
LYMPHOCYTES # BLD AUTO: 1.91 K/UL (ref 1–4.8)
LYMPHOCYTES NFR BLD: 21.5 % (ref 22–41)
MACROCYTES BLD QL SMEAR: ABNORMAL
MAGNESIUM SERPL-MCNC: 1.9 MG/DL (ref 1.5–2.5)
MANUAL DIFF BLD: NORMAL
MCH RBC QN AUTO: 34.8 PG (ref 27–33)
MCHC RBC AUTO-ENTMCNC: 31.9 G/DL (ref 33.6–35)
MCV RBC AUTO: 109.2 FL (ref 81.4–97.8)
MICROCYTES BLD QL SMEAR: ABNORMAL
MONOCYTES # BLD AUTO: 1 K/UL (ref 0–0.85)
MONOCYTES NFR BLD AUTO: 11.2 % (ref 0–13.4)
MORPHOLOGY BLD-IMP: NORMAL
MYELOCYTES NFR BLD MANUAL: 0.9 %
NEUTROPHILS # BLD AUTO: 5.6 K/UL (ref 2–7.15)
NEUTROPHILS NFR BLD: 62.9 % (ref 44–72)
NRBC # BLD AUTO: 0.02 K/UL
NRBC BLD-RTO: 0.2 /100 WBC
PHOSPHATE SERPL-MCNC: 1.9 MG/DL (ref 2.5–4.5)
PLATELET # BLD AUTO: 438 K/UL (ref 164–446)
PLATELET BLD QL SMEAR: NORMAL
PMV BLD AUTO: 9.1 FL (ref 9–12.9)
POLYCHROMASIA BLD QL SMEAR: NORMAL
POTASSIUM SERPL-SCNC: 3.8 MMOL/L (ref 3.6–5.5)
POTASSIUM SERPL-SCNC: 3.9 MMOL/L (ref 3.6–5.5)
POTASSIUM SERPL-SCNC: 4.2 MMOL/L (ref 3.6–5.5)
POTASSIUM SERPL-SCNC: 4.4 MMOL/L (ref 3.6–5.5)
RBC # BLD AUTO: 2.5 M/UL (ref 4.2–5.4)
RBC BLD AUTO: PRESENT
SODIUM SERPL-SCNC: 131 MMOL/L (ref 135–145)
SODIUM SERPL-SCNC: 131 MMOL/L (ref 135–145)
SODIUM SERPL-SCNC: 132 MMOL/L (ref 135–145)
SODIUM SERPL-SCNC: 136 MMOL/L (ref 135–145)
WBC # BLD AUTO: 8.9 K/UL (ref 4.8–10.8)

## 2018-03-15 PROCEDURE — 700102 HCHG RX REV CODE 250 W/ 637 OVERRIDE(OP): Performed by: SURGERY

## 2018-03-15 PROCEDURE — 85007 BL SMEAR W/DIFF WBC COUNT: CPT

## 2018-03-15 PROCEDURE — 700111 HCHG RX REV CODE 636 W/ 250 OVERRIDE (IP): Performed by: SURGERY

## 2018-03-15 PROCEDURE — 700102 HCHG RX REV CODE 250 W/ 637 OVERRIDE(OP): Performed by: NEUROLOGICAL SURGERY

## 2018-03-15 PROCEDURE — 80048 BASIC METABOLIC PNL TOTAL CA: CPT | Mod: 91

## 2018-03-15 PROCEDURE — 83735 ASSAY OF MAGNESIUM: CPT

## 2018-03-15 PROCEDURE — A9270 NON-COVERED ITEM OR SERVICE: HCPCS | Performed by: SURGERY

## 2018-03-15 PROCEDURE — 92610 EVALUATE SWALLOWING FUNCTION: CPT

## 2018-03-15 PROCEDURE — G8997 SWALLOW GOAL STATUS: HCPCS | Mod: CH

## 2018-03-15 PROCEDURE — A9270 NON-COVERED ITEM OR SERVICE: HCPCS | Performed by: NURSE PRACTITIONER

## 2018-03-15 PROCEDURE — 770001 HCHG ROOM/CARE - MED/SURG/GYN PRIV*

## 2018-03-15 PROCEDURE — 700105 HCHG RX REV CODE 258: Performed by: SURGERY

## 2018-03-15 PROCEDURE — 85027 COMPLETE CBC AUTOMATED: CPT

## 2018-03-15 PROCEDURE — 700102 HCHG RX REV CODE 250 W/ 637 OVERRIDE(OP): Performed by: NURSE PRACTITIONER

## 2018-03-15 PROCEDURE — 36415 COLL VENOUS BLD VENIPUNCTURE: CPT

## 2018-03-15 PROCEDURE — 84100 ASSAY OF PHOSPHORUS: CPT

## 2018-03-15 PROCEDURE — G8996 SWALLOW CURRENT STATUS: HCPCS | Mod: CI

## 2018-03-15 PROCEDURE — A9270 NON-COVERED ITEM OR SERVICE: HCPCS | Performed by: NEUROLOGICAL SURGERY

## 2018-03-15 RX ORDER — LEVETIRACETAM 500 MG/1
1000 TABLET ORAL 2 TIMES DAILY
Status: DISCONTINUED | OUTPATIENT
Start: 2018-03-15 | End: 2018-03-18

## 2018-03-15 RX ORDER — OMEPRAZOLE 20 MG/1
20 CAPSULE, DELAYED RELEASE ORAL DAILY
Status: DISCONTINUED | OUTPATIENT
Start: 2018-03-15 | End: 2018-03-20 | Stop reason: HOSPADM

## 2018-03-15 RX ORDER — LEVETIRACETAM 100 MG/ML
1000 SOLUTION ORAL EVERY 12 HOURS
Status: DISCONTINUED | OUTPATIENT
Start: 2018-03-15 | End: 2018-03-15

## 2018-03-15 RX ADMIN — LEVETIRACETAM 1000 MG: 500 TABLET, FILM COATED ORAL at 21:24

## 2018-03-15 RX ADMIN — SODIUM CHLORIDE TAB 1 GM 2 G: 1 TAB at 10:51

## 2018-03-15 RX ADMIN — ENOXAPARIN SODIUM 30 MG: 100 INJECTION SUBCUTANEOUS at 10:51

## 2018-03-15 RX ADMIN — CHLORDIAZEPOXIDE HYDROCHLORIDE 25 MG: 25 CAPSULE ORAL at 05:05

## 2018-03-15 RX ADMIN — FLUDROCORTISONE ACETATE 0.1 MG: 0.1 TABLET ORAL at 10:51

## 2018-03-15 RX ADMIN — POTASSIUM BICARBONATE 50 MEQ: 25 TABLET, EFFERVESCENT ORAL at 21:24

## 2018-03-15 RX ADMIN — POTASSIUM BICARBONATE 50 MEQ: 25 TABLET, EFFERVESCENT ORAL at 05:05

## 2018-03-15 RX ADMIN — CHLORDIAZEPOXIDE HYDROCHLORIDE 25 MG: 25 CAPSULE ORAL at 21:24

## 2018-03-15 RX ADMIN — SODIUM CHLORIDE TAB 1 GM 2 G: 1 TAB at 16:24

## 2018-03-15 RX ADMIN — SODIUM CHLORIDE TAB 1 GM 2 G: 1 TAB at 05:04

## 2018-03-15 RX ADMIN — SODIUM CHLORIDE: 234 INJECTION, SOLUTION INTRAVENOUS at 08:47

## 2018-03-15 RX ADMIN — POTASSIUM BICARBONATE 50 MEQ: 25 TABLET, EFFERVESCENT ORAL at 16:24

## 2018-03-15 RX ADMIN — LEVETIRACETAM 1000 MG: 100 SOLUTION ORAL at 12:25

## 2018-03-15 RX ADMIN — OMEPRAZOLE 20 MG: 20 CAPSULE, DELAYED RELEASE ORAL at 10:51

## 2018-03-15 RX ADMIN — FLUDROCORTISONE ACETATE 0.1 MG: 0.1 TABLET ORAL at 21:24

## 2018-03-15 RX ADMIN — CHLORDIAZEPOXIDE HYDROCHLORIDE 25 MG: 25 CAPSULE ORAL at 14:16

## 2018-03-15 RX ADMIN — ENOXAPARIN SODIUM 30 MG: 100 INJECTION SUBCUTANEOUS at 21:24

## 2018-03-15 ASSESSMENT — PAIN SCALES - GENERAL
PAINLEVEL_OUTOF10: 4
PAINLEVEL_OUTOF10: 6
PAINLEVEL_OUTOF10: 5

## 2018-03-15 ASSESSMENT — ENCOUNTER SYMPTOMS
EYES NEGATIVE: 1
CARDIOVASCULAR NEGATIVE: 1
DIZZINESS: 1
MYALGIAS: 1
GASTROINTESTINAL NEGATIVE: 1
ROS GI COMMENTS: BM 3/14
RESPIRATORY NEGATIVE: 1
HEADACHES: 1

## 2018-03-15 ASSESSMENT — LIFESTYLE VARIABLES: SUBSTANCE_ABUSE: 1

## 2018-03-15 NOTE — DISCHARGE PLANNING
Medical Social Work    Referral:  Transfer    Intervention:  Pt transfer from ICU. Pt is currently restrained and experiencing ETOH withdrawal.  SW will attempt to meet with pt in the AM    Plan:  SW will remain available for dc planning

## 2018-03-15 NOTE — CARE PLAN
Problem: Safety  Goal: Will remain free from injury  Outcome: PROGRESSING AS EXPECTED  Patient remains free from injury. Nursing staff assists with ambulation. Patient educated on injury prevention.   Goal: Will remain free from falls  Outcome: PROGRESSING AS EXPECTED  Fall precautions implemented. Treaded socks and bed alarm in place. Patient remains free from falls.

## 2018-03-15 NOTE — THERAPY
"  Speech Language Therapy Clinical Swallow Evaluation completed.  Functional Status: Patient seen this date for CSE in the setting of IVH and injuries of the face. She was asleep upon arrival, but alerted readily and agreed to assessment. Oral mech exam was essentially WFL, except for mild fasciculations appreciated during lingual range of motion eval and some mild reduced ROM of the lip on the R, swollen side of the face. Voice clear and palpation of swallow revealed complete hyolaryngeal elevation. PO trials of ice chips, thin water but spoon and cup, pureed consistencies, and soft mechanicals. No s/sx of aspiration or difficulty with any consistency. Patient with some difficulty with hand to mouth coordinaton while self-feeding, requiring min assist. Discussed starting with soft mechanical diet to reduce fatigue while eating and patient in agreement. Suspect quick progression to regular diet.     Recommendations - Diet:  Dysphagia 3, Thin liquids                             Strategies: Assistance needed for meal tray set-up and Head of Bed at 90 Degrees                            Medication Administration:  Whole with liquid wash    Plan of Care: Will benefit from Speech Therapy 3 times per week    Post-Acute Therapy: Discharge to a transitional care facility for continued skilled therapy services.    See \"Rehab Therapy-Acute\" Patient Summary Report for complete documentation.        "

## 2018-03-15 NOTE — PROGRESS NOTES
"  Trauma/Surgical Progress Note    Author: Vianney Jj Date & Time created: 3/15/2018   12:32 PM     Interval Events:  Transfer to [facility] - HD # 11 - GLF - ICB/Facial trauma - ETOH  Tertiary complete no further findings  A&O x 4  PT/OT for mobility  SLP for swallow and cog  Nursing to provide oral care and shower  Weaning sodium drip    Review of Systems   Constitutional: Positive for malaise/fatigue.   HENT:        Facial pain   Eyes: Negative.    Respiratory: Negative.    Cardiovascular: Negative.    Gastrointestinal: Negative.         BM 3/14   Genitourinary:        Voiding    Musculoskeletal: Positive for myalgias.   Neurological: Positive for dizziness and headaches.   Psychiatric/Behavioral: Positive for substance abuse.   All other systems reviewed and are negative.    Hemodynamics:  Blood pressure 132/72, pulse 88, temperature 36.4 °C (97.5 °F), resp. rate 16, height 1.727 m (5' 8\"), weight 60.1 kg (132 lb 7.9 oz), SpO2 94 %.     Respiratory:    Respiration: 16, Pulse Oximetry: 94 %     Work Of Breathing / Effort: Mild  RUL Breath Sounds: Clear, RML Breath Sounds: Clear, RLL Breath Sounds: Diminished, CARLI Breath Sounds: Clear, LLL Breath Sounds: Diminished  Fluids:    Intake/Output Summary (Last 24 hours) at 03/15/18 1232  Last data filed at 03/15/18 1100   Gross per 24 hour   Intake              590 ml   Output             1350 ml   Net             -760 ml     Admit Weight: 69 kg (152 lb 1.9 oz)  Current      Physical Exam   Constitutional: She is oriented to person, place, and time. No distress.   HENT:   Right facial ecchymosis and swelling   cortrak    Eyes: Right conjunctiva has a hemorrhage. Left conjunctiva is not injected.   Neck: Normal range of motion.   Pulmonary/Chest: Effort normal and breath sounds normal. No respiratory distress. She exhibits no tenderness.   Abdominal: Soft. She exhibits no distension.   Musculoskeletal: Normal range of motion.   Neurological: She is alert and oriented to " person, place, and time. GCS eye subscore is 4. GCS verbal subscore is 5. GCS motor subscore is 6.   Skin: Skin is warm and dry.   Psychiatric: She has a normal mood and affect.   Nursing note and vitals reviewed.      Medical Decision Making/Problem List:    Active Hospital Problems    Diagnosis   • Hyponatremia [E87.1]     Priority: High     Progressive downward trend to critical levels  NaCl tabs, Florinef, and 3% infusion  3/13 3% NaCl increase to 50 mL/h  Sodium trending up: If we need to increase drip will need central line   Continue salt tablets as well as Florinef  3/14 Rate change to 30cc/hr  3/15 rate change to 20cc/hr - BMP at 1800     • Traumatic intracranial hemorrhage (CMS-HCC) [S06.309A]     Priority: High     Multiple areas of SAH and SDH, 4.1mm shift  Repeat CT imaging of the brain stable to slightly worse  3/5 seizure @1515, repeat CT scan was stable  Further seizures 3/6, assessment complicated by alcohol withdrawal  Continue Keppra  Cog eval ordered  3/8 cleared for lovenox  3/14 mental status improving slowly  Course of Keppra completed  Non-operative management.   Lorne Menchaca MD. Neurosurgery.       • Acute hyperactive alcohol withdrawal delirium (CMS-HCC) [F10.231]     Priority: High     DEYANIRA 0.43 on admission; LFTs elevated, thrombocytopenic and subsequently developed alcohol withdrawal  [facility]  Sched[facility] initially  3/13 agitation reasonably controlled on Librium  RASS score is appropriate       • Moderate protein-calorie malnutrition (CMS-HCC) [E44.0]     Priority: Medium     Tube feeding per protocol.      • Bacterial conjunctivitis of both eyes [H10.9]     Priority: Low     Ciprofloxacin eyedrops x 7 days      • Anemia [D64.9]     Priority: Low     Significant hemoglobin drop after admission  No evidence of gastrointestinal hemorrhage, abdomen examination is benign  Considering liver function tests abdominal CT scan was completed and no showed no evidence of  intra-abdominal bleeding  Serial hemoglobin stable   Iron replacement  Likely chronic      • Pharmacologic contraindication to deep vein thrombosis (DVT) prophylaxis [Z53.09]     Priority: Low     Prophylactic Lovenox initially contraindicated due to elevated bleeding risk  3/7 duplex with no evidence of superficial or deep venous thrombosis.   3/8 Lovenox initiated      • Trauma [T14.90XA]     Priority: Low     GLF. Tripped over dogs, landed on face.  Non trauma activation.       Core Measures & Quality Metrics:  Labs reviewed, Radiology images reviewed and Medications reviewed  Pemberton catheter: No Pemberton      DVT Prophylaxis: Enoxaparin (Lovenox)  DVT prophylaxis - mechanical: SCDs  Ulcer prophylaxis: Yes    Assessed for rehab: Patient was assess for and/or received rehabilitation services during this hospitalization    Total Score: 7  ETOH Screening     Intervention complete date: 3/14/2018  Patient response to intervention: No coopertive with answering questions, concerning substance abuse..   Patient does not demonstrat understanding of intervention.Plan of care:    has not been contacted.Follow up with: Clinic  Total ETOH intervention time: 15 - 30 mintues  Discussed patient condition with RN, Patient and trauma surgery. Dr. Roque    Patient seen, data reviewed and discussed.  Agree with assessment and plan.     Parveen Roque MD  [phone]

## 2018-03-16 PROBLEM — R13.12 OROPHARYNGEAL DYSPHAGIA: Status: ACTIVE | Noted: 2018-03-16

## 2018-03-16 LAB
ANION GAP SERPL CALC-SCNC: 8 MMOL/L (ref 0–11.9)
ANION GAP SERPL CALC-SCNC: 8 MMOL/L (ref 0–11.9)
ANION GAP SERPL CALC-SCNC: 9 MMOL/L (ref 0–11.9)
BASOPHILS # BLD AUTO: 1.4 % (ref 0–1.8)
BASOPHILS # BLD: 0.13 K/UL (ref 0–0.12)
BUN SERPL-MCNC: 13 MG/DL (ref 8–22)
BUN SERPL-MCNC: 13 MG/DL (ref 8–22)
BUN SERPL-MCNC: 14 MG/DL (ref 8–22)
CALCIUM SERPL-MCNC: 8.7 MG/DL (ref 8.5–10.5)
CALCIUM SERPL-MCNC: 8.7 MG/DL (ref 8.5–10.5)
CALCIUM SERPL-MCNC: 9 MG/DL (ref 8.5–10.5)
CHLORIDE SERPL-SCNC: 101 MMOL/L (ref 96–112)
CHLORIDE SERPL-SCNC: 102 MMOL/L (ref 96–112)
CHLORIDE SERPL-SCNC: 99 MMOL/L (ref 96–112)
CO2 SERPL-SCNC: 23 MMOL/L (ref 20–33)
CO2 SERPL-SCNC: 24 MMOL/L (ref 20–33)
CO2 SERPL-SCNC: 24 MMOL/L (ref 20–33)
CREAT SERPL-MCNC: 0.43 MG/DL (ref 0.5–1.4)
CREAT SERPL-MCNC: 0.5 MG/DL (ref 0.5–1.4)
CREAT SERPL-MCNC: 0.52 MG/DL (ref 0.5–1.4)
EOSINOPHIL # BLD AUTO: 0.14 K/UL (ref 0–0.51)
EOSINOPHIL NFR BLD: 1.5 % (ref 0–6.9)
ERYTHROCYTE [DISTWIDTH] IN BLOOD BY AUTOMATED COUNT: 59.5 FL (ref 35.9–50)
GLUCOSE SERPL-MCNC: 94 MG/DL (ref 65–99)
GLUCOSE SERPL-MCNC: 94 MG/DL (ref 65–99)
GLUCOSE SERPL-MCNC: 97 MG/DL (ref 65–99)
HCT VFR BLD AUTO: 25.5 % (ref 37–47)
HGB BLD-MCNC: 8.3 G/DL (ref 12–16)
IMM GRANULOCYTES # BLD AUTO: 0.16 K/UL (ref 0–0.11)
IMM GRANULOCYTES NFR BLD AUTO: 1.8 % (ref 0–0.9)
LYMPHOCYTES # BLD AUTO: 1.9 K/UL (ref 1–4.8)
LYMPHOCYTES NFR BLD: 21 % (ref 22–41)
MCH RBC QN AUTO: 35.9 PG (ref 27–33)
MCHC RBC AUTO-ENTMCNC: 32.5 G/DL (ref 33.6–35)
MCV RBC AUTO: 110.4 FL (ref 81.4–97.8)
MONOCYTES # BLD AUTO: 0.96 K/UL (ref 0–0.85)
MONOCYTES NFR BLD AUTO: 10.6 % (ref 0–13.4)
NEUTROPHILS # BLD AUTO: 5.77 K/UL (ref 2–7.15)
NEUTROPHILS NFR BLD: 63.7 % (ref 44–72)
NRBC # BLD AUTO: 0 K/UL
NRBC BLD-RTO: 0 /100 WBC
PLATELET # BLD AUTO: 453 K/UL (ref 164–446)
PMV BLD AUTO: 9.5 FL (ref 9–12.9)
POTASSIUM SERPL-SCNC: 3.4 MMOL/L (ref 3.6–5.5)
POTASSIUM SERPL-SCNC: 3.5 MMOL/L (ref 3.6–5.5)
POTASSIUM SERPL-SCNC: 3.7 MMOL/L (ref 3.6–5.5)
RBC # BLD AUTO: 2.31 M/UL (ref 4.2–5.4)
SODIUM SERPL-SCNC: 131 MMOL/L (ref 135–145)
SODIUM SERPL-SCNC: 133 MMOL/L (ref 135–145)
SODIUM SERPL-SCNC: 134 MMOL/L (ref 135–145)
WBC # BLD AUTO: 9.1 K/UL (ref 4.8–10.8)

## 2018-03-16 PROCEDURE — 700105 HCHG RX REV CODE 258: Performed by: NURSE PRACTITIONER

## 2018-03-16 PROCEDURE — 700102 HCHG RX REV CODE 250 W/ 637 OVERRIDE(OP): Performed by: NURSE PRACTITIONER

## 2018-03-16 PROCEDURE — A9270 NON-COVERED ITEM OR SERVICE: HCPCS | Performed by: SURGERY

## 2018-03-16 PROCEDURE — G8987 SELF CARE CURRENT STATUS: HCPCS | Mod: CJ

## 2018-03-16 PROCEDURE — 97162 PT EVAL MOD COMPLEX 30 MIN: CPT

## 2018-03-16 PROCEDURE — G8988 SELF CARE GOAL STATUS: HCPCS | Mod: CH

## 2018-03-16 PROCEDURE — A9270 NON-COVERED ITEM OR SERVICE: HCPCS | Performed by: NEUROLOGICAL SURGERY

## 2018-03-16 PROCEDURE — A9270 NON-COVERED ITEM OR SERVICE: HCPCS | Performed by: NURSE PRACTITIONER

## 2018-03-16 PROCEDURE — 85025 COMPLETE CBC W/AUTO DIFF WBC: CPT

## 2018-03-16 PROCEDURE — 36415 COLL VENOUS BLD VENIPUNCTURE: CPT

## 2018-03-16 PROCEDURE — 97165 OT EVAL LOW COMPLEX 30 MIN: CPT

## 2018-03-16 PROCEDURE — 92523 SPEECH SOUND LANG COMPREHEN: CPT

## 2018-03-16 PROCEDURE — 700102 HCHG RX REV CODE 250 W/ 637 OVERRIDE(OP): Performed by: SURGERY

## 2018-03-16 PROCEDURE — G8996 SWALLOW CURRENT STATUS: HCPCS | Mod: CI

## 2018-03-16 PROCEDURE — G8978 MOBILITY CURRENT STATUS: HCPCS | Mod: CK

## 2018-03-16 PROCEDURE — 770001 HCHG ROOM/CARE - MED/SURG/GYN PRIV*

## 2018-03-16 PROCEDURE — 700111 HCHG RX REV CODE 636 W/ 250 OVERRIDE (IP): Performed by: SURGERY

## 2018-03-16 PROCEDURE — 700102 HCHG RX REV CODE 250 W/ 637 OVERRIDE(OP): Performed by: NEUROLOGICAL SURGERY

## 2018-03-16 PROCEDURE — 80048 BASIC METABOLIC PNL TOTAL CA: CPT

## 2018-03-16 PROCEDURE — G8997 SWALLOW GOAL STATUS: HCPCS | Mod: CH

## 2018-03-16 PROCEDURE — G8979 MOBILITY GOAL STATUS: HCPCS | Mod: CI

## 2018-03-16 RX ADMIN — OMEPRAZOLE 20 MG: 20 CAPSULE, DELAYED RELEASE ORAL at 08:28

## 2018-03-16 RX ADMIN — POTASSIUM BICARBONATE 50 MEQ: 25 TABLET, EFFERVESCENT ORAL at 05:49

## 2018-03-16 RX ADMIN — FLUDROCORTISONE ACETATE 0.1 MG: 0.1 TABLET ORAL at 08:29

## 2018-03-16 RX ADMIN — FLUDROCORTISONE ACETATE 0.1 MG: 0.1 TABLET ORAL at 21:17

## 2018-03-16 RX ADMIN — ENOXAPARIN SODIUM 30 MG: 100 INJECTION SUBCUTANEOUS at 21:19

## 2018-03-16 RX ADMIN — SODIUM CHLORIDE TAB 1 GM 2 G: 1 TAB at 05:49

## 2018-03-16 RX ADMIN — LEVETIRACETAM 1000 MG: 500 TABLET, FILM COATED ORAL at 21:16

## 2018-03-16 RX ADMIN — SODIUM CHLORIDE TAB 1 GM 2 G: 1 TAB at 00:26

## 2018-03-16 RX ADMIN — CHLORDIAZEPOXIDE HYDROCHLORIDE 25 MG: 25 CAPSULE ORAL at 14:09

## 2018-03-16 RX ADMIN — SODIUM CHLORIDE TAB 1 GM 2 G: 1 TAB at 12:15

## 2018-03-16 RX ADMIN — CHLORDIAZEPOXIDE HYDROCHLORIDE 25 MG: 25 CAPSULE ORAL at 21:15

## 2018-03-16 RX ADMIN — POTASSIUM BICARBONATE 50 MEQ: 25 TABLET, EFFERVESCENT ORAL at 21:18

## 2018-03-16 RX ADMIN — LEVETIRACETAM 1000 MG: 500 TABLET, FILM COATED ORAL at 08:28

## 2018-03-16 RX ADMIN — SODIUM CHLORIDE TAB 1 GM 2 G: 1 TAB at 17:45

## 2018-03-16 RX ADMIN — CHLORDIAZEPOXIDE HYDROCHLORIDE 25 MG: 25 CAPSULE ORAL at 05:49

## 2018-03-16 RX ADMIN — SODIUM CHLORIDE: 234 INJECTION, SOLUTION INTRAVENOUS at 07:30

## 2018-03-16 RX ADMIN — ENOXAPARIN SODIUM 30 MG: 100 INJECTION SUBCUTANEOUS at 08:29

## 2018-03-16 RX ADMIN — POTASSIUM BICARBONATE 50 MEQ: 25 TABLET, EFFERVESCENT ORAL at 14:09

## 2018-03-16 ASSESSMENT — ENCOUNTER SYMPTOMS
ROS GI COMMENTS: BM 3/15
DIZZINESS: 1
GASTROINTESTINAL NEGATIVE: 1
EYES NEGATIVE: 1
CARDIOVASCULAR NEGATIVE: 1
MYALGIAS: 1
RESPIRATORY NEGATIVE: 1
HEADACHES: 1

## 2018-03-16 ASSESSMENT — PAIN SCALES - GENERAL
PAINLEVEL_OUTOF10: 5
PAINLEVEL_OUTOF10: 0
PAINLEVEL_OUTOF10: 5
PAINLEVEL_OUTOF10: 5
PAINLEVEL_OUTOF10: 0

## 2018-03-16 ASSESSMENT — COGNITIVE AND FUNCTIONAL STATUS - GENERAL
MOVING FROM LYING ON BACK TO SITTING ON SIDE OF FLAT BED: UNABLE
CLIMB 3 TO 5 STEPS WITH RAILING: A LOT
STANDING UP FROM CHAIR USING ARMS: A LITTLE
MOBILITY SCORE: 15
SUGGESTED CMS G CODE MODIFIER MOBILITY: CK
WALKING IN HOSPITAL ROOM: A LOT
MOVING TO AND FROM BED TO CHAIR: A LITTLE

## 2018-03-16 ASSESSMENT — GAIT ASSESSMENTS
DEVIATION: BRADYKINETIC;SHUFFLED GAIT;DECREASED TOE OFF;DECREASED HEEL STRIKE
DISTANCE (FEET): 8
ASSISTIVE DEVICE: FRONT WHEEL WALKER
GAIT LEVEL OF ASSIST: MODERATE ASSIST

## 2018-03-16 ASSESSMENT — LIFESTYLE VARIABLES: SUBSTANCE_ABUSE: 1

## 2018-03-16 ASSESSMENT — ACTIVITIES OF DAILY LIVING (ADL): TOILETING: INDEPENDENT

## 2018-03-16 NOTE — DISCHARGE PLANNING
PMR order received from SANDY Jj.  DX: GLF resulting in a ICH with positive LOS.  Facesheet says Al[facility] is her medical provider.  Current documentation indicates she may no longer have [place] through [employer].  Will need to verify medical provider along with 24/7 supervision.  Dr. Mireles to consult.  I do appreciate the referral.

## 2018-03-16 NOTE — CONSULTS
Physical Medicine and Rehabilitation Consultation    Date of Consultation: 3/16/2018  Consulting provider: Vianney Jj  Reason for consultation: assess for acute inpatient rehab appropriateness  Chief complaint: GLF while drinking alcohol    HPI: The patient is a 48 y.o. female with a past medical history of alcohol abuse, tobacco abuse admitted on 3/4/2018  8:56 PM with GLF with resultant intracranial bleed    CT demonstrating    Right subarachnoid hemorrhage within the sylvian fissure is not significantly changed. Underlying aneurysm is not excluded.    Parafalcine hemorrhage as well as blood along the tentorium are not significantly changed.    Trace left frontal subdural is unchanged.    Blood along the right aspect of the brainstem in the cerebellopontine cistern is unchanged.    6 mm midline shift to the left    Prominent soft tissue swelling on the right is again seen.  Repeat CT demonstrated improvement win bleed but still with signficant midline shift of 6 mm. Her course  has been complicated by alcohoal withdrawal amd a seizure. On Keppra. Other issues that have arisen include hyponatremia, hypokalmia Dysphagia, acute hyperactive alcohol withdrawal, dysphagia, malnutrition, bacterial conjunctivitis and anemia       PM&R has been consulted regarding suitability for inpatient rehabilitation    ROS:  A 12 point review of systems was performed and was negative with the exception of items mentioned elsewhere in this document      Past Medical History:   Diagnosis Date   • Surgical menopause    • Tobacco dependence        PSH:  Past Surgical History:   Procedure Laterality Date   • ABDOMINAL HYSTERECTOMY TOTAL  age 27    Total       FHX:  Family History   Problem Relation Age of Onset   • Cancer Neg Hx    • Diabetes Neg Hx    • Heart Disease Neg Hx    • Stroke Neg Hx    • Hyperlipidemia Neg Hx    • Hypertension Neg Hx        Medications:  Current Facility-Administered Medications   Medication Dose   •  "omeprazole (PRILOSEC) capsule 20 mg  20 mg   • levETIRAcetam (KEPPRA) tablet 1,000 mg  1,000 mg   • potassium bicarbonate (KLYTE) 25 MEQ effervescent tablet TBEF 50 mEq  50 mEq   • fludrocortisone (FLORINEF) tablet 0.1 mg  0.1 mg   • chlordiazePOXIDE (LIBRIUM) capsule 25 mg  25 mg   • sodium chloride (SALT) tablet 2 g  2 g   • enoxaparin (LOVENOX) inj 30 mg  30 mg   • omeprazole 2 mg/mL in sodium bicarbonate (PRILOSEC) oral susp 20 mg  20 mg   • Respiratory Care per Protocol     • Pharmacy Consult Request ...Pain Management Review 1 Each  1 Each   • ondansetron (ZOFRAN) syringe/vial injection 4 mg  4 mg   • oxyCODONE immediate-release (ROXICODONE) tablet 5 mg  5 mg    Or   • oxyCODONE immediate-release (ROXICODONE) tablet 10 mg  10 mg       Allergies:  Allergies   Allergen Reactions   • Shrimp (Diagnostic) Anaphylaxis     poa stated that this patient had a reaction a couple years ago after eating shrimp   • Shrimp Flavor Anaphylaxis       Social Hx:  Pre-morbidly, this patient lived in a two level home with Two  steps to enter, and can live on the first floor. She lives with her boyfriend but was independent prior to the injury     Employment: Unemployed  Tobacco:+  Alcohol:+  Drugs: Denied    Prior level of function:   Independent,     Current level of function:  The patient was evaluated by acute care Physical Therapy, Occupational Therapy and Speech Language Pathology; currently requiring moderate assistance for mobility and moderate assistance for ADLs, also with ongoing cognitive, speech and swallowing deficits.    Physical Exam:  Vitals: Blood pressure (!) 99/57, pulse 94, temperature 37.4 °C (99.3 °F), resp. rate 16, height 1.727 m (5' 8\"), weight 60.1 kg (132 lb 7.9 oz), SpO2 93 %.  Gen: NAD  HEENT: NC/AT, PERRLA, moist mucous membranes  Cardio: RRR, no mumurs  Pulm: CTAB, with normal respiratory effort  Abd: Soft NTND, active bowel sounds  Ext: No peripheral edema. No calf tenderness. No " clubbing/cyanosis. +dorsal pedalis pulses bilaterally.    Mental status: Lethargic oriented to person, [facility] and hospital. She knows she has an injury thinks the month is May can be cued and Friday  Speech: fluent, no aphasia or dysarthria  Slowed cognitive processsing decrease memory    CRANIAL NERVES:  2,3: visual acuity grossly intact, PERRL  3,4,6: EOMI bilaterally, no nystagmus or diplopia  5: sensation intact to light touch bilaterally and symmetric  7: no facial asymmetry  8: hearing grossly intact  9,10: symmetric palate elevation  11: SCM/Trapezius strength 5/5 bilaterally  12: tongue protrudes midline    Motor:      5 out 5 throughout  Negative Pronator drift bilaterally    Sensory:   intact to light touch through out  intact to vibration at bilateral great toes  intact to proprioception at bilateral great toes  Negative double simultaneous touch bilaterally UE and LE    DTRs: 2+ in bilateral biceps, triceps, brachioradialis, 2+ in bilateral patellar and achilles tendons  No clonus at bilateral ankles  Negative babinski b/l  Negative Betancourt b/l     Tone: no spasticity noted, no cogwheeling noted    Coordination:   intact finger to nose bilaterally  intact fine motor with fingers bilaterally  intact heel to shin bilaterally    Labs:  Recent Labs      03/14/18   0535  03/15/18   0258  03/16/18   0510   RBC  2.48*  2.50*  2.31*   HEMOGLOBIN  9.0*  8.7*  8.3*   HEMATOCRIT  27.0*  27.3*  25.5*   PLATELETCT  368  438  453*     Recent Labs      03/15/18   1743  03/16/18   0014  03/16/18   0510   SODIUM  131*  131*  133*   POTASSIUM  4.4  3.7  3.4*   CHLORIDE  100  99  102   CO2  24  24  23   GLUCOSE  114*  94  94   BUN  14  14  13   CREATININE  0.42*  0.50  0.43*   CALCIUM  8.8  8.7  9.0     Recent Results (from the past 24 hour(s))   BASIC METABOLIC PANEL    Collection Time: 03/15/18  5:43 PM   Result Value Ref Range    Sodium 131 (L) 135 - 145 mmol/L    Potassium 4.4 3.6 - 5.5 mmol/L    Chloride 100 96 - 112  mmol/L    Co2 24 20 - 33 mmol/L    Glucose 114 (H) 65 - 99 mg/dL    Bun 14 8 - 22 mg/dL    Creatinine 0.42 (L) 0.50 - 1.40 mg/dL    Calcium 8.8 8.5 - 10.5 mg/dL    Anion Gap 7.0 0.0 - 11.9   ESTIMATED GFR    Collection Time: 03/15/18  5:43 PM   Result Value Ref Range    GFR If African American >60 >60 mL/min/1.73 m 2    GFR If Non African American >60 >60 mL/min/1.73 m 2   BASIC METABOLIC PANEL    Collection Time: 03/16/18 12:14 AM   Result Value Ref Range    Sodium 131 (L) 135 - 145 mmol/L    Potassium 3.7 3.6 - 5.5 mmol/L    Chloride 99 96 - 112 mmol/L    Co2 24 20 - 33 mmol/L    Glucose 94 65 - 99 mg/dL    Bun 14 8 - 22 mg/dL    Creatinine 0.50 0.50 - 1.40 mg/dL    Calcium 8.7 8.5 - 10.5 mg/dL    Anion Gap 8.0 0.0 - 11.9   ESTIMATED GFR    Collection Time: 03/16/18 12:14 AM   Result Value Ref Range    GFR If African American >60 >60 mL/min/1.73 m 2    GFR If Non African American >60 >60 mL/min/1.73 m 2   CBC WITH DIFFERENTIAL    Collection Time: 03/16/18  5:10 AM   Result Value Ref Range    WBC 9.1 4.8 - 10.8 K/uL    RBC 2.31 (L) 4.20 - 5.40 M/uL    Hemoglobin 8.3 (L) 12.0 - 16.0 g/dL    Hematocrit 25.5 (L) 37.0 - 47.0 %    .4 (H) 81.4 - 97.8 fL    MCH 35.9 (H) 27.0 - 33.0 pg    MCHC 32.5 (L) 33.6 - 35.0 g/dL    RDW 59.5 (H) 35.9 - 50.0 fL    Platelet Count 453 (H) 164 - 446 K/uL    MPV 9.5 9.0 - 12.9 fL    Neutrophils-Polys 63.70 44.00 - 72.00 %    Lymphocytes 21.00 (L) 22.00 - 41.00 %    Monocytes 10.60 0.00 - 13.40 %    Eosinophils 1.50 0.00 - 6.90 %    Basophils 1.40 0.00 - 1.80 %    Immature Granulocytes 1.80 (H) 0.00 - 0.90 %    Nucleated RBC 0.00 /100 WBC    Neutrophils (Absolute) 5.77 2.00 - 7.15 K/uL    Lymphs (Absolute) 1.90 1.00 - 4.80 K/uL    Monos (Absolute) 0.96 (H) 0.00 - 0.85 K/uL    Eos (Absolute) 0.14 0.00 - 0.51 K/uL    Baso (Absolute) 0.13 (H) 0.00 - 0.12 K/uL    Immature Granulocytes (abs) 0.16 (H) 0.00 - 0.11 K/uL    NRBC (Absolute) 0.00 K/uL   BASIC METABOLIC PANEL    Collection  Time: 03/16/18  5:10 AM   Result Value Ref Range    Sodium 133 (L) 135 - 145 mmol/L    Potassium 3.4 (L) 3.6 - 5.5 mmol/L    Chloride 102 96 - 112 mmol/L    Co2 23 20 - 33 mmol/L    Glucose 94 65 - 99 mg/dL    Bun 13 8 - 22 mg/dL    Creatinine 0.43 (L) 0.50 - 1.40 mg/dL    Calcium 9.0 8.5 - 10.5 mg/dL    Anion Gap 8.0 0.0 - 11.9   ESTIMATED GFR    Collection Time: 03/16/18  5:10 AM   Result Value Ref Range    GFR If African American >60 >60 mL/min/1.73 m 2    GFR If Non African American >60 >60 mL/min/1.73 m 2       ASSESSMENT:    Patient is a 48 y.o. female admitted with Intracranial bleed after alcohol related GLF    Patient with multiple co-morbidities(including but not limited to tobacco and alcohol abuse, hyponatremia, hypokalemia, alcoholo withdrawal with cognitive deficits and functional deficits in mobility/self-care/swallowing/speech, and Severe de-conditioning.     Pre-morbidly, this patient lived in a two level home with One steps to enter, living with [facility]. The patient was evaluated by acute care Physical Therapy, Occupational Therapy and Speech Language Pathology; currently requiring minimal assistance for mobility and minimal assistance for ADLs, also with ongoing cognitive and speech deficits.     The patient is a(n) Very Good candidate for an acute inpatient rehabilitation program with a coordinated program of care at an intensity and frequency not available at a lower level of care.     Note: if patient continues to progress while waiting for medical clearance, and no longer requires 2 of of 3 therapy services (PT/OT/SLP), patient will no longer need acute inpatient rehabilitation.    This recommendation is substantiated by the patient's current medical condition with intervention and assessment of medical issues requiring an acute level of care for patient's safety and maximum outcome. A coordinated program of care will be provided by an interdisciplinary team including physical therapy,  occupational therapy, speech language pathology, physiatry, rehab nursing and rehab psychology. Rehab goals include improved cognition and speech, mobility, self-care management, strength and conditioning/endurance, pain management, bowel and bladder management, mood and affect, and safety with independent home management including caregiver training.     Estimated length of stay is approximately 10-14 days. Rehab potential: Very Good. Disposition: to pre-morbid independent living setting with supportive care of patient's significant other. We will continue to follow with you in anticipation of discharge to acute inpatient rehabilitation when medically stable to do so at the discretion of her  attending physician. Thank you for allowing us to participate in her care. Please call with any questions regarding this recommendation.

## 2018-03-16 NOTE — DISCHARGE PLANNING
Thank you for the opportunity to assist with this patient as they transition to post acute services.  We are aware of the Rehab referral from SANDY Jj.  Dr. Mireles to consult this referral. Our Transitional Case Coordinator will follow. At this time patient is showing to have [facility] as their coverage.   Please do not hesitate to call us if you require additional assistance my phone number is [phone] Nacho.

## 2018-03-16 NOTE — THERAPY
"Pt is a 49 y/o female presenting to physical therapy with impairments in balance, gait, strength, sequencing, and activity tolerance. Pt reports she was fully independent PTA. Pt requires Min A with VC to sequence sit to stand. Mod A to ambulate 8 ft with FWW. Pt will benefit from acute PT interventions to address present impairments.  Physical Therapy Evaluation completed.   Bed Mobility:  Supine to Sit:  (in chair upon PT arrival)  Transfers: Sit to Stand: Moderate Assist (-> Min A improved with reps, performed 3x)  Gait: Level Of Assist: Moderate Assist with Front-Wheel Walker     x8 ft  Plan of Care: Will benefit from Physical Therapy 4 times per week  Discharge Recommendations: Equipment: Will Continue to Assess for Equipment Needs. Post-acute therapy:  Will likely require placement prior to D/C home (consider rehab vs SNF depending on progress made in acute setting).       See \"Rehab Therapy-Acute\" Patient Summary Report for complete documentation.     "

## 2018-03-16 NOTE — PROGRESS NOTES
Pt is A&Ox4. Pt was educated on how to use the call light when she needed to get out of bed or needed something else. Pt has used the call light to call us to help her to the bathroom. Pt worked with PT and OT today. She also had her cognitive eval. Pt reported that her face and head hurt but said that was a constant for her and did not want any pain medication at the time. Call light within reach. No new needs at this time.

## 2018-03-16 NOTE — PROGRESS NOTES
At approximately 2110, RN found patient with her IV ripped out, on her hands and knees, with feces on the floor. Patient reports she was trying to get to the sink to wash the blood off of her from the IV site. Patient intermittently confused and impulsive at baseline. All fall precautions in place, bed alarm on, call light within reach. Patient was in an older version hospital bed, no bed alarm on the bed was available. No injuries noted. Patient prescription for Librium could have contributed to fall.Trauma team notified. RN will round on patient q1hr for neuros until midnight. Pharmacy notified for medication assessment.     Patient moved to desk bed in S161. Bed alarm strip in place, hospital bed alarm on. Patient visible from nursing station.

## 2018-03-16 NOTE — CARE PLAN
Problem: Communication  Goal: The ability to communicate needs accurately and effectively will improve  Outcome: PROGRESSING SLOWER THAN EXPECTED  Patient continues to be educated on the importance of calling before getting out of bed. Patient continues to make multiple attempts to get out of bed. Patient extremely unstable. Very high fall risk.     Problem: Infection  Goal: Will remain free from infection  Outcome: PROGRESSING AS EXPECTED  No S/Sx of infection. Hand hygiene performed before and after patient contact. Patient educated on hand hygiene and infection prevention.

## 2018-03-16 NOTE — THERAPY
"Occupational Therapy Evaluation completed.   Functional Status:  Min A grooming EOB, Min A LB dressing, Mod A supine>sit, Min A scooting in bed, Min A sit>stand, CGA functional transfers  Plan of Care: Will benefit from Occupational Therapy 3 times per week  Discharge Recommendations:  Equipment: Will Continue to Assess for Equipment Needs. Post-acute therapy Discharge to a transitional care facility for continued  therapy services 5x/wk.    See \"Rehab Therapy-Acute\" Patient Summary Report for complete documentation.      Pt is a 49 y/o female who presents to acute 2' ground level fall in which she struck her head and face on ground. PMH includes Menopause, tobacco dependency, ETOH dependency. Pt. Lives in multistory home w/ boyfriend w/ an adult daughter close by. Per pt she was independent in all BADLs and IADLs prior to fall. Pt presents w/ decreased activity tolerance, decreased functional mobility, decreased balance, decreased homemaking skills, decreased sequencing, problem solving, safety awareness and decreased overall functional independence indicating a need for Acute OT services. Recommend DC to transitional care facility prior to returning home for increased therapy 5x+/wk. Pt has potential to tolerate 3 hours of therapy a day. Will continue to monitor and work with DC planning team.     "

## 2018-03-16 NOTE — PROGRESS NOTES
Patient moved to desk bed in S161, daughter notified of change. Patient A&Ox3, disoriented to time. Patient continues to state that she is in the hospital because she fell. Patient complains of headache, but no other pain. Denies SOB and chest pain. Bed strip alarm on, hospital bed alarm on. Hourly rounding in place. Call light within reach, attempted ambulation which led to a fall this evening. Bed in lowest position, bed locked, RN and CNA numbers provided, no further needs at this time. No changes from EPIC. Hourly rounding in place.

## 2018-03-16 NOTE — PROGRESS NOTES
Neurosurgery Progress Note    Subjective:  No events      Exam:  Eye opening spont  Perrl, conjugate  Face symm  Oriented to place and year  Fc x 4      BP  Min: 128/76  Max: 138/79  Pulse  Av.3  Min: 92  Max: 96  Resp  Av  Min: 16  Max: 16  Temp  Av.2 °C (98.9 °F)  Min: 36.7 °C (98.1 °F)  Max: 37.6 °C (99.7 °F)  SpO2  Av.7 %  Min: 93 %  Max: 98 %    No Data Recorded    Recent Labs      18   0535  03/15/18   0258  18   0510   WBC  6.7  8.9  9.1   RBC  2.48*  2.50*  2.31*   HEMOGLOBIN  9.0*  8.7*  8.3*   HEMATOCRIT  27.0*  27.3*  25.5*   MCV  108.9*  109.2*  110.4*   MCH  36.3*  34.8*  35.9*   MCHC  33.3*  31.9*  32.5*   RDW  57.1*  58.4*  59.5*   PLATELETCT  368  438  453*   MPV  10.0  9.1  9.5     Recent Labs      03/15/18   1743  18   0014  18   0510   SODIUM  131*  131*  133*   POTASSIUM  4.4  3.7  3.4*   CHLORIDE  100  99  102   CO2  24  24  23   GLUCOSE  114*  94  94   BUN  14  14  13               Intake/Output       03/15/18 0700 - 18 0659 18 0700 - 18 59       Total [id number] Total       Intake    Total Intake -- -- -- -- -- --       Output    Urine  950  -- 950  --  -- --    Number of Times Voided 5 x 1 x 6 x -- -- --    Void (ml) 950 -- 950 -- -- --    Stool  --  -- --  --  -- --    Number of Times Stooled 2 x -- 2 x -- -- --    Total Output 950 -- 950 -- -- --       Net I/O     -950 -- -950 -- -- --            Intake/Output Summary (Last 24 hours) at 18 0856  Last data filed at 03/15/18 1800   Gross per 24 hour   Intake                0 ml   Output              950 ml   Net             -950 ml            • omeprazole  20 mg DAILY   • levETIRAcetam  1,000 mg BID   • potassium bicarbonate  50 mEq Q8HRS   • fludrocortisone  0.1 mg BID   • chlordiazePOXIDE  25 mg Q8HRS   • 3% sodium chloride   Continuous   • sodium chloride  2 g Q6HRS   • enoxaparin (LOVENOX) injection  30 mg Q12HRS   • omeprazole 2 mg/mL in  sodium bicarbonate  20 mg DAILY   • Respiratory Care per Protocol   Continuous RT   • Pharmacy Consult Request  1 Each PRN   • ondansetron  4 mg Q4HRS PRN   • oxyCODONE immediate-release  5 mg Q3HRS PRN    Or   • oxyCODONE immediate-release  10 mg Q3HRS PRN       Assessment and Plan:  Hospital day # 11 lin FRANCIS SDH, DTs  Exam improving  Prophylactic anticoagulation: yes  hyponatremia- 133, on 3%, salt tabs and florinef --- improved; d/c 3%  neuro checks to Q4

## 2018-03-16 NOTE — PROGRESS NOTES
"  Trauma/Surgical Progress Note    Author: Vianney JENNIFER BarrientosParviz Date & Time created: 3/16/2018   10:48 AM     Interval Events:    Sodium drip off - repeat bmp at 1300 - if low will increase Florinef   Tolerating upgraded diet  Overall shaky and uncoordinated but no obvious withdrawal - A&Ox4  Fall last night to knees - ecchymosis to knees but appears to be from original injury  PT/OT and cognitive eval pending  Discussed current options and dispo with patient  Rehab referral placed    Review of Systems   Constitutional: Positive for malaise/fatigue.   HENT:        Facial pain   Eyes: Negative.    Respiratory: Negative.    Cardiovascular: Negative.    Gastrointestinal: Negative.         BM 3/15   Genitourinary:        Voiding    Musculoskeletal: Positive for myalgias.   Neurological: Positive for dizziness and headaches.   Psychiatric/Behavioral: Positive for substance abuse.   All other systems reviewed and are negative.    Hemodynamics:  Blood pressure (!) 99/57, pulse 94, temperature 37.4 °C (99.3 °F), resp. rate 16, height 1.727 m (5' 8\"), weight 60.1 kg (132 lb 7.9 oz), SpO2 93 %.     Respiratory:    Respiration: 16, Pulse Oximetry: 93 %     Work Of Breathing / Effort: Mild  RUL Breath Sounds: Clear, RML Breath Sounds: Clear, RLL Breath Sounds: Diminished, CARLI Breath Sounds: Clear, LLL Breath Sounds: Diminished  Fluids:    Intake/Output Summary (Last 24 hours) at 03/16/18 1048  Last data filed at 03/15/18 1800   Gross per 24 hour   Intake                0 ml   Output              950 ml   Net             -950 ml     Admit Weight: 69 kg (152 lb 1.9 oz)  Current      Physical Exam   Constitutional: She is oriented to person, place, and time. No distress.   HENT:   Right facial ecchymosis and swelling    Eyes: Right conjunctiva has a hemorrhage. Left conjunctiva is not injected.   Neck: Normal range of motion.   Pulmonary/Chest: Effort normal and breath sounds normal. No respiratory distress. She exhibits no tenderness. "   Abdominal: Soft. She exhibits no distension.   Musculoskeletal: Normal range of motion.   Neurological: She is alert and oriented to person, place, and time. GCS eye subscore is 4. GCS verbal subscore is 5. GCS motor subscore is 6.   Overall shaky and uncoordinated    Skin: Skin is warm and dry.   Psychiatric: She has a normal mood and affect.   Nursing note and vitals reviewed.      Medical Decision Making/Problem List:    Active Hospital Problems    Diagnosis   • Hyponatremia [E87.1]     Priority: High     Progressive downward trend to critical levels  NaCl tabs, Florinef, and 3% infusion  Goal <130  3/13 3% NaCl increase to 50 mL/h  Sodium trending up: If we need to increase drip will need central line   Continue salt tablets as well as Florinef  3/14 Rate change to 30cc/hr  3/15 rate change to 20cc/hr   3/16 Stopped gtt - repeat BMP at 1300      • Traumatic intracranial hemorrhage (CMS-HCC) [S06.309A]     Priority: High     Multiple areas of SAH and SDH, 4.1mm shift  Repeat CT imaging of the brain stable to slightly worse  3/5 seizure @1515, repeat CT scan was stable  Further seizures 3/6, assessment complicated by alcohol withdrawal  Continue Keppra  Cog eval ordered  3/8 cleared for lovenox  3/14 mental status improving slowly   Course of Keppra completed   Non-operative management.   Lorne Menchaca MD. Neurosurgery.     • Acute hyperactive alcohol withdrawal delirium (CMS-HCC) [F10.231]     Priority: High     DEYANIRA 0.43 on admission; LFTs elevated, thrombocytopenic and subsequently developed alcohol withdrawal  [facility] protocol,  Scheduled Librium initially  3/13 agitation reasonably controlled on Librium  RASS score is appropriate       • Oropharyngeal dysphagia [R13.12]     Priority: Medium     3/15 SLP eval - diet added - cortrak removed  3/16 Tolerating diet     • Moderate protein-calorie malnutrition (CMS-HCC) [E44.0]     Priority: Medium     Tube feeding per protocol.      • Bacterial conjunctivitis  of both eyes [H10.9]     Priority: Low     Ciprofloxacin eyedrops x 7 days      • Anemia [D64.9]     Priority: Low     Significant hemoglobin drop after admission  No evidence of gastrointestinal hemorrhage, abdomen examination is benign  Considering liver function tests abdominal CT scan was completed and no showed no evidence of intra-abdominal bleeding  Serial hemoglobin stable   Iron replacement  Likely chronic      • Pharmacologic contraindication to deep vein thrombosis (DVT) prophylaxis [Z53.09]     Priority: Low     Prophylactic Lovenox initially contraindicated due to elevated bleeding risk  3/7 duplex with no evidence of superficial or deep venous thrombosis.   3/8 Lovenox initiated      • Trauma [T14.90XA]     Priority: Low     GLF. Tripped over dogs, landed on face.  Non trauma activation.       Core Measures & Quality Metrics:  Labs reviewed and Medications reviewed  Pemberton catheter: No Pemberton      DVT Prophylaxis: Enoxaparin (Lovenox)  DVT prophylaxis - mechanical: SCDs  Ulcer prophylaxis: Not indicated    Assessed for rehab: Patient was assess for and/or received rehabilitation services during this hospitalization    Total Score: 7  ETOH Screening     Intervention complete date: 3/14/2018  Patient response to intervention: No coopertive with answering questions, concerning substance abuse..   Patient does not demonstrat understanding of intervention.Plan of care:    has not been contacted.Follow up with: Clinic  Total ETOH intervention time: 15 - 30 mintues    Discussed patient condition with RN, Patient and trauma surgery Dr. Zepeda .    Patient seen, data reviewed and discussed.  Agree with assessment and plan.  AMBAR

## 2018-03-16 NOTE — THERAPY
"Speech Language Therapy Evaluation completed to address speech, communication and cognition    Functional Status: Patient was seated outside of bed in a chair by CNAx2. She was AAOx4. Speech minimally slowed in rate with intermittent delayed response time to verbal stimuli. Portions of standardized measures were used to assess an array of cognitive-linguistic domains which revealed deficits in the minimum-to-severe range. Severe deficits in generative naming (4 items in 1 min, 20=WFL) and thought organization; moderate deficits in immediate memory, clock drawing, and reading comprehension at the sentence level; minimum deficits in STM, sequencing ADLs, reasoning, verbal problem solving, and writing. Auditory comprehension was WFL.     Recommendations: At this time, recommend 24-hour supervision with IADLs if patient were to discharge home given current cognitive deficits and impulsive behavior demonstrated in the hospital setting. Patient would benefit from speech therapy at the acute and post-acute level of care to address cognitive deficits stated above.     Plan of Care: Will benefit from Speech Therapy 3 times per week    Post-Acute Therapy: Discharge to a transitional care facility for continued skilled therapy services.    See \"Rehab Therapy-Acute\" Patient Summary Report for complete documentation. Thank you for the consult.   "

## 2018-03-17 LAB
ANION GAP SERPL CALC-SCNC: 8 MMOL/L (ref 0–11.9)
BASOPHILS # BLD AUTO: 1.8 % (ref 0–1.8)
BASOPHILS # BLD: 0.11 K/UL (ref 0–0.12)
BUN SERPL-MCNC: 9 MG/DL (ref 8–22)
CALCIUM SERPL-MCNC: 8.6 MG/DL (ref 8.5–10.5)
CHLORIDE SERPL-SCNC: 102 MMOL/L (ref 96–112)
CO2 SERPL-SCNC: 25 MMOL/L (ref 20–33)
CREAT SERPL-MCNC: 0.48 MG/DL (ref 0.5–1.4)
EOSINOPHIL # BLD AUTO: 0.15 K/UL (ref 0–0.51)
EOSINOPHIL NFR BLD: 2.5 % (ref 0–6.9)
ERYTHROCYTE [DISTWIDTH] IN BLOOD BY AUTOMATED COUNT: 60.2 FL (ref 35.9–50)
GLUCOSE SERPL-MCNC: 106 MG/DL (ref 65–99)
HCT VFR BLD AUTO: 27.5 % (ref 37–47)
HGB BLD-MCNC: 8.6 G/DL (ref 12–16)
IMM GRANULOCYTES # BLD AUTO: 0.07 K/UL (ref 0–0.11)
IMM GRANULOCYTES NFR BLD AUTO: 1.2 % (ref 0–0.9)
LYMPHOCYTES # BLD AUTO: 1.46 K/UL (ref 1–4.8)
LYMPHOCYTES NFR BLD: 24.1 % (ref 22–41)
MCH RBC QN AUTO: 35.1 PG (ref 27–33)
MCHC RBC AUTO-ENTMCNC: 31.3 G/DL (ref 33.6–35)
MCV RBC AUTO: 112.2 FL (ref 81.4–97.8)
MONOCYTES # BLD AUTO: 0.7 K/UL (ref 0–0.85)
MONOCYTES NFR BLD AUTO: 11.5 % (ref 0–13.4)
NEUTROPHILS # BLD AUTO: 3.58 K/UL (ref 2–7.15)
NEUTROPHILS NFR BLD: 58.9 % (ref 44–72)
NRBC # BLD AUTO: 0 K/UL
NRBC BLD-RTO: 0 /100 WBC
PLATELET # BLD AUTO: 515 K/UL (ref 164–446)
PMV BLD AUTO: 9.1 FL (ref 9–12.9)
POTASSIUM SERPL-SCNC: 3.5 MMOL/L (ref 3.6–5.5)
RBC # BLD AUTO: 2.45 M/UL (ref 4.2–5.4)
SODIUM SERPL-SCNC: 135 MMOL/L (ref 135–145)
WBC # BLD AUTO: 6.1 K/UL (ref 4.8–10.8)

## 2018-03-17 PROCEDURE — 770001 HCHG ROOM/CARE - MED/SURG/GYN PRIV*

## 2018-03-17 PROCEDURE — A9270 NON-COVERED ITEM OR SERVICE: HCPCS | Performed by: NURSE PRACTITIONER

## 2018-03-17 PROCEDURE — 700102 HCHG RX REV CODE 250 W/ 637 OVERRIDE(OP): Performed by: SURGERY

## 2018-03-17 PROCEDURE — 700111 HCHG RX REV CODE 636 W/ 250 OVERRIDE (IP): Performed by: SURGERY

## 2018-03-17 PROCEDURE — 700102 HCHG RX REV CODE 250 W/ 637 OVERRIDE(OP): Performed by: NEUROLOGICAL SURGERY

## 2018-03-17 PROCEDURE — A9270 NON-COVERED ITEM OR SERVICE: HCPCS | Performed by: SURGERY

## 2018-03-17 PROCEDURE — A9270 NON-COVERED ITEM OR SERVICE: HCPCS | Performed by: NEUROLOGICAL SURGERY

## 2018-03-17 PROCEDURE — 80048 BASIC METABOLIC PNL TOTAL CA: CPT

## 2018-03-17 PROCEDURE — 36415 COLL VENOUS BLD VENIPUNCTURE: CPT

## 2018-03-17 PROCEDURE — 700102 HCHG RX REV CODE 250 W/ 637 OVERRIDE(OP): Performed by: NURSE PRACTITIONER

## 2018-03-17 PROCEDURE — 85025 COMPLETE CBC W/AUTO DIFF WBC: CPT

## 2018-03-17 RX ORDER — BUTALBITAL, ACETAMINOPHEN AND CAFFEINE 50; 325; 40 MG/1; MG/1; MG/1
1 TABLET ORAL EVERY 6 HOURS PRN
Status: DISCONTINUED | OUTPATIENT
Start: 2018-03-17 | End: 2018-03-20 | Stop reason: HOSPADM

## 2018-03-17 RX ADMIN — SODIUM CHLORIDE TAB 1 GM 2 G: 1 TAB at 01:00

## 2018-03-17 RX ADMIN — SODIUM CHLORIDE TAB 1 GM 2 G: 1 TAB at 13:27

## 2018-03-17 RX ADMIN — CHLORDIAZEPOXIDE HYDROCHLORIDE 25 MG: 25 CAPSULE ORAL at 20:55

## 2018-03-17 RX ADMIN — POTASSIUM BICARBONATE 50 MEQ: 25 TABLET, EFFERVESCENT ORAL at 20:55

## 2018-03-17 RX ADMIN — BUTALBITAL, ACETAMINOPHEN, AND CAFFEINE 1 TABLET: 50; 325; 40 TABLET ORAL at 09:05

## 2018-03-17 RX ADMIN — BUTALBITAL, ACETAMINOPHEN, AND CAFFEINE 1 TABLET: 50; 325; 40 TABLET ORAL at 18:09

## 2018-03-17 RX ADMIN — ENOXAPARIN SODIUM 30 MG: 100 INJECTION SUBCUTANEOUS at 09:06

## 2018-03-17 RX ADMIN — SODIUM CHLORIDE TAB 1 GM 1 G: 1 TAB at 07:21

## 2018-03-17 RX ADMIN — LEVETIRACETAM 1000 MG: 500 TABLET, FILM COATED ORAL at 09:05

## 2018-03-17 RX ADMIN — OMEPRAZOLE 20 MG: 20 CAPSULE, DELAYED RELEASE ORAL at 09:05

## 2018-03-17 RX ADMIN — FLUDROCORTISONE ACETATE 0.1 MG: 0.1 TABLET ORAL at 09:05

## 2018-03-17 RX ADMIN — SODIUM CHLORIDE TAB 1 GM 2 G: 1 TAB at 18:09

## 2018-03-17 RX ADMIN — OXYCODONE HYDROCHLORIDE 5 MG: 5 TABLET ORAL at 20:55

## 2018-03-17 RX ADMIN — CHLORDIAZEPOXIDE HYDROCHLORIDE 25 MG: 25 CAPSULE ORAL at 07:21

## 2018-03-17 RX ADMIN — ENOXAPARIN SODIUM 30 MG: 100 INJECTION SUBCUTANEOUS at 20:55

## 2018-03-17 RX ADMIN — FLUDROCORTISONE ACETATE 0.1 MG: 0.1 TABLET ORAL at 20:55

## 2018-03-17 RX ADMIN — LEVETIRACETAM 1000 MG: 500 TABLET, FILM COATED ORAL at 20:55

## 2018-03-17 RX ADMIN — POTASSIUM BICARBONATE 50 MEQ: 25 TABLET, EFFERVESCENT ORAL at 15:55

## 2018-03-17 RX ADMIN — CHLORDIAZEPOXIDE HYDROCHLORIDE 25 MG: 25 CAPSULE ORAL at 13:27

## 2018-03-17 ASSESSMENT — LIFESTYLE VARIABLES: SUBSTANCE_ABUSE: 1

## 2018-03-17 ASSESSMENT — PAIN SCALES - GENERAL
PAINLEVEL_OUTOF10: 10
PAINLEVEL_OUTOF10: 0
PAINLEVEL_OUTOF10: 0
PAINLEVEL_OUTOF10: 7

## 2018-03-17 ASSESSMENT — ENCOUNTER SYMPTOMS
ROS GI COMMENTS: BM 3/16
EYES NEGATIVE: 1
MYALGIAS: 1
CARDIOVASCULAR NEGATIVE: 1
HEADACHES: 1
GASTROINTESTINAL NEGATIVE: 1
RESPIRATORY NEGATIVE: 1
DIZZINESS: 1

## 2018-03-17 NOTE — DISCHARGE PLANNING
Physiatry Dr. Mireles consult recommending pt appropriate for inpatient rehab. Pending clarification of discharge support, TCC to seek authorization from [employer] on Monday.

## 2018-03-17 NOTE — CARE PLAN
Problem: Communication  Goal: The ability to communicate needs accurately and effectively will improve  Outcome: PROGRESSING AS EXPECTED     Patient encouraged and educated on the importance of communicating with care providers. Patient states understanding. Call light use given. Patient calls for assistance.    Problem: Safety  Goal: Will remain free from falls  Outcome: PROGRESSING AS EXPECTED    Patient assessed for risk for injury/fall. Appropriate signs in use, educated staff. Patient Alert and oriented. Patient educated on risks and interventions in place Patient verbalized understanding.Bed at lowest level with rails up, call light and belongings within reach, patient calls for assistance, safety socks on, floor clear, hourly rounds in place. Patient remains free from injury, Will continue to monitor.    Problem: Venous Thromboembolism (VTW)/Deep Vein Thrombosis (DVT) Prevention:  Goal: Patient will participate in Venous Thrombosis (VTE)/Deep Vein Thrombosis (DVT)Prevention Measures  Outcome: PROGRESSING AS EXPECTED    Patient assessed for any signs of thrombus/embolus/DVT event. Patient alert and oriented, breathing normally with normal vital signs. Educated patient on the importance of VTE prophylaxis. SCD's on, No unilateral leg pain/redness/swelling. Pharmacological interventions: unfractionated lovenox, No signs of excessive bleeding

## 2018-03-17 NOTE — PROGRESS NOTES
Received report from am RN at bedside. Pt is calm and alert, denies N/T, equal motor strength, no changes from baseline, pt  shows no signs of distress. Negative for  N/V, Pt is breathing normally. No SOB, no, chest pain. Present bowel sounds and pt passing gas. Bowel protocol in place. No needs at this time. POC discussed. Bed in low position call bell within reach, half side rails up, Bed alarm on. Pt resting quietly. Will continue to assess.

## 2018-03-17 NOTE — PROGRESS NOTES
"  Trauma/Surgical Progress Note    Author: Vianney RODRIGUEZ Parviz Date & Time created: 3/17/2018   8:20 AM     Interval Events:    Sodium 135   BMP in AM  A&Ox 4 - shaking/uncoordination improving - feeding self without difficulty  Rehab referral in place   Continue therapies    Review of Systems   Constitutional: Positive for malaise/fatigue.   HENT:        Facial pain   Eyes: Negative.    Respiratory: Negative.    Cardiovascular: Negative.    Gastrointestinal: Negative.         BM 3/16   Genitourinary:        Voiding    Musculoskeletal: Positive for myalgias.   Neurological: Positive for dizziness and headaches.   Psychiatric/Behavioral: Positive for substance abuse.   All other systems reviewed and are negative.    Hemodynamics:  Blood pressure 111/61, pulse 87, temperature 36.7 °C (98 °F), resp. rate 16, height 1.727 m (5' 8\"), weight 60.1 kg (132 lb 7.9 oz), SpO2 94 %.     Respiratory:    Respiration: 16, Pulse Oximetry: 94 %     Work Of Breathing / Effort: Mild  RUL Breath Sounds: Clear, RML Breath Sounds: Clear, RLL Breath Sounds: Diminished, CARLI Breath Sounds: Clear, LLL Breath Sounds: Diminished  Fluids:    Intake/Output Summary (Last 24 hours) at 03/17/18 0820  Last data filed at 03/17/18 0600   Gross per 24 hour   Intake              740 ml   Output                0 ml   Net              740 ml     Admit Weight: 69 kg (152 lb 1.9 oz)  Current      Physical Exam   Constitutional: She is oriented to person, place, and time. No distress.   Sitting up in bed feeding herself    HENT:   Right facial ecchymosis and swelling    Eyes: Right conjunctiva has a hemorrhage. Left conjunctiva is not injected.   Neck: Normal range of motion.   Pulmonary/Chest: Effort normal. No respiratory distress.   Abdominal: Soft. She exhibits no distension.   Musculoskeletal: Normal range of motion.   Neurological: She is alert and oriented to person, place, and time. GCS eye subscore is 4. GCS verbal subscore is 5. GCS motor subscore is " 6.   Overall shaky and uncoordinated - somewhat improved   No recall of event    Skin: Skin is warm and dry.   Psychiatric: She has a normal mood and affect.   Nursing note and vitals reviewed.      Medical Decision Making/Problem List:    Active Hospital Problems    Diagnosis   • Hyponatremia [E87.1]     Priority: High     Progressive downward trend to critical levels  NaCl tabs, Florinef, and 3% infusion  Goal <130  3/13 3% NaCl increase to 50 mL/h  Sodium trending up: If we need to increase drip will need central line   Continue salt tablets as well as Florinef  3/14 Rate change to 30cc/hr  3/15 rate change to 20cc/hr   3/16 Stopped gtt - repeat BMP at 1300 - 134  3/17 Sodium 135     • Traumatic intracranial hemorrhage (CMS-HCC) [S06.309A]     Priority: High     Multiple areas of SAH and SDH, 4.1mm shift  Repeat CT imaging of the brain stable to slightly worse  3/5 seizure @1515, repeat CT scan was stable  Further seizures 3/6, assessment complicated by alcohol withdrawal  Cog eval - 24/7 supervision   3/8 cleared for lovenox  3/14 mental status improving slowly    Course of Keppra completed   Non-operative management.   Lorne Menchaca MD. Neurosurgery.     • Acute hyperactive alcohol withdrawal delirium (CMS-HCC) [F10.231]     Priority: High     DEYANIRA 0.43 on admission; LFTs elevated, thrombocytopenic and subsequently developed alcohol withdrawal  [facility]  [facility]  3/13 agitation reasonably controlled on Librium  RASS score is appropriate        • Oropharyngeal dysphagia [R13.12]     Priority: Medium     3/15 SLP eval - diet added - cortrak removed  3/16 Tolerating diet      • Moderate protein-calorie malnutrition (CMS-HCC) [E44.0]     Priority: Medium     Tube feeding per protocol.      • Bacterial conjunctivitis of both eyes [H10.9]     Priority: Low     Ciprofloxacin eyedrops x 7 days      • Anemia [D64.9]     Priority: Low     Significant hemoglobin drop after admission  No  evidence of gastrointestinal hemorrhage, abdomen examination is benign  Considering liver function tests abdominal CT scan was completed and no showed no evidence of intra-abdominal bleeding  Serial hemoglobin stable   Iron replacement  Likely chronic      • Pharmacologic contraindication to deep vein thrombosis (DVT) prophylaxis [Z53.09]     Priority: Low     Prophylactic Lovenox initially contraindicated due to elevated bleeding risk  3/7 duplex with no evidence of superficial or deep venous thrombosis.   3/8 Lovenox initiated      • Trauma [T14.90XA]     Priority: Low     GLF. Tripped over dogs, landed on face.  Non trauma activation.       Core Measures & Quality Metrics:  Labs reviewed and Medications reviewed  Pemberton catheter: No Pemberton      DVT Prophylaxis: Enoxaparin (Lovenox)  DVT prophylaxis - mechanical: SCDs  Ulcer prophylaxis: Yes    Assessed for rehab: Patient was assess for and/or received rehabilitation services during this hospitalization    Total Score: 7  ETOH Screening     Intervention complete date: 3/17/2018  Patient response to intervention: No coopertive with answering questions, concerning substance abuse.- remains evasive .   Patient does not demonstrat understanding of intervention.Plan of care:    has not been contacted.Follow up with: Clinic  Total ETOH intervention time: 15 - 30 mintues  Discussed patient condition with RN, Patient and trauma surgery. Dr. FERNANDO Zepeda     Patient seen, data reviewed and discussed.  Agree with assessment and plan.  AMBAR

## 2018-03-18 LAB
ANION GAP SERPL CALC-SCNC: 8 MMOL/L (ref 0–11.9)
BUN SERPL-MCNC: 11 MG/DL (ref 8–22)
CALCIUM SERPL-MCNC: 9.1 MG/DL (ref 8.5–10.5)
CHLORIDE SERPL-SCNC: 105 MMOL/L (ref 96–112)
CO2 SERPL-SCNC: 24 MMOL/L (ref 20–33)
CREAT SERPL-MCNC: 0.48 MG/DL (ref 0.5–1.4)
GLUCOSE SERPL-MCNC: 105 MG/DL (ref 65–99)
POTASSIUM SERPL-SCNC: 3.5 MMOL/L (ref 3.6–5.5)
SODIUM SERPL-SCNC: 137 MMOL/L (ref 135–145)

## 2018-03-18 PROCEDURE — 700102 HCHG RX REV CODE 250 W/ 637 OVERRIDE(OP): Performed by: NURSE PRACTITIONER

## 2018-03-18 PROCEDURE — A9270 NON-COVERED ITEM OR SERVICE: HCPCS | Performed by: SURGERY

## 2018-03-18 PROCEDURE — 770001 HCHG ROOM/CARE - MED/SURG/GYN PRIV*

## 2018-03-18 PROCEDURE — 700102 HCHG RX REV CODE 250 W/ 637 OVERRIDE(OP): Performed by: NEUROLOGICAL SURGERY

## 2018-03-18 PROCEDURE — 36415 COLL VENOUS BLD VENIPUNCTURE: CPT

## 2018-03-18 PROCEDURE — 700111 HCHG RX REV CODE 636 W/ 250 OVERRIDE (IP): Performed by: SURGERY

## 2018-03-18 PROCEDURE — 700102 HCHG RX REV CODE 250 W/ 637 OVERRIDE(OP): Performed by: SURGERY

## 2018-03-18 PROCEDURE — A9270 NON-COVERED ITEM OR SERVICE: HCPCS | Performed by: NEUROLOGICAL SURGERY

## 2018-03-18 PROCEDURE — A9270 NON-COVERED ITEM OR SERVICE: HCPCS | Performed by: NURSE PRACTITIONER

## 2018-03-18 PROCEDURE — 80048 BASIC METABOLIC PNL TOTAL CA: CPT

## 2018-03-18 RX ADMIN — POTASSIUM BICARBONATE 50 MEQ: 25 TABLET, EFFERVESCENT ORAL at 20:47

## 2018-03-18 RX ADMIN — OXYCODONE HYDROCHLORIDE 5 MG: 5 TABLET ORAL at 10:15

## 2018-03-18 RX ADMIN — OMEPRAZOLE 20 MG: 20 CAPSULE, DELAYED RELEASE ORAL at 08:29

## 2018-03-18 RX ADMIN — SODIUM CHLORIDE TAB 1 GM 2 G: 1 TAB at 00:04

## 2018-03-18 RX ADMIN — FLUDROCORTISONE ACETATE 0.1 MG: 0.1 TABLET ORAL at 08:29

## 2018-03-18 RX ADMIN — OXYCODONE HYDROCHLORIDE 5 MG: 5 TABLET ORAL at 01:51

## 2018-03-18 RX ADMIN — CHLORDIAZEPOXIDE HYDROCHLORIDE 25 MG: 25 CAPSULE ORAL at 05:46

## 2018-03-18 RX ADMIN — ENOXAPARIN SODIUM 30 MG: 100 INJECTION SUBCUTANEOUS at 20:47

## 2018-03-18 RX ADMIN — OXYCODONE HYDROCHLORIDE 10 MG: 10 TABLET ORAL at 20:47

## 2018-03-18 RX ADMIN — FLUDROCORTISONE ACETATE 0.1 MG: 0.1 TABLET ORAL at 20:47

## 2018-03-18 RX ADMIN — SODIUM CHLORIDE TAB 1 GM 2 G: 1 TAB at 23:54

## 2018-03-18 RX ADMIN — OXYCODONE HYDROCHLORIDE 5 MG: 5 TABLET ORAL at 05:46

## 2018-03-18 RX ADMIN — SODIUM CHLORIDE TAB 1 GM 2 G: 1 TAB at 11:39

## 2018-03-18 RX ADMIN — SODIUM CHLORIDE TAB 1 GM 2 G: 1 TAB at 17:22

## 2018-03-18 RX ADMIN — OXYCODONE HYDROCHLORIDE 10 MG: 10 TABLET ORAL at 14:53

## 2018-03-18 RX ADMIN — SODIUM CHLORIDE TAB 1 GM 2 G: 1 TAB at 05:46

## 2018-03-18 RX ADMIN — CHLORDIAZEPOXIDE HYDROCHLORIDE 25 MG: 25 CAPSULE ORAL at 13:07

## 2018-03-18 RX ADMIN — OXYCODONE HYDROCHLORIDE 10 MG: 10 TABLET ORAL at 23:54

## 2018-03-18 RX ADMIN — POTASSIUM BICARBONATE 50 MEQ: 25 TABLET, EFFERVESCENT ORAL at 13:08

## 2018-03-18 RX ADMIN — CHLORDIAZEPOXIDE HYDROCHLORIDE 25 MG: 25 CAPSULE ORAL at 20:47

## 2018-03-18 RX ADMIN — ENOXAPARIN SODIUM 30 MG: 100 INJECTION SUBCUTANEOUS at 08:29

## 2018-03-18 ASSESSMENT — LIFESTYLE VARIABLES
SUBSTANCE_ABUSE: 1
DO YOU DRINK ALCOHOL: YES

## 2018-03-18 ASSESSMENT — ENCOUNTER SYMPTOMS
HEADACHES: 1
MYALGIAS: 1
ROS GI COMMENTS: BM 3/16
EYES NEGATIVE: 1
RESPIRATORY NEGATIVE: 1
CARDIOVASCULAR NEGATIVE: 1
DIZZINESS: 1
GASTROINTESTINAL NEGATIVE: 1

## 2018-03-18 ASSESSMENT — PAIN SCALES - GENERAL
PAINLEVEL_OUTOF10: 4
PAINLEVEL_OUTOF10: 3
PAINLEVEL_OUTOF10: 3
PAINLEVEL_OUTOF10: 7

## 2018-03-18 NOTE — PROGRESS NOTES
"  Trauma/Surgical Progress Note    Author: Vianney Jj Date & Time created: 3/18/2018   9:35 AM     Interval Events:    Headache adequately controlled with oral meds   Sodium stable  BMP in AM  Continue current care and therapies  Medically cleared for post acute services  Rehab to seek insurance auth on Monday  She has a safe discharge plan post rehab - states her adult daughter can come stay with her    Review of Systems   Constitutional: Positive for malaise/fatigue.   HENT:        Facial pain   Eyes: Negative.    Respiratory: Negative.    Cardiovascular: Negative.    Gastrointestinal: Negative.         BM 3/16   Genitourinary:        Voiding    Musculoskeletal: Positive for myalgias.   Neurological: Positive for dizziness and headaches.   Psychiatric/Behavioral: Positive for substance abuse.   All other systems reviewed and are negative.    Hemodynamics:  Blood pressure 104/71, pulse 83, temperature 37 °C (98.6 °F), resp. rate 16, height 1.727 m (5' 8\"), weight 60.1 kg (132 lb 7.9 oz), SpO2 90 %.     Respiratory:    Respiration: 16, Pulse Oximetry: 90 %        RUL Breath Sounds: Clear, RML Breath Sounds: Clear, RLL Breath Sounds: Clear, CARLI Breath Sounds: Clear, LLL Breath Sounds: Clear  Fluids:    Intake/Output Summary (Last 24 hours) at 03/18/18 0935  Last data filed at 03/18/18 0600   Gross per 24 hour   Intake              610 ml   Output                0 ml   Net              610 ml     Admit Weight: 69 kg (152 lb 1.9 oz)  Current      Physical Exam   Constitutional: She is oriented to person, place, and time. She appears well-developed. No distress.   HENT:   Right facial ecchymosis and swelling    Eyes: Right conjunctiva has a hemorrhage. Left conjunctiva is not injected.   Neck: Normal range of motion.   Pulmonary/Chest: Effort normal. No respiratory distress.   Abdominal: Soft. She exhibits no distension.   Musculoskeletal: Normal range of motion.   Neurological: She is alert and oriented to person, " place, and time. GCS eye subscore is 4. GCS verbal subscore is 5. GCS motor subscore is 6.   No recall of event      Skin: Skin is warm and dry.   Psychiatric: She has a normal mood and affect.   Nursing note and vitals reviewed.      Medical Decision Making/Problem List:    Active Hospital Problems    Diagnosis   • Hyponatremia [E87.1]     Priority: High     Progressive downward trend to critical levels  NaCl tabs, Florinef, and 3% infusion  Goal <130  Continue salt tablets as well as Florinef  3/16 Stopped gtt   3/18 Sodium 137     • Traumatic intracranial hemorrhage (CMS-HCC) [S06.309A]     Priority: High     Multiple areas of SAH and SDH, 4.1mm shift  Repeat CT imaging of the brain stable to slightly worse  3/5 seizure @1515, repeat CT scan was stable  Further seizures 3/6, assessment complicated by alcohol withdrawal  Cog eval - 24/7 supervision   Course of Keppra completed    Non-operative management.   Lorne Menchaca MD. Neurosurgery.     • Acute hyperactive alcohol withdrawal delirium (CMS-HCC) [F10.231]     Priority: High     DEYANIRA 0.43 on admission; LFTs elevated, thrombocytopenic and subsequently developed alcohol withdrawal  [facility]  [facility]  3/13 agitation reasonably controlled on Librium  RASS score is appropriate        • Oropharyngeal dysphagia [R13.12]     Priority: Medium     3/15 SLP eval - diet added - cortrak removed  3/16 Tolerating diet      • Moderate protein-calorie malnutrition (CMS-HCC) [E44.0]     Priority: Medium     Tube feeding per protocol.      • Bacterial conjunctivitis of both eyes [H10.9]     Priority: Low     Ciprofloxacin eyedrops x 7 days   Completed     • Anemia [D64.9]     Priority: Low     Significant hemoglobin drop after admission  No evidence of gastrointestinal hemorrhage, abdomen examination is benign  Considering liver function tests abdominal CT scan was completed and no showed no evidence of intra-abdominal bleeding  Serial hemoglobin  stable   Iron replacement  Likely chronic      • Pharmacologic contraindication to deep vein thrombosis (DVT) prophylaxis [Z53.09]     Priority: Low     Prophylactic Lovenox initially contraindicated due to elevated bleeding risk  3/7 duplex with no evidence of superficial or deep venous thrombosis.   3/8 Lovenox initiated      • Trauma [T14.90XA]     Priority: Low     GLF. Tripped over dogs, landed on face.  Non trauma activation.       Core Measures & Quality Metrics:  Medications reviewed and Labs reviewed  Pemberton catheter: No Pemberton      DVT Prophylaxis: Enoxaparin (Lovenox)  DVT prophylaxis - mechanical: SCDs  Ulcer prophylaxis: Not indicated    Assessed for rehab: Patient was assess for and/or received rehabilitation services during this hospitalization    Total Score: 7  ETOH Screening     Intervention complete date: 3/17/2018  Patient response to intervention: No coopertive with answering questions, concerning substance abuse.- remains evasive .   Patient does not demonstrat understanding of intervention.Plan of care:    has not been contacted.Follow up with: Clinic  Total ETOH intervention time: 15 - 30 mintues    Discussed patient condition with Family, RN, Patient and trauma surgery. Dr. FERNANDO Zepeda.    Patient seen, data reviewed and discussed.  Agree with assessment and plan.  AMBAR

## 2018-03-18 NOTE — PREADMISSION SCREENING NOTE
"  Pre-Admission Screening Form    Patient Information:   Name: Terri Krishnan     MRN: [id number]       : 1970      Age: 48 y.o.   Gender: female      Race: White [7]       Marital Status: Single [1]  Family Contact: Aaln Krishnan,Sophie \"Kenneth\"        Relationship: Brother [1]  Daughter [2]  Home Phone: [phone]           Cell Phone:     Advanced Directives: None  Code Status:  FULL  Current Attending Provider: Parveen Roque M.D.  Referring Physician: Dr. Vianney Jj     Physiatrist Consult: Dr. Nathan Mireles       Referral Date: 3/18/18  Primary Payor Source:  ANTHEM  Secondary Payor Source:      Medical Information:   Date of Admission to Acute Care Setting:3/4/2018  Room Number: S161/00  Rehabilitation Diagnosis: 02.22 Traumatic, Closed Injury  Immunization History   Administered Date(s) Administered   • Pneumococcal polysaccharide vaccine (PPSV-23) 10/19/2016   • Tdap Vaccine 2015     Allergies   Allergen Reactions   • Shrimp (Diagnostic) Anaphylaxis     poa stated that this patient had a reaction a couple years ago after eating shrimp   • Shrimp Flavor Anaphylaxis     Past Medical History:   Diagnosis Date   • Surgical menopause    • Tobacco dependence      Past Surgical History:   Procedure Laterality Date   • ABDOMINAL HYSTERECTOMY TOTAL  age 27    Total       History Leading to Admission, Conditions that Caused the Need for Rehab (CMS):     Parveen Roque M.D. Physician Signed Surgery General  H&P Date of Service: 3/4/2018 11:26 PM      Expand All Collapse All    []Hide copied text  []Hover for attribution information  Trauma History and Physical  3/4/2018     Attending Physician: Parveen Roque MD.      CC: Trauma The patient was triaged as a T-5000 in accordance with established pre hospital protols. An expeditious primary and secondary survey with required adjuncts was conducted. See Trauma Narrator for full details.     HPI: This is a 47 yo " female who says she tripped over her dog this evening while drinking alcohol, sustaining a ground level fall in the process and striking her head and face on the ground. She did lose consciousness and was brought to [facility] for evaluation.         Assessment: This is a 48 y.o. Female with what appears to be pancytopenia from heavy alcohol use who has significant intracranial hemorrhage after a ground level fall. I am going to give her 1 U platelets empirically for thrombocytopenia. I will follow up her platelet mapping TEG and INR once they are completed     Plan: Admit to ICU       Active Hospital Problems     Diagnosis   • Traumatic intracranial hemorrhage (CMS-[facility]) [S06.309A]       Priority: High       Multiple areas of SAH and SDH, 4.1mm shift  Repeat CT imaging of the brain ordered.  Keppra x 1 week ordered  Cog eval ordered  Non-operative management.  Lorne Menchaca MD. Neurosurgery.      • Thrombocytopenia (CMS-[facility]) [D69.6]       Priority: High       Platelet count 51 on admission, presumably from alcoholism  1 U platelets given empirically  Trend exam, head CT, platelet count and TEG   • Acute alcohol intoxication (CMS-HCC) [F10.929]       Priority: High       DEYANIRA 0.43 on admission; LFTs all elevated, thrombocytopenic  Monitor closely for signs of withdrawal            The patient is/remains critically ill with significant traumatic brain injury, coagulopathy, thrombocytopenia, acute alcohol intoxication.     I provided the following critical care services: management of above, communication with consulting physicians.     Critical care time spent exclusive of procedures: 85 minutes thus far        Parveen Roque MD  [facility]  [phone]     Frankie Menchaca M.D. Physician Signed Surgery Neurosurgery  Consults Date of Service: 3/5/2018  8:59 AM         []Hide copied text  []Kailashver for attribution information  DATE OF SERVICE:  03/05/2018     NEUROSURGICAL CONSULTATION     HISTORY OF PRESENT  ILLNESS:  Patient is a pleasant 48-year-old woman who fell   yesterday.  No recollection of the event.  She believes she probably passed   out.  She has a headache.  No weakness, numbness, or tingling.  No nausea or   vomiting.  Her right eye is swollen shut, but she is not complaining of any   vision changes.  She was drinking at the time of the event.  She has a history   of alcohol abuse.     PAST MEDICAL HISTORY:  Menopause, tobacco dependency, ETOH dependency.     PAST SURGICAL HISTORY:  Total abdominal hysterectomy.     MEDICATIONS ON PRESENTATION:  None.      IMAGING:  CT scan of the brain, noncontrast, on repeat at 5:40 this morning   compared to 22:35 last night shows multiple areas of traumatic subarachnoid   hemorrhage and falcine subdural and convexity subdural, all of which are quite   small.  There is mass effect, but no significant shift, no fracture.     ASSESSMENT AND PLAN:  I have discussed plan with trauma.  Given the continued   platelet inhibition and slightly worsening CAT scan on repeat, we will order 1   more unit of platelets.  Keep the patient in the intensive care unit.  We   will watch her carefully.  I think it is unlikely that she will need surgical   intervention given how good her physical exam is despite imaging findings.  We   will follow closely.        ____________________________________     MD Nathan LIM M.D. Physician Signed Physical Medicine & Rehab  Consults Date of Service: 3/16/2018 12:16 PM   Consult Orders:   IP CONSULT FOR PHYSIATRY [[id number]] ordered by Vianney Jj, [email or web address] at 03/16/18 1120      Expand All Collapse All    []Hide copied text  Physical Medicine and Rehabilitation Consultation     Date of Consultation: 3/16/2018  Consulting provider: Vianney Jj  Reason for consultation: assess for acute inpatient rehab appropriateness  Chief complaint: GLF while drinking alcohol     HPI: The patient is a 48 y.o. female with a past  medical history of alcohol abuse, tobacco abuse admitted on 3/4/2018  8:56 PM with GLF with resultant intracranial bleed     CT demonstrating     Right subarachnoid hemorrhage within the sylvian fissure is not significantly changed. Underlying aneurysm is not excluded.    Parafalcine hemorrhage as well as blood along the tentorium are not significantly changed.    Trace left frontal subdural is unchanged.    Blood along the right aspect of the brainstem in the cerebellopontine cistern is unchanged.    6 mm midline shift to the left    Prominent soft tissue swelling on the right is again seen.  Repeat CT demonstrated improvement win bleed but still with signficant midline shift of 6 mm. Her course  has been complicated by alcohoal withdrawal amd a seizure. On Keppra. Other issues that have arisen include hyponatremia, hypokalmia Dysphagia, acute hyperactive alcohol withdrawal, dysphagia, malnutrition, bacterial conjunctivitis and anemia         PM&R has been consulted regarding suitability for inpatient rehabilitation     ROS:  A 12 point review of systems was performed and was negative with the exception of items mentioned elsewhere in this document         ASSESSMENT:     Patient is a 48 y.o. female admitted with Intracranial bleed after alcohol related GLF     Patient with multiple co-morbidities(including but not limited to tobacco and alcohol abuse, hyponatremia, hypokalemia, alcoholo withdrawal with cognitive deficits and functional deficits in mobility/self-care/swallowing/speech, and Severe de-conditioning.      Pre-morbidly, this patient lived in a two level home with One steps to enter, living with [facility]. The patient was evaluated by acute care Physical Therapy, Occupational Therapy and Speech Language Pathology; currently requiring minimal assistance for mobility and minimal assistance for ADLs, also with ongoing cognitive and speech deficits.      The patient is a(n) Very Good candidate for an acute  inpatient rehabilitation program with a coordinated program of care at an intensity and frequency not available at a lower level of care.      Note: if patient continues to progress while waiting for medical clearance, and no longer requires 2 of of 3 therapy services (PT/OT/SLP), patient will no longer need acute inpatient rehabilitation.     This recommendation is substantiated by the patient's current medical condition with intervention and assessment of medical issues requiring an acute level of care for patient's safety and maximum outcome. A coordinated program of care will be provided by an interdisciplinary team including physical therapy, occupational therapy, speech language pathology, physiatry, rehab nursing and rehab psychology. Rehab goals include improved cognition and speech, mobility, self-care management, strength and conditioning/endurance, pain management, bowel and bladder management, mood and affect, and safety with independent home management including caregiver training.      Estimated length of stay is approximately 10-14 days. Rehab potential: Very Good. Disposition: to pre-morbid independent living setting with supportive care of patient's significant other. We will continue to follow with you in anticipation of discharge to acute inpatient rehabilitation when medically stable to do so at the discretion of her  attending physician. Thank you for allowing us to participate in her care. Please call with any questions regarding this recommendation.    Co-morbidities: See above  Potential Risk - Complications: Cognitive Impairment, Deep Vein Thrombosis, Dysphagia, Malnutrition, Pain, Perceptual Impairment, Pneumonia, Pressure Ulcer and Seizures  Level of Risk: High    Ongoing Medical Management Needed (Medical/Nursing Needs):   Patient Active Problem List    Diagnosis Date Noted   • Hyponatremia 03/10/2018     Priority: High   • Traumatic intracranial hemorrhage (CMS-HCC) 03/04/2018      "Priority: High   • Acute hyperactive alcohol withdrawal delirium (CMS-HCC) 03/04/2018     Priority: High   • Oropharyngeal dysphagia 03/16/2018     Priority: Medium   • Moderate protein-calorie malnutrition (CMS-HCC) 03/08/2018     Priority: Medium   • Bacterial conjunctivitis of both eyes 03/07/2018     Priority: Low   • Anemia 03/06/2018     Priority: Low   • Pharmacologic contraindication to deep vein thrombosis (DVT) prophylaxis 03/05/2018     Priority: Low   • Trauma 03/04/2018     Priority: Low   • CARSON (generalized anxiety disorder) 10/12/2017   • Surgical menopause 03/06/2014   • Tobacco dependence 03/06/2014     Current Vital Signs:   Temperature: 37 °C (98.6 °F) Pulse: 83 Respiration: 16 Blood Pressure: 104/71  Weight: 60.1 kg (132 lb 7.9 oz) Height: 172.7 cm (5' 8\") (drivers license)  Pulse Oximetry: 90 % O2 (LPM): 0      Completed Laboratory Reports:  Recent Labs      03/15/18   1743  03/16/18   0014  03/16/18   0510  03/16/18   1242  03/17/18   0438  03/18/18   0354   WBC   --    --   9.1   --   6.1   --    HEMOGLOBIN   --    --   8.3*   --   8.6*   --    HEMATOCRIT   --    --   25.5*   --   27.5*   --    PLATELETCT   --    --   453*   --   515*   --    SODIUM  131*  131*  133*  134*  135  137   POTASSIUM  4.4  3.7  3.4*  3.5*  3.5*  3.5*   BUN  14  14  13  13  9  11   CREATININE  0.42*  0.50  0.43*  0.52  0.48*  0.48*   GLUCOSE  114*  94  94  97  106*  105*     Additional Labs: Not Applicable    Prior Living Situation:   Housing / Facility: 2 Story House (spare bedroom and full bathroom on 1st floor)  Steps Into Home:  (1)  Steps In Home:  (1 flight)  Lives with - Patient's Self Care Capacity: Significant Other (Reports she lives w/ boyfriend)  Equipment Owned: None    Prior Level of Function / Living Situation:   Physical Therapy: Prior Services: Home-Independent  Housing / Facility: 2 Story House (spare bedroom and full bathroom on 1st floor)  Steps Into Home:  (1)  Steps In Home:  (1 " flight)  Bathroom Set up: Walk In Shower, Bathtub / Shower Combination  Equipment Owned: None  Lives with - Patient's Self Care Capacity: Significant Other (Reports she lives w/ boyfriend)  Bed Mobility: Independent  Transfer Status: Independent  Ambulation: Independent  Distance Ambulation (Feet):  (community)  Assistive Devices Used: None  Stairs: Independent  Current Level of Function:   Level Of Assist: Moderate Assist  Assistive Device: Front Wheel Walker  Distance (Feet): 8  Deviation: Bradykinetic, Shuffled Gait, Decreased Toe Off, Decreased Heel Strike (decreased weight shift)  # of Stairs Climbed: 0  Weight Bearing Status: No restrictions  Supine to Sit: Moderate Assist (Min PA max V/C's for sequencing)  Sit to Supine: Contact Guard Assist (legs, v/c's sequencing )  Scooting: Minimal Assist  Sit to Stand: Minimal Assist  Bed, Chair, Wheelchair Transfer: Contact Guard Assist  Transfer Method: Stand Pivot  Sitting in Chair: NT  Sitting Edge of Bed: 15 min  Standin min  Occupational Therapy:   Self Feeding: Independent  Grooming / Hygiene: Independent  Bathing: Independent  Dressing: Independent  Toileting: Independent  Medication Management: Independent  Laundry: Independent  Kitchen Mobility: Independent  Finances: Independent  Home Management: Independent  Shopping: Independent  Prior Level Of Mobility: Independent Without Device in Community, Independent Without Device in Home  Driving / Transportation: Driving Independent  Prior Services: Home-Independent  Housing / Facility: 2 Story House (spare bedroom and full bathroom on 1st floor)  Occupation (Pre-Hospital Vocational):  (Pt reports she is not working right now)  Current Level of Function:   Eating: Not Tested  Bathing: Not Tested (Pt refused bathing intervention)  Upper Body Dressing: Contact Guard Assist  Lower Body Dressing: Minimal Assist (socks)  Toileting: Not Tested  Speech Language Pathology:   Assessment : Patient was seen on this date  for a cognitive-linguistic evaluation. Patient was seated outside of bed in a chair by CNAx2. She was AAOx4. Speech minimally slowed in rate with intermittent delayed response time to verbal stimuli. Portions of standardized measures were used to assess an array of cognitive-linguistic domains which revealed deficits in the minimum-to-severe range. Severe deficits in generative naming (4 items in 1 min, 20=WFL) and thought organization; moderate deficits in immediate memory, clock drawing, and reading comprehension at the sentence level; minimum deficits in STM, sequencing ADLs, reasoning, verbal problem solving, and writing. Auditory comprehension was WFL. At this time, recommend 24-hour supervision with IADLs if patient were to discharge home given impulsive behavior demonstrated in the hospital setting. Patient would benefit from speech therapy at the acute and post-acute level of care to address cognitive deficits stated above.   Problem List: Dysphagia, Cognitive-Language Deficits  Diet / Liquid Recommendation: Dysphagia III, Thin Liquid  Rehabilitation Prognosis/Potential: Good  Estimated Length of Stay: 10-14 days    Nursing:   Orientation : Oriented x 4  Incontinent    Scope/Intensity of Services Recommended:  Physical Therapy: 1 hr / day  5 days / week. Therapeutic Interventions Required: Maximize Endurance, Mobility, Strength and Safety  Occupational Therapy: 1 hr / day 5 days / week. Therapeutic Interventions Required: Maximize Self Care, ADLs, IADLs and Energy Conservation  Speech & Language Pathology: 1 hr / day 5 days / week. Therapeutic Interventions Required: Maximize Cognition, Swallowing and Safety  Rehabilitation Nursin/7. Therapeutic Interventions Required: Monitor Pain, Skin, Wound(s), Vital Signs, Intake and Output, Labs, Safety, Aspiration Risk, Family Training and Bowel & Bladder regimen; DVT prophylaxis; ADL's.   Rehabilitation Physician: 3 - 5 days / week. Therapeutic Interventions  Required: Medical Management  Respiratory Care: Consult. Therapeutic Interventions Required: Pending recommendations of consult  Dietician: Consult. Therapeutic Interventions Required: Nutritional evaluation with recommendations to promote optimal health/healing.    Rehabilitation Goals and Plan (Expected frequency & duration of treatment in the IRF):   Return to the Community, Modified Independent Level of Care, Outpatient Support and Family Able to Provide 24/7 Assistance  Anticipated Date of Rehabilitation Admission: 3/19/18  Patient/Family oriented IRF level of care/facility/plan: Yes  Patient/Family willing to participate in IRF care/facility/plan: Yes  Patient able to tolerate IRF level of care proposed: Yes  Patient has potential to benefit IRF level of care proposed: Yes  Comments: Not Applicable    Special Needs or Precautions - Medical Necessity:  Safety Concerns/Precautions:  Fall Risk / High Risk for Falls, Balance and Cognition  Complex Wound Care: Facial ecchymosis  Pain Management  Diet:   DIET ORDERS (Through next 24h)    Start     Ordered    03/15/18 1407  DIET ORDER  ALL MEALS     Question Answer Comment   Diet: Regular    Texture/Fiber modifications: Dysphagia 3(Mechanical Soft)specify fluid consistency(question 6)    Consistency/Fluid modifications: Thin Liquids        03/15/18 1408          Anticipated Discharge Destination / Patient/Family Goal:  Destination: Home with Assistance Support System: Family   Anticipated home health services: OT, PT, SLP, Nursing and Social Work  Previously used HH service/ provider: Not Applicable  Anticipated DME Needs: To be determined  Outpatient Services: To be determined  Alternative resources to address additional identified needs:   N/A  Pre-Screen Completed: 3/18/2018 1:15 PM Mariajose Laird R.N.

## 2018-03-18 NOTE — CARE PLAN
Problem: Safety  Goal: Will remain free from falls  Outcome: PROGRESSING AS EXPECTED  Room close to RN station, bed alarm armed, call light within reach and used appropriately, pt A&Ox4.     Problem: Venous Thromboembolism (VTW)/Deep Vein Thrombosis (DVT) Prevention:  Goal: Patient will participate in Venous Thrombosis (VTE)/Deep Vein Thrombosis (DVT)Prevention Measures  Outcome: PROGRESSING AS EXPECTED  Pt wears SCDs and receives LMWH.     Problem: Pain Management  Goal: Pain level will decrease to patient's comfort goal  Outcome: PROGRESSING AS EXPECTED  Pt reports constant h/a, has PRN fioricet and oxy 5-10, tried first dose of oxy 5mg, will monitor effectiveness with h/a vs fioricet.

## 2018-03-18 NOTE — DISCHARGE PLANNING
Attempted to reach daughter Sophie Olivera by phone to discuss d/c resources. Left voice message requesting return call to Rehab [facility]. Will follow for clarification of d/c plan to support safe transition back to community.

## 2018-03-18 NOTE — PROGRESS NOTES
Late entry for 2100 -   Pt A&Ox4, reporting headache pain, educated on PRN fioricet and oxy, has not tried oxy during this stay, will see if it is more effective than fioricet, otherwise will provide fioricet when available next. Also provided ice pack.   One raised lump to R forehead, bruising of purple/blue/green shades noted, some edema around R jawline. R sclera is red, pupils PERRLA, blurry vision reported in R eye that comes and goes.   Neuro checks intact, denies n/t, strength equal bilaterally in all extremities.   POC discussed, no further needs at this time, call light within reach, bed alarm armed, room close to RN station.
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
## 2018-03-18 NOTE — DISCHARGE PLANNING
Rehab pre-admit screen prepared. Rehab PAR will submit case to insurance Monday. [facility] will follow for auth determination.

## 2018-03-19 LAB
ANION GAP SERPL CALC-SCNC: 7 MMOL/L (ref 0–11.9)
BUN SERPL-MCNC: 8 MG/DL (ref 8–22)
CALCIUM SERPL-MCNC: 8.9 MG/DL (ref 8.5–10.5)
CHLORIDE SERPL-SCNC: 104 MMOL/L (ref 96–112)
CO2 SERPL-SCNC: 25 MMOL/L (ref 20–33)
CREAT SERPL-MCNC: 0.56 MG/DL (ref 0.5–1.4)
GLUCOSE SERPL-MCNC: 102 MG/DL (ref 65–99)
MAGNESIUM SERPL-MCNC: 1.7 MG/DL (ref 1.5–2.5)
PHOSPHATE SERPL-MCNC: 4.2 MG/DL (ref 2.5–4.5)
POTASSIUM SERPL-SCNC: 3.6 MMOL/L (ref 3.6–5.5)
SODIUM SERPL-SCNC: 136 MMOL/L (ref 135–145)

## 2018-03-19 PROCEDURE — A9270 NON-COVERED ITEM OR SERVICE: HCPCS | Performed by: NURSE PRACTITIONER

## 2018-03-19 PROCEDURE — A9270 NON-COVERED ITEM OR SERVICE: HCPCS | Performed by: SURGERY

## 2018-03-19 PROCEDURE — 97116 GAIT TRAINING THERAPY: CPT

## 2018-03-19 PROCEDURE — 770001 HCHG ROOM/CARE - MED/SURG/GYN PRIV*

## 2018-03-19 PROCEDURE — 700102 HCHG RX REV CODE 250 W/ 637 OVERRIDE(OP): Performed by: NEUROLOGICAL SURGERY

## 2018-03-19 PROCEDURE — 700102 HCHG RX REV CODE 250 W/ 637 OVERRIDE(OP): Performed by: SURGERY

## 2018-03-19 PROCEDURE — 80048 BASIC METABOLIC PNL TOTAL CA: CPT

## 2018-03-19 PROCEDURE — 700111 HCHG RX REV CODE 636 W/ 250 OVERRIDE (IP): Performed by: SURGERY

## 2018-03-19 PROCEDURE — 36415 COLL VENOUS BLD VENIPUNCTURE: CPT

## 2018-03-19 PROCEDURE — 97112 NEUROMUSCULAR REEDUCATION: CPT

## 2018-03-19 PROCEDURE — A9270 NON-COVERED ITEM OR SERVICE: HCPCS | Performed by: NEUROLOGICAL SURGERY

## 2018-03-19 PROCEDURE — 700102 HCHG RX REV CODE 250 W/ 637 OVERRIDE(OP): Performed by: NURSE PRACTITIONER

## 2018-03-19 PROCEDURE — 84100 ASSAY OF PHOSPHORUS: CPT

## 2018-03-19 PROCEDURE — 83735 ASSAY OF MAGNESIUM: CPT

## 2018-03-19 RX ORDER — SODIUM CHLORIDE 1 G/1
2 TABLET ORAL EVERY 6 HOURS
Qty: 90 TAB | Refills: 3 | Status: ON HOLD
Start: 2018-03-19 | End: 2018-04-05

## 2018-03-19 RX ORDER — CHLORDIAZEPOXIDE HYDROCHLORIDE 25 MG/1
25 CAPSULE, GELATIN COATED ORAL EVERY 8 HOURS
Qty: 90 CAP | Status: ON HOLD
Start: 2018-03-19 | End: 2018-04-05

## 2018-03-19 RX ORDER — OMEPRAZOLE 20 MG/1
20 CAPSULE, DELAYED RELEASE ORAL DAILY
Qty: 30 CAP | Status: ON HOLD
Start: 2018-03-19 | End: 2018-04-05

## 2018-03-19 RX ORDER — BUTALBITAL, ACETAMINOPHEN AND CAFFEINE 50; 325; 40 MG/1; MG/1; MG/1
1 TABLET ORAL EVERY 6 HOURS PRN
Qty: 30 TAB | Refills: 0 | Status: ON HOLD
Start: 2018-03-19 | End: 2018-04-05

## 2018-03-19 RX ORDER — FLUDROCORTISONE ACETATE 0.1 MG/1
0.1 TABLET ORAL 2 TIMES DAILY
Qty: 30 TAB | Status: ON HOLD
Start: 2018-03-19 | End: 2018-04-05

## 2018-03-19 RX ORDER — OXYCODONE HYDROCHLORIDE 5 MG/1
5 TABLET ORAL
Qty: 30 TAB | Refills: 0 | Status: ON HOLD
Start: 2018-03-19 | End: 2018-04-05

## 2018-03-19 RX ADMIN — CHLORDIAZEPOXIDE HYDROCHLORIDE 25 MG: 25 CAPSULE ORAL at 17:50

## 2018-03-19 RX ADMIN — POTASSIUM BICARBONATE 50 MEQ: 25 TABLET, EFFERVESCENT ORAL at 21:20

## 2018-03-19 RX ADMIN — OXYCODONE HYDROCHLORIDE 10 MG: 10 TABLET ORAL at 05:38

## 2018-03-19 RX ADMIN — OXYCODONE HYDROCHLORIDE 10 MG: 10 TABLET ORAL at 21:21

## 2018-03-19 RX ADMIN — OXYCODONE HYDROCHLORIDE 10 MG: 10 TABLET ORAL at 10:07

## 2018-03-19 RX ADMIN — SODIUM CHLORIDE TAB 1 GM 2 G: 1 TAB at 17:50

## 2018-03-19 RX ADMIN — ENOXAPARIN SODIUM 30 MG: 100 INJECTION SUBCUTANEOUS at 10:01

## 2018-03-19 RX ADMIN — OMEPRAZOLE 20 MG: 20 CAPSULE, DELAYED RELEASE ORAL at 10:00

## 2018-03-19 RX ADMIN — POTASSIUM BICARBONATE 50 MEQ: 25 TABLET, EFFERVESCENT ORAL at 18:04

## 2018-03-19 RX ADMIN — FLUDROCORTISONE ACETATE 0.1 MG: 0.1 TABLET ORAL at 10:00

## 2018-03-19 RX ADMIN — OXYCODONE HYDROCHLORIDE 10 MG: 10 TABLET ORAL at 17:50

## 2018-03-19 RX ADMIN — POTASSIUM BICARBONATE 50 MEQ: 25 TABLET, EFFERVESCENT ORAL at 05:38

## 2018-03-19 RX ADMIN — CHLORDIAZEPOXIDE HYDROCHLORIDE 25 MG: 25 CAPSULE ORAL at 05:38

## 2018-03-19 RX ADMIN — SODIUM CHLORIDE TAB 1 GM 2 G: 1 TAB at 10:00

## 2018-03-19 RX ADMIN — ENOXAPARIN SODIUM 30 MG: 100 INJECTION SUBCUTANEOUS at 21:21

## 2018-03-19 RX ADMIN — SODIUM CHLORIDE TAB 1 GM 2 G: 1 TAB at 05:38

## 2018-03-19 RX ADMIN — CHLORDIAZEPOXIDE HYDROCHLORIDE 25 MG: 25 CAPSULE ORAL at 21:21

## 2018-03-19 RX ADMIN — FLUDROCORTISONE ACETATE 0.1 MG: 0.1 TABLET ORAL at 21:21

## 2018-03-19 ASSESSMENT — GAIT ASSESSMENTS
GAIT LEVEL OF ASSIST: STAND BY ASSIST
DISTANCE (FEET): 50
ASSISTIVE DEVICE: FRONT WHEEL WALKER

## 2018-03-19 ASSESSMENT — LIFESTYLE VARIABLES
VISUAL DISTURBANCES: NOT PRESENT
AUDITORY DISTURBANCES: NOT PRESENT
PAROXYSMAL SWEATS: NO SWEAT VISIBLE
ANXIETY: NO ANXIETY (AT EASE)
TOTAL SCORE: 4
SUBSTANCE_ABUSE: 1
AGITATION: NORMAL ACTIVITY
HEADACHE, FULLNESS IN HEAD: MILD
TREMOR: NO TREMOR
ORIENTATION AND CLOUDING OF SENSORIUM: CANNOT DO SERIAL ADDITIONS OR IS UNCERTAIN ABOUT DATE
NAUSEA AND VOMITING: MILD NAUSEA WITH NO VOMITING

## 2018-03-19 ASSESSMENT — COGNITIVE AND FUNCTIONAL STATUS - GENERAL
STANDING UP FROM CHAIR USING ARMS: A LITTLE
MOBILITY SCORE: 16
SUGGESTED CMS G CODE MODIFIER MOBILITY: CK
MOVING TO AND FROM BED TO CHAIR: UNABLE
MOVING FROM LYING ON BACK TO SITTING ON SIDE OF FLAT BED: A LOT
CLIMB 3 TO 5 STEPS WITH RAILING: A LITTLE
WALKING IN HOSPITAL ROOM: A LITTLE

## 2018-03-19 ASSESSMENT — ENCOUNTER SYMPTOMS
EYES NEGATIVE: 1
DIZZINESS: 0
MUSCULOSKELETAL NEGATIVE: 1
MYALGIAS: 0
ROS GI COMMENTS: BM 3/17
RESPIRATORY NEGATIVE: 1
GASTROINTESTINAL NEGATIVE: 1
CARDIOVASCULAR NEGATIVE: 1
HEADACHES: 1

## 2018-03-19 ASSESSMENT — PAIN SCALES - GENERAL
PAINLEVEL_OUTOF10: 8
PAINLEVEL_OUTOF10: 7
PAINLEVEL_OUTOF10: 8
PAINLEVEL_OUTOF10: 7

## 2018-03-19 NOTE — PROGRESS NOTES
Pt. Is A/Ox4. C/O headache.  IV intact. Generalized bruising. Poc discussed, bed alarm on, call light within reach.

## 2018-03-19 NOTE — CARE PLAN
Problem: Safety  Goal: Will remain free from injury    Intervention: Provide assistance with mobility  Pt. mobilizing with fww. Unsteady. Needs to have one person assistance to safely make it to the restroom.        Problem: Venous Thromboembolism (VTW)/Deep Vein Thrombosis (DVT) Prevention:  Goal: Patient will participate in Venous Thrombosis (VTE)/Deep Vein Thrombosis (DVT)Prevention Measures  Outcome: PROGRESSING AS EXPECTED  Pt. Does not want the SCDs. Has Lovenox scheduled.     Problem: Pain Management  Goal: Pain level will decrease to patient's comfort goal  Outcome: PROGRESSING AS EXPECTED  Pt.'s headache is a 7/10. Pt. States she didn't really want to take oxycodone, however states she does not feel relief from the Fioricet. Encouraged the oxycodone to prevent pain from passing 7/10.

## 2018-03-19 NOTE — DISCHARGE SUMMARY
DATE OF ADMISSION:  03/04/2018    DATE OF TRANSFER:  03/20/2018    LENGTH OF STAY:  16 days.    ATTENDING PHYSICIAN:  Parveen Roque MD    CONSULTING PHYSICIAN:  1.  Dr. Frankie Menchaca, neurosurgery.  2.  Dr. Nathan Mireles, physiatry.    PROCEDURES:  None.    DISCHARGE DIAGNOSES:  1.  Traumatic intracranial hemorrhage.  2.  Hyponatremia.  3.  Acute hyperactive alcohol withdrawal delirium.  4.  Oropharyngeal dysphagia.  5.  Protein calorie malnutrition, moderate.  6.  Bacterial conjunctivitis of both eyes, resolved   7.  Anemia.    HOSPITAL COURSE:  This is a 48-year-old female who allegedly tripped over her   dog in the evening while drinking alcohol.  She sustained a ground level fall   on the process.  She struck her head and face on the ground.  She does have   report of loss of consciousness.  She was initially triaged as a  in   accordance with pre-established hospital guidelines; however, upon review of   imaging and note of her injury, Dr. Roque was consulted regarding trauma.    On arrival, she underwent extensive radiographic and laboratory studies and   was admitted to the critical care team under the direction and supervision of   Dr. Parveen Roque.  She sustained the above injuries and incurred the above   diagnosis during her stay.    Given her alcohol level as well as her pancytopenia and her intracranial   hemorrhage she was admitted to the intensive care unit.  She remained in the   intensive care unit for approximately 10 days.  At that time, she was   transferred out to the neurosurgical unit where she has remained for her stay.    While in the intensive care unit she underwent acute alcohol withdrawal.  She   was on a sodium drip, which has since been weaned.  She had consults regarding   her intracranial hemorrhage.    Admit imaging noted multiple areas of subarachnoid hemorrhage as well as   subdural hemorrhage.  She had a 4.1 mm shift.  Repeat CT imaging of the brain   was  initially slightly worse.  On March 5th, she had a seizure; however, her   CT scan was stable.  The following day, she had an additional seizure which   was complicated by alcohol withdrawal.  She has now completed her course of   Keppra.  Dr. Menchaca and team were consulted with regards to her intracranial   hemorrhage.  They felt this was nonoperative in management.  She had a   cognitive evaluation, which recommended 24/7 supervision upon discharge.    Her admit blood alcohol level was 0.43.  Her LFTs were elevated.  She was   thrombocytopenic and subsequently developed alcohol withdrawal while in the   intensive care unit.  She had Ativan per [facility] protocol.  She is currently on   scheduled Librium.  She has had no continued signs of acute alcohol   withdrawal.  She does remain shaky and somewhat uncoordinated in her movements   however.    She also suffered oropharyngeal dysphagia.  She initially had a Cortrack with   tube feeds.  On March 15th, speech and language pathology evaluated and   upgraded her diet.  Her Cortrack was removed at that time.  She has been   tolerating her diet without difficulty.    She has suffered some hyponatremia.  Initially, she was placed on a 3% drip.    Florinef and salt tabs were added.  At this time, she is only on salt tabs and   Florinef.  Her sodium drip has been stopped.  Her goal sodium is above 130.    As of this morning, her sodium is 136.    She suffered bacterial conjunctivitis of both eyes.  She did complete 7 days   of ciprofloxacin eyedrops.  At that time, she has no concerning drainage from   her eyes.    She did suffer a significant drop in her hemoglobin after admission.  There   was no evidence of gastrointestinal hemorrhage and her abdominal exams were   benign.  Her CT scan did not show any evidence of intra-abdominal bleeding.    Her serial hemoglobins have since been stable.  She did have iron replacement.    We felt this was probably likely chronic in nature.   Her hemoglobin as of   this morning was 8.6.    As of today, she is tolerating her prescribed diet by speech and language   pathology services.  She is up with therapies.  Again, she is overall   uncoordinated and a bit shaky.  It is recommended that she will benefit from   rehabilitation.  Additionally, she needs additional cognitive services.  She   has had somewhat of a persistent headache.  This seems to be controlled with   some oxycodone.  There was a trial of Fioricet, but she states this did not   help her.  She does have extensive bruising to the right side of her face;   however, there was no underlying fractures on her admit imaging.  As of today,   she is stable for transfer to [facility] where she will continue   to progress and hopefully be able to resume her activities of daily living.    She does have her daughter who will come and stay with her upon discharge for   supervision.    3/20/18 1045 - ADDENDUM: NO INTERVAL CHANGES IN PAST 24 HOURS - INSURANCE AUTH RECEIVED. TRANSFER TO REHAB    DISCHARGE PHYSICAL EXAMINATION:  Please see [facility] tertiary exam dated day of   discharge.    DISCHARGE MEDICATIONS:  1.  Oxycodone 5 mg, may take 1 tab by mouth every 4 hours as needed for pain.  2.  Fioricet 1 tab by mouth every 6 hours for pain.  3.  Librium 25 mg every 8 hours.  4.  Zofran 4 mg every 4 hours as needed p.r.n. nausea.  5.  Florinef 0.1 mg 2 times a day.  6.  Potassium bicarbonate 25 mEq 2 tabs every 8 hours.  7.  Salt tabs 2 g every 6 hours.  8.  Prilosec 20 mg daily.  9.  Diet as prescribed by speech and language pathology services.    DISPOSITION:  The patient will be transferred to [facility] in stable   condition.  She does need to follow up with neurosurgical team upon discharge.    She can follow up with Dr. Roque as needed.  She has been extensively   counseled as to when to seek emergency treatment such as changing condition,   worsening condition, fever, signs and symptoms  of infection, or any other   changes in condition.  She has been advised to stop drinking.  She has been   advised to never drink again.    I have reviewed the narcotic report regarding this patient.  I have completed   the opioid risk assessment tool.       ____________________________________     DAVID TAO    DO / NTS    DD:  03/19/2018 08:27:26  DT:  03/19/2018 09:36:26    D#:  [id number]  Job#:  [id number]    cc: EULALIA HALL MD, Nathan Mireles MD

## 2018-03-19 NOTE — THERAPY
"Pt w/impaired balance, gait, coordination, and activity tolerance. Pt w/impulsivity, requiring verbal cuing for correction. Pt demonstrated veering like gait (to the L), suspect 2/2 dizziness. Pt deomonstrated R sided deficits w/coordination activities, requiring verbal cuing and SBA. Pt would benefit from further acute PT txs to progress towards goals and independence. Would recommend post acute placemement to address deficits.    Physical Therapy Treatment completed.   Bed Mobility:  Supine to Sit: Supervised  Transfers: Sit to Stand: Stand by Assist  Gait: Level Of Assist: Stand by Assist with Front-Wheel Walker       Plan of Care: Will benefit from Physical Therapy 4 times per week    See \"Rehab Therapy-Acute\" Patient Summary Report for complete documentation.       "

## 2018-03-19 NOTE — PROGRESS NOTES
"  Trauma/Surgical Progress Note    Author: Vianney Jj Date & Time created: 3/19/2018   9:18 AM     Interval Events:    Sodium stable  Tolerating diet  Adequate pain control  Participating in therapies  Medically cleared for rehab - insurance auth pending  Tertiary complete - no further findings  Dictated - [id number]    Review of Systems   Constitutional: Positive for malaise/fatigue.   HENT:        Facial pain   Eyes: Negative.    Respiratory: Negative.    Cardiovascular: Negative.    Gastrointestinal: Negative.         BM 3/17   Genitourinary:        Voiding    Musculoskeletal: Negative.  Negative for myalgias.   Neurological: Positive for headaches. Negative for dizziness.   Psychiatric/Behavioral: Positive for substance abuse.   All other systems reviewed and are negative.    Hemodynamics:  Blood pressure 130/80, pulse 78, temperature 36.1 °C (97 °F), resp. rate 16, height 1.727 m (5' 8\"), weight 60.1 kg (132 lb 7.9 oz), SpO2 91 %.     Respiratory:    Respiration: 16, Pulse Oximetry: 91 %           Fluids:    Intake/Output Summary (Last 24 hours) at 03/19/18 0918  Last data filed at 03/19/18 0600   Gross per 24 hour   Intake              620 ml   Output                0 ml   Net              620 ml     Admit Weight: 69 kg (152 lb 1.9 oz)  Current      Physical Exam   Constitutional: She is oriented to person, place, and time. She appears well-developed. No distress.   HENT:   Right facial ecchymosis and swelling    Eyes: Right conjunctiva has a hemorrhage. Left conjunctiva is not injected.   Neck: Normal range of motion.   Cardiovascular: Normal rate and regular rhythm.    Pulmonary/Chest: Effort normal and breath sounds normal. No respiratory distress.   Abdominal: Soft. She exhibits no distension.   Musculoskeletal: Normal range of motion.   Neurological: She is alert and oriented to person, place, and time. GCS eye subscore is 4. GCS verbal subscore is 5. GCS motor subscore is 6.   No recall of event     "   Skin: Skin is warm and dry.   Psychiatric: She has a normal mood and affect.   Nursing note and vitals reviewed.      Medical Decision Making/Problem List:    Active Hospital Problems    Diagnosis   • Hyponatremia [E87.1]     Priority: High     Progressive downward trend to critical levels  NaCl tabs, Florinef, and 3% infusion  Goal <130  Continue salt tablets as well as Florinef  3/16 Stopped gtt   3/19 Sodium 136     • Traumatic intracranial hemorrhage (CMS-HCC) [S06.309A]     Priority: High     Multiple areas of SAH and SDH, 4.1mm shift  Repeat CT imaging of the brain stable to slightly worse  3/5 seizure @1515, repeat CT scan was stable  Further seizures 3/6, assessment complicated by alcohol withdrawal  Cog eval - 24/7 supervision   Course of Keppra completed     Non-operative management.   Lorne Menchaca MD. Neurosurgery.     • Acute hyperactive alcohol withdrawal delirium (CMS-HCC) [F10.231]     Priority: High     DEYANIRA 0.43 on admission; LFTs elevated, thrombocytopenic and subsequently developed alcohol withdrawal  Ativan WA protocol,  Scheduled Librium initially  3/13 agitation reasonably controlled on Librium  RASS score is appropriate        • Oropharyngeal dysphagia [R13.12]     Priority: Medium     3/15 SLP eval - diet added - cortrak removed  3/16 Tolerating diet      • Moderate protein-calorie malnutrition (CMS-HCC) [E44.0]     Priority: Medium     Tube feeding per protocol.      • Bacterial conjunctivitis of both eyes [H10.9]     Priority: Low     Ciprofloxacin eyedrops x 7 days   Completed     • Anemia [D64.9]     Priority: Low     Significant hemoglobin drop after admission  No evidence of gastrointestinal hemorrhage, abdomen examination is benign  Considering liver function tests abdominal CT scan was completed and no showed no evidence of intra-abdominal bleeding  Serial hemoglobin stable   Iron replacement  Likely chronic      • Pharmacologic contraindication to deep vein thrombosis (DVT)  prophylaxis [Z53.09]     Priority: Low     Prophylactic Lovenox initially contraindicated due to elevated bleeding risk  3/7 duplex with no evidence of superficial or deep venous thrombosis.   3/8 Lovenox initiated      • Trauma [T14.90XA]     Priority: Low     GLF. Tripped over dogs, landed on face.  Non trauma activation.       Core Measures & Quality Metrics:  Labs reviewed and Medications reviewed  Pemberton catheter: No Pemberton      DVT Prophylaxis: Enoxaparin (Lovenox)        Assessed for rehab: Patient was assess for and/or received rehabilitation services during this hospitalization    Total Score: 7  ETOH Screening     Intervention complete date: 3/17/2018  Patient response to intervention: Not coopertive with answering questions, concerning substance abuse.- remains evasive .   Patient does not demonstrat understanding of intervention.Plan of care:    has not been contacted.Follow up with: Clinic  Total ETOH intervention time: 15 - 30 mintues    Discussed patient condition with RN, Patient and trauma surgery. Dr. DEBBY Zepeda     Patient seen, data reviewed and discussed.  Agree with assessment and plan.  AMBAR

## 2018-03-19 NOTE — PROGRESS NOTES
"Patient states feels better but still has some  Headache.  \"States can see better in rt eye but still blurry.\" Head pain 8/10 received Prn pain medication. RA at this time.  "

## 2018-03-19 NOTE — PROGRESS NOTES
Received report from night RN at bedside. Pt shows no signs of pain or distress. Bed in low position call bell within reach half side rails up. Pt resting quietly. Will continue to assess.

## 2018-03-19 NOTE — PROGRESS NOTES
Gave report to night rn at bedside in sbar format. Pt aware of shift change. Pt shows no signs of pain or distress will continue to assess. Bed in low position call bell within reach half side rails up will continue to assess,

## 2018-03-20 ENCOUNTER — HOSPITAL ENCOUNTER (INPATIENT)
Facility: REHABILITATION | Age: 48
LOS: 16 days | DRG: 949 | End: 2018-04-05
Attending: PHYSICAL MEDICINE & REHABILITATION | Admitting: PHYSICAL MEDICINE & REHABILITATION
Payer: COMMERCIAL

## 2018-03-20 ENCOUNTER — RESOLUTE PROFESSIONAL BILLING HOSPITAL PROF FEE (OUTPATIENT)
Dept: PHYSICAL MEDICINE AND REHAB | Facility: REHABILITATION | Age: 48
End: 2018-03-20
Payer: COMMERCIAL

## 2018-03-20 VITALS
WEIGHT: 132.5 LBS | RESPIRATION RATE: 16 BRPM | BODY MASS INDEX: 20.08 KG/M2 | SYSTOLIC BLOOD PRESSURE: 102 MMHG | DIASTOLIC BLOOD PRESSURE: 54 MMHG | HEART RATE: 80 BPM | HEIGHT: 68 IN | OXYGEN SATURATION: 90 % | TEMPERATURE: 97.8 F

## 2018-03-20 DIAGNOSIS — R45.86 LABILITY EMOTIONAL: ICD-10-CM

## 2018-03-20 DIAGNOSIS — F10.10 ALCOHOL ABUSE: ICD-10-CM

## 2018-03-20 PROBLEM — Z74.09 IMPAIRED MOBILITY AND ADLS: Status: ACTIVE | Noted: 2018-03-20

## 2018-03-20 PROBLEM — R26.89 BALANCE DISORDER: Status: ACTIVE | Noted: 2018-03-20

## 2018-03-20 PROBLEM — R46.89 COGNITIVE AND BEHAVIORAL CHANGES: Status: ACTIVE | Noted: 2018-03-20

## 2018-03-20 PROBLEM — R41.89 COGNITIVE AND BEHAVIORAL CHANGES: Status: ACTIVE | Noted: 2018-03-20

## 2018-03-20 PROBLEM — Z78.9 IMPAIRED MOBILITY AND ADLS: Status: ACTIVE | Noted: 2018-03-20

## 2018-03-20 PROCEDURE — 94760 N-INVAS EAR/PLS OXIMETRY 1: CPT

## 2018-03-20 PROCEDURE — 700111 HCHG RX REV CODE 636 W/ 250 OVERRIDE (IP): Performed by: PHYSICAL MEDICINE & REHABILITATION

## 2018-03-20 PROCEDURE — A9270 NON-COVERED ITEM OR SERVICE: HCPCS | Performed by: NEUROLOGICAL SURGERY

## 2018-03-20 PROCEDURE — 700102 HCHG RX REV CODE 250 W/ 637 OVERRIDE(OP): Performed by: SURGERY

## 2018-03-20 PROCEDURE — 99223 1ST HOSP IP/OBS HIGH 75: CPT | Performed by: PHYSICAL MEDICINE & REHABILITATION

## 2018-03-20 PROCEDURE — A9270 NON-COVERED ITEM OR SERVICE: HCPCS | Performed by: SURGERY

## 2018-03-20 PROCEDURE — A9270 NON-COVERED ITEM OR SERVICE: HCPCS | Performed by: PHYSICAL MEDICINE & REHABILITATION

## 2018-03-20 PROCEDURE — HZ2ZZZZ DETOXIFICATION SERVICES FOR SUBSTANCE ABUSE TREATMENT: ICD-10-PCS | Performed by: PHYSICAL MEDICINE & REHABILITATION

## 2018-03-20 PROCEDURE — A9270 NON-COVERED ITEM OR SERVICE: HCPCS | Performed by: NURSE PRACTITIONER

## 2018-03-20 PROCEDURE — 700102 HCHG RX REV CODE 250 W/ 637 OVERRIDE(OP): Performed by: PHYSICAL MEDICINE & REHABILITATION

## 2018-03-20 PROCEDURE — 700102 HCHG RX REV CODE 250 W/ 637 OVERRIDE(OP): Performed by: NURSE PRACTITIONER

## 2018-03-20 PROCEDURE — 700112 HCHG RX REV CODE 229: Performed by: PHYSICAL MEDICINE & REHABILITATION

## 2018-03-20 PROCEDURE — 770010 HCHG ROOM/CARE - REHAB SEMI PRIVAT*

## 2018-03-20 PROCEDURE — 700111 HCHG RX REV CODE 636 W/ 250 OVERRIDE (IP): Performed by: SURGERY

## 2018-03-20 PROCEDURE — 700102 HCHG RX REV CODE 250 W/ 637 OVERRIDE(OP): Performed by: NEUROLOGICAL SURGERY

## 2018-03-20 RX ORDER — OXYCODONE HYDROCHLORIDE 5 MG/1
5 TABLET ORAL EVERY 4 HOURS PRN
Status: DISCONTINUED | OUTPATIENT
Start: 2018-03-20 | End: 2018-03-29

## 2018-03-20 RX ORDER — ALUMINA, MAGNESIA, AND SIMETHICONE 2400; 2400; 240 MG/30ML; MG/30ML; MG/30ML
20 SUSPENSION ORAL
Status: DISCONTINUED | OUTPATIENT
Start: 2018-03-20 | End: 2018-04-05 | Stop reason: HOSPADM

## 2018-03-20 RX ORDER — ACETAMINOPHEN 325 MG/1
650 TABLET ORAL EVERY 6 HOURS
Status: COMPLETED | OUTPATIENT
Start: 2018-03-20 | End: 2018-03-25

## 2018-03-20 RX ORDER — ONDANSETRON 4 MG/1
4 TABLET, ORALLY DISINTEGRATING ORAL 4 TIMES DAILY PRN
Status: DISCONTINUED | OUTPATIENT
Start: 2018-03-20 | End: 2018-04-05 | Stop reason: HOSPADM

## 2018-03-20 RX ORDER — AMOXICILLIN 250 MG
1 CAPSULE ORAL EVERY EVENING
Status: DISCONTINUED | OUTPATIENT
Start: 2018-03-20 | End: 2018-04-05 | Stop reason: HOSPADM

## 2018-03-20 RX ORDER — TRAZODONE HYDROCHLORIDE 50 MG/1
50 TABLET ORAL
Status: DISCONTINUED | OUTPATIENT
Start: 2018-03-20 | End: 2018-04-05 | Stop reason: HOSPADM

## 2018-03-20 RX ORDER — OMEPRAZOLE 20 MG/1
20 CAPSULE, DELAYED RELEASE ORAL DAILY
Status: CANCELLED | OUTPATIENT
Start: 2018-03-21

## 2018-03-20 RX ORDER — POLYVINYL ALCOHOL 14 MG/ML
1 SOLUTION/ DROPS OPHTHALMIC PRN
Status: DISCONTINUED | OUTPATIENT
Start: 2018-03-20 | End: 2018-04-05 | Stop reason: HOSPADM

## 2018-03-20 RX ORDER — BUTALBITAL, ACETAMINOPHEN AND CAFFEINE 50; 325; 40 MG/1; MG/1; MG/1
1 TABLET ORAL EVERY 6 HOURS PRN
Status: DISCONTINUED | OUTPATIENT
Start: 2018-03-20 | End: 2018-03-20

## 2018-03-20 RX ORDER — HYDRALAZINE HYDROCHLORIDE 25 MG/1
25 TABLET, FILM COATED ORAL EVERY 8 HOURS PRN
Status: DISCONTINUED | OUTPATIENT
Start: 2018-03-20 | End: 2018-04-05 | Stop reason: HOSPADM

## 2018-03-20 RX ORDER — FLUDROCORTISONE ACETATE 0.1 MG/1
0.1 TABLET ORAL 2 TIMES DAILY
Status: CANCELLED | OUTPATIENT
Start: 2018-03-20

## 2018-03-20 RX ORDER — ACETAMINOPHEN 325 MG/1
650 TABLET ORAL EVERY 4 HOURS PRN
Status: DISCONTINUED | OUTPATIENT
Start: 2018-03-25 | End: 2018-04-02

## 2018-03-20 RX ORDER — BUTALBITAL, ACETAMINOPHEN AND CAFFEINE 50; 325; 40 MG/1; MG/1; MG/1
1 TABLET ORAL EVERY 6 HOURS PRN
Status: CANCELLED | OUTPATIENT
Start: 2018-03-20

## 2018-03-20 RX ORDER — OMEPRAZOLE 20 MG/1
20 CAPSULE, DELAYED RELEASE ORAL DAILY
Status: DISCONTINUED | OUTPATIENT
Start: 2018-03-21 | End: 2018-04-05 | Stop reason: HOSPADM

## 2018-03-20 RX ORDER — CHLORDIAZEPOXIDE HYDROCHLORIDE 25 MG/1
25 CAPSULE, GELATIN COATED ORAL EVERY 8 HOURS
Status: CANCELLED | OUTPATIENT
Start: 2018-03-20

## 2018-03-20 RX ORDER — ACETAMINOPHEN 500 MG
1000 TABLET ORAL EVERY 6 HOURS
Status: DISCONTINUED | OUTPATIENT
Start: 2018-03-20 | End: 2018-03-20

## 2018-03-20 RX ORDER — DULOXETIN HYDROCHLORIDE 30 MG/1
30 CAPSULE, DELAYED RELEASE ORAL DAILY
Status: DISCONTINUED | OUTPATIENT
Start: 2018-03-20 | End: 2018-03-26

## 2018-03-20 RX ORDER — BISACODYL 10 MG
10 SUPPOSITORY, RECTAL RECTAL
Status: DISCONTINUED | OUTPATIENT
Start: 2018-03-20 | End: 2018-04-05 | Stop reason: HOSPADM

## 2018-03-20 RX ORDER — CHLORDIAZEPOXIDE HYDROCHLORIDE 25 MG/1
25 CAPSULE, GELATIN COATED ORAL EVERY 8 HOURS
Status: DISCONTINUED | OUTPATIENT
Start: 2018-03-20 | End: 2018-03-26

## 2018-03-20 RX ORDER — DOCUSATE SODIUM 100 MG/1
100 CAPSULE, LIQUID FILLED ORAL DAILY
Status: DISCONTINUED | OUTPATIENT
Start: 2018-03-20 | End: 2018-04-05 | Stop reason: HOSPADM

## 2018-03-20 RX ORDER — ONDANSETRON 2 MG/ML
4 INJECTION INTRAMUSCULAR; INTRAVENOUS 4 TIMES DAILY PRN
Status: DISCONTINUED | OUTPATIENT
Start: 2018-03-20 | End: 2018-04-05 | Stop reason: HOSPADM

## 2018-03-20 RX ORDER — SODIUM CHLORIDE 1 G/1
2 TABLET ORAL EVERY 6 HOURS
Status: DISCONTINUED | OUTPATIENT
Start: 2018-03-20 | End: 2018-03-23

## 2018-03-20 RX ORDER — BUTALBITAL, ACETAMINOPHEN AND CAFFEINE 50; 325; 40 MG/1; MG/1; MG/1
1 TABLET ORAL 3 TIMES DAILY PRN
Status: DISCONTINUED | OUTPATIENT
Start: 2018-03-20 | End: 2018-03-20

## 2018-03-20 RX ORDER — FLUDROCORTISONE ACETATE 0.1 MG/1
0.1 TABLET ORAL 2 TIMES DAILY
Status: DISCONTINUED | OUTPATIENT
Start: 2018-03-20 | End: 2018-03-26

## 2018-03-20 RX ORDER — SODIUM CHLORIDE 1 G/1
2 TABLET ORAL EVERY 6 HOURS
Status: CANCELLED | OUTPATIENT
Start: 2018-03-20

## 2018-03-20 RX ORDER — ACETAMINOPHEN 325 MG/1
650 TABLET ORAL EVERY 4 HOURS PRN
Status: DISCONTINUED | OUTPATIENT
Start: 2018-03-20 | End: 2018-03-20

## 2018-03-20 RX ORDER — LACTULOSE 20 G/30ML
30 SOLUTION ORAL
Status: DISCONTINUED | OUTPATIENT
Start: 2018-03-20 | End: 2018-04-05 | Stop reason: HOSPADM

## 2018-03-20 RX ADMIN — POTASSIUM BICARBONATE 50 MEQ: 25 TABLET, EFFERVESCENT ORAL at 20:41

## 2018-03-20 RX ADMIN — Medication 2 G: at 12:42

## 2018-03-20 RX ADMIN — DOCUSATE SODIUM 100 MG: 100 CAPSULE, LIQUID FILLED ORAL at 12:42

## 2018-03-20 RX ADMIN — OXYCODONE HYDROCHLORIDE 5 MG: 5 TABLET ORAL at 21:53

## 2018-03-20 RX ADMIN — POTASSIUM BICARBONATE 50 MEQ: 25 TABLET, EFFERVESCENT ORAL at 14:42

## 2018-03-20 RX ADMIN — SODIUM CHLORIDE TAB 1 GM 2 G: 1 TAB at 00:44

## 2018-03-20 RX ADMIN — OMEPRAZOLE 20 MG: 20 CAPSULE, DELAYED RELEASE ORAL at 10:14

## 2018-03-20 RX ADMIN — OXYCODONE HYDROCHLORIDE 10 MG: 10 TABLET ORAL at 06:13

## 2018-03-20 RX ADMIN — Medication 2 G: at 17:16

## 2018-03-20 RX ADMIN — ACETAMINOPHEN 650 MG: 325 TABLET, FILM COATED ORAL at 17:16

## 2018-03-20 RX ADMIN — DULOXETINE HYDROCHLORIDE 30 MG: 30 CAPSULE, DELAYED RELEASE ORAL at 17:16

## 2018-03-20 RX ADMIN — CHLORDIAZEPOXIDE HYDROCHLORIDE 25 MG: 25 CAPSULE ORAL at 20:39

## 2018-03-20 RX ADMIN — ENOXAPARIN SODIUM 30 MG: 100 INJECTION SUBCUTANEOUS at 10:14

## 2018-03-20 RX ADMIN — ACETAMINOPHEN 1000 MG: 500 TABLET ORAL at 12:42

## 2018-03-20 RX ADMIN — FLUDROCORTISONE ACETATE 0.1 MG: 0.1 TABLET ORAL at 10:14

## 2018-03-20 RX ADMIN — Medication 2 G: at 23:41

## 2018-03-20 RX ADMIN — SODIUM CHLORIDE TAB 1 GM 2 G: 1 TAB at 06:13

## 2018-03-20 RX ADMIN — ENOXAPARIN SODIUM 30 MG: 100 INJECTION SUBCUTANEOUS at 20:41

## 2018-03-20 RX ADMIN — STANDARDIZED SENNA CONCENTRATE AND DOCUSATE SODIUM 1 TABLET: 8.6; 5 TABLET, FILM COATED ORAL at 20:40

## 2018-03-20 RX ADMIN — ACETAMINOPHEN 650 MG: 325 TABLET, FILM COATED ORAL at 23:41

## 2018-03-20 RX ADMIN — OXYCODONE HYDROCHLORIDE 10 MG: 10 TABLET ORAL at 10:14

## 2018-03-20 RX ADMIN — CHLORDIAZEPOXIDE HYDROCHLORIDE 25 MG: 25 CAPSULE ORAL at 06:13

## 2018-03-20 RX ADMIN — OXYCODONE HYDROCHLORIDE 10 MG: 10 TABLET ORAL at 00:44

## 2018-03-20 RX ADMIN — POTASSIUM BICARBONATE 50 MEQ: 25 TABLET, EFFERVESCENT ORAL at 06:14

## 2018-03-20 RX ADMIN — OXYCODONE HYDROCHLORIDE 5 MG: 5 TABLET ORAL at 17:16

## 2018-03-20 RX ADMIN — CHLORDIAZEPOXIDE HYDROCHLORIDE 25 MG: 25 CAPSULE ORAL at 14:42

## 2018-03-20 RX ADMIN — FLUDROCORTISONE ACETATE 0.1 MG: 0.1 TABLET ORAL at 20:40

## 2018-03-20 ASSESSMENT — LIFESTYLE VARIABLES
TOTAL SCORE: 3
TOTAL SCORE: 3
CONSUMPTION TOTAL: POSITIVE
EVER FELT BAD OR GUILTY ABOUT YOUR DRINKING: YES
EVER HAD A DRINK FIRST THING IN THE MORNING TO STEADY YOUR NERVES TO GET RID OF A HANGOVER: NO
HOW MANY TIMES IN THE PAST YEAR HAVE YOU HAD 5 OR MORE DRINKS IN A DAY: 0
TOTAL SCORE: 3
ON A TYPICAL DAY WHEN YOU DRINK ALCOHOL HOW MANY DRINKS DO YOU HAVE: 2
EVER_SMOKED: YES
DOES PATIENT WANT TO STOP DRINKING: YES
EVER_SMOKED: YES
EVER_SMOKED: YES
DOES PATIENT WANT TO TALK TO SOMEONE ABOUT QUITTING: NO
HAVE PEOPLE ANNOYED YOU BY CRITICIZING YOUR DRINKING: YES
DO YOU DRINK ALCOHOL: YES
AVERAGE NUMBER OF DAYS PER WEEK YOU HAVE A DRINK CONTAINING ALCOHOL: 7
HAVE YOU EVER FELT YOU SHOULD CUT DOWN ON YOUR DRINKING: YES
SUBSTANCE_ABUSE: 1

## 2018-03-20 ASSESSMENT — PAIN SCALES - GENERAL
PAINLEVEL_OUTOF10: 8
PAINLEVEL_OUTOF10: 6
PAINLEVEL_OUTOF10: 8
PAINLEVEL_OUTOF10: 6

## 2018-03-20 ASSESSMENT — PATIENT HEALTH QUESTIONNAIRE - PHQ9
SUM OF ALL RESPONSES TO PHQ9 QUESTIONS 1 AND 2: 0
SUM OF ALL RESPONSES TO PHQ QUESTIONS 1-9: 0
1. LITTLE INTEREST OR PLEASURE IN DOING THINGS: NOT AT ALL
2. FEELING DOWN, DEPRESSED, IRRITABLE, OR HOPELESS: NOT AT ALL

## 2018-03-20 ASSESSMENT — ENCOUNTER SYMPTOMS
GASTROINTESTINAL NEGATIVE: 1
ROS GI COMMENTS: BM 3/18
DIZZINESS: 0
MYALGIAS: 0
CARDIOVASCULAR NEGATIVE: 1
HEADACHES: 1
MUSCULOSKELETAL NEGATIVE: 1
BLURRED VISION: 1
RESPIRATORY NEGATIVE: 1

## 2018-03-20 NOTE — PROGRESS NOTES
Patient admitted to facility at 1200  via wheelchair; accompanied by hospital transport.  Patient assisted to room and positioned in bed for comfort and safety; call light within reach.  Patient assisted with stowing belongings and oriented to room and facility.  Admission assessment performed and documented in computer. Patient received and reviewed education binder. Admission paperwork completed; signed copies placed in chart.  Will continue to monitor.

## 2018-03-20 NOTE — PROGRESS NOTES
Pt. Is A/Ox4. States pain is 8/10. IV intact. Ambulated to restroom. Poc discussed for possible d/c tomorrow. Bed alarm on, call light within reach.

## 2018-03-20 NOTE — DISCHARGE PLANNING
Medical Social Work    Referral:  [facility]    Intervention:  SW received tc from Emeka Lee LPN at [facility].  Pt will transfer at 11:30 am.      SW met with pt bedside.  COBRA authorization obtained.    COBRA placed in pt chart.  Bedside RN notified.    Plan:  SW will remain available for dc planning

## 2018-03-20 NOTE — PROGRESS NOTES
"  Trauma/Surgical Progress Note    Author: Vianney Jj Date & Time created: 3/20/2018   9:24 AM     Interval Events:  Remains medically cleared for post acute services  Rehab pending insurance auth    Review of Systems   Constitutional: Positive for malaise/fatigue.   HENT:        Facial pain   Eyes: Positive for blurred vision.   Respiratory: Negative.    Cardiovascular: Negative.    Gastrointestinal: Negative.         BM 3/18   Genitourinary:        Voiding    Musculoskeletal: Negative.  Negative for myalgias.   Neurological: Positive for headaches. Negative for dizziness.   Psychiatric/Behavioral: Positive for substance abuse.   All other systems reviewed and are negative.    Hemodynamics:  Blood pressure 102/54, pulse 80, temperature 36.6 °C (97.8 °F), resp. rate 16, height 1.727 m (5' 8\"), weight 60.1 kg (132 lb 7.9 oz), SpO2 90 %.     Respiratory:    Respiration: 16, Pulse Oximetry: 90 %     Work Of Breathing / Effort: Mild  RUL Breath Sounds: Clear, RML Breath Sounds: Clear, RLL Breath Sounds: Clear, CARLI Breath Sounds: Clear, LLL Breath Sounds: Clear  Fluids:    Intake/Output Summary (Last 24 hours) at 03/20/18 0924  Last data filed at 03/20/18 0600   Gross per 24 hour   Intake             1100 ml   Output                0 ml   Net             1100 ml     Admit Weight: 69 kg (152 lb 1.9 oz)  Current      Physical Exam   Constitutional: She is oriented to person, place, and time. She appears well-developed. No distress.   HENT:   Right facial ecchymosis and swelling    Eyes: Right conjunctiva has a hemorrhage. Left conjunctiva is not injected.   Neck: Normal range of motion.   Cardiovascular: Normal rate and regular rhythm.    Pulmonary/Chest: Effort normal and breath sounds normal. No respiratory distress.   Abdominal: Soft. She exhibits no distension.   Musculoskeletal: Normal range of motion.   Neurological: She is alert and oriented to person, place, and time. GCS eye subscore is 4. GCS verbal subscore " is 5. GCS motor subscore is 6.   No recall of event       Skin: Skin is warm and dry.   Psychiatric: She has a normal mood and affect.   Nursing note and vitals reviewed.      Medical Decision Making/Problem List:    Active Hospital Problems    Diagnosis   • Hyponatremia [E87.1]     Priority: High     Progressive downward trend to critical levels  NaCl tabs, Florinef, and 3% infusion  Goal <130  Continue salt tablets as well as Florinef  3/16 Stopped gtt   3/19 Sodium 136      • Traumatic intracranial hemorrhage (CMS-HCC) [S06.309A]     Priority: High     Multiple areas of SAH and SDH, 4.1mm shift  Repeat CT imaging of the brain stable to slightly worse  3/5 seizure @1515, repeat CT scan was stable  Further seizures 3/6, assessment complicated by alcohol withdrawal  Cog eval - 24/7 supervision   Course of Keppra completed     Non-operative management.    Lorne Menchaca MD. Neurosurgery.     • Acute hyperactive alcohol withdrawal delirium (CMS-HCC) [F10.231]     Priority: High     DEYANIRA 0.43 on admission; LFTs elevated, thrombocytopenic and subsequently developed alcohol withdrawal  Ativan WA protocol,  Scheduled Librium initially  3/13 agitation reasonably controlled on Librium  RASS score is appropriate        • Oropharyngeal dysphagia [R13.12]     Priority: Medium     3/15 SLP eval - diet added - cortrak removed  3/16 Tolerating diet      • Moderate protein-calorie malnutrition (CMS-HCC) [E44.0]     Priority: Low     Tube feeding per protocol.   Tolerating diet      • Bacterial conjunctivitis of both eyes [H10.9]     Priority: Low     Ciprofloxacin eyedrops x 7 days   Completed     • Anemia [D64.9]     Priority: Low     Significant hemoglobin drop after admission  No evidence of gastrointestinal hemorrhage, abdomen examination is benign  Considering liver function tests abdominal CT scan was completed and no showed no evidence of intra-abdominal bleeding  Serial hemoglobin stable   Iron replacement  Likely chronic       • Pharmacologic contraindication to deep vein thrombosis (DVT) prophylaxis [Z53.09]     Priority: Low     Prophylactic Lovenox initially contraindicated due to elevated bleeding risk  3/7 duplex with no evidence of superficial or deep venous thrombosis.   3/8 Lovenox initiated      • Trauma [T14.90XA]     Priority: Low     GLF. Tripped over dogs, landed on face.  Non trauma activation.       Core Measures & Quality Metrics:  Medications reviewed  Pemberton catheter: No Pemberton      DVT Prophylaxis: Enoxaparin (Lovenox)    Ulcer prophylaxis: Not indicated    Assessed for rehab: Patient was assess for and/or received rehabilitation services during this hospitalization    Total Score: 7  ETOH Screening     Intervention complete date: 3/20/2018  Patient response to intervention: remians evasive about ETOH however - recommended outpatient services .   Patient demonstrats understanding of intervention.Plan of care: outpatient follow up     has been contacted.Follow up with: Clinic  Total ETOH intervention time: 15 - 30 mintues  Discussed patient condition with RN, Patient and trauma surgery. Dr. Roque     Patient seen, data reviewed and discussed.  Agree with assessment and plan.     D/c planning.    Parveen Roque MD  [phone]

## 2018-03-20 NOTE — NON-PROVIDER
REHABILITATION HISTORY AND PHYSICAL/POST ADMISSION EVALUATION    3/20/2018  2:55 PM  Terri Krishnan  [id number]  Admission  3/20/2018 12:06 PM  Reason for admission: Debility from intracranial hemorrhage secondary to GLF    HPI:  The patient is a 48 y.o. female with a past medical history of alcohol and tobacco abuse; now admitted for acute inpatient rehabilitation with severe functional debility following intracranial hemorrhage secondary to ground level fall on 3/4/18.  On admission the patient and medical record report that she tripped over a dog and fell while drinking alcohol and experienced loss of consciousness. CT showed right subarachnoid hemorrhage, trace left frontal subdural hemorrhage, and 6 mm midline shift to the left. Repeat scan a week later showed reduction in subarachnoid and subdural hemorrhage but unchanged 6 mm shift. Her hospital course was complicated by seizure and alcohol withdrawal, as well as hyponatremia, hypokalemia, dysphagia, bilateral bacterial conjunctivitis, and anemia.      Patient was evaluated by Rehab Medicine physician and Physical Therapy, Occupational Therapy and Speech Therapy and determined to be appropriate for acute inpatient rehab and was transferred to [facility] on 3/20/18.    She is oriented to place, date, and condition. She describes decreased strength, particularly in her back, difficulty with balance, and impaired dexterity with poor handwriting. She complains of headache and blurred vision in the right eye. In addition, her glasses were broken in the fall and she does not have contacts with her.     Pre-mobidly, the patient lived in a two level home in [place] with a significant other who has since moved out.  With this acute therapeutic intervention, this patient hopes to improve her functional status, and return to independent living with the supportive care of daughter.         REVIEW OF SYSTEMS:     A 12 point review of systems was performed  and was negative in detail with the exception of items mentioned elsewhere in this document.      PMH:  Past Medical History:   Diagnosis Date   • Surgical menopause    • Tobacco dependence        PSH:  Past Surgical History:   Procedure Laterality Date   • ABDOMINAL HYSTERECTOMY TOTAL  age 27    Total       FAMILY HISTORY:  Father - drug-induced cardiomyopathy  Mother - living, age 68        MEDICATIONS:  Current Facility-Administered Medications   Medication Dose   • Respiratory Care per Protocol     • Pharmacy Consult Request ...Pain Management Review 1 Each  1 Each   • docusate sodium (COLACE) capsule 100 mg  100 mg    And   • senna-docusate (PERICOLACE or SENOKOT S) 8.6-50 MG per tablet 1 Tab  1 Tab    And   • lactulose 20 GM/30ML solution 30 mL  30 mL    And   • bisacodyl (DULCOLAX) suppository 10 mg  10 mg   • artificial tears 1.4 % ophthalmic solution 1 Drop  1 Drop   • benzocaine-menthol (CEPACOL) lozenge 1 Lozenge  1 Lozenge   • hydrALAZINE (APRESOLINE) tablet 25 mg  25 mg   • mag hydrox-al hydrox-simeth (MAALOX PLUS ES or MYLANTA DS) suspension 20 mL  20 mL   • ondansetron (ZOFRAN ODT) dispertab 4 mg  4 mg    Or   • ondansetron (ZOFRAN) syringe/vial injection 4 mg  4 mg   • traZODone (DESYREL) tablet 50 mg  50 mg   • chlordiazePOXIDE (LIBRIUM) capsule 25 mg  25 mg   • enoxaparin (LOVENOX) inj 30 mg  30 mg   • fludrocortisone (FLORINEF) tablet 0.1 mg  0.1 mg   • [START ON 3/21/2018] omeprazole (PRILOSEC) capsule 20 mg  20 mg   • potassium bicarbonate (KLYTE) 25 MEQ effervescent tablet TBEF 50 mEq  50 mEq   • sodium chloride (SALT) tablet 2 g  2 g   • acetaminophen (TYLENOL) tablet 650 mg  650 mg   • [START ON 3/25/2018] acetaminophen (TYLENOL) tablet 650 mg  650 mg   • acetaminophen/caffeine/butalbital 325-40-50 mg (FIORICET) -40 MG per tablet 1 Tab  1 Tab       ALLERGIES:  Shrimp (diagnostic) and Shrimp flavor    PSYCHOSOCIAL HISTORY:  Living Site:  Home  Living With:  Significant other (now  ")  Caregiver's availability:  Not Applicable  Number of stairs: 2 stairs into home, 2 story house  Substance use history:  Alcohol and tobacco abuse    LEVEL OF FUNCTION PRIOR TO DISABILTY:      LEVEL OF FUNCTION PRIOR TO ADMISSION to [facility]:  Pt presents w/ decreased activity tolerance, decreased functional mobility, decreased balance, decreased homemaking skills, decreased sequencing, problem solving, safety awareness and decreased overall functional independence indicating a need for Acute OT services. Min A grooming EOB, Min A LB dressing, Mod A supine>sit, Min A scooting in bed, Min A sit>stand, CGA functional transfers. Speech minimally slowed in rate with intermittent delayed response time to verbal stimuli.    CURRENT LEVEL OF FUNCTION:   Same as level of function prior to admission to [facility]    PHYSICAL EXAM:     VITAL SIGNS:   height is 1.727 m (5' 8\") and weight is 57.2 kg (126 lb). Her temperature is 36.3 °C (97.4 °F). Her blood pressure is 102/62 and her pulse is 80. Her respiration is 18 and oxygen saturation is 94%.     GENERAL: No apparent distress  HEENT: Ecchymotic right side of face, and bilateral submandibular and suborbital region, EOMI and PERRL  CARDIAC: Regular rate and rhythm, normal S1, S2   LUNGS: Clear to auscultation   ABDOMINAL: bowel sounds present, soft, nontender and nondistended    EXTREMITIES: no contractures, spasticity, or edema.  No calf tenderness bilaterally, 2+ bilateral DP/PT pulses.    NEURO:    Mental status:  A&Ox4 (person, place, date, situation) answers questions appropriately follows commands  Speech: fluent, no aphasia or dysarthria  Slowed processing  Impaired working memory, more difficulty with verbal than visual     CRANIAL NERVES:  2,3: visual acuity grossly intact, PERRL  3,4,6: EOMI bilaterally, no nystagmus or diplopia  5: intact in all branches  7: no facial asymmetry  8: hearing " grossly intact  9,10: symmetric palate elevation  11: SCM/Trapezius strength 5/5 bilaterally  12: tongue protrudes midline    Motor:      Elbow flexors:  Bilateral -  5/5  Wrist extensors:  Bilateral -  5/5  Wrist flexors:  Bilateral -  4/5  Elbow extensors:  Bilateral -  5/5  Hip flexors:  Bilateral -  5/5  Knee ext:  Bilateral -  4/5  Sensory: intact to light touch through out    DTRs: 2+ in bilateral biceps and patellar tendons      Tone: no spasticity noted    Proprioception: intact   Coordination: finger to nose, fine motor delayed with fingers to thumb    RADIOLOGY:  CT head w/o 3/12: 1)  Interval decrease in subarachnoid and subdural hemorrhage from the prior exam previously described left subdural hemorrhage is not appreciated on the current exam; 2)  6 mm midline shift to the left is unchanged.    LABS:  Recent Labs      03/18/18   0354  03/19/18   0255   SODIUM  137  136   POTASSIUM  3.5*  3.6   CHLORIDE  105  104   CO2  24  25   GLUCOSE  105*  102*   BUN  11  8   CREATININE  0.48*  0.56   CALCIUM  9.1  8.9                 PRIMARY REHAB DIAGNOSIS:    This patient is a 48 y.o. female admitted for acute inpatient rehabilitation with debility following intracranial hemorrhage secondary to a GLF.     IMPAIRMENTS:   Cognitive  ADLs/IADLs  Mobility    SECONDARY DIAGNOSIS/MEDICAL CO-MORBIDITIES AFFECTING FUNCTION:  Hypokalemia  Hyponatremia     RELEVANT CHANGES SINCE PREADMISSION EVALUATION:    Status unchanged    The patient's rehabilitation potential is Excellent  The patient's medical prognosis is good    PLAN:   Discussion and Recommendations:   1. The patient requires an acute inpatient rehabilitation program with a coordinated program of care at an intensity and frequency not available at a lower level of care. This recommendation is substantiated by the patient's medical physicians who recommend that the patient's intervention and assessment of medical issues needs to be done at an acute level of care for  patient's safety and maximum outcome.   2. A coordinated program of care will be supplied by an interdisciplinary team of physical therapy, occupational therapy, rehab physician, rehab nursing, and, if needed, speech therapy and rehab psychology. Rehab team presents a patient-specific rehabilitation and education program concentrating on prevention of future problems related to accessibility, mobility, skin, bowel, bladder, sexuality, and psychosocial and medical/surgical problems.   3. Need for Rehabilitation Physician: The rehab physician will be evaluating the patient on a multi-weekly basis to help coordinate the program of care. The rehab physician communicates between medical physicians, therapists, and nurses to maximize the patient's potential outcome. Specific areas in which the rehab physician will be providing daily assessment include the following:   A. Assessing the patient's heart rate and blood pressure response (vitals monitoring) to activity and making adjustments in medications or conservative measures as needed.   B. The rehab physician will be assessing the frequency at which the program can be increased to allow the patient to reach optimal functional outcome.   C. The rehab physician will also provide assessments in daily skin care, especially in light of patient's impairments in mobility.   D. The rehab physician will provide special expertise in understanding how to work with functional impairment and recommend appropriate interventions, compensatory techniques, and education that will facilitate the patient's outcome.   4. Rehab R.N.   The rehab RN will be working with patient to carry over in room mobility and activities of daily living when the patient is not in 3 hours of skilled therapy. Rehab nursing will be working in conjunction with rehab physician to address all the medical issues above and continue to assess laboratory work and discuss abnormalities with the treating physicians,  assess vitals, and response to activity, and discuss and report abnormalities with the rehab physician. Rehab RN will also continue daily skin care, supervise bladder/bowel program, instruct in medication administration, and ensure patient safety.     E. Therapies to treat at intensity and frequency of (may change after completion of evaluation by all therapeutic disciplines):       PT:  Physical therapy to address mobility, transfer, gait training and evaluation for adaptive equipment needs 1hour/day at least 5 days/week for the duration of the ELOS (see below)       OT:  Occupational therapy to address ADLs, self-care, home management training, functional mobility/transfers and assistive device evaluation, and community re-integration 1hour/day at least 5 days/week for the duration of the ELOS (see below).        ST/Dysphagia:  Speech therapy to address speech, language, and cognitive deficits as well as swallowing difficulties with retraining/dysphagia management and community re-integration with comprehension, expression, cognitive training 1hour/day at least 5 days/week for the duration of the ELOS (see below).     F. Medical management / Rehabilitation Issues/ Adverse Potential as part of rehabilitation plan         1. TBI (Traumatic Brain Injury):Patient demonstrates functional deficits in cognition, behavior, strength, balance, coordination, and ADL's. The patient requires therapy to correct these deficits prior to discharge. Patient is admitted to [facility] for comprehensive rehabilitation therapy, including physical, occupational and speech therapy. Rehabilitation nursing monitors bowel and bladder control, educates on medication administration, co-morbidities and monitors patient safety.      2.  Neurostimulants: None at this time but continue to assess daily for need to initiate should status change.    3.  DVT prophylaxis:  Patient is on lovenox for anticoagulation upon transfer.  Encourage OOB. Monitor daily for signs and symptoms of DVT including but not limited to swelling and pain to prevent the development of DVT that may interfere with therapies.  .    4.  Pain:  currently Controlled with oxycodone.   - Headache. Fioricet did not work for her in the hospital, and she was controlled with 10 mg oxycodone. We will give her 5 mg starting today with plans to discontinue within the next few days. We will also start cymbalta today for pain relief, with the intention that it will also help with emotional lability. She received Librium for alcohol withdrawal which has not been discontinued and may be slowing cognition. She endorses that the headache worsens with concentration, so we will work to taper dose of Librium.     5. Electrolyte abnormalities: hypokalemia and hyponatremia. Continue to monitor BMP  - Hypokalemia - continue potassium bicarbonate  - Hyponatremia - continue salt tabs and florinef    6.  Nutrition/Dysphagia: Dietician monitors nutrient intake, recommend supplements prn and provide nutrition education to pt/family to promote optimal nutrition for wound healing/recovery.     7.  Bladder/bowel:  Start bowel and bladder program as described below, to prevent constipation, urinary retention (which may lead to UTI), and urinary incontinence (which will impact upon pt's functional independence).   - TV Q3h while awake with post void bladder scans, I&O cath for PVRs >400  - up to commode after meal     8.  Skin/dermal ulcer prophylaxis: Monitor for new skin conditions with q.2 h. turns as required to prevent the development of skin breakdown.     9.  Cognition/Behavior:  Psychologist Dr. Navarro provides adjustment counseling to illness and psychosocial barriers that may be potential barriers to rehabilitation.     10. Respiratory therapy: RT performs O2 management prn, breathing retraining, pulmonary hygiene and bronchospasm management prn to optimize participation in  therapies.    MEDICAL CO-MORBIDITIES/ADVERSE POTENTIAL AFFECTING FUNCTION:  Cognitive Impairment    Pt was seen today for 60 min, and entire time spent in face-to-face contact was >50% in counseling and coordination of care as detailed in A/P above.        GOALS/EXPECTED LEVEL OF FUNCTION BASED ON CURRENT MEDICAL AND FUNCTIONAL STATUS (may change based on patient's medical status and rate of impairment recovery):  Transfers:   Independent  Mobility/Gait:   Independent  ADL's:   Independent  Cognition:  Return to baseline    DISPOSITION: Discharge to pre-morbid independent living setting with the supportive care of patient's family.

## 2018-03-20 NOTE — DISCHARGE PLANNING
F/U with client at bedside to discuss IRF referral. Explained specifics of inpatient rehab, answered questions/concerns. Pt agreeable to admission. Pt tells me she will have her daughter Kenneth living with her at discharge. Pt aware insurance has authorized inpatient rehab.Provided [facility] contact information for any additional questions. Pt voiced understanding that transport to [facility] scheduled for 11:30a today.     Daughter Kenneth updated regarding Rehab admission. Provided [facility] contact information.

## 2018-03-20 NOTE — FLOWSHEET NOTE
03/20/18 1241   Type of Assessment   Assessment Yes   Patient History   Pulmonary Diagnosis no   Home O2 No   Home Treatments/Frequency No   COPD Risk Screening   Do you have a history of COPD? No   Smoking History   Have you ever smoked Yes   Have you smoked in the last 12 months Yes   Have you quit smoking No   Smoking Cessation Offered Patient Counseled   Level Of Consciousness   Level of Consciousness Alert   Respiratory WDL   Respiratory (WDL) X   Chest Exam   Respiration 18   Pulse 74   Breath Sounds   Pre/Post Intervention (clear t/o)   Oximetry   #Pulse Oximetry (Single Determination) Yes   Oxygen   Home O2 Use Prior To Admission? No   Pulse Oximetry 96 %   O2 Daily Delivery Respiratory  Room Air with O2 Available

## 2018-03-20 NOTE — PROGRESS NOTES
Discharge instructions provided and reviewed w/pt. All questions answered. Pt left w/hospital escort via wheel chair. All belongings taken. Report given to SHELLEY George at [facility].

## 2018-03-20 NOTE — DISCHARGE INSTRUCTIONS
Discharge Instructions    Discharged to other by [facility] with escort. Discharged via wheelchair, hospital escort: Yes.  Special equipment needed: Not Applicable    Be sure to schedule a follow-up appointment with your primary care doctor or any specialists as instructed.     Discharge Plan:   Diet Plan: Discussed  Activity Level: Discussed  Smoking Cessation Offered: Patient Refused  Confirmed Follow up Appointment: Patient to Call and Schedule Appointment  Confirmed Symptoms Management: Discussed  Medication Reconciliation Updated: Yes  Influenza Vaccine Indication: Patient Refuses    I understand that a diet low in cholesterol, fat, and sodium is recommended for good health. Unless I have been given specific instructions below for another diet, I accept this instruction as my diet prescription.   Other diet: Dysphagia 3 w/thins     Special Instructions: None    · Is patient discharged on Warfarin / Coumadin?   No     Depression / Suicide Risk    As you are discharged from this [facility] facility, it is important to learn how to keep safe from harming yourself.    Recognize the warning signs:  · Abrupt changes in personality, positive or negative- including increase in energy   · Giving away possessions  · Change in eating patterns- significant weight changes-  positive or negative  · Change in sleeping patterns- unable to sleep or sleeping all the time   · Unwillingness or inability to communicate  · Depression  · Unusual sadness, discouragement and loneliness  · Talk of wanting to die  · Neglect of personal appearance   · Rebelliousness- reckless behavior  · Withdrawal from people/activities they love  · Confusion- inability to concentrate     If you or a loved one observes any of these behaviors or has concerns about self-harm, here's what you can do:  · Talk about it- your feelings and reasons for harming yourself  · Remove any means that you might use to hurt yourself (examples: pills, rope, extension  cords, firearm)  · Get professional help from the community (Mental Health, Substance Abuse, psychological counseling)  · Do not be alone:Call your Safe Contact- someone whom you trust who will be there for you.  · Call your local CRISIS HOTLINE 416-2814 or 797-740-0356  · Call your local Children's Mobile Crisis Response Team [place] (671) 239-3357 or www.[employer]  · Call the toll free National Suicide Prevention Hotlines   · National Suicide Prevention Lifeline 557-890-AXPF (5557)  · National Hope Line Network 800-SUICIDE (898-2424)

## 2018-03-20 NOTE — CARE PLAN
Problem: Safety  Goal: Will remain free from falls  Outcome: PROGRESSING AS EXPECTED  Pt. Got out of bed alone making an attempt to get a blanket from across the room. Pt. Started to wobble and I put her back to bed and got her blanket for her. Reinforced education using call light first to prevent falling.     Problem: Discharge Barriers/Planning  Goal: Patient's continuum of care needs will be met  Outcome: PROGRESSING AS EXPECTED  Waiting for authorization from insurance before she goes to [facility] tomorrow.

## 2018-03-21 LAB
25(OH)D3 SERPL-MCNC: 31 NG/ML (ref 30–100)
BASOPHILS # BLD AUTO: 2.5 % (ref 0–1.8)
BASOPHILS # BLD: 0.12 K/UL (ref 0–0.12)
EOSINOPHIL # BLD AUTO: 0.34 K/UL (ref 0–0.51)
EOSINOPHIL NFR BLD: 7 % (ref 0–6.9)
ERYTHROCYTE [DISTWIDTH] IN BLOOD BY AUTOMATED COUNT: 56.2 FL (ref 35.9–50)
HCT VFR BLD AUTO: 33.3 % (ref 37–47)
HGB BLD-MCNC: 10.7 G/DL (ref 12–16)
IMM GRANULOCYTES # BLD AUTO: 0.03 K/UL (ref 0–0.11)
IMM GRANULOCYTES NFR BLD AUTO: 0.6 % (ref 0–0.9)
LYMPHOCYTES # BLD AUTO: 1.71 K/UL (ref 1–4.8)
LYMPHOCYTES NFR BLD: 35.3 % (ref 22–41)
MCH RBC QN AUTO: 35.7 PG (ref 27–33)
MCHC RBC AUTO-ENTMCNC: 32.1 G/DL (ref 33.6–35)
MCV RBC AUTO: 111 FL (ref 81.4–97.8)
MONOCYTES # BLD AUTO: 0.38 K/UL (ref 0–0.85)
MONOCYTES NFR BLD AUTO: 7.9 % (ref 0–13.4)
NEUTROPHILS # BLD AUTO: 2.26 K/UL (ref 2–7.15)
NEUTROPHILS NFR BLD: 46.7 % (ref 44–72)
NRBC # BLD AUTO: 0 K/UL
NRBC BLD-RTO: 0 /100 WBC
PLATELET # BLD AUTO: 651 K/UL (ref 164–446)
PMV BLD AUTO: 8.8 FL (ref 9–12.9)
PREALB SERPL-MCNC: 18 MG/DL (ref 18–38)
RBC # BLD AUTO: 3 M/UL (ref 4.2–5.4)
WBC # BLD AUTO: 4.8 K/UL (ref 4.8–10.8)

## 2018-03-21 PROCEDURE — 770010 HCHG ROOM/CARE - REHAB SEMI PRIVAT*

## 2018-03-21 PROCEDURE — 92610 EVALUATE SWALLOWING FUNCTION: CPT

## 2018-03-21 PROCEDURE — 92523 SPEECH SOUND LANG COMPREHEN: CPT

## 2018-03-21 PROCEDURE — 700112 HCHG RX REV CODE 229: Performed by: PHYSICAL MEDICINE & REHABILITATION

## 2018-03-21 PROCEDURE — 97530 THERAPEUTIC ACTIVITIES: CPT

## 2018-03-21 PROCEDURE — 97166 OT EVAL MOD COMPLEX 45 MIN: CPT

## 2018-03-21 PROCEDURE — A9270 NON-COVERED ITEM OR SERVICE: HCPCS | Performed by: PHYSICAL MEDICINE & REHABILITATION

## 2018-03-21 PROCEDURE — 700111 HCHG RX REV CODE 636 W/ 250 OVERRIDE (IP): Performed by: PHYSICAL MEDICINE & REHABILITATION

## 2018-03-21 PROCEDURE — 36415 COLL VENOUS BLD VENIPUNCTURE: CPT

## 2018-03-21 PROCEDURE — 97163 PT EVAL HIGH COMPLEX 45 MIN: CPT

## 2018-03-21 PROCEDURE — 700102 HCHG RX REV CODE 250 W/ 637 OVERRIDE(OP): Performed by: PHYSICAL MEDICINE & REHABILITATION

## 2018-03-21 PROCEDURE — 97535 SELF CARE MNGMENT TRAINING: CPT

## 2018-03-21 PROCEDURE — 82306 VITAMIN D 25 HYDROXY: CPT

## 2018-03-21 PROCEDURE — 85025 COMPLETE CBC W/AUTO DIFF WBC: CPT

## 2018-03-21 PROCEDURE — 84134 ASSAY OF PREALBUMIN: CPT

## 2018-03-21 RX ADMIN — Medication 2 G: at 23:47

## 2018-03-21 RX ADMIN — CHLORDIAZEPOXIDE HYDROCHLORIDE 25 MG: 25 CAPSULE ORAL at 05:10

## 2018-03-21 RX ADMIN — POTASSIUM BICARBONATE 50 MEQ: 25 TABLET, EFFERVESCENT ORAL at 20:13

## 2018-03-21 RX ADMIN — CHLORDIAZEPOXIDE HYDROCHLORIDE 25 MG: 25 CAPSULE ORAL at 20:13

## 2018-03-21 RX ADMIN — ENOXAPARIN SODIUM 30 MG: 100 INJECTION SUBCUTANEOUS at 08:22

## 2018-03-21 RX ADMIN — OXYCODONE HYDROCHLORIDE 5 MG: 5 TABLET ORAL at 07:20

## 2018-03-21 RX ADMIN — CHLORDIAZEPOXIDE HYDROCHLORIDE 25 MG: 25 CAPSULE ORAL at 14:15

## 2018-03-21 RX ADMIN — OXYCODONE HYDROCHLORIDE 5 MG: 5 TABLET ORAL at 20:13

## 2018-03-21 RX ADMIN — ACETAMINOPHEN 650 MG: 325 TABLET, FILM COATED ORAL at 11:44

## 2018-03-21 RX ADMIN — POTASSIUM BICARBONATE 50 MEQ: 25 TABLET, EFFERVESCENT ORAL at 14:15

## 2018-03-21 RX ADMIN — FLUDROCORTISONE ACETATE 0.1 MG: 0.1 TABLET ORAL at 08:23

## 2018-03-21 RX ADMIN — POTASSIUM BICARBONATE 50 MEQ: 25 TABLET, EFFERVESCENT ORAL at 05:09

## 2018-03-21 RX ADMIN — ACETAMINOPHEN 650 MG: 325 TABLET, FILM COATED ORAL at 05:09

## 2018-03-21 RX ADMIN — STANDARDIZED SENNA CONCENTRATE AND DOCUSATE SODIUM 1 TABLET: 8.6; 5 TABLET, FILM COATED ORAL at 20:13

## 2018-03-21 RX ADMIN — OXYCODONE HYDROCHLORIDE 5 MG: 5 TABLET ORAL at 14:22

## 2018-03-21 RX ADMIN — ENOXAPARIN SODIUM 30 MG: 100 INJECTION SUBCUTANEOUS at 20:12

## 2018-03-21 RX ADMIN — Medication 2 G: at 17:40

## 2018-03-21 RX ADMIN — Medication 2 G: at 11:44

## 2018-03-21 RX ADMIN — ACETAMINOPHEN 650 MG: 325 TABLET, FILM COATED ORAL at 17:40

## 2018-03-21 RX ADMIN — DULOXETINE HYDROCHLORIDE 30 MG: 30 CAPSULE, DELAYED RELEASE ORAL at 08:23

## 2018-03-21 RX ADMIN — OMEPRAZOLE 20 MG: 20 CAPSULE, DELAYED RELEASE ORAL at 08:23

## 2018-03-21 RX ADMIN — ACETAMINOPHEN 650 MG: 325 TABLET, FILM COATED ORAL at 23:47

## 2018-03-21 RX ADMIN — DOCUSATE SODIUM 100 MG: 100 CAPSULE, LIQUID FILLED ORAL at 08:23

## 2018-03-21 RX ADMIN — FLUDROCORTISONE ACETATE 0.1 MG: 0.1 TABLET ORAL at 20:13

## 2018-03-21 RX ADMIN — Medication 2 G: at 05:09

## 2018-03-21 ASSESSMENT — BRIEF INTERVIEW FOR MENTAL STATUS (BIMS)
BIMS SUMMARY SCORE: 13
GENERAL MEMORY AND RECALL ABILITY: RECOGNIZES APPROPRIATE HEALTHCARE SETTING;CURRENT SEASON
WHAT YEAR IS IT: CORRECT
ASKED TO RECALL SOCK: NO, COULD NOT RECALL
ASKED TO RECALL BED: YES, NO CUE REQUIRED
INITIAL REPETITION OF BED BLUE SOCK - FIRST ATTEMPT: 3
WHAT DAY OF THE WEEK IS IT: CORRECT
WHAT MONTH IS IT: ACCURATE WITHIN 5 DAYS
ASKED TO RECALL BLUE: YES, NO CUE REQUIRED

## 2018-03-21 ASSESSMENT — PAIN SCALES - GENERAL
PAINLEVEL_OUTOF10: 8
PAINLEVEL_OUTOF10: 7
PAINLEVEL_OUTOF10: 8

## 2018-03-21 ASSESSMENT — GAIT ASSESSMENTS
DISTANCE (FEET): 200
GAIT LEVEL OF ASSIST: MINIMAL ASSIST

## 2018-03-21 ASSESSMENT — ACTIVITIES OF DAILY LIVING (ADL): TOILETING: INDEPENDENT
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
## 2018-03-21 NOTE — THERAPY
Speech Therapy Initial Plan of Care Note    1) Assessment:  Patient is 48 y.o. female with a diagnosis of severe functional debility after a ground-level fall with resultant intracranial hemorrhage.  Additional factors influencing patient status / progress (ie: cognitive factors, co-morbidities, social support, etc): independent PLOF, lives alone, TBI, ETOH abuse, tobacco use, impulsive, reduced safety awareness.  Long term and short term goals have been discussed with patient and they are in agreement.  2) Plan:  Recommend Speech Therapy  minutes per day 5-7 days per week for 2 weeks for the following treatments:  SLP Cognitive Skill Development, SLP Group Therapy.  3) Goals:  Please refer to care plan for goals.

## 2018-03-21 NOTE — REHAB-PHARMACY IDT TEAM NOTE
Pharmacy   Pharmacy  Antibiotics/Antifungals/Antivirals:  Medication:      Active Orders     None        Route:         None  Stop Date:  N/A  Reason:   Antihypertensives/Cardiac:  Medication:    Orders (72h ago through future)    Start     Ordered    03/20/18 1042  hydrALAZINE (APRESOLINE) tablet 25 mg  EVERY 8 HOURS PRN      03/20/18 1042        Patient Vitals for the past 24 hrs:   BP Pulse   03/21/18 1300 113/74 84   03/21/18 1130 110/65 86   03/21/18 1045 104/71 96   03/21/18 0653 132/82 78   03/21/18 0510 112/80 76   03/20/18 2039 108/70 89       Anticoagulation:  Medication:  lovenox  INR:      Other key medications:      A review of the medication list reveals no issues at this time.      Section completed by:  Vishnu Alvarado [facility]

## 2018-03-21 NOTE — PROGRESS NOTES
Pat from Lab telephoned, patient HgB 3/21/18 is 10.7, last value was 8.6 on 3/17/18, will notify MD.

## 2018-03-21 NOTE — THERAPY
Occupational Therapy Initial Plan of Care Note    1) Assessment:  Patient is 48 y.o. female with a diagnosis of debility from intracranial hemorrhage secondary to GLF on 3/4/18 with PMH of alcohol and tobacco abuse.  Additional factors influencing patient status / progress (ie: cognitive factors, co-morbidities, social support, etc): decreased FMC, balance, and activity tolerance for ADLs further complicated by cognitive deficits, visual deficits, and mild impulsivity.  Long term and short term goals have been discussed with patient and they are in agreement.  2) Plan:  Recommend Occupational Therapy  minutes per day 5-7 days per week for 10-14 days for the following treatments:  OT Group Therapy, OT Self Care/ADL, OT Cognitive Skill Dev, OT Community Reintegration, OT Manual Ther Technique, OT Neuro Re-Ed/Balance, OT Therapeutic Activity, OT Evaluation and OT Therapeutic Exercise.  3) Goals:  Please refer to care plan for goals.

## 2018-03-21 NOTE — NON-PROVIDER
"Rehab Progress Note     Interval Events (Subjective)  Ms. Krishnan began therapy today, fell while arising from wheelchair unassisted and sustained a left wrist abrasion. Her headache is unchanged from yesterday.     Objective:  VITAL SIGNS: /82   Pulse 78   Temp 36.4 °C (97.5 °F)   Resp 18   Ht 1.727 m (5' 8\")   Wt 57.2 kg (126 lb)   SpO2 93%   Breastfeeding? No   BMI 19.16 kg/m²     Physical Exam:    HEENT: Improvement in facial ecchymoses and subconjunctival hemorrhage from yesterday   CV: Regular rhythm, normal S1 S2, no murmurs  Pulm: Lungs clear to auscultation bilaterally   Neuro: Poor phonological and semantic fluency, affective go/no-go response, poor balance, falls backwards to the left, Left>Right balance on single foot   MSK: Plantar flexion 5/5 bilaterally, dorsiflexion 5/5 bilaterally       Recent Results (from the past 72 hour(s))   Magnesium: Every Monday and Thursday AM    Collection Time: 03/19/18  2:55 AM   Result Value Ref Range    Magnesium 1.7 1.5 - 2.5 mg/dL   Phosphorus: Every Monday and Thursday AM    Collection Time: 03/19/18  2:55 AM   Result Value Ref Range    Phosphorus 4.2 2.5 - 4.5 mg/dL   BASIC METABOLIC PANEL    Collection Time: 03/19/18  2:55 AM   Result Value Ref Range    Sodium 136 135 - 145 mmol/L    Potassium 3.6 3.6 - 5.5 mmol/L    Chloride 104 96 - 112 mmol/L    Co2 25 20 - 33 mmol/L    Glucose 102 (H) 65 - 99 mg/dL    Bun 8 8 - 22 mg/dL    Creatinine 0.56 0.50 - 1.40 mg/dL    Calcium 8.9 8.5 - 10.5 mg/dL    Anion Gap 7.0 0.0 - 11.9   ESTIMATED GFR    Collection Time: 03/19/18  2:55 AM   Result Value Ref Range    GFR If African American >60 >60 mL/min/1.73 m 2    GFR If Non African American >60 >60 mL/min/1.73 m 2   CBC with Differential    Collection Time: 03/21/18  5:51 AM   Result Value Ref Range    WBC 4.8 4.8 - 10.8 K/uL    RBC 3.00 (L) 4.20 - 5.40 M/uL    Hemoglobin 10.7 (L) 12.0 - 16.0 g/dL    Hematocrit 33.3 (L) 37.0 - 47.0 %    .0 (H) 81.4 - 97.8 " fL    MCH 35.7 (H) 27.0 - 33.0 pg    MCHC 32.1 (L) 33.6 - 35.0 g/dL    RDW 56.2 (H) 35.9 - 50.0 fL    Platelet Count 651 (H) 164 - 446 K/uL    MPV 8.8 (L) 9.0 - 12.9 fL    Neutrophils-Polys 46.70 44.00 - 72.00 %    Lymphocytes 35.30 22.00 - 41.00 %    Monocytes 7.90 0.00 - 13.40 %    Eosinophils 7.00 (H) 0.00 - 6.90 %    Basophils 2.50 (H) 0.00 - 1.80 %    Immature Granulocytes 0.60 0.00 - 0.90 %    Nucleated RBC 0.00 /100 WBC    Neutrophils (Absolute) 2.26 2.00 - 7.15 K/uL    Lymphs (Absolute) 1.71 1.00 - 4.80 K/uL    Monos (Absolute) 0.38 0.00 - 0.85 K/uL    Eos (Absolute) 0.34 0.00 - 0.51 K/uL    Baso (Absolute) 0.12 0.00 - 0.12 K/uL    Immature Granulocytes (abs) 0.03 0.00 - 0.11 K/uL    NRBC (Absolute) 0.00 K/uL   Prealbumin    Collection Time: 03/21/18  5:51 AM   Result Value Ref Range    Pre-Albumin 18.0 18.0 - 38.0 mg/dL   Vitamin D, 25-hydroxy (blood)    Collection Time: 03/21/18  5:51 AM   Result Value Ref Range    25-Hydroxy   Vitamin D 25 31 30 - 100 ng/mL       Current Facility-Administered Medications   Medication Frequency   • Respiratory Care per Protocol Continuous RT   • Pharmacy Consult Request ...Pain Management Review 1 Each PRN   • docusate sodium (COLACE) capsule 100 mg DAILY    And   • senna-docusate (PERICOLACE or SENOKOT S) 8.6-50 MG per tablet 1 Tab Q EVENING    And   • lactulose 20 GM/30ML solution 30 mL Q24HRS PRN    And   • bisacodyl (DULCOLAX) suppository 10 mg QDAY PRN   • artificial tears 1.4 % ophthalmic solution 1 Drop PRN   • benzocaine-menthol (CEPACOL) lozenge 1 Lozenge Q2HRS PRN   • hydrALAZINE (APRESOLINE) tablet 25 mg Q8HRS PRN   • mag hydrox-al hydrox-simeth (MAALOX PLUS ES or MYLANTA DS) suspension 20 mL Q2HRS PRN   • ondansetron (ZOFRAN ODT) dispertab 4 mg 4X/DAY PRN    Or   • ondansetron (ZOFRAN) syringe/vial injection 4 mg 4X/DAY PRN   • traZODone (DESYREL) tablet 50 mg QHS PRN   • chlordiazePOXIDE (LIBRIUM) capsule 25 mg Q8HRS   • enoxaparin (LOVENOX) inj 30 mg Q12HRS    • fludrocortisone (FLORINEF) tablet 0.1 mg BID   • omeprazole (PRILOSEC) capsule 20 mg DAILY   • potassium bicarbonate (KLYTE) 25 MEQ effervescent tablet TBEF 50 mEq Q8HRS   • sodium chloride (SALT) tablet 2 g Q6HRS   • acetaminophen (TYLENOL) tablet 650 mg Q6HRS   • [START ON 3/25/2018] acetaminophen (TYLENOL) tablet 650 mg Q4HRS PRN   • oxyCODONE immediate-release (ROXICODONE) tablet 5 mg Q4HRS PRN   • DULoxetine (CYMBALTA) capsule 30 mg DAILY       Orders Placed This Encounter   Procedures   • DIET ORDER     Standing Status:   Standing     Number of Occurrences:   1     Order Specific Question:   Diet:     Answer:   Regular [1]     Order Specific Question:   Texture/Fiber modifications:     Answer:   Dysphagia 3(Mechanical Soft)specify fluid consistency(question 6) [3]     Order Specific Question:   Consistency/Fluid modifications:     Answer:   Thin Liquids [3]       Assessment:  Active Hospital Problems    Diagnosis   • *Traumatic intracranial hemorrhage (CMS-HCC)   • Hyponatremia   • Acute hyperactive alcohol withdrawal delirium (CMS-HCC)   • Moderate protein-calorie malnutrition (CMS-HCC)   • Anemia   • Alcohol abuse   • Cognitive and behavioral changes   • Lability emotional   • Impaired mobility and ADLs   • Balance disorder   • CARSON (generalized anxiety disorder)   • Tobacco dependence     48 y.o. Female with PMH of alcohol abuse and tobacco use here for debility following intracranial hemorrhage secondary to GLF on 3/4/18.     Medical Decision Making and Plan:    TBI - Intracranial hemorrhage secondary to GLF  - Inpatient for acute rehab therapy    History of alcohol abuse - S/p acute alcohol withdrawal in hospital, started on Librium   - Goal: decrease Librium as benzodiazapines have the potential to slow recovery    Pain - Headache  - Cymbalta - pain relief and possible effect on emotional lability  - Roxicodone - Pain uncontrolled by any other medication while inpatient. Plan to taper off within the  week.    Electrolyte abnormalities - hypokalemia and hyponatremia, continue to monitor  BMP  - Hypokalemia - continue potassium bicarbonate, 3.6 on 3/19  - Hyponatremia - 136 on 3/19, will slowly wean salt tabs and florinef     Anemia - H/H 10.7/33.3, , RDW 56.2 on 3/21  - Consider iron replacement  - Continue to monitor thrombocytopenia that was present on admission    Cognitive deficits - Cymbalta for stimulant effects, consider additional stimulant    Emotional lability - Cymbalta     Bacterial conjunctivitis - Resolved following course of antibiotics, continue to monitor     Protein malnutrition - Monitor, consider supplementation           Amy Schroeder, Student

## 2018-03-21 NOTE — CARE PLAN
Problem: Communication  Goal: The ability to communicate needs accurately and effectively will improve  Outcome: PROGRESSING AS EXPECTED  Pt is able to effectively communicate her needs to staff.    Problem: Safety  Goal: Will remain free from injury  Outcome: PROGRESSING AS EXPECTED  Pt uses call light consistently for staff assistance. Pt has good safety awareness, no impulsivity observed. Pt is moderate fall risk with history of recent fall. Alarms in place and pt in room close to nursing station for safety.     Problem: Infection  Goal: Will remain free from infection  Outcome: PROGRESSING AS EXPECTED  No s/s of infection present    Problem: Bowel/Gastric:  Goal: Normal bowel function is maintained or improved  Outcome: PROGRESSING AS EXPECTED  Pt is continent of bowel with BM this shift

## 2018-03-21 NOTE — REHAB-OT IDT TEAM NOTE
Occupational Therapy   Activities of Daily Living  Eating Initial:  7 - Independent  Eating Current:  7 - Independent   Eating Description:     Grooming Initial:  5 - Standby Prompting/Supervision or Set-up  Grooming Current:  5 - Standby Prompting/Supervision or Set-up   Grooming Description:   (Setup seated for oral care and hair care)  Bathing Initial:  5 - Standby Prompting/Supervision or Set-up  Bathing Current:  5 - Standby Prompting/Supervision or Set-up   Bathing Description:   (SBA for sit/stand bathing, pt stood for 20% of bathing for buttocks and required mod v/c for initiation of sitting when attempting to wash legs. Pt used GB for balance when standing. Pt able of 10/10 parts)  Upper Body Dressing Initial:  4 - Minimal Assistance  Upper Body Dressing Current:  4 - Minimal Assistance   Upper Body Dressing Description:   (Min A to zip bra and to draw down pullover shirt.)  Lower Body Dressing Initial:  5 - Standby Prompting/Supervsion or Set-up  Lower Body Dressing Current:  5 - Standby Prompting/Supervsion or Set-up   Lower Body Dressing Description:  5 - Standby Prompting/Supervsion or Set-up  Toileting Initial:  5 - Standby Prompting/Supervision or Set-up  Toileting Current:  5 - Standby Prompting/Supervision or Set-up   Toileting Description:   (SPV for 3/3 tasks)  Toilet Transfer Initial:  5 - Standby Prompting/Supervision or Set-up  Toilet Transfer Current:  5 - Standby Prompting/Supervision or Set-up   Toilet Transfer Description:  5 - Standby Prompting/Supervision or Set-up  Tub / Shower Transfer Initial:  5 - Standby Prompting/Supervision or Set-up  Tub / Shower Transfer Current:  5 - Standby Prompting/Supervision or Set-up   Tub / Shower Transfer Description:   (SPV SPT w/c<>shower bench using GB for balance)  IADL:  TBA, new admit   Family Training/Education:  New admit, pt briefly educated on changes in processing speed/memory with TBI. No FT at this time.   DME/DC Recommendations:  Pt owns no  equipment, and it is unclear if pt will require any DME at this time as pt is new admit.     Strengths:  Able to follow instructions, Alert and oriented, Effective communication skills, Independent PLOF, Motivated for self care and independence, Pleasant and cooperative, Supportive family and Willingly participates in therapeutic activities  Barriers:  Decreased endurance, Home accessibility, Impulsive, Limited mobility, Poor activity tolerance, Poor balance, Poor carryover of learning and Poor insight/denial of deficits     # of short term goals set= 3    # of short term goals met= 0, New admit           Occupational Therapy Goals           Problem: Dressing     Dates: Start: 03/21/18       Goal: STG-Within one week, patient will dress UB     Dates: Start: 03/21/18       Description: 1) Individualized Goal:  At a SPV level using AE or compensatory techniques PRN  2) Interventions:  OT Group Therapy, OT Self Care/ADL, OT Cognitive Skill Dev, OT Community Reintegration, OT Manual Ther Technique, OT Neuro Re-Ed/Balance, OT Therapeutic Activity, OT Evaluation and OT Therapeutic Exercise             Problem: Functional Cognition     Dates: Start: 03/21/18       Goal: STG-Within one week, patient will demonstrate safety awareness     Dates: Start: 03/21/18       Description: 1) Individualized Goal:  By locking w/c breaks on 3/5 opportunities with min v/c.  2) Interventions:  OT Group Therapy, OT Self Care/ADL, OT Cognitive Skill Dev, OT Community Reintegration, OT Manual Ther Technique, OT Neuro Re-Ed/Balance, OT Therapeutic Activity, OT Evaluation and OT Therapeutic Exercise             Problem: Functional Transfers     Dates: Start: 03/21/18       Goal: STG-Within one week, patient will transfer to toilet     Dates: Start: 03/21/18       Description: 1) Individualized Goal:  At a SPV level using DME/AE with no cues for safety  2) Interventions:  OT Group Therapy, OT Self Care/ADL, OT Cognitive Skill Dev, OT Community  Reintegration, OT Manual Ther Technique, OT Neuro Re-Ed/Balance, OT Therapeutic Activity, OT Evaluation and OT Therapeutic Exercise             Problem: Grooming     Dates: Start: 03/21/18       Goal: STG-Within one week, patient will complete grooming     Dates: Start: 03/21/18       Description: 1) Individualized Goal:  In standing with SPV for oral care and hair care with no LOB using DME PRN and min cues for safety  2) Interventions:  OT Group Therapy, OT Self Care/ADL, OT Cognitive Skill Dev, OT Community Reintegration, OT Manual Ther Technique, OT Neuro Re-Ed/Balance, OT Therapeutic Activity, OT Evaluation and OT Therapeutic Exercise               Problem: OT Long Term Goals     Dates: Start: 03/21/18       Goal: LTG-By discharge, patient will complete basic self care tasks     Dates: Start: 03/21/18       Description: 1) Individualized Goal:  At a SPV level using DME/AE or compensatory techniques PRN  2) Interventions:  OT Group Therapy, OT Self Care/ADL, OT Cognitive Skill Dev, OT Community Reintegration, OT Manual Ther Technique, OT Neuro Re-Ed/Balance, OT Therapeutic Activity, OT Evaluation and OT Therapeutic Exercise           Goal: LTG-By discharge, patient will perform bathroom transfers     Dates: Start: 03/21/18       Description: 1) Individualized Goal:  At a SPV level using DME/AE or compensatory techniques PRN  2) Interventions:  OT Group Therapy, OT Self Care/ADL, OT Cognitive Skill Dev, OT Community Reintegration, OT Manual Ther Technique, OT Neuro Re-Ed/Balance, OT Therapeutic Activity, OT Evaluation and OT Therapeutic Exercise           Goal: LTG-By discharge, patient will complete basic home management     Dates: Start: 03/21/18       Description: 1) Individualized Goal:  At a Min A level using DME/AE or compensatory techniques PRN  2) Interventions:  OT Group Therapy, OT Self Care/ADL, OT Cognitive Skill Dev, OT Community Reintegration, OT Manual Ther Technique, OT Neuro Re-Ed/Balance, OT  Therapeutic Activity, OT Evaluation and OT Therapeutic Exercise                 Section completed by:  Geetha Askew, MS,OTR/L

## 2018-03-21 NOTE — CARE PLAN
Problem: Safety  Goal: Will remain free from injury  Pt. was instructed to use call light each time she needs to get up of bed or assistance is needed. Pt. verbalizes understanding. Pt's room near to the nurse station.      Problem: Pain Management  Goal: Pain level will decrease to patient's comfort goal  Pt. c/o 8/10 headache. PRN Oxycodone was given as ordered. Will reassess pain levels..

## 2018-03-21 NOTE — PROGRESS NOTES
1031 Pt. was waiting at the gym to start physical therapy, got up from wheelchair and lost her balance, as a result she had a small abrasion on left wrist. The fall was witnessed by OT and Laurie. Picture was taken and uploaded to [employer]. Pt. denies hitting her head. Vital signs stable. No neurological changes. Pt's daughter Reji was notified. MD was notified by PT Ángel. Will monitor.
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
## 2018-03-22 PROCEDURE — 700102 HCHG RX REV CODE 250 W/ 637 OVERRIDE(OP): Performed by: PHYSICAL MEDICINE & REHABILITATION

## 2018-03-22 PROCEDURE — 700112 HCHG RX REV CODE 229: Performed by: PHYSICAL MEDICINE & REHABILITATION

## 2018-03-22 PROCEDURE — G0515 COGNITIVE SKILLS DEVELOPMENT: HCPCS

## 2018-03-22 PROCEDURE — 97116 GAIT TRAINING THERAPY: CPT

## 2018-03-22 PROCEDURE — 97530 THERAPEUTIC ACTIVITIES: CPT

## 2018-03-22 PROCEDURE — 700111 HCHG RX REV CODE 636 W/ 250 OVERRIDE (IP): Performed by: PHYSICAL MEDICINE & REHABILITATION

## 2018-03-22 PROCEDURE — 97112 NEUROMUSCULAR REEDUCATION: CPT

## 2018-03-22 PROCEDURE — A9270 NON-COVERED ITEM OR SERVICE: HCPCS | Performed by: PHYSICAL MEDICINE & REHABILITATION

## 2018-03-22 PROCEDURE — 770010 HCHG ROOM/CARE - REHAB SEMI PRIVAT*

## 2018-03-22 PROCEDURE — 99232 SBSQ HOSP IP/OBS MODERATE 35: CPT | Performed by: PHYSICAL MEDICINE & REHABILITATION

## 2018-03-22 RX ORDER — GABAPENTIN 100 MG/1
100 CAPSULE ORAL 3 TIMES DAILY
Status: DISCONTINUED | OUTPATIENT
Start: 2018-03-22 | End: 2018-03-23

## 2018-03-22 RX ADMIN — OXYCODONE HYDROCHLORIDE 5 MG: 5 TABLET ORAL at 15:05

## 2018-03-22 RX ADMIN — Medication 2 G: at 11:25

## 2018-03-22 RX ADMIN — OXYCODONE HYDROCHLORIDE 5 MG: 5 TABLET ORAL at 21:07

## 2018-03-22 RX ADMIN — FLUDROCORTISONE ACETATE 0.1 MG: 0.1 TABLET ORAL at 21:07

## 2018-03-22 RX ADMIN — ENOXAPARIN SODIUM 30 MG: 100 INJECTION SUBCUTANEOUS at 21:06

## 2018-03-22 RX ADMIN — OMEPRAZOLE 20 MG: 20 CAPSULE, DELAYED RELEASE ORAL at 08:29

## 2018-03-22 RX ADMIN — GABAPENTIN 100 MG: 100 CAPSULE ORAL at 15:01

## 2018-03-22 RX ADMIN — Medication 2 G: at 06:13

## 2018-03-22 RX ADMIN — CHLORDIAZEPOXIDE HYDROCHLORIDE 25 MG: 25 CAPSULE ORAL at 15:00

## 2018-03-22 RX ADMIN — ACETAMINOPHEN 650 MG: 325 TABLET, FILM COATED ORAL at 11:25

## 2018-03-22 RX ADMIN — ACETAMINOPHEN 650 MG: 325 TABLET, FILM COATED ORAL at 17:33

## 2018-03-22 RX ADMIN — POTASSIUM BICARBONATE 50 MEQ: 25 TABLET, EFFERVESCENT ORAL at 21:06

## 2018-03-22 RX ADMIN — POTASSIUM BICARBONATE 50 MEQ: 25 TABLET, EFFERVESCENT ORAL at 15:01

## 2018-03-22 RX ADMIN — ACETAMINOPHEN 650 MG: 325 TABLET, FILM COATED ORAL at 06:12

## 2018-03-22 RX ADMIN — CHLORDIAZEPOXIDE HYDROCHLORIDE 25 MG: 25 CAPSULE ORAL at 21:08

## 2018-03-22 RX ADMIN — GABAPENTIN 100 MG: 100 CAPSULE ORAL at 21:07

## 2018-03-22 RX ADMIN — Medication 2 G: at 17:32

## 2018-03-22 RX ADMIN — ENOXAPARIN SODIUM 30 MG: 100 INJECTION SUBCUTANEOUS at 08:29

## 2018-03-22 RX ADMIN — DOCUSATE SODIUM 100 MG: 100 CAPSULE, LIQUID FILLED ORAL at 08:29

## 2018-03-22 RX ADMIN — CHLORDIAZEPOXIDE HYDROCHLORIDE 25 MG: 25 CAPSULE ORAL at 06:12

## 2018-03-22 RX ADMIN — POTASSIUM BICARBONATE 50 MEQ: 25 TABLET, EFFERVESCENT ORAL at 06:12

## 2018-03-22 RX ADMIN — DULOXETINE HYDROCHLORIDE 30 MG: 30 CAPSULE, DELAYED RELEASE ORAL at 08:29

## 2018-03-22 RX ADMIN — STANDARDIZED SENNA CONCENTRATE AND DOCUSATE SODIUM 1 TABLET: 8.6; 5 TABLET, FILM COATED ORAL at 21:07

## 2018-03-22 RX ADMIN — FLUDROCORTISONE ACETATE 0.1 MG: 0.1 TABLET ORAL at 08:29

## 2018-03-22 ASSESSMENT — PAIN SCALES - GENERAL
PAINLEVEL_OUTOF10: 6
PAINLEVEL_OUTOF10: 4
PAINLEVEL_OUTOF10: 8
PAINLEVEL_OUTOF10: 0

## 2018-03-22 NOTE — PROGRESS NOTES
"Rehab Progress Note     Interval Events (Subjective)  CC:   Traumatic Brain injury  with cognitive deficts and balance deficits    Patient was seen several times today including physical therapy she completed continues complaining about headache issues and is requesting narcotics for it    IDT Team Meeting 3/22/2018    Nathan HATCH M.D., was present and led the interdisciplinary team conference on 3/22/2018.       Please emphasize our discussion of patient goals, barriers to achieving them and how we want to plan for the future  Goals    Improved balance,    Improve fine motor coordination  Improved cognition and insight        Barriers    Deficits in initiation  Delayed reaction timeDecreased vision  Blurred      Cognition  Impulsivity    Mobility  Alcohol   issues  Pain behavior    of headache        Alcohol addiction      Discussed barriers      At least 2 weeks length of stay will depend of the recovery      Therapy update 3/22/2018  Independent eating  Standby assist grooming  Standby assist bathing  Min assist upper body dressing  Standby assist lower body dressing  Supervision toileting  Supervisionbladder  Supervision bowel  Standby assist bed/chair transfer  Standby assist toilet transfer  Standby assist tub/shower transfer  Mod assist ambulation  Not tested wheelchair propulsion  Not tested stairs  Standby assist comprehension  Standby assist expression  Standby assist social interaction  Min assist problem solving  Not tested memory                    Objective:  VITAL SIGNS: /69   Pulse 96   Temp 36.4 °C (97.6 °F)   Resp 20   Ht 1.727 m (5' 8\")   Wt 57.2 kg (126 lb)   SpO2 93%   Breastfeeding? No   BMI 19.16 kg/m²   Cardiac: regular rate and rhythm, nl S1/S2   Lungs: clear to auscultation bilaterally.   Abdomen: soft; NT, ND, BS present.   Extremities: Without clubbing cyanosis or edema  Neuro: Significant improvement noted in balance    Recent Results (from the past 72 hour(s))   CBC " with Differential    Collection Time: 03/21/18  5:51 AM   Result Value Ref Range    WBC 4.8 4.8 - 10.8 K/uL    RBC 3.00 (L) 4.20 - 5.40 M/uL    Hemoglobin 10.7 (L) 12.0 - 16.0 g/dL    Hematocrit 33.3 (L) 37.0 - 47.0 %    .0 (H) 81.4 - 97.8 fL    MCH 35.7 (H) 27.0 - 33.0 pg    MCHC 32.1 (L) 33.6 - 35.0 g/dL    RDW 56.2 (H) 35.9 - 50.0 fL    Platelet Count 651 (H) 164 - 446 K/uL    MPV 8.8 (L) 9.0 - 12.9 fL    Neutrophils-Polys 46.70 44.00 - 72.00 %    Lymphocytes 35.30 22.00 - 41.00 %    Monocytes 7.90 0.00 - 13.40 %    Eosinophils 7.00 (H) 0.00 - 6.90 %    Basophils 2.50 (H) 0.00 - 1.80 %    Immature Granulocytes 0.60 0.00 - 0.90 %    Nucleated RBC 0.00 /100 WBC    Neutrophils (Absolute) 2.26 2.00 - 7.15 K/uL    Lymphs (Absolute) 1.71 1.00 - 4.80 K/uL    Monos (Absolute) 0.38 0.00 - 0.85 K/uL    Eos (Absolute) 0.34 0.00 - 0.51 K/uL    Baso (Absolute) 0.12 0.00 - 0.12 K/uL    Immature Granulocytes (abs) 0.03 0.00 - 0.11 K/uL    NRBC (Absolute) 0.00 K/uL   Prealbumin    Collection Time: 03/21/18  5:51 AM   Result Value Ref Range    Pre-Albumin 18.0 18.0 - 38.0 mg/dL   Vitamin D, 25-hydroxy (blood)    Collection Time: 03/21/18  5:51 AM   Result Value Ref Range    25-Hydroxy   Vitamin D 25 31 30 - 100 ng/mL       Current Facility-Administered Medications   Medication Frequency   • Respiratory Care per Protocol Continuous RT   • Pharmacy Consult Request ...Pain Management Review 1 Each PRN   • docusate sodium (COLACE) capsule 100 mg DAILY    And   • senna-docusate (PERICOLACE or SENOKOT S) 8.6-50 MG per tablet 1 Tab Q EVENING    And   • lactulose 20 GM/30ML solution 30 mL Q24HRS PRN    And   • bisacodyl (DULCOLAX) suppository 10 mg QDAY PRN   • artificial tears 1.4 % ophthalmic solution 1 Drop PRN   • benzocaine-menthol (CEPACOL) lozenge 1 Lozenge Q2HRS PRN   • hydrALAZINE (APRESOLINE) tablet 25 mg Q8HRS PRN   • mag hydrox-al hydrox-simeth (MAALOX PLUS ES or MYLANTA DS) suspension 20 mL Q2HRS PRN   • ondansetron  (ZOFRAN ODT) dispertab 4 mg 4X/DAY PRN    Or   • ondansetron (ZOFRAN) syringe/vial injection 4 mg 4X/DAY PRN   • traZODone (DESYREL) tablet 50 mg QHS PRN   • chlordiazePOXIDE (LIBRIUM) capsule 25 mg Q8HRS   • enoxaparin (LOVENOX) inj 30 mg Q12HRS   • fludrocortisone (FLORINEF) tablet 0.1 mg BID   • omeprazole (PRILOSEC) capsule 20 mg DAILY   • potassium bicarbonate (KLYTE) 25 MEQ effervescent tablet TBEF 50 mEq Q8HRS   • sodium chloride (SALT) tablet 2 g Q6HRS   • acetaminophen (TYLENOL) tablet 650 mg Q6HRS   • [START ON 3/25/2018] acetaminophen (TYLENOL) tablet 650 mg Q4HRS PRN   • oxyCODONE immediate-release (ROXICODONE) tablet 5 mg Q4HRS PRN   • DULoxetine (CYMBALTA) capsule 30 mg DAILY       Orders Placed This Encounter   Procedures   • DIET ORDER     Standing Status:   Standing     Number of Occurrences:   1     Order Specific Question:   Diet:     Answer:   Regular [1]     Order Specific Question:   Texture/Fiber modifications:     Answer:   Dysphagia 3(Mechanical Soft)specify fluid consistency(question 6) [3]     Order Specific Question:   Consistency/Fluid modifications:     Answer:   Thin Liquids [3]       Assessment:  Active Hospital Problems    Diagnosis   • *Traumatic intracranial hemorrhage (CMS-HCC)   • Hyponatremia   • Acute hyperactive alcohol withdrawal delirium (CMS-HCC)   • Moderate protein-calorie malnutrition (CMS-HCC)   • Anemia   • Alcohol abuse   • Cognitive and behavioral changes   • Lability emotional   • Impaired mobility and ADLs   • Balance disorder   • CARSON (generalized anxiety disorder)   • Tobacco dependence       Medical Decision Making and Plan:  Patient clearly is improving still has issues but will need further addressing I'm most concerned about her alcohol issues and lack of awareness. She fell yesterday because of her impulsivity      History of heavy alcohol use status post acute alcoholic withdrawal   This may be one of my biggest areas of concern with this patient. family is  well aware I'm going to start weaning the Librium down clearly counseling is important        Hyponatremia  Last sodium was 136 all over the gram sodium to 2 every 8 hours and watch how she does     Dysphagia  Patient is on dysphagia 3 thin liquid diets was seen by speech and my expectation is rapid advancement regular diet     Protein malnutrition  Patient's by mouth intake is than less than optimal we are adding supplements to her     Bacterial conjunctivae of both eyes  Patient appears better     Anemia   H&H is creeping upward     Thrombocytopenia upon admission to the hospital   We'll follow labs latest platelets was more than adequate     History of tobacco abuse  Still not smoking smoking     Headache  Still complaining of headaches have added a small dose of Neurontin to see if it helps I think long-term use of narcotics is not an acceptable solution     Cognitive deficits     Significantly improved on the Cymbalta will continue to follow     Emotional lability  Patient's been started on duloxetine will monitor with improvement noted       Tentative discharge date set for 4/5/2018Will continue PT, OT and SLP      Team Conference    Nursing  Diet/Nutrition:  Regular, Dysphagia III-Mechanical Soft and Thin Liquids  Medication Administration:  Whole with Liquid Wash  % consumed at meals in last 24 hours:  Consumed 30-90% of meals per documentation.  Breakfast:  80%   Lunch:  30%  Dinner:  90%   Snack schedule:  None  Appetite:  Good  Fluid Intake/Output in past 24 hours: In: 1580 [P.O.:1580]  Out: -   Admit Weight:  Weight: 57.2 kg (126 lb)  Weight Last 7 Days   [57.2 kg (126 lb)] 57.2 kg (126 lb) (03/20 1200)     Pain Issues:    Location:  Head;Face (03/21 2000)  Right (03/21 2000)         Severity:  Moderate   Scheduled pain medications:  None   Scheduled tylenol  PRN pain medications used in last 24 hours:  oxycodone immediate release (ROXICODONE)    Non Pharmacologic Interventions:  distraction,  repositioned and rest  Effectiveness of pain management plan:  fair=improved comfort with interventions but does not always meet goal     Bowel:    Bowel Assist Initial Score:  5 - Standby Prompting/Supervision or Set-up  Bowel Assist Current Score:  5 - Standby Prompting/Supervision or Set-up  Bowl Accidents in last 7 days:  0  Last bowel movement: 03/20/18  Stool: Medium, Soft, Formed     Usual bowel pattern:  every other day  Scheduled bowel medications:  docusate sodium (COLACE) and senna-docusate (PERICOLACE or SENOKOT S)   PRN bowel medications used in last 24 hours:  None  Nursing Interventions:  Increased time, Scheduled medication, Verbal cueing, Supervision  Effectiveness of bowel program:   good=regular, predictable, controlled emptying of bowel  Bladder:    Bladder Assist Initial Score:  5 - Standby Prompting/Supervision or Set-up  Bladder Assist Current Score:  5 - Standby Prompting/Supervision or Set-up  Bladder Accidents in last 7 days:  0  PVR range for past 24-48 hours:  [16-38]  ()  Medications affecting bladder:  None    Time void schedule/voiding pattern:  Voiding every 2-4 hours  Interventions:  Increased time, Supervision, Verbal cueing, Time void patient initiated  Effectiveness of bladder training:  Good=regular, predictable, emptying of bladder, patient initiates time voiding        Wound:         Patient Lines/Drains/Airways Status             Active Current Wounds               Name: Placement date: Placement time: Site: Days:      Wound POA Abrasion Face;Head Right Anterior;Lateral 03/05/18   0700     16                      Interventions:  BRYON  Effectiveness of intervention:  wound is improving         Sleep/wake cycle:   Average hours slept:  Sleeps 4-6 hours without waking  Sleep medication usage:  None     Patient/Family Training/Education:  Fall Prevention, General Self Care, Medication Management and Pain Management  Strengths: Alert and oriented, Able to follow instructions and  Pleasant and cooperative   Barriers:   Fatigue, Generalized weakness and Impulsive                   Nursing Problems                     Problem: Bowel/Gastric:      Goal: Normal bowel function is maintained or improved             Goal: Will not experience complications related to bowel motility                       Problem: Communication      Goal: The ability to communicate needs accurately and effectively will improve                       Problem: Discharge Barriers/Planning      Goal: Patient's continuum of care needs will be met                       Problem: Infection      Goal: Will remain free from infection                       Problem: Knowledge Deficit      Goal: Knowledge of disease process/condition, treatment plan, diagnostic tests, and medications will improve             Goal: Knowledge of the prescribed therapeutic regimen will improve                       Problem: Pain Management      Goal: Pain level will decrease to patient's comfort goal                   Problem: Safety      Goal: Will remain free from injury             Goal: Will remain free from falls                       Problem: Venous Thromboembolism (VTW)/Deep Vein Thrombosis (DVT) Prevention:      Goal: Patient will participate in Venous Thrombosis (VTE)/Deep Vein Thrombosis (DVT)Prevention Measures                     Long Term Goals:   At discharge patient will be able to function safely at home with support.     Section completed by:  Livier Hernandez R.N.            Mobility  Bed mobility:   [facility]  Bed /Chair/Wheelchair Transfer Initial:  5 - Standby Prompting/Supervision or Set-up  Bed /Chair/Wheelchair Transfer Current:  4 - Minimal Assistance             Bed/Chair/Wheelchair Transfer Description:  Increased time, Verbal cueing  Walk Initial:  1 - Total Assistance  Walk Current:  4 - Minimal Assistance             Walk Description:  Verbal cueing, Safety concerns, Requires incidental assist, Extra time (250 ft min A for  steadying occasional LOB, running into objects on R consistently needs cuing to avoid )  Wheelchair Initial:  5 - Standby Prompting/Supervision or Set-up  Wheelchair Current:  5 - Standby Prompting/Supervision or Set-up             Wheelchair Description:  Verbal cueing, Supervision for safety, Safety concerns, Extra time  Stairs Initial:  0 - Not tested,unsafe activity  Stairs Current: 0 - Not tested,unsafe activity             Stairs Description:    Patient/Family Training/Education:  Fall risk, safety precautions  DME/DC Recommendations:  TBD - AD, home PT  Strengths:  Able to follow instructions, Independent PLOF, Motivated for self care and independence and Supportive family  Barriers:   Impulsive, Pain poorly managed, Poor balance and Other: Visual/inattention defecits,   # of short term goals set= 3  # of short term goals met=0 (eval yesterday)              Physical Therapy Problems                       Problem: Mobility      Dates: Start: 03/21/18                    Goal: STG-Within one week, patient will ambulate household distance             Dates: Start: 03/21/18   Expected End: 03/28/18          Description: 1) Individualized goal:  With LRD at [facility] in order to progress towards PLOF  2) Interventions:  PT Group Therapy, PT E Stim Attended, PT Gait Training, PT Therapeutic Exercises, PT TENS Application, PT Neuro Re-Ed/Balance, PT Therapeutic Activity, PT Manual Therapy and PT Evaluation                    Note:           Goal Note filed on 03/22/18 1157 by Jaya Ramirez, PT      Goal: STG-Within one week, patient will ambulate household distance  Outcome: PROGRESSING AS EXPECTED  eval yesterday                                 Goal: STG-Within one week, patient will ascend and descend four to six stairs             Dates: Start: 03/21/18   Expected End: 03/28/18          Description: 1) Individualized goal:  With [facility] at min A wth step to pattern in order to enter/exit home safely  2) Interventions:   PT Group Therapy, PT E Stim Attended, PT Gait Training, PT Therapeutic Exercises, PT TENS Application, PT Neuro Re-Ed/Balance, PT Therapeutic Activity, PT Manual Therapy and PT Evaluation                    Note:           Goal Note filed on 03/22/18 1157 by Jaya Ramirez, PT      Goal: STG-Within one week, patient will ascend and descend four to six   stairs  Outcome: NOT MET  NT eval yesterday                                 Problem: Mobility Transfers      Dates: Start: 03/21/18                    Goal: STG-Within one week, patient will transfer bed to chair             Dates: Start: 03/21/18   Expected End: 03/28/18          Description: 1) Individualized goal:  At [facility] with LRD In order to maximize self mobility  2) Interventions:   PT Group Therapy, PT E Stim Attended, PT Gait Training, PT Therapeutic Exercises, PT TENS Application, PT Neuro Re-Ed/Balance, PT Therapeutic Activity, PT Manual Therapy and PT Evaluation                    Note:           Goal Note filed on 03/22/18 1157 by Jaya Ramirez, PT      Goal: STG-Within one week, patient will transfer bed to chair  Outcome: PROGRESSING AS EXPECTED  eval yesterday                                 Problem: PT-Long Term Goals      Dates: Start: 03/21/18              Goal: LTG-By discharge, patient will maintain balance            Dates: Start: 03/21/18   Expected End: 04/11/18         Description: 1) Individualized goal: Will score > 44/56 on Casey balance assessment in order to decrease risk for falls  2) Interventions:   PT Group Therapy, PT E Stim Attended, PT Gait Training, PT Therapeutic Exercises, PT TENS Application, PT Neuro Re-Ed/Balance, PT Therapeutic Activity, PT Manual Therapy and PT Evaluation                      Goal: LTG-By discharge, patient will ambulate            Dates: Start: 03/21/18   Expected End: 04/11/18         Description: 1) Individualized goal:  With LRD at [facility] in order to progress towards PLOF  2) Interventions:   PT  Group Therapy, PT E Stim Attended, PT Gait Training, PT Therapeutic Exercises, PT TENS Application, PT Neuro Re-Ed/Balance, PT Therapeutic Activity, PT Manual Therapy and PT Evaluation                      Goal: LTG-By discharge, patient will transfer one surface to another            Dates: Start: 03/21/18   Expected End: 04/11/18         Description: 1) Individualized goal:  At mod I with LRD In order to maximize self mobility  2) Interventions:   PT Group Therapy, PT E Stim Attended, PT Gait Training, PT Therapeutic Exercises, PT TENS Application, PT Neuro Re-Ed/Balance, PT Therapeutic Activity, PT Manual Therapy and PT Evaluation                      Goal: LTG-By discharge, patient will ambulate up/down flight of stairs            Dates: Start: 03/21/18   Expected End: 04/11/18         Description: 1) Individualized goal:  With BHR at [facility] wth step to pattern in order to enter/exit home safely  2) Interventions:   PT Group Therapy, PT E Stim Attended, PT Gait Training, PT Therapeutic Exercises, PT TENS Application, PT Neuro Re-Ed/Balance, PT Therapeutic Activity, PT Manual Therapy and PT Evaluation                         Section completed by:  Jaya Ramirez, PT        Activities of Daily Living  Eating Initial:  7 - Independent  Eating Current:  7 - Independent             Eating Description:     Grooming Initial:  5 - Standby Prompting/Supervision or Set-up  Grooming Current:  5 - Standby Prompting/Supervision or Set-up             Grooming Description:   (Setup seated for oral care and hair care)  Bathing Initial:  5 - Standby Prompting/Supervision or Set-up  Bathing Current:  5 - Standby Prompting/Supervision or Set-up             Bathing Description:   (SBA for sit/stand bathing, pt stood for 20% of bathing for buttocks and required mod v/c for initiation of sitting when attempting to wash legs. Pt used GB for balance when standing. Pt able of 10/10 parts)  Upper Body Dressing Initial:  4 - Minimal  Assistance  Upper Body Dressing Current:  4 - Minimal Assistance             Upper Body Dressing Description:   (Min A to zip bra and to draw down pullover shirt.)  Lower Body Dressing Initial:  5 - Standby Prompting/Supervsion or Set-up  Lower Body Dressing Current:  5 - Standby Prompting/Supervsion or Set-up             Lower Body Dressing Description:  5 - Standby Prompting/Supervsion or Set-up  Toileting Initial:  5 - Standby Prompting/Supervision or Set-up  Toileting Current:  5 - Standby Prompting/Supervision or Set-up             Toileting Description:   (SPV for 3/3 tasks)  Toilet Transfer Initial:  5 - Standby Prompting/Supervision or Set-up  Toilet Transfer Current:  5 - Standby Prompting/Supervision or Set-up             Toilet Transfer Description:  5 - Standby Prompting/Supervision or Set-up  Tub / Shower Transfer Initial:  5 - Standby Prompting/Supervision or Set-up  Tub / Shower Transfer Current:  5 - Standby Prompting/Supervision or Set-up             Tub / Shower Transfer Description:   (SPV SPT w/c<>shower bench using GB for balance)  IADL:  TBA, new admit   Family Training/Education:  New admit, pt briefly educated on changes in processing speed/memory with TBI. No FT at this time.   DME/DC Recommendations:  Pt owns no equipment, and it is unclear if pt will require any DME at this time as pt is new admit.      Strengths:  Able to follow instructions, Alert and oriented, Effective communication skills, Independent PLOF, Motivated for self care and independence, Pleasant and cooperative, Supportive family and Willingly participates in therapeutic activities  Barriers:  Decreased endurance, Home accessibility, Impulsive, Limited mobility, Poor activity tolerance, Poor balance, Poor carryover of learning and Poor insight/denial of deficits     # of short term goals set= 3    # of short term goals met= 0, New admit                  Occupational Therapy Goals                       Problem: Dressing       Dates: Start: 03/21/18                   Goal: STG-Within one week, patient will dress UB            Dates: Start: 03/21/18         Description: 1) Individualized Goal:  At a SPV level using AE or compensatory techniques PRN  2) Interventions:  OT Group Therapy, OT Self Care/ADL, OT Cognitive Skill Dev, OT Community Reintegration, OT Manual Ther Technique, OT Neuro Re-Ed/Balance, OT Therapeutic Activity, OT Evaluation and OT Therapeutic Exercise                          Problem: Functional Cognition      Dates: Start: 03/21/18                   Goal: STG-Within one week, patient will demonstrate safety awareness            Dates: Start: 03/21/18         Description: 1) Individualized Goal:  By locking w/c breaks on 3/5 opportunities with min v/c.  2) Interventions:  OT Group Therapy, OT Self Care/ADL, OT Cognitive Skill Dev, OT Community Reintegration, OT Manual Ther Technique, OT Neuro Re-Ed/Balance, OT Therapeutic Activity, OT Evaluation and OT Therapeutic Exercise                          Problem: Functional Transfers      Dates: Start: 03/21/18                   Goal: STG-Within one week, patient will transfer to toilet            Dates: Start: 03/21/18         Description: 1) Individualized Goal:  At a SPV level using DME/AE with no cues for safety  2) Interventions:  OT Group Therapy, OT Self Care/ADL, OT Cognitive Skill Dev, OT Community Reintegration, OT Manual Ther Technique, OT Neuro Re-Ed/Balance, OT Therapeutic Activity, OT Evaluation and OT Therapeutic Exercise                          Problem: Grooming      Dates: Start: 03/21/18                   Goal: STG-Within one week, patient will complete grooming            Dates: Start: 03/21/18         Description: 1) Individualized Goal:  In standing with SPV for oral care and hair care with no LOB using DME PRN and min cues for safety  2) Interventions:  OT Group Therapy, OT Self Care/ADL, OT Cognitive Skill Dev, OT Community Reintegration, OT Manual  Ther Technique, OT Neuro Re-Ed/Balance, OT Therapeutic Activity, OT Evaluation and OT Therapeutic Exercise                            Problem: OT Long Term Goals      Dates: Start: 03/21/18              Goal: LTG-By discharge, patient will complete basic self care tasks            Dates: Start: 03/21/18         Description: 1) Individualized Goal:  At a SPV level using DME/AE or compensatory techniques PRN  2) Interventions:  OT Group Therapy, OT Self Care/ADL, OT Cognitive Skill Dev, OT Community Reintegration, OT Manual Ther Technique, OT Neuro Re-Ed/Balance, OT Therapeutic Activity, OT Evaluation and OT Therapeutic Exercise                    Goal: LTG-By discharge, patient will perform bathroom transfers            Dates: Start: 03/21/18         Description: 1) Individualized Goal:  At a SPV level using DME/AE or compensatory techniques PRN  2) Interventions:  OT Group Therapy, OT Self Care/ADL, OT Cognitive Skill Dev, OT Community Reintegration, OT Manual Ther Technique, OT Neuro Re-Ed/Balance, OT Therapeutic Activity, OT Evaluation and OT Therapeutic Exercise                    Goal: LTG-By discharge, patient will complete basic home management            Dates: Start: 03/21/18         Description: 1) Individualized Goal:  At a Min A level using DME/AE or compensatory techniques PRN  2) Interventions:  OT Group Therapy, OT Self Care/ADL, OT Cognitive Skill Dev, OT Community Reintegration, OT Manual Ther Technique, OT Neuro Re-Ed/Balance, OT Therapeutic Activity, OT Evaluation and OT Therapeutic Exercise                      Section completed by:  Geetha Askew, MS,OTR/L        Cognitive Linquistic Functions  Comprehension Initial:  5 - Stand-by Prompting/Supervision or Set-up  Comprehension Current:  5 - Stand-by Prompting/Supervision or Set-up             Comprehension Description:  Verbal cues  Expression Initial:  5 - Stand-by Prompting/Supervision or Set-up  Expression Current:  5 - Stand-by  Prompting/Supervision or Set-up             Expression Description:  Verbal cueing  Social Interaction Initial:  5 - Stand-by Prompting/Supervision or Set-up  Social Interaction Current:  5 - Stand-by Prompting/Supervision or Set-up             Social Interaction Description:  Verbal cues, Increased time  Problem Solving Initial:  5 - Standby Prompting/Supervision or Set-up  Problem Solving Current:  4 - Minimal Assistance             Problem Solving Description:  Verbal cueing, Therapy schedule, Bed/chair alarm  Memory Initial:  5 - Standby Prompting/Supervision or Set-up  Memory Current:  3 - Moderate Assistance             Memory Description:  Verbal cueing, Therapy schedule, Bed/chair alarm  Executive Functioning / Organization Initial:     Executive Functioning / Organization Current:4 - Minimal Assistance                Executive Functioning Desciption: Pt independent with IADLs prior.   Swallowing  Swallowing Status: BSSE completed, pt advanced to regular textures/thin liquids. No further dysphagia intervention warranted.         Orders Placed This Encounter   Procedures   • DIET ORDER       Standing Status:   Standing       Number of Occurrences:   1       Order Specific Question:   Diet:       Answer:   Regular [1]       Order Specific Question:   Texture/Fiber modifications:       Answer:   Dysphagia 3(Mechanical Soft)specify fluid consistency(question 6) [3]       Order Specific Question:   Consistency/Fluid modifications:       Answer:   Thin Liquids [3]      Behavior Modification Plan  Give clear feedback, Set clear goals, Provide reasonable choices, Decrease the chance of failure by offering activities that are within the patient's abilities and Analyze tasks (break down into smaller steps)  Assistive Technology  Low tech: Calendar, planner, schedule, alarms/timers, pill organizer, post-it notes, lists  Family Training/Education:  To be completed  DC Recommendations: TBD  Strengths:  Able to follow  instructions, Alert and oriented, Independent PLOF, Motivated for self care and independence, Pleasant and cooperative and Willingly participates in therapeutic activities  Barriers:  Other: impulsivity, attention, STM, limited insight to deficits  # of short term goals set=2  # of short term goals met=0 (pt assessed 3/21/18)              Speech Therapy Problems                       Problem: Memory STGs      Dates: Start: 03/21/18             Goal: STG-Within one week, patient will             Dates: Start: 03/21/18          Description: 1) Individualized goal: recall information r/t TBI ed, goals, safety, sequences with 80% accuracy and MIN A.  2) Interventions:  SLP Cognitive Skill Development and SLP Group Treatment                     Note:           Goal Note filed on 03/21/18 1659 by Marta Cortez MS,CCC-SLP      Goal: STG-Within one week, patient will  Outcome: NOT MET  Pt assessed 3/21/18                                    Problem: Problem Solving STGs      Dates: Start: 03/21/18                    Goal: STG-Within one week, patient will solve complex problems      Dates: Start: 03/21/18          Description: 1) Individualized goal: given complex attention tasks (divided/alternating) with 80% accuracy provided MIN cues.  2) Interventions:  SLP Cognitive Skill Development and SLP Group Treatment                     Note:           Goal Note filed on 03/21/18 1659 by Marta Cortez MS,CCC-SLP      Goal: STG-Within one week, patient will solve complex problems  Outcome: NOT MET  Pt assessed 3/21/18.                                  Problem: Speech/Swallowing LTGs      Dates: Start: 03/21/18              Goal: LTG-By discharge, patient will solve complex problem            Dates: Start: 03/21/18         Description: 1) Individualized goal:  Related to IADLs for return to PLOF with MOD I for safe d/c home.  2) Interventions:  SLP Cognitive Skill Development and SLP Group Treatment                         Section completed by:  Marta Cortez MS,CCC-SLP            REHAB-Pharmacy IDT Team Note by Vishnu Alvarado RPH at 3/21/2018  3:21 PM  Version 1 of 1     Author:  Vishnu Alvarado RPH Service:  (none) Author Type:  Pharmacist     Filed:  3/21/2018  3:22 PM Date of Service:  3/21/2018  3:21 PM Status:  Signed     :  Vishnu Alvarado RPH (Pharmacist)            Pharmacy   Pharmacy  Antibiotics/Antifungals/Antivirals:  Medication:          Active Orders      None          Route:         None  Stop Date:  N/A  Reason:   Antihypertensives/Cardiac:  Medication:                Orders (72h ago through future)                  Start     Ordered      03/20/18 1042   hydrALAZINE (APRESOLINE) tablet 25 mg  EVERY 8 HOURS PRN      03/20/18 1042          Patient Vitals for the past 24 hrs:    BP Pulse   03/21/18 1300 113/74 84   03/21/18 1130 110/65 86   03/21/18 1045 104/71 96   03/21/18 0653 132/82 78   03/21/18 0510 112/80 76   03/20/18 2039 108/70 89         Anticoagulation:  Medication:  lovenox  INR:      Other key medications:      A review of the medication list reveals no issues at this time.        Section completed by:  Vishnu Alvarado RPH[WR.1]          Attribution Cabrera     WR.1 - Vishnu Alvarado RPH on 3/21/2018  3:21 PM                        DC Planning  DC destination/dispostion:  Patient has been living with boyfriend prior but he has moved out.  Home is a 2 level.  Patient indicates that her daughter will stay with her at discharge.     Referrals: Patient will need resources for alcohol treatment/counseling.     DC Needs:  Dme needs have not yet been determined.  She will need follow up therapy probably outpatient.  I will confirm availability of transportation.  Patient indicates she has a friend who can help out.     Barriers to discharge:  Cognitive issues and hx of Etoh abuse.      Strengths: Her daughter is very supportive and will be able to stay with patient in her  home.     Section completed by:  Tamar Dougherty R.N.        Physician Summary  I participated and led team conference discussion.     I spent 40 minutes face to face was spent with the patient with greater than 50% of the time spent counseling and coordinating care    Aundrea Mireles M.D.

## 2018-03-22 NOTE — CARE PLAN
Problem: Problem Solving STGs  Goal: STG-Within one week, patient will solve complex problems  1) Individualized goal: given complex attention tasks (divided/alternating) with 80% accuracy provided MIN cues.  2) Interventions:  SLP Cognitive Skill Development and SLP Group Treatment      Outcome: NOT MET  Pt assessed 3/21/18.     Problem: Memory STGs  Goal: STG-Within one week, patient will  1) Individualized goal: recall information r/t TBI ed, goals, safety, sequences with 80% accuracy and MIN A.  2) Interventions:  SLP Cognitive Skill Development and SLP Group Treatment      Outcome: NOT MET  Pt assessed 3/21/18

## 2018-03-22 NOTE — PROGRESS NOTES
"Rehab Progress Note     Interval Events (Subjective)  Chief complaint: Right brain injury with resultant cognitive deficits and problems with balance    Patient was seen in physical therapy today balance is a significant issue. She has some headache pains but no other new complaints at this time. Notes of the therapist appreciated    Objective:  VITAL SIGNS: /67   Pulse 88   Temp 36.5 °C (97.7 °F)   Resp 18   Ht 1.727 m (5' 8\")   Wt 57.2 kg (126 lb)   SpO2 94%   Breastfeeding? No   BMI 19.16 kg/m²     GENERAL: No apparent distress  HEENT: Normocephalic/atraumatic, EOMI, PERRL and No nystagmus Ptosis left eye, multiple ecchymotic areas right side of face and neck conjunctival hemorrhage noted the right eye  CARDIAC: Regular rate and rhythm, normal S1, S2   LUNGS: Clear to auscultation   ABDOMINAL: bowel sounds present, soft, nontender and nondistended    EXTREMITIES: no contractures, spasticity, or edema.  No calf tenderness bilaterally, 2+ bilateral DP/PT pulses.     NEURO:  Unchanged      Her physical examination is unchanged from that of yesterday 3/20/2018    Recent Results (from the past 72 hour(s))   CBC with Differential    Collection Time: 03/21/18  5:51 AM   Result Value Ref Range    WBC 4.8 4.8 - 10.8 K/uL    RBC 3.00 (L) 4.20 - 5.40 M/uL    Hemoglobin 10.7 (L) 12.0 - 16.0 g/dL    Hematocrit 33.3 (L) 37.0 - 47.0 %    .0 (H) 81.4 - 97.8 fL    MCH 35.7 (H) 27.0 - 33.0 pg    MCHC 32.1 (L) 33.6 - 35.0 g/dL    RDW 56.2 (H) 35.9 - 50.0 fL    Platelet Count 651 (H) 164 - 446 K/uL    MPV 8.8 (L) 9.0 - 12.9 fL    Neutrophils-Polys 46.70 44.00 - 72.00 %    Lymphocytes 35.30 22.00 - 41.00 %    Monocytes 7.90 0.00 - 13.40 %    Eosinophils 7.00 (H) 0.00 - 6.90 %    Basophils 2.50 (H) 0.00 - 1.80 %    Immature Granulocytes 0.60 0.00 - 0.90 %    Nucleated RBC 0.00 /100 WBC    Neutrophils (Absolute) 2.26 2.00 - 7.15 K/uL    Lymphs (Absolute) 1.71 1.00 - 4.80 K/uL    Monos (Absolute) 0.38 0.00 - 0.85 " K/uL    Eos (Absolute) 0.34 0.00 - 0.51 K/uL    Baso (Absolute) 0.12 0.00 - 0.12 K/uL    Immature Granulocytes (abs) 0.03 0.00 - 0.11 K/uL    NRBC (Absolute) 0.00 K/uL   Prealbumin    Collection Time: 03/21/18  5:51 AM   Result Value Ref Range    Pre-Albumin 18.0 18.0 - 38.0 mg/dL   Vitamin D, 25-hydroxy (blood)    Collection Time: 03/21/18  5:51 AM   Result Value Ref Range    25-Hydroxy   Vitamin D 25 31 30 - 100 ng/mL       Current Facility-Administered Medications   Medication Frequency   • Respiratory Care per Protocol Continuous RT   • Pharmacy Consult Request ...Pain Management Review 1 Each PRN   • docusate sodium (COLACE) capsule 100 mg DAILY    And   • senna-docusate (PERICOLACE or SENOKOT S) 8.6-50 MG per tablet 1 Tab Q EVENING    And   • lactulose 20 GM/30ML solution 30 mL Q24HRS PRN    And   • bisacodyl (DULCOLAX) suppository 10 mg QDAY PRN   • artificial tears 1.4 % ophthalmic solution 1 Drop PRN   • benzocaine-menthol (CEPACOL) lozenge 1 Lozenge Q2HRS PRN   • hydrALAZINE (APRESOLINE) tablet 25 mg Q8HRS PRN   • mag hydrox-al hydrox-simeth (MAALOX PLUS ES or MYLANTA DS) suspension 20 mL Q2HRS PRN   • ondansetron (ZOFRAN ODT) dispertab 4 mg 4X/DAY PRN    Or   • ondansetron (ZOFRAN) syringe/vial injection 4 mg 4X/DAY PRN   • traZODone (DESYREL) tablet 50 mg QHS PRN   • chlordiazePOXIDE (LIBRIUM) capsule 25 mg Q8HRS   • enoxaparin (LOVENOX) inj 30 mg Q12HRS   • fludrocortisone (FLORINEF) tablet 0.1 mg BID   • omeprazole (PRILOSEC) capsule 20 mg DAILY   • potassium bicarbonate (KLYTE) 25 MEQ effervescent tablet TBEF 50 mEq Q8HRS   • sodium chloride (SALT) tablet 2 g Q6HRS   • acetaminophen (TYLENOL) tablet 650 mg Q6HRS   • [START ON 3/25/2018] acetaminophen (TYLENOL) tablet 650 mg Q4HRS PRN   • oxyCODONE immediate-release (ROXICODONE) tablet 5 mg Q4HRS PRN   • DULoxetine (CYMBALTA) capsule 30 mg DAILY       Orders Placed This Encounter   Procedures   • DIET ORDER     Standing Status:   Standing     Number of  Occurrences:   1     Order Specific Question:   Diet:     Answer:   Regular [1]     Order Specific Question:   Texture/Fiber modifications:     Answer:   Dysphagia 3(Mechanical Soft)specify fluid consistency(question 6) [3]     Order Specific Question:   Consistency/Fluid modifications:     Answer:   Thin Liquids [3]       Assessment:  Active Hospital Problems    Diagnosis   • *Traumatic intracranial hemorrhage (CMS-HCC)   • Hyponatremia   • Acute hyperactive alcohol withdrawal delirium (CMS-HCC)   • Moderate protein-calorie malnutrition (CMS-HCC)   • Anemia   • Alcohol abuse   • Cognitive and behavioral changes   • Lability emotional   • Impaired mobility and ADLs   • Balance disorder   • CARSON (generalized anxiety disorder)   • Tobacco dependence       Medical Decision Making and Plan:  Patient has improved slightly since admission. Her alcohol issues remain medically biggest concern as well as to the etiology of the injury           History of heavy alcohol use status post acute alcoholic withdrawal   With her cognitive issues were start weaning the Librium as it can be an issue of further compromise his cognitive problems     Hyponatremia  Last sodium was 136 all over the gram sodium to 2 every 8 hours and watch how she does     Dysphagia  Patient is on dysphagia 3 thin liquid diets was seen by speech and my expectation is rapid advancement regular diet     Protein malnutrition  Patient's by mouth intake is than less than optimal we are adding supplements to her her by mouth     Bacterial conjunctivae of both eyes  Improved     Anemia   H&H is creeping upward     Thrombocytopenia upon admission to the hospital   We'll follow labs latest platelets were more than adequate     History of tobacco abuse  Still not smoking      Headache  Still complaining of headaches have added a small dose of Neurontin to see if it helps I think long-term use of narcotics is not an acceptable solution     Cognitive  deficits     Significantly improved on the Cymbalta will continue to follow     Emotional lability  Patient's has been started on duloxetine will monitor with improvement noted     Continue PT, OT and SLP They're completing their initial evaluation    Initial team conference will be tomorrow 3/22/2008      I spent 30 minutes face to face was spent with the patient with greater than 50% of the time spent counseling and coordinating care    Aundrea Mireles M.D.

## 2018-03-22 NOTE — NON-PROVIDER
"Rehab Progress Note     Interval Events (Subjective)  Ms. Krishnan was observed during cognitive testing. Still complains of headache which has not decreased in severity since arrival, even with oxycodone. However, she was still able to focus on tasks. Balance is markedly improved since admission. Her main issues include impulsivity and limited insight to current condition as well as alcohol abuse.    Objective:  VITAL SIGNS: /69   Pulse 96   Temp 36.4 °C (97.6 °F)   Resp 20   Ht 1.727 m (5' 8\")   Wt 57.2 kg (126 lb)   SpO2 93%   Breastfeeding? No   BMI 19.16 kg/m²     Physical Exam:  HEENT: Resolving ecchymoses and R subconjunctival hemorrhage  CV: Regular rate and rhythm, normal S1 S2   Pulm: Lungs clear to auscultation   Neuro: Improved balance, min A on weight shifting and marching in place     Recent Results (from the past 72 hour(s))   CBC with Differential    Collection Time: 03/21/18  5:51 AM   Result Value Ref Range    WBC 4.8 4.8 - 10.8 K/uL    RBC 3.00 (L) 4.20 - 5.40 M/uL    Hemoglobin 10.7 (L) 12.0 - 16.0 g/dL    Hematocrit 33.3 (L) 37.0 - 47.0 %    .0 (H) 81.4 - 97.8 fL    MCH 35.7 (H) 27.0 - 33.0 pg    MCHC 32.1 (L) 33.6 - 35.0 g/dL    RDW 56.2 (H) 35.9 - 50.0 fL    Platelet Count 651 (H) 164 - 446 K/uL    MPV 8.8 (L) 9.0 - 12.9 fL    Neutrophils-Polys 46.70 44.00 - 72.00 %    Lymphocytes 35.30 22.00 - 41.00 %    Monocytes 7.90 0.00 - 13.40 %    Eosinophils 7.00 (H) 0.00 - 6.90 %    Basophils 2.50 (H) 0.00 - 1.80 %    Immature Granulocytes 0.60 0.00 - 0.90 %    Nucleated RBC 0.00 /100 WBC    Neutrophils (Absolute) 2.26 2.00 - 7.15 K/uL    Lymphs (Absolute) 1.71 1.00 - 4.80 K/uL    Monos (Absolute) 0.38 0.00 - 0.85 K/uL    Eos (Absolute) 0.34 0.00 - 0.51 K/uL    Baso (Absolute) 0.12 0.00 - 0.12 K/uL    Immature Granulocytes (abs) 0.03 0.00 - 0.11 K/uL    NRBC (Absolute) 0.00 K/uL   Prealbumin    Collection Time: 03/21/18  5:51 AM   Result Value Ref Range    Pre-Albumin 18.0 18.0 - " 38.0 mg/dL   Vitamin D, 25-hydroxy (blood)    Collection Time: 03/21/18  5:51 AM   Result Value Ref Range    25-Hydroxy   Vitamin D 25 31 30 - 100 ng/mL       Current Facility-Administered Medications   Medication Frequency   • Respiratory Care per Protocol Continuous RT   • Pharmacy Consult Request ...Pain Management Review 1 Each PRN   • docusate sodium (COLACE) capsule 100 mg DAILY    And   • senna-docusate (PERICOLACE or SENOKOT S) 8.6-50 MG per tablet 1 Tab Q EVENING    And   • lactulose 20 GM/30ML solution 30 mL Q24HRS PRN    And   • bisacodyl (DULCOLAX) suppository 10 mg QDAY PRN   • artificial tears 1.4 % ophthalmic solution 1 Drop PRN   • benzocaine-menthol (CEPACOL) lozenge 1 Lozenge Q2HRS PRN   • hydrALAZINE (APRESOLINE) tablet 25 mg Q8HRS PRN   • mag hydrox-al hydrox-simeth (MAALOX PLUS ES or MYLANTA DS) suspension 20 mL Q2HRS PRN   • ondansetron (ZOFRAN ODT) dispertab 4 mg 4X/DAY PRN    Or   • ondansetron (ZOFRAN) syringe/vial injection 4 mg 4X/DAY PRN   • traZODone (DESYREL) tablet 50 mg QHS PRN   • chlordiazePOXIDE (LIBRIUM) capsule 25 mg Q8HRS   • enoxaparin (LOVENOX) inj 30 mg Q12HRS   • fludrocortisone (FLORINEF) tablet 0.1 mg BID   • omeprazole (PRILOSEC) capsule 20 mg DAILY   • potassium bicarbonate (KLYTE) 25 MEQ effervescent tablet TBEF 50 mEq Q8HRS   • sodium chloride (SALT) tablet 2 g Q6HRS   • acetaminophen (TYLENOL) tablet 650 mg Q6HRS   • [START ON 3/25/2018] acetaminophen (TYLENOL) tablet 650 mg Q4HRS PRN   • oxyCODONE immediate-release (ROXICODONE) tablet 5 mg Q4HRS PRN   • DULoxetine (CYMBALTA) capsule 30 mg DAILY       Orders Placed This Encounter   Procedures   • DIET ORDER     Standing Status:   Standing     Number of Occurrences:   1     Order Specific Question:   Diet:     Answer:   Regular [1]     Order Specific Question:   Texture/Fiber modifications:     Answer:   Dysphagia 3(Mechanical Soft)specify fluid consistency(question 6) [3]     Order Specific Question:    Consistency/Fluid modifications:     Answer:   Thin Liquids [3]       Assessment:  Active Hospital Problems    Diagnosis   • *Traumatic intracranial hemorrhage (CMS-HCC)   • Hyponatremia   • Acute hyperactive alcohol withdrawal delirium (CMS-HCC)   • Moderate protein-calorie malnutrition (CMS-HCC)   • Anemia   • Alcohol abuse   • Cognitive and behavioral changes   • Lability emotional   • Impaired mobility and ADLs   • Balance disorder   • CARSON (generalized anxiety disorder)   • Tobacco dependence     48 y.o. Female with PMH of alcohol abuse and tobacco use here for debility following intracranial hemorrhage secondary to GLF on 3/4/18.      Medical Decision Making and Plan:    TBI - Intracranial hemorrhage secondary to GLF  - Inpatient for acute rehab therapy     History of alcohol abuse - S/p acute alcohol withdrawal in hospital, started on Librium   - Goal: decrease Librium as benzodiazapines have the potential to slow recovery, start taper on Monday  - Psychology has been consulted      Pain - Headache  - Cymbalta - pain relief and possible effect on emotional lability  - Roxicodone - Pain uncontrolled by any other medication while inpatient. Decrease from 5 mg to 2.5 mg with goal of discontinuing next week   - Increase Neurontin dose to help with pain      Electrolyte abnormalities - hypokalemia and hyponatremia, continue to monitor  BMP  - Hypokalemia - continue potassium bicarbonate, 3.6 on 3/19  - Hyponatremia - 136 on 3/19, will slowly wean salt tabs and florinef      Anemia - H/H 10.7/33.3, , RDW 56.2 on 3/21  - Consider iron replacement  - Continue to monitor thrombocytopenia that was present on admission     Cognitive deficits - Cymbalta for stimulant effects, consider additional stimulant     Emotional lability - Cymbalta      Bacterial conjunctivitis - Resolved following course of antibiotics, continue to monitor      Protein malnutrition - Monitor, consider supplementation       Amy Schroeder,  Student

## 2018-03-22 NOTE — REHAB-DIETARY IDT TEAM NOTE
Dietary   Nutrition       Dietary Problems           Problem: Other Problem (see comments)     Description: Diagnosis: Inadequate oral intake related to lengthy clinical course s/p GLF and resultant TBI as evidenced by previous need for TF to maintain nutritional status and current PO intake of ~55%     Goal: Other Goal     Description: Monitor/Evaluation: Monitor PO intake, weight, labs, medication adjustments, skin integrity, GI function, vitals, I/Os, and overall hydration status.  Adjust nutritional POC pending clinical outcomes.   RD following weekly.    Goal: Maintain >/= 50% adequate oral nutrient/fluid intake to promote nutrition optimization/healing.                Nutrition Care/ Consult For Poor PO / Wt Loss PTA     Assessment:     Admitting Diagnosis: Closed traumatic TBI, EtOH abuse, EtOH withdrawal     Pertinent PMH: Tobacco dependence     Additional Information: The pt was visited in her room this afternoon to discuss appetite and recent PO intake.The pt states that her appetite is fine but that the meals that she has been receiving are too large for her to consume in one sitting. PO intake has been ~55% of meals so far (4 meal records). She states that she has always been a small eater and she requests smaller portions with her meal trays. Pt encouraged to add snacks between meals if decreasing portion sizes. Noted that pt is borderline underweight for her age and could benefit from some weight gain. Several snack ideas proposed to the pt but she was reluctant to order food. She wonders why she is on a dysphagia 3 diet. Pt amenable to adding magic cup (wild berry) for HS snack each night and cottage cheese/fruit and coffee with breakfast.     Appropriate for Education? Yes  Education in Past? Unknown  Appetite: Fair  Diet: Regular / dysphagia 3 / thin liquids  Average PO intake: 55% (4 meal records so far)     Labs: Reviewed.  Medications: Librium, Colace, Pericolace, Cymbalta, Lovenox, Florinef,  "Prilosec, K-Lyte, Salt tabs  PRN Medications: Hydralazine, Lactulose, Dulcolax, Zofran, Roxicodone  IVF: None currently in MAR     Height: 5'8\" (1.727m)  Weight: 126# (57.2kg)  IBW: 140# (63.6kg)  BMI: 19.16 (at low end of normal limits)     Skin: Intact - no skin breakdown noted  GI: last BM 3/22 (medium, brown)  : UOP yellow  Vitals: /69, on RA  I/Os: +360mL x 24 hours (no output recorded yet today)     Nutrient Needs:  Kcal: 1634-1760kcal/day (29-31kcal/kg)        BEE=1257 x 1.3-1.4 to encourage wt gain  Protein: 57-69g/day (1-1.2g/kg)  Fluid: 1700-1850mL/day     Diagnosis: Inadequate oral intake related to lengthy clinical course s/p GLF and resultant TBI as evidenced by previous need for TF to maintain nutritional status and current PO intake of ~55%     Intervention/ Recommendations/POC:  1. Continue current diet. Diet upgrades per SLP.  2. Magic cup berry (9g protein) for HS snack, cottage cheese/fruit/coffee with breakfast daily.  3. Encourage adequate PO/fluid intake.  4. Nutrition rep to see regarding food prefs/ honor within dietary restrictions (if indicated)     Monitor/Evaluation: Monitor PO intake, weight, labs, medication adjustments, skin integrity, GI function, vitals, I/Os, and overall hydration status.  Adjust nutritional POC pending clinical outcomes.   RD following weekly.     Goal: Maintain >/= 50% adequate oral nutrient/fluid intake to promote nutrition optimization/healing.      Section completed by:  Veronica Pulliam R.D.     "

## 2018-03-22 NOTE — PROGRESS NOTES
Received shift report and assumed care of patient. Patient in bathroom brushing teeth with CNA assistance. All needs met at this time. Call light and belongings within reach.

## 2018-03-22 NOTE — CARE PLAN
Problem: Other Problem (see comments)  Goal: Other Goal  Monitor/Evaluation: Monitor PO intake, weight, labs, medication adjustments, skin integrity, GI function, vitals, I/Os, and overall hydration status.  Adjust nutritional POC pending clinical outcomes.   RD following weekly.    Goal: Maintain >/= 50% adequate oral nutrient/fluid intake to promote nutrition optimization/healing.      Outcome: PROGRESSING AS EXPECTED

## 2018-03-22 NOTE — CARE PLAN
"Problem: Pain Management  Goal: Pain level will decrease to patient's comfort goal  Outcome: PROGRESSING AS EXPECTED  Pt c/o of headache. Tylenol given as scheduled. Pt states that \"Tylenol is not working\". Notified Dr. Mireles of this. MD states he will order a new med.       "

## 2018-03-22 NOTE — REHAB-PT IDT TEAM NOTE
Physical Therapy   Mobility  Bed mobility:   [facility]  Bed /Chair/Wheelchair Transfer Initial:  5 - Standby Prompting/Supervision or Set-up  Bed /Chair/Wheelchair Transfer Current:  4 - Minimal Assistance   Bed/Chair/Wheelchair Transfer Description:  Increased time, Verbal cueing  Walk Initial:  1 - Total Assistance  Walk Current:  4 - Minimal Assistance   Walk Description:  Verbal cueing, Safety concerns, Requires incidental assist, Extra time (250 ft min A for steadying occasional LOB, running into objects on R consistently needs cuing to avoid )  Wheelchair Initial:  5 - Standby Prompting/Supervision or Set-up  Wheelchair Current:  5 - Standby Prompting/Supervision or Set-up   Wheelchair Description:  Verbal cueing, Supervision for safety, Safety concerns, Extra time  Stairs Initial:  0 - Not tested,unsafe activity  Stairs Current: 0 - Not tested,unsafe activity   Stairs Description:    Patient/Family Training/Education:  Fall risk, safety precautions  DME/DC Recommendations:  TBD - AD, home PT  Strengths:  Able to follow instructions, Independent PLOF, Motivated for self care and independence and Supportive family  Barriers:   Impulsive, Pain poorly managed, Poor balance and Other: Visual/inattention defecits,   # of short term goals set= 3  # of short term goals met=0 (eval yesterday)       Physical Therapy Problems           Problem: Mobility     Dates: Start: 03/21/18       Goal: STG-Within one week, patient will ambulate household distance     Dates: Start: 03/21/18   Expected End: 03/28/18       Description: 1) Individualized goal:  With LRD at [facility] in order to progress towards PLOF  2) Interventions:  PT Group Therapy, PT E Stim Attended, PT Gait Training, PT Therapeutic Exercises, PT TENS Application, PT Neuro Re-Ed/Balance, PT Therapeutic Activity, PT Manual Therapy and PT Evaluation       Note:     Goal Note filed on 03/22/18 1157 by Jaya Ramirez, PT    Goal: STG-Within one week, patient will ambulate  household distance  Outcome: PROGRESSING AS EXPECTED  eval yesterday                Goal: STG-Within one week, patient will ascend and descend four to six stairs     Dates: Start: 03/21/18   Expected End: 03/28/18       Description: 1) Individualized goal:  With [facility] at min A wth step to pattern in order to enter/exit home safely  2) Interventions:  PT Group Therapy, PT E Stim Attended, PT Gait Training, PT Therapeutic Exercises, PT TENS Application, PT Neuro Re-Ed/Balance, PT Therapeutic Activity, PT Manual Therapy and PT Evaluation       Note:     Goal Note filed on 03/22/18 1157 by Jaya Ramirez, PT    Goal: STG-Within one week, patient will ascend and descend four to six   stairs  Outcome: NOT MET  NT eval yesterday                  Problem: Mobility Transfers     Dates: Start: 03/21/18       Goal: STG-Within one week, patient will transfer bed to chair     Dates: Start: 03/21/18   Expected End: 03/28/18       Description: 1) Individualized goal:  At \[facility]\"" with LRD In order to maximize self mobility  2) Interventions:   PT Group Therapy, PT E Stim Attended, PT Gait Training, PT Therapeutic Exercises, PT TENS Application, PT Neuro Re-Ed/Balance, PT Therapeutic Activity, PT Manual Therapy and PT Evaluation       Note:     Goal Note filed on 03/22/18 1157 by Jaya Ramirez, PT    Goal: STG-Within one week, patient will transfer bed to chair  Outcome: PROGRESSING AS EXPECTED  eval yesterday                  Problem: PT-Long Term Goals     Dates: Start: 03/21/18       Goal: LTG-By discharge, patient will maintain balance     Dates: Start: 03/21/18   Expected End: 04/11/18       Description: 1) Individualized goal: Will score > 44/56 on Casey balance assessment in order to decrease risk for falls  2) Interventions:   PT Group Therapy, PT E Stim Attended, PT Gait Training, PT Therapeutic Exercises, PT TENS Application, PT Neuro Re-Ed/Balance, PT Therapeutic Activity, PT Manual Therapy and PT Evaluation              Goal: LTG-By discharge, patient will ambulate     Dates: Start: 03/21/18   Expected End: 04/11/18       Description: 1) Individualized goal:  With LRD at [facility] in order to progress towards PLOF  2) Interventions:   PT Group Therapy, PT E Stim Attended, PT Gait Training, PT Therapeutic Exercises, PT TENS Application, PT Neuro Re-Ed/Balance, PT Therapeutic Activity, PT Manual Therapy and PT Evaluation             Goal: LTG-By discharge, patient will transfer one surface to another     Dates: Start: 03/21/18   Expected End: 04/11/18       Description: 1) Individualized goal:  At mod I with LRD In order to maximize self mobility  2) Interventions:   PT Group Therapy, PT E Stim Attended, PT Gait Training, PT Therapeutic Exercises, PT TENS Application, PT Neuro Re-Ed/Balance, PT Therapeutic Activity, PT Manual Therapy and PT Evaluation             Goal: LTG-By discharge, patient will ambulate up/down flight of stairs     Dates: Start: 03/21/18   Expected End: 04/11/18       Description: 1) Individualized goal:  With BHR at [facility] wth step to pattern in order to enter/exit home safely  2) Interventions:   PT Group Therapy, PT E Stim Attended, PT Gait Training, PT Therapeutic Exercises, PT TENS Application, PT Neuro Re-Ed/Balance, PT Therapeutic Activity, PT Manual Therapy and PT Evaluation                     Section completed by:  Jaya Ramirez, PT

## 2018-03-22 NOTE — IDT DISCHARGE PLANNING
CASE MANAGEMENT INITIAL ASSESSMENT    Admit Date:  3/20/2018     I met with patient to discuss role of case management / discharge planning / team conference. She is alert and able to provide some information.  I have confirmed with patient's daughter that she will be with her when she goes home.  Patient is a  48 y.o. female transferred from [facility].  Her admitting physician for rehab is Dr. Mireles.    Diagnosis: 02.22 Traumatic, CLosed Injury  TBI (traumatic brain injury) (CMS-HCC)  Alcohol abuse  Alcohol withdrawal (CMS-HCC)    Co-morbidities:   Patient Active Problem List    Diagnosis Date Noted   • Hyponatremia 03/10/2018     Priority: High   • Traumatic intracranial hemorrhage (CMS-HCC) 03/04/2018     Priority: High   • Acute hyperactive alcohol withdrawal delirium (CMS-HCC) 03/04/2018     Priority: High   • Oropharyngeal dysphagia 03/16/2018     Priority: Medium   • Moderate protein-calorie malnutrition (CMS-HCC) 03/08/2018     Priority: Low   • Bacterial conjunctivitis of both eyes 03/07/2018     Priority: Low   • Anemia 03/06/2018     Priority: Low   • Pharmacologic contraindication to deep vein thrombosis (DVT) prophylaxis 03/05/2018     Priority: Low   • Trauma 03/04/2018     Priority: Low   • Alcohol abuse 03/20/2018   • Cognitive and behavioral changes 03/20/2018   • Lability emotional 03/20/2018   • Impaired mobility and ADLs 03/20/2018   • Balance disorder 03/20/2018   • CARSON (generalized anxiety disorder) 10/12/2017   • Surgical menopause 03/06/2014   • Tobacco dependence 03/06/2014     Prior Living Situation:  Housing / Facility: 2 Story House  Lives with - Patient's Self Care Capacity: Alone and Able to Care For Self    Prior Level of Function:  Medication Management: Independent  Finances: Independent  Home Management: Independent  Shopping: Independent  Prior Level Of Mobility: Independent Without Device in Community, Independent With Steps in Community, Independent Without Device in Home  Driving /  Transportation: Driving Independent    Support Systems:  Primary : Daughter Sophie Olivera at [phone]  Other support systems:      Previous Services Utilized:   Equipment Owned: None  Prior Services: Home-Independent    Other Information:  Occupation (Pre-Hospital Vocational): Employed Full Time  Primary Payor Source: Private Insurance  Primary Care Practitioner : Earlene PIEDRA  Other MDs: Dr. Menchaca for neurosurgery    Additional Case Management Questions:  Have you ever received case management services for yourself or a family member? no    Do you feel you have an an understanding of of what services  provide? I reviewed all with patient and provided introductory letter.    Do you have any additional questions regarding case management?    No    Patient / Family Goal:  Patient / Family Goal: Patient will return home with her daughter.  Her boyfriend is no longer in the picture.  Patient should be able to return to her home with her daughter's support.  She will need resources provided for [facility] programs.  I will follow to assist.    Plan:  1. Continue to follow patient through hospitalization and provide discharge planning in collaboration with patient, family, physicians and ancillary services.     2. Utilize community resources to ensure a safe discharge.

## 2018-03-22 NOTE — REHAB-SLP IDT TEAM NOTE
Speech Therapy   Cognitive Linquistic Functions  Comprehension Initial:  5 - Stand-by Prompting/Supervision or Set-up  Comprehension Current:  5 - Stand-by Prompting/Supervision or Set-up   Comprehension Description:  Verbal cues  Expression Initial:  5 - Stand-by Prompting/Supervision or Set-up  Expression Current:  5 - Stand-by Prompting/Supervision or Set-up   Expression Description:  Verbal cueing  Social Interaction Initial:  5 - Stand-by Prompting/Supervision or Set-up  Social Interaction Current:  5 - Stand-by Prompting/Supervision or Set-up   Social Interaction Description:  Verbal cues, Increased time  Problem Solving Initial:  5 - Standby Prompting/Supervision or Set-up  Problem Solving Current:  4 - Minimal Assistance   Problem Solving Description:  Verbal cueing, Therapy schedule, Bed/chair alarm  Memory Initial:  5 - Standby Prompting/Supervision or Set-up  Memory Current:  3 - Moderate Assistance   Memory Description:  Verbal cueing, Therapy schedule, Bed/chair alarm  Executive Functioning / Organization Initial:     Executive Functioning / Organization Current:4 - Minimal Assistance      Executive Functioning Desciption: Pt independent with IADLs prior.   Swallowing  Swallowing Status: BSSE completed, pt advanced to regular textures/thin liquids. No further dysphagia intervention warranted.   Orders Placed This Encounter   Procedures   • DIET ORDER     Standing Status:   Standing     Number of Occurrences:   1     Order Specific Question:   Diet:     Answer:   Regular [1]     Order Specific Question:   Texture/Fiber modifications:     Answer:   Dysphagia 3(Mechanical Soft)specify fluid consistency(question 6) [3]     Order Specific Question:   Consistency/Fluid modifications:     Answer:   Thin Liquids [3]     Behavior Modification Plan  Give clear feedback, Set clear goals, Provide reasonable choices, Decrease the chance of failure by offering activities that are within the patient's abilities and  Analyze tasks (break down into smaller steps)  Assistive Technology  Low tech: Calendar, planner, schedule, alarms/timers, pill organizer, post-it notes, lists  Family Training/Education:  To be completed  DC Recommendations: TBD  Strengths:  Able to follow instructions, Alert and oriented, Independent PLOF, Motivated for self care and independence, Pleasant and cooperative and Willingly participates in therapeutic activities  Barriers:  Other: impulsivity, attention, STM, limited insight to deficits  # of short term goals set=2  # of short term goals met=0 (pt assessed 3/21/18)       Speech Therapy Problems           Problem: Memory STGs     Dates: Start: 03/21/18       Goal: STG-Within one week, patient will     Dates: Start: 03/21/18       Description: 1) Individualized goal: recall information r/t TBI ed, goals, safety, sequences with 80% accuracy and MIN A.  2) Interventions:  SLP Cognitive Skill Development and SLP Group Treatment        Note:     Goal Note filed on 03/21/18 1659 by Marta Cortez MS,CCC-SLP    Goal: STG-Within one week, patient will  Outcome: NOT MET  Pt assessed 3/21/18                    Problem: Problem Solving STGs     Dates: Start: 03/21/18       Goal: STG-Within one week, patient will solve complex problems     Dates: Start: 03/21/18       Description: 1) Individualized goal: given complex attention tasks (divided/alternating) with 80% accuracy provided MIN cues.  2) Interventions:  SLP Cognitive Skill Development and SLP Group Treatment        Note:     Goal Note filed on 03/21/18 1659 by Marta Cortez MS,CCC-SLP    Goal: STG-Within one week, patient will solve complex problems  Outcome: NOT MET  Pt assessed 3/21/18.                   Problem: Speech/Swallowing LTGs     Dates: Start: 03/21/18       Goal: LTG-By discharge, patient will solve complex problem     Dates: Start: 03/21/18       Description: 1) Individualized goal:  Related to IADLs for return to PLOF with MOD I for safe  d/c home.  2) Interventions:  SLP Cognitive Skill Development and SLP Group Treatment                    Section completed by:  Marta Cortez MS,CCC-SLP

## 2018-03-22 NOTE — REHAB-NURSING IDT TEAM NOTE
Nursing   Nursing  Diet/Nutrition:  Regular, Dysphagia III-Mechanical Soft and Thin Liquids  Medication Administration:  Whole with Liquid Wash  % consumed at meals in last 24 hours:  Consumed 30-90% of meals per documentation.  Breakfast:  80%   Lunch:  30%  Dinner:  90%   Snack schedule:  None  Appetite:  Good  Fluid Intake/Output in past 24 hours: In: 1580 [P.O.:1580]  Out: -   Admit Weight:  Weight: 57.2 kg (126 lb)  Weight Last 7 Days   [57.2 kg (126 lb)] 57.2 kg (126 lb) (03/20 1200)    Pain Issues:    Location:  Head;Face (03/21 2000)  Right (03/21 2000)         Severity:  Moderate   Scheduled pain medications:  None   Scheduled tylenol  PRN pain medications used in last 24 hours:  oxycodone immediate release (ROXICODONE)    Non Pharmacologic Interventions:  distraction, repositioned and rest  Effectiveness of pain management plan:  fair=improved comfort with interventions but does not always meet goal    Bowel:    Bowel Assist Initial Score:  5 - Standby Prompting/Supervision or Set-up  Bowel Assist Current Score:  5 - Standby Prompting/Supervision or Set-up  Bowl Accidents in last 7 days:  0  Last bowel movement: 03/20/18  Stool: Medium, Soft, Formed     Usual bowel pattern:  every other day  Scheduled bowel medications:  docusate sodium (COLACE) and senna-docusate (PERICOLACE or SENOKOT S)   PRN bowel medications used in last 24 hours:  None  Nursing Interventions:  Increased time, Scheduled medication, Verbal cueing, Supervision  Effectiveness of bowel program:   good=regular, predictable, controlled emptying of bowel  Bladder:    Bladder Assist Initial Score:  5 - Standby Prompting/Supervision or Set-up  Bladder Assist Current Score:  5 - Standby Prompting/Supervision or Set-up  Bladder Accidents in last 7 days:  0  PVR range for past 24-48 hours:  [16-38]  ()  Medications affecting bladder:  None    Time void schedule/voiding pattern:  Voiding every 2-4 hours  Interventions:  Increased time,  Supervision, Verbal cueing, Time void patient initiated  Effectiveness of bladder training:  Good=regular, predictable, emptying of bladder, patient initiates time voiding      Wound:         Patient Lines/Drains/Airways Status    Active Current Wounds     Name: Placement date: Placement time: Site: Days:    Wound POA Abrasion Face;Head Right Anterior;Lateral 03/05/18   0700    16                   Interventions:  BRYON  Effectiveness of intervention:  wound is improving       Sleep/wake cycle:   Average hours slept:  Sleeps 4-6 hours without waking  Sleep medication usage:  None    Patient/Family Training/Education:  Fall Prevention, General Self Care, Medication Management and Pain Management  Strengths: Alert and oriented, Able to follow instructions and Pleasant and cooperative   Barriers:   Fatigue, Generalized weakness and Impulsive            Nursing Problems           Problem: Bowel/Gastric:     Goal: Normal bowel function is maintained or improved           Goal: Will not experience complications related to bowel motility             Problem: Communication     Goal: The ability to communicate needs accurately and effectively will improve             Problem: Discharge Barriers/Planning     Goal: Patient's continuum of care needs will be met             Problem: Infection     Goal: Will remain free from infection             Problem: Knowledge Deficit     Goal: Knowledge of disease process/condition, treatment plan, diagnostic tests, and medications will improve           Goal: Knowledge of the prescribed therapeutic regimen will improve             Problem: Pain Management     Goal: Pain level will decrease to patient's comfort goal             Problem: Safety     Goal: Will remain free from injury           Goal: Will remain free from falls             Problem: Venous Thromboembolism (VTW)/Deep Vein Thrombosis (DVT) Prevention:     Goal: Patient will participate in Venous Thrombosis (VTE)/Deep Vein  Thrombosis (DVT)Prevention Measures                  Long Term Goals:   At discharge patient will be able to function safely at home with support.    Section completed by:  Livier Hernandez R.N.

## 2018-03-22 NOTE — CARE PLAN
Problem: Bowel/Gastric:  Goal: Will not experience complications related to bowel motility  Outcome: PROGRESSING AS EXPECTED  Patient having regular bowel movements; last BM today 3/22.  Denies s/s constipation; bowel meds available if needed.  Will continue to monitor.

## 2018-03-22 NOTE — CARE PLAN
Problem: Safety  Goal: Will remain free from injury  Per report, patient had a fall today and patient is impulsive. Patient educated on the importance of using call light for assistance, patient verbalized understanding. Patient using call light appropriately so far this shift, does not attempt to self transfer. Call light and belongings within reach, bed in lowest and locked position, bed rails up x2, alarms in place, and non skid socks on. Patient is in room near nursing station and hourly rounding is in place.    Problem: Pain Management  Goal: Pain level will decrease to patient's comfort goal  Patient complains of 8/10 right sided headache. Patient given PRN oxycodone. Patient reports pain medication is usually effective at minimizing pain. Patient asleep and appears comfortable upon reassessment. Will continue to assess and monitor pain.

## 2018-03-22 NOTE — CARE PLAN
Problem: Mobility  Goal: STG-Within one week, patient will ambulate household distance  1) Individualized goal:  With LRD at [facility] in order to progress towards PLOF  2) Interventions:  PT Group Therapy, PT E Stim Attended, PT Gait Training, PT Therapeutic Exercises, PT TENS Application, PT Neuro Re-Ed/Balance, PT Therapeutic Activity, PT Manual Therapy and PT Evaluation     Outcome: PROGRESSING AS EXPECTED  eval yesterday  Goal: STG-Within one week, patient will ascend and descend four to six stairs  1) Individualized goal:  With BHR at min A wth step to pattern in order to enter/exit home safely  2) Interventions:  PT Group Therapy, PT E Stim Attended, PT Gait Training, PT Therapeutic Exercises, PT TENS Application, PT Neuro Re-Ed/Balance, PT Therapeutic Activity, PT Manual Therapy and PT Evaluation     Outcome: NOT MET  NT eval yesterday    Problem: Mobility Transfers  Goal: STG-Within one week, patient will transfer bed to chair  1) Individualized goal:  At \[facility]\"" with LRD In order to maximize self mobility  2) Interventions:   PT Group Therapy, PT E Stim Attended, PT Gait Training, PT Therapeutic Exercises, PT TENS Application, PT Neuro Re-Ed/Balance, PT Therapeutic Activity, PT Manual Therapy and PT Evaluation     Outcome: PROGRESSING AS EXPECTED  eval yesterday

## 2018-03-22 NOTE — REHAB-COLLABORATIVE ONGOING IDT TEAM NOTE
Weekly Interdisciplinary Team Conference Note    Weekly Interdisciplinary Team Conference # 1  Date:  3/22/2018    Clinicians present and reporting at team conference include the following:   MD: Nathan Mireles MD   RN:  Kate Shelton RN   PT:   Ángel Ramirez, PT, DPT  OT:  MARIA INES SingerT, OTR/L   ST:  Chase Velasquez, MS, CCC-SLP  CM:  Tamar Dougherty RN, [facility]  REC:  None  RT:  None  RPh:  Gela De La Rosa [facility]  Other:   None  All reporting clinicians have a working knowledge of this patient's plan of care.    Targeted DC Date:  4/5/18     Medical    Patient Active Problem List    Diagnosis Date Noted   • Hyponatremia 03/10/2018     Priority: High   • Traumatic intracranial hemorrhage (CMS-HCC) 03/04/2018     Priority: High   • Acute hyperactive alcohol withdrawal delirium (CMS-HCC) 03/04/2018     Priority: High   • Oropharyngeal dysphagia 03/16/2018     Priority: Medium   • Moderate protein-calorie malnutrition (CMS-HCC) 03/08/2018     Priority: Low   • Bacterial conjunctivitis of both eyes 03/07/2018     Priority: Low   • Anemia 03/06/2018     Priority: Low   • Pharmacologic contraindication to deep vein thrombosis (DVT) prophylaxis 03/05/2018     Priority: Low   • Trauma 03/04/2018     Priority: Low   • Alcohol abuse 03/20/2018   • Cognitive and behavioral changes 03/20/2018   • Lability emotional 03/20/2018   • Impaired mobility and ADLs 03/20/2018   • Balance disorder 03/20/2018   • CARSON (generalized anxiety disorder) 10/12/2017   • Surgical menopause 03/06/2014   • Tobacco dependence 03/06/2014     Results     ** No results found for the last 24 hours. **           Nursing  Diet/Nutrition:  Regular, Dysphagia III-Mechanical Soft and Thin Liquids  Medication Administration:  Whole with Liquid Wash  % consumed at meals in last 24 hours:  Consumed 30-90% of meals per documentation.  Breakfast:  80%   Lunch:  30%  Dinner:  90%   Snack schedule:  None  Appetite:  Good  Fluid Intake/Output in past 24 hours: In:  1580 [P.O.:1580]  Out: -   Admit Weight:  Weight: 57.2 kg (126 lb)  Weight Last 7 Days   [57.2 kg (126 lb)] 57.2 kg (126 lb) (03/20 1200)    Pain Issues:    Location:  Head;Face (03/21 2000)  Right (03/21 2000)         Severity:  Moderate   Scheduled pain medications:  None   Scheduled tylenol  PRN pain medications used in last 24 hours:  oxycodone immediate release (ROXICODONE)    Non Pharmacologic Interventions:  distraction, repositioned and rest  Effectiveness of pain management plan:  fair=improved comfort with interventions but does not always meet goal    Bowel:    Bowel Assist Initial Score:  5 - Standby Prompting/Supervision or Set-up  Bowel Assist Current Score:  5 - Standby Prompting/Supervision or Set-up  Bowl Accidents in last 7 days:  0  Last bowel movement: 03/20/18  Stool: Medium, Soft, Formed     Usual bowel pattern:  every other day  Scheduled bowel medications:  docusate sodium (COLACE) and senna-docusate (PERICOLACE or SENOKOT S)   PRN bowel medications used in last 24 hours:  None  Nursing Interventions:  Increased time, Scheduled medication, Verbal cueing, Supervision  Effectiveness of bowel program:   good=regular, predictable, controlled emptying of bowel  Bladder:    Bladder Assist Initial Score:  5 - Standby Prompting/Supervision or Set-up  Bladder Assist Current Score:  5 - Standby Prompting/Supervision or Set-up  Bladder Accidents in last 7 days:  0  PVR range for past 24-48 hours:  [16-38]  ()  Medications affecting bladder:  None    Time void schedule/voiding pattern:  Voiding every 2-4 hours  Interventions:  Increased time, Supervision, Verbal cueing, Time void patient initiated  Effectiveness of bladder training:  Good=regular, predictable, emptying of bladder, patient initiates time voiding      Wound:         Patient Lines/Drains/Airways Status    Active Current Wounds     Name: Placement date: Placement time: Site: Days:    Wound POA Abrasion Face;Head Right Anterior;Lateral  03/05/18   0700    16                   Interventions:  BRYON  Effectiveness of intervention:  wound is improving       Sleep/wake cycle:   Average hours slept:  Sleeps 4-6 hours without waking  Sleep medication usage:  None    Patient/Family Training/Education:  Fall Prevention, General Self Care, Medication Management and Pain Management  Strengths: Alert and oriented, Able to follow instructions and Pleasant and cooperative   Barriers:   Fatigue, Generalized weakness and Impulsive            Nursing Problems           Problem: Bowel/Gastric:     Goal: Normal bowel function is maintained or improved           Goal: Will not experience complications related to bowel motility             Problem: Communication     Goal: The ability to communicate needs accurately and effectively will improve             Problem: Discharge Barriers/Planning     Goal: Patient's continuum of care needs will be met             Problem: Infection     Goal: Will remain free from infection             Problem: Knowledge Deficit     Goal: Knowledge of disease process/condition, treatment plan, diagnostic tests, and medications will improve           Goal: Knowledge of the prescribed therapeutic regimen will improve             Problem: Pain Management     Goal: Pain level will decrease to patient's comfort goal             Problem: Safety     Goal: Will remain free from injury           Goal: Will remain free from falls             Problem: Venous Thromboembolism (VTW)/Deep Vein Thrombosis (DVT) Prevention:     Goal: Patient will participate in Venous Thrombosis (VTE)/Deep Vein Thrombosis (DVT)Prevention Measures                  Long Term Goals:   At discharge patient will be able to function safely at home with support.    Section completed by:  Livier Hernandez R.N.              Mobility  Bed mobility:   C[facility]  Bed /Chair/Wheelchair Transfer Initial:  5 - Standby Prompting/Supervision or Set-up  Bed /Chair/Wheelchair Transfer Current:  4 -  Minimal Assistance   Bed/Chair/Wheelchair Transfer Description:  Increased time, Verbal cueing  Walk Initial:  1 - Total Assistance  Walk Current:  4 - Minimal Assistance   Walk Description:  Verbal cueing, Safety concerns, Requires incidental assist, Extra time (250 ft min A for steadying occasional LOB, running into objects on R consistently needs cuing to avoid )  Wheelchair Initial:  5 - Standby Prompting/Supervision or Set-up  Wheelchair Current:  5 - Standby Prompting/Supervision or Set-up   Wheelchair Description:  Verbal cueing, Supervision for safety, Safety concerns, Extra time  Stairs Initial:  0 - Not tested,unsafe activity  Stairs Current: 0 - Not tested,unsafe activity   Stairs Description:    Patient/Family Training/Education:  Fall risk, safety precautions  DME/DC Recommendations:  TBD - AD, home PT  Strengths:  Able to follow instructions, Independent PLOF, Motivated for self care and independence and Supportive family  Barriers:   Impulsive, Pain poorly managed, Poor balance and Other: Visual/inattention defecits,   # of short term goals set= 3  # of short term goals met=0 (eval yesterday)       Physical Therapy Problems           Problem: Mobility     Dates: Start: 03/21/18       Goal: STG-Within one week, patient will ambulate household distance     Dates: Start: 03/21/18   Expected End: 03/28/18       Description: 1) Individualized goal:  With LRD at [facility] in order to progress towards PLOF  2) Interventions:  PT Group Therapy, PT E Stim Attended, PT Gait Training, PT Therapeutic Exercises, PT TENS Application, PT Neuro Re-Ed/Balance, PT Therapeutic Activity, PT Manual Therapy and PT Evaluation       Note:     Goal Note filed on 03/22/18 1157 by Jaya Ramirez, PT    Goal: STG-Within one week, patient will ambulate household distance  Outcome: PROGRESSING AS EXPECTED  eval yesterday                Goal: STG-Within one week, patient will ascend and descend four to six stairs     Dates: Start:  03/21/18   Expected End: 03/28/18       Description: 1) Individualized goal:  With BHR at min A wth step to pattern in order to enter/exit home safely  2) Interventions:  PT Group Therapy, PT E Stim Attended, PT Gait Training, PT Therapeutic Exercises, PT TENS Application, PT Neuro Re-Ed/Balance, PT Therapeutic Activity, PT Manual Therapy and PT Evaluation       Note:     Goal Note filed on 03/22/18 1157 by Jaya Ramirez, PT    Goal: STG-Within one week, patient will ascend and descend four to six   stairs  Outcome: NOT MET  NT eval yesterday                  Problem: Mobility Transfers     Dates: Start: 03/21/18       Goal: STG-Within one week, patient will transfer bed to chair     Dates: Start: 03/21/18   Expected End: 03/28/18       Description: 1) Individualized goal:  At [facility] with LRD In order to maximize self mobility  2) Interventions:   PT Group Therapy, PT E Stim Attended, PT Gait Training, PT Therapeutic Exercises, PT TENS Application, PT Neuro Re-Ed/Balance, PT Therapeutic Activity, PT Manual Therapy and PT Evaluation       Note:     Goal Note filed on 03/22/18 1157 by Jaya Ramirez, PT    Goal: STG-Within one week, patient will transfer bed to chair  Outcome: PROGRESSING AS EXPECTED  eval yesterday                  Problem: PT-Long Term Goals     Dates: Start: 03/21/18       Goal: LTG-By discharge, patient will maintain balance     Dates: Start: 03/21/18   Expected End: 04/11/18       Description: 1) Individualized goal: Will score > 44/56 on Casey balance assessment in order to decrease risk for falls  2) Interventions:   PT Group Therapy, PT E Stim Attended, PT Gait Training, PT Therapeutic Exercises, PT TENS Application, PT Neuro Re-Ed/Balance, PT Therapeutic Activity, PT Manual Therapy and PT Evaluation             Goal: LTG-By discharge, patient will ambulate     Dates: Start: 03/21/18   Expected End: 04/11/18       Description: 1) Individualized goal:  With LRD at [facility] in order to  progress towards PLOF  2) Interventions:   PT Group Therapy, PT E Stim Attended, PT Gait Training, PT Therapeutic Exercises, PT TENS Application, PT Neuro Re-Ed/Balance, PT Therapeutic Activity, PT Manual Therapy and PT Evaluation             Goal: LTG-By discharge, patient will transfer one surface to another     Dates: Start: 03/21/18   Expected End: 04/11/18       Description: 1) Individualized goal:  At mod I with LRD In order to maximize self mobility  2) Interventions:   PT Group Therapy, PT E Stim Attended, PT Gait Training, PT Therapeutic Exercises, PT TENS Application, PT Neuro Re-Ed/Balance, PT Therapeutic Activity, PT Manual Therapy and PT Evaluation             Goal: LTG-By discharge, patient will ambulate up/down flight of stairs     Dates: Start: 03/21/18   Expected End: 04/11/18       Description: 1) Individualized goal:  With BHR at [facility] wth step to pattern in order to enter/exit home safely  2) Interventions:   PT Group Therapy, PT E Stim Attended, PT Gait Training, PT Therapeutic Exercises, PT TENS Application, PT Neuro Re-Ed/Balance, PT Therapeutic Activity, PT Manual Therapy and PT Evaluation                     Section completed by:  Jaya Ramirez, PT       Activities of Daily Living  Eating Initial:  7 - Independent  Eating Current:  7 - Independent   Eating Description:     Grooming Initial:  5 - Standby Prompting/Supervision or Set-up  Grooming Current:  5 - Standby Prompting/Supervision or Set-up   Grooming Description:   (Setup seated for oral care and hair care)  Bathing Initial:  5 - Standby Prompting/Supervision or Set-up  Bathing Current:  5 - Standby Prompting/Supervision or Set-up   Bathing Description:   (SBA for sit/stand bathing, pt stood for 20% of bathing for buttocks and required mod v/c for initiation of sitting when attempting to wash legs. Pt used GB for balance when standing. Pt able of 10/10 parts)  Upper Body Dressing Initial:  4 - Minimal Assistance  Upper Body  Dressing Current:  4 - Minimal Assistance   Upper Body Dressing Description:   (Min A to zip bra and to draw down pullover shirt.)  Lower Body Dressing Initial:  5 - Standby Prompting/Supervsion or Set-up  Lower Body Dressing Current:  5 - Standby Prompting/Supervsion or Set-up   Lower Body Dressing Description:  5 - Standby Prompting/Supervsion or Set-up  Toileting Initial:  5 - Standby Prompting/Supervision or Set-up  Toileting Current:  5 - Standby Prompting/Supervision or Set-up   Toileting Description:   (SPV for 3/3 tasks)  Toilet Transfer Initial:  5 - Standby Prompting/Supervision or Set-up  Toilet Transfer Current:  5 - Standby Prompting/Supervision or Set-up   Toilet Transfer Description:  5 - Standby Prompting/Supervision or Set-up  Tub / Shower Transfer Initial:  5 - Standby Prompting/Supervision or Set-up  Tub / Shower Transfer Current:  5 - Standby Prompting/Supervision or Set-up   Tub / Shower Transfer Description:   (SPV SPT w/c<>shower bench using GB for balance)  IADL:  TBA, new admit   Family Training/Education:  New admit, pt briefly educated on changes in processing speed/memory with TBI. No FT at this time.   DME/DC Recommendations:  Pt owns no equipment, and it is unclear if pt will require any DME at this time as pt is new admit.     Strengths:  Able to follow instructions, Alert and oriented, Effective communication skills, Independent PLOF, Motivated for self care and independence, Pleasant and cooperative, Supportive family and Willingly participates in therapeutic activities  Barriers:  Decreased endurance, Home accessibility, Impulsive, Limited mobility, Poor activity tolerance, Poor balance, Poor carryover of learning and Poor insight/denial of deficits     # of short term goals set= 3    # of short term goals met= 0, New admit           Occupational Therapy Goals           Problem: Dressing     Dates: Start: 03/21/18       Goal: STG-Within one week, patient will dress UB     Dates:  Start: 03/21/18       Description: 1) Individualized Goal:  At a SPV level using AE or compensatory techniques PRN  2) Interventions:  OT Group Therapy, OT Self Care/ADL, OT Cognitive Skill Dev, OT Community Reintegration, OT Manual Ther Technique, OT Neuro Re-Ed/Balance, OT Therapeutic Activity, OT Evaluation and OT Therapeutic Exercise             Problem: Functional Cognition     Dates: Start: 03/21/18       Goal: STG-Within one week, patient will demonstrate safety awareness     Dates: Start: 03/21/18       Description: 1) Individualized Goal:  By locking w/c breaks on 3/5 opportunities with min v/c.  2) Interventions:  OT Group Therapy, OT Self Care/ADL, OT Cognitive Skill Dev, OT Community Reintegration, OT Manual Ther Technique, OT Neuro Re-Ed/Balance, OT Therapeutic Activity, OT Evaluation and OT Therapeutic Exercise             Problem: Functional Transfers     Dates: Start: 03/21/18       Goal: STG-Within one week, patient will transfer to toilet     Dates: Start: 03/21/18       Description: 1) Individualized Goal:  At a SPV level using DME/AE with no cues for safety  2) Interventions:  OT Group Therapy, OT Self Care/ADL, OT Cognitive Skill Dev, OT Community Reintegration, OT Manual Ther Technique, OT Neuro Re-Ed/Balance, OT Therapeutic Activity, OT Evaluation and OT Therapeutic Exercise             Problem: Grooming     Dates: Start: 03/21/18       Goal: STG-Within one week, patient will complete grooming     Dates: Start: 03/21/18       Description: 1) Individualized Goal:  In standing with SPV for oral care and hair care with no LOB using DME PRN and min cues for safety  2) Interventions:  OT Group Therapy, OT Self Care/ADL, OT Cognitive Skill Dev, OT Community Reintegration, OT Manual Ther Technique, OT Neuro Re-Ed/Balance, OT Therapeutic Activity, OT Evaluation and OT Therapeutic Exercise               Problem: OT Long Term Goals     Dates: Start: 03/21/18       Goal: LTG-By discharge, patient will  complete basic self care tasks     Dates: Start: 03/21/18       Description: 1) Individualized Goal:  At a SPV level using DME/AE or compensatory techniques PRN  2) Interventions:  OT Group Therapy, OT Self Care/ADL, OT Cognitive Skill Dev, OT Community Reintegration, OT Manual Ther Technique, OT Neuro Re-Ed/Balance, OT Therapeutic Activity, OT Evaluation and OT Therapeutic Exercise           Goal: LTG-By discharge, patient will perform bathroom transfers     Dates: Start: 03/21/18       Description: 1) Individualized Goal:  At a SPV level using DME/AE or compensatory techniques PRN  2) Interventions:  OT Group Therapy, OT Self Care/ADL, OT Cognitive Skill Dev, OT Community Reintegration, OT Manual Ther Technique, OT Neuro Re-Ed/Balance, OT Therapeutic Activity, OT Evaluation and OT Therapeutic Exercise           Goal: LTG-By discharge, patient will complete basic home management     Dates: Start: 03/21/18       Description: 1) Individualized Goal:  At a Min A level using DME/AE or compensatory techniques PRN  2) Interventions:  OT Group Therapy, OT Self Care/ADL, OT Cognitive Skill Dev, OT Community Reintegration, OT Manual Ther Technique, OT Neuro Re-Ed/Balance, OT Therapeutic Activity, OT Evaluation and OT Therapeutic Exercise                 Section completed by:  Geetha Askew, MS,OTR/L       Cognitive Linquistic Functions  Comprehension Initial:  5 - Stand-by Prompting/Supervision or Set-up  Comprehension Current:  5 - Stand-by Prompting/Supervision or Set-up   Comprehension Description:  Verbal cues  Expression Initial:  5 - Stand-by Prompting/Supervision or Set-up  Expression Current:  5 - Stand-by Prompting/Supervision or Set-up   Expression Description:  Verbal cueing  Social Interaction Initial:  5 - Stand-by Prompting/Supervision or Set-up  Social Interaction Current:  5 - Stand-by Prompting/Supervision or Set-up   Social Interaction Description:  Verbal cues, Increased time  Problem Solving Initial:   5 - Standby Prompting/Supervision or Set-up  Problem Solving Current:  4 - Minimal Assistance   Problem Solving Description:  Verbal cueing, Therapy schedule, Bed/chair alarm  Memory Initial:  5 - Standby Prompting/Supervision or Set-up  Memory Current:  3 - Moderate Assistance   Memory Description:  Verbal cueing, Therapy schedule, Bed/chair alarm  Executive Functioning / Organization Initial:     Executive Functioning / Organization Current:4 - Minimal Assistance      Executive Functioning Desciption: Pt independent with IADLs prior.   Swallowing  Swallowing Status: BSSE completed, pt advanced to regular textures/thin liquids. No further dysphagia intervention warranted.   Orders Placed This Encounter   Procedures   • DIET ORDER     Standing Status:   Standing     Number of Occurrences:   1     Order Specific Question:   Diet:     Answer:   Regular [1]     Order Specific Question:   Texture/Fiber modifications:     Answer:   Dysphagia 3(Mechanical Soft)specify fluid consistency(question 6) [3]     Order Specific Question:   Consistency/Fluid modifications:     Answer:   Thin Liquids [3]     Behavior Modification Plan  Give clear feedback, Set clear goals, Provide reasonable choices, Decrease the chance of failure by offering activities that are within the patient's abilities and Analyze tasks (break down into smaller steps)  Assistive Technology  Low tech: Calendar, planner, schedule, alarms/timers, pill organizer, post-it notes, lists  Family Training/Education:  To be completed  DC Recommendations: TBD  Strengths:  Able to follow instructions, Alert and oriented, Independent PLOF, Motivated for self care and independence, Pleasant and cooperative and Willingly participates in therapeutic activities  Barriers:  Other: impulsivity, attention, STM, limited insight to deficits  # of short term goals set=2  # of short term goals met=0 (pt assessed 3/21/18)       Speech Therapy Problems           Problem: Memory STGs      Dates: Start: 03/21/18       Goal: STG-Within one week, patient will     Dates: Start: 03/21/18       Description: 1) Individualized goal: recall information r/t TBI ed, goals, safety, sequences with 80% accuracy and MIN A.  2) Interventions:  SLP Cognitive Skill Development and SLP Group Treatment        Note:     Goal Note filed on 03/21/18 1659 by Marta Cortez MS,CCC-SLP    Goal: STG-Within one week, patient will  Outcome: NOT MET  Pt assessed 3/21/18                    Problem: Problem Solving STGs     Dates: Start: 03/21/18       Goal: STG-Within one week, patient will solve complex problems     Dates: Start: 03/21/18       Description: 1) Individualized goal: given complex attention tasks (divided/alternating) with 80% accuracy provided MIN cues.  2) Interventions:  SLP Cognitive Skill Development and SLP Group Treatment        Note:     Goal Note filed on 03/21/18 1659 by Marta Cortez MS,CCC-SLP    Goal: STG-Within one week, patient will solve complex problems  Outcome: NOT MET  Pt assessed 3/21/18.                   Problem: Speech/Swallowing LTGs     Dates: Start: 03/21/18       Goal: LTG-By discharge, patient will solve complex problem     Dates: Start: 03/21/18       Description: 1) Individualized goal:  Related to IADLs for return to PLOF with MOD I for safe d/c home.  2) Interventions:  SLP Cognitive Skill Development and SLP Group Treatment                    Section completed by:  Marta Cortez MS,[facility]-SLP          REHAB-Pharmacy IDT Team Note by Vishnu Alvarado RPH at 3/21/2018  3:21 PM  Version 1 of 1    Author:  Vishnu Alvarado RPH Service:  (none) Author Type:  Pharmacist    Filed:  3/21/2018  3:22 PM Date of Service:  3/21/2018  3:21 PM Status:  Signed    :  Vishnu Alvarado RPH (Pharmacist)         Pharmacy   Pharmacy  Antibiotics/Antifungals/Antivirals:  Medication:      Active Orders     None        Route:         None  Stop Date:  N/A  Reason:    Antihypertensives/Cardiac:  Medication:    Orders (72h ago through future)    Start     Ordered    03/20/18 1042  hydrALAZINE (APRESOLINE) tablet 25 mg  EVERY 8 HOURS PRN      03/20/18 1042        Patient Vitals for the past 24 hrs:   BP Pulse   03/21/18 1300 113/74 84   03/21/18 1130 110/65 86   03/21/18 1045 104/71 96   03/21/18 0653 132/82 78   03/21/18 0510 112/80 76   03/20/18 2039 108/70 89       Anticoagulation:  Medication:  lovenox  INR:      Other key medications:      A review of the medication list reveals no issues at this time.      Section completed by:  Vishnu Alvarado RPH[WR.1]        Attribution Cabrera     WR.1 - Vishnu Alvarado RPH on 3/21/2018  3:21 PM                    DC Planning  DC destination/dispostion:  Patient has been living with boyfriend prior but he has moved out.  Home is a 2 level.  Patient indicates that her daughter will stay with her at discharge.    Referrals: Patient will need resources for alcohol treatment/counseling.    DC Needs:  Dme needs have not yet been determined.  She will need follow up therapy probably outpatient.  I will confirm availability of transportation.  Patient indicates she has a friend who can help out.    Barriers to discharge:  Cognitive issues and hx of Etoh abuse.      Strengths: Her daughter is very supportive and will be able to stay with patient in her home.    Section completed by:  Tamar Dougherty R.N.      Physician Summary  Nathan Mireles MD participated and led team conference discussion.

## 2018-03-22 NOTE — REHAB-CM IDT TEAM NOTE
Case Management    DC Planning  DC destination/dispostion:  Patient has been living with boyfriend prior but he has moved out.  Home is a 2 level.  Patient indicates that her daughter will stay with her at discharge.    Referrals: Patient will need resources for alcohol treatment/counseling.    DC Needs:  Dme needs have not yet been determined.  She will need follow up therapy probably outpatient.  I will confirm availability of transportation.  Patient indicates she has a friend who can help out.    Barriers to discharge:  Cognitive issues and hx of Etoh abuse.      Strengths: Her daughter is very supportive and will be able to stay with patient in her home.    Section completed by:  Tamar Dougherty R.N.

## 2018-03-22 NOTE — CARE PLAN
Problem: Safety  Goal: Will remain free from falls  Pt. Needs reminders in using call light. Pt. Has poor safety awareness. Pt's room near to the nurse station. Bed and chair alarm in place.     Problem: Bowel/Gastric:  Goal: Normal bowel function is maintained or improved  Outcome: PROGRESSING AS EXPECTED  Per report Pt. Had a bowel movement yesterday. Bowel sounds present in all four quadrants. Will f/u if Pt. Unable to have a bowel movement.

## 2018-03-22 NOTE — DISCHARGE PLANNING
"Case Management;  Met with patient and reviewed team conference discussion and d/c target.  Also, called her daughter with permission from patient.  Her daughter \"Kenneth\" confirms that she will be staying with her mother when she goes home.  I have provided my contact information.  Will follow.  "

## 2018-03-22 NOTE — DIETARY
"Nutrition Care/ Consult For Poor PO / Wt Loss PTA    Assessment:    Admitting Diagnosis: Closed traumatic TBI, EtOH abuse, EtOH withdrawal    Pertinent PMH: Tobacco dependence    Additional Information: The pt was visited in her room this afternoon to discuss appetite and recent PO intake.The pt states that her appetite is fine but that the meals that she has been receiving are too large for her to consume in one sitting. PO intake has been ~55% of meals so far (4 meal records) She states that she has always been a small eater and she requests smaller portions with her meal trays. Pt encouraged to add snacks between meals if decreasing portion sizes. Noted that pt is borderline underweight for her age and could benefit from some weight gain. Several snack ideas proposed to the pt but she was reluctant to order food. She wonders why she is on a dysphagia 3 diet. Pt amenable to adding magic cup (wild berry) for HS snack each night and cottage cheese/fruit and coffee with breakfast.    Appropriate for Education? Yes  Education in Past? Unknown  Appetite: Fair  Diet: Regular / dysphagia 3 / thin liquids  Average PO intake: 55% (4 meal records so far)    Labs: Reviewed.  Medications: Librium, Colace, Pericolace, Cymbalta, Lovenox, Florinef, Prilosec, K-Lyte, Salt tabs  PRN Medications: Hydralazine, Lactulose, Dulcolax, Zofran, Roxicodone  IVF: None currently in MAR    Height: 5'8\" (1.727m)  Weight: 126# (57.2kg)  IBW: 140# (63.6kg)  BMI: 19.16 (at low end of normal limits)    Skin: Intact - no skin breakdown noted  GI: last BM 3/22 (medium, brown)  : UOP yellow  Vitals: /69, on RA  I/Os: +360mL x 24 hours (no output recorded yet today)    Nutrient Needs:  Kcal: 1634-1760kcal/day (29-31kcal/kg) BEE=1257 x 1.3-1.4 to encourage wt gain  Protein: 57-69g/day (1-1.2g/kg)  Fluid: 1700-1850mL/day    Diagnosis: Inadequate oral intake related to lengthy clinical course s/p GLF and resultant TBI as evidenced by previous " need for TF to maintain nutritional status and current PO intake of ~55%    Intervention/ Recommendations/POC:  1. Continue current diet. Diet upgrades per SLP.  2. Magic cup berry (9g protein) for HS snack, cottage cheese/fruit/coffee with breakfast daily.  3. Encourage adequate PO/fluid intake.  4. Nutrition rep to see regarding food prefs/ honor within dietary restrictions (if indicated)     Monitor/Evaluation: Monitor PO intake, weight, labs, medication adjustments, skin integrity, GI function, vitals, I/Os, and overall hydration status.  Adjust nutritional POC pending clinical outcomes.   RD following weekly.    Goal: Maintain >/= 50% adequate oral nutrient/fluid intake to promote nutrition optimization/healing.

## 2018-03-23 PROCEDURE — G0515 COGNITIVE SKILLS DEVELOPMENT: HCPCS

## 2018-03-23 PROCEDURE — 700111 HCHG RX REV CODE 636 W/ 250 OVERRIDE (IP): Performed by: PHYSICAL MEDICINE & REHABILITATION

## 2018-03-23 PROCEDURE — 97112 NEUROMUSCULAR REEDUCATION: CPT

## 2018-03-23 PROCEDURE — 97116 GAIT TRAINING THERAPY: CPT

## 2018-03-23 PROCEDURE — 700112 HCHG RX REV CODE 229: Performed by: PHYSICAL MEDICINE & REHABILITATION

## 2018-03-23 PROCEDURE — A9270 NON-COVERED ITEM OR SERVICE: HCPCS | Performed by: PHYSICAL MEDICINE & REHABILITATION

## 2018-03-23 PROCEDURE — 770010 HCHG ROOM/CARE - REHAB SEMI PRIVAT*

## 2018-03-23 PROCEDURE — 97530 THERAPEUTIC ACTIVITIES: CPT

## 2018-03-23 PROCEDURE — 700102 HCHG RX REV CODE 250 W/ 637 OVERRIDE(OP): Performed by: PHYSICAL MEDICINE & REHABILITATION

## 2018-03-23 RX ORDER — GABAPENTIN 100 MG/1
200 CAPSULE ORAL 3 TIMES DAILY
Status: DISCONTINUED | OUTPATIENT
Start: 2018-03-23 | End: 2018-03-26

## 2018-03-23 RX ORDER — SODIUM CHLORIDE 1 G/1
2 TABLET ORAL EVERY 8 HOURS
Status: DISCONTINUED | OUTPATIENT
Start: 2018-03-23 | End: 2018-03-26

## 2018-03-23 RX ADMIN — Medication 2 G: at 00:37

## 2018-03-23 RX ADMIN — CHLORDIAZEPOXIDE HYDROCHLORIDE 25 MG: 25 CAPSULE ORAL at 05:09

## 2018-03-23 RX ADMIN — ACETAMINOPHEN 650 MG: 325 TABLET, FILM COATED ORAL at 11:55

## 2018-03-23 RX ADMIN — Medication 2 G: at 21:06

## 2018-03-23 RX ADMIN — GABAPENTIN 200 MG: 100 CAPSULE ORAL at 21:05

## 2018-03-23 RX ADMIN — GABAPENTIN 100 MG: 100 CAPSULE ORAL at 14:18

## 2018-03-23 RX ADMIN — ACETAMINOPHEN 650 MG: 325 TABLET, FILM COATED ORAL at 18:18

## 2018-03-23 RX ADMIN — CHLORDIAZEPOXIDE HYDROCHLORIDE 25 MG: 25 CAPSULE ORAL at 14:17

## 2018-03-23 RX ADMIN — POTASSIUM BICARBONATE 50 MEQ: 25 TABLET, EFFERVESCENT ORAL at 14:16

## 2018-03-23 RX ADMIN — ENOXAPARIN SODIUM 30 MG: 100 INJECTION SUBCUTANEOUS at 09:53

## 2018-03-23 RX ADMIN — FLUDROCORTISONE ACETATE 0.1 MG: 0.1 TABLET ORAL at 09:50

## 2018-03-23 RX ADMIN — ACETAMINOPHEN 650 MG: 325 TABLET, FILM COATED ORAL at 05:05

## 2018-03-23 RX ADMIN — OMEPRAZOLE 20 MG: 20 CAPSULE, DELAYED RELEASE ORAL at 09:50

## 2018-03-23 RX ADMIN — GABAPENTIN 100 MG: 100 CAPSULE ORAL at 09:51

## 2018-03-23 RX ADMIN — ENOXAPARIN SODIUM 30 MG: 100 INJECTION SUBCUTANEOUS at 21:05

## 2018-03-23 RX ADMIN — FLUDROCORTISONE ACETATE 0.1 MG: 0.1 TABLET ORAL at 21:06

## 2018-03-23 RX ADMIN — OXYCODONE HYDROCHLORIDE 5 MG: 5 TABLET ORAL at 18:18

## 2018-03-23 RX ADMIN — CHLORDIAZEPOXIDE HYDROCHLORIDE 25 MG: 25 CAPSULE ORAL at 21:05

## 2018-03-23 RX ADMIN — DULOXETINE HYDROCHLORIDE 30 MG: 30 CAPSULE, DELAYED RELEASE ORAL at 09:51

## 2018-03-23 RX ADMIN — OXYCODONE HYDROCHLORIDE 5 MG: 5 TABLET ORAL at 05:06

## 2018-03-23 RX ADMIN — ACETAMINOPHEN 650 MG: 325 TABLET, FILM COATED ORAL at 00:37

## 2018-03-23 RX ADMIN — POTASSIUM BICARBONATE 50 MEQ: 25 TABLET, EFFERVESCENT ORAL at 21:05

## 2018-03-23 RX ADMIN — POTASSIUM BICARBONATE 50 MEQ: 25 TABLET, EFFERVESCENT ORAL at 05:04

## 2018-03-23 RX ADMIN — STANDARDIZED SENNA CONCENTRATE AND DOCUSATE SODIUM 1 TABLET: 8.6; 5 TABLET, FILM COATED ORAL at 21:06

## 2018-03-23 RX ADMIN — Medication 2 G: at 05:05

## 2018-03-23 RX ADMIN — Medication 2 G: at 14:17

## 2018-03-23 RX ADMIN — DOCUSATE SODIUM 100 MG: 100 CAPSULE, LIQUID FILLED ORAL at 09:50

## 2018-03-23 RX ADMIN — OXYCODONE HYDROCHLORIDE 5 MG: 5 TABLET ORAL at 09:56

## 2018-03-23 ASSESSMENT — PAIN SCALES - GENERAL
PAINLEVEL_OUTOF10: 2
PAINLEVEL_OUTOF10: 1
PAINLEVEL_OUTOF10: 6
PAINLEVEL_OUTOF10: 6
PAINLEVEL_OUTOF10: 3
PAINLEVEL_OUTOF10: 8
PAINLEVEL_OUTOF10: ASSUMED PAIN PRESENT

## 2018-03-23 NOTE — CARE PLAN
Problem: Safety  Goal: Will remain free from falls    Intervention: Assess risk factors for falls  Patient is AOx4 and has demonstrated good safety technique this shift.  Asks for assistance when needed and does not attempt self transfer.  Able to verbalize needs.  Will continue to monitor.      Problem: Pain Management  Goal: Pain level will decrease to patient's comfort goal  Pt reported headache 6/10; PRN percocet given. Reassessed- pt sleeping comfortably

## 2018-03-24 PROCEDURE — A9270 NON-COVERED ITEM OR SERVICE: HCPCS | Performed by: PHYSICAL MEDICINE & REHABILITATION

## 2018-03-24 PROCEDURE — 770010 HCHG ROOM/CARE - REHAB SEMI PRIVAT*

## 2018-03-24 PROCEDURE — 700112 HCHG RX REV CODE 229: Performed by: PHYSICAL MEDICINE & REHABILITATION

## 2018-03-24 PROCEDURE — 700102 HCHG RX REV CODE 250 W/ 637 OVERRIDE(OP): Performed by: PHYSICAL MEDICINE & REHABILITATION

## 2018-03-24 PROCEDURE — 700111 HCHG RX REV CODE 636 W/ 250 OVERRIDE (IP): Performed by: PHYSICAL MEDICINE & REHABILITATION

## 2018-03-24 RX ADMIN — ACETAMINOPHEN 650 MG: 325 TABLET, FILM COATED ORAL at 00:02

## 2018-03-24 RX ADMIN — OXYCODONE HYDROCHLORIDE 5 MG: 5 TABLET ORAL at 09:16

## 2018-03-24 RX ADMIN — CHLORDIAZEPOXIDE HYDROCHLORIDE 25 MG: 25 CAPSULE ORAL at 05:19

## 2018-03-24 RX ADMIN — OXYCODONE HYDROCHLORIDE 5 MG: 5 TABLET ORAL at 21:17

## 2018-03-24 RX ADMIN — CHLORDIAZEPOXIDE HYDROCHLORIDE 25 MG: 25 CAPSULE ORAL at 15:29

## 2018-03-24 RX ADMIN — DULOXETINE HYDROCHLORIDE 30 MG: 30 CAPSULE, DELAYED RELEASE ORAL at 09:15

## 2018-03-24 RX ADMIN — Medication 2 G: at 21:07

## 2018-03-24 RX ADMIN — OMEPRAZOLE 20 MG: 20 CAPSULE, DELAYED RELEASE ORAL at 09:15

## 2018-03-24 RX ADMIN — POTASSIUM BICARBONATE 50 MEQ: 25 TABLET, EFFERVESCENT ORAL at 05:18

## 2018-03-24 RX ADMIN — POTASSIUM BICARBONATE 50 MEQ: 25 TABLET, EFFERVESCENT ORAL at 15:28

## 2018-03-24 RX ADMIN — GABAPENTIN 200 MG: 100 CAPSULE ORAL at 09:15

## 2018-03-24 RX ADMIN — FLUDROCORTISONE ACETATE 0.1 MG: 0.1 TABLET ORAL at 21:07

## 2018-03-24 RX ADMIN — GABAPENTIN 200 MG: 100 CAPSULE ORAL at 15:29

## 2018-03-24 RX ADMIN — ACETAMINOPHEN 650 MG: 325 TABLET, FILM COATED ORAL at 17:14

## 2018-03-24 RX ADMIN — OXYCODONE HYDROCHLORIDE 5 MG: 5 TABLET ORAL at 00:01

## 2018-03-24 RX ADMIN — ACETAMINOPHEN 650 MG: 325 TABLET, FILM COATED ORAL at 11:43

## 2018-03-24 RX ADMIN — Medication 2 G: at 05:18

## 2018-03-24 RX ADMIN — STANDARDIZED SENNA CONCENTRATE AND DOCUSATE SODIUM 1 TABLET: 8.6; 5 TABLET, FILM COATED ORAL at 21:07

## 2018-03-24 RX ADMIN — ACETAMINOPHEN 650 MG: 325 TABLET, FILM COATED ORAL at 05:19

## 2018-03-24 RX ADMIN — CHLORDIAZEPOXIDE HYDROCHLORIDE 25 MG: 25 CAPSULE ORAL at 21:08

## 2018-03-24 RX ADMIN — ENOXAPARIN SODIUM 30 MG: 100 INJECTION SUBCUTANEOUS at 21:07

## 2018-03-24 RX ADMIN — OXYCODONE HYDROCHLORIDE 5 MG: 5 TABLET ORAL at 15:33

## 2018-03-24 RX ADMIN — Medication 2 G: at 15:29

## 2018-03-24 RX ADMIN — DOCUSATE SODIUM 100 MG: 100 CAPSULE, LIQUID FILLED ORAL at 09:16

## 2018-03-24 RX ADMIN — ENOXAPARIN SODIUM 30 MG: 100 INJECTION SUBCUTANEOUS at 09:18

## 2018-03-24 RX ADMIN — FLUDROCORTISONE ACETATE 0.1 MG: 0.1 TABLET ORAL at 09:16

## 2018-03-24 RX ADMIN — POTASSIUM BICARBONATE 50 MEQ: 25 TABLET, EFFERVESCENT ORAL at 21:07

## 2018-03-24 RX ADMIN — GABAPENTIN 200 MG: 100 CAPSULE ORAL at 21:07

## 2018-03-24 ASSESSMENT — PAIN SCALES - GENERAL
PAINLEVEL_OUTOF10: 6
PAINLEVEL_OUTOF10: 8
PAINLEVEL_OUTOF10: 8
PAINLEVEL_OUTOF10: 2
PAINLEVEL_OUTOF10: 6
PAINLEVEL_OUTOF10: 2

## 2018-03-24 NOTE — CARE PLAN
Problem: Communication  Goal: The ability to communicate needs accurately and effectively will improve  Outcome: PROGRESSING AS EXPECTED  Pt is AOx4 and able to communicate needs effectively to staff. She has some slightly delayed responses at times.    Problem: Pain Management  Goal: Pain level will decrease to patient's comfort goal  Outcome: PROGRESSING AS EXPECTED  Pt reported some discomfort but understands she is not yet due for next pain medication. Discussed timing of pain medication and pt states she will try to sleep and call if she needs pain medication later tonight. Assisted pt with positioning for comfort.

## 2018-03-24 NOTE — CARE PLAN
Problem: Communication  Goal: The ability to communicate needs accurately and effectively will improve  Patient is alert and oriented x 4.  She is able to communicate her needs accurately and effectively.

## 2018-03-24 NOTE — CARE PLAN
Problem: Pain Management  Goal: Pain level will decrease to patient's comfort goal   03/23/18 0956   OTHER   Nurse Pain Scale 0 - 10  6   Comfort Goal Comfort at Rest;Perform Activity;Comfort with Movement;Stay Alert   Patient medicated with Roxicodone 5 mg per her request.  Complained of headache.

## 2018-03-25 PROCEDURE — 700112 HCHG RX REV CODE 229: Performed by: PHYSICAL MEDICINE & REHABILITATION

## 2018-03-25 PROCEDURE — G0515 COGNITIVE SKILLS DEVELOPMENT: HCPCS

## 2018-03-25 PROCEDURE — 97530 THERAPEUTIC ACTIVITIES: CPT

## 2018-03-25 PROCEDURE — 770010 HCHG ROOM/CARE - REHAB SEMI PRIVAT*

## 2018-03-25 PROCEDURE — A9270 NON-COVERED ITEM OR SERVICE: HCPCS | Performed by: PHYSICAL MEDICINE & REHABILITATION

## 2018-03-25 PROCEDURE — 97112 NEUROMUSCULAR REEDUCATION: CPT

## 2018-03-25 PROCEDURE — 700102 HCHG RX REV CODE 250 W/ 637 OVERRIDE(OP): Performed by: PHYSICAL MEDICINE & REHABILITATION

## 2018-03-25 PROCEDURE — 97116 GAIT TRAINING THERAPY: CPT

## 2018-03-25 PROCEDURE — 700111 HCHG RX REV CODE 636 W/ 250 OVERRIDE (IP): Performed by: PHYSICAL MEDICINE & REHABILITATION

## 2018-03-25 RX ADMIN — POTASSIUM BICARBONATE 50 MEQ: 25 TABLET, EFFERVESCENT ORAL at 14:00

## 2018-03-25 RX ADMIN — Medication 2 G: at 05:42

## 2018-03-25 RX ADMIN — CHLORDIAZEPOXIDE HYDROCHLORIDE 25 MG: 25 CAPSULE ORAL at 21:02

## 2018-03-25 RX ADMIN — OMEPRAZOLE 20 MG: 20 CAPSULE, DELAYED RELEASE ORAL at 08:04

## 2018-03-25 RX ADMIN — OXYCODONE HYDROCHLORIDE 5 MG: 5 TABLET ORAL at 10:26

## 2018-03-25 RX ADMIN — DOCUSATE SODIUM 100 MG: 100 CAPSULE, LIQUID FILLED ORAL at 08:04

## 2018-03-25 RX ADMIN — ENOXAPARIN SODIUM 30 MG: 100 INJECTION SUBCUTANEOUS at 08:04

## 2018-03-25 RX ADMIN — OXYCODONE HYDROCHLORIDE 5 MG: 5 TABLET ORAL at 15:08

## 2018-03-25 RX ADMIN — ACETAMINOPHEN 650 MG: 325 TABLET, FILM COATED ORAL at 05:42

## 2018-03-25 RX ADMIN — CHLORDIAZEPOXIDE HYDROCHLORIDE 25 MG: 25 CAPSULE ORAL at 05:42

## 2018-03-25 RX ADMIN — OXYCODONE HYDROCHLORIDE 5 MG: 5 TABLET ORAL at 21:02

## 2018-03-25 RX ADMIN — CHLORDIAZEPOXIDE HYDROCHLORIDE 25 MG: 25 CAPSULE ORAL at 14:00

## 2018-03-25 RX ADMIN — DULOXETINE HYDROCHLORIDE 30 MG: 30 CAPSULE, DELAYED RELEASE ORAL at 08:04

## 2018-03-25 RX ADMIN — STANDARDIZED SENNA CONCENTRATE AND DOCUSATE SODIUM 1 TABLET: 8.6; 5 TABLET, FILM COATED ORAL at 21:01

## 2018-03-25 RX ADMIN — FLUDROCORTISONE ACETATE 0.1 MG: 0.1 TABLET ORAL at 21:02

## 2018-03-25 RX ADMIN — FLUDROCORTISONE ACETATE 0.1 MG: 0.1 TABLET ORAL at 08:04

## 2018-03-25 RX ADMIN — OXYCODONE HYDROCHLORIDE 5 MG: 5 TABLET ORAL at 05:50

## 2018-03-25 RX ADMIN — Medication 2 G: at 14:00

## 2018-03-25 RX ADMIN — GABAPENTIN 200 MG: 100 CAPSULE ORAL at 14:00

## 2018-03-25 RX ADMIN — ENOXAPARIN SODIUM 30 MG: 100 INJECTION SUBCUTANEOUS at 21:01

## 2018-03-25 RX ADMIN — POTASSIUM BICARBONATE 50 MEQ: 25 TABLET, EFFERVESCENT ORAL at 05:41

## 2018-03-25 RX ADMIN — ACETAMINOPHEN 650 MG: 325 TABLET, FILM COATED ORAL at 00:34

## 2018-03-25 RX ADMIN — Medication 2 G: at 21:01

## 2018-03-25 RX ADMIN — GABAPENTIN 200 MG: 100 CAPSULE ORAL at 21:01

## 2018-03-25 RX ADMIN — GABAPENTIN 200 MG: 100 CAPSULE ORAL at 08:04

## 2018-03-25 RX ADMIN — POTASSIUM BICARBONATE 50 MEQ: 25 TABLET, EFFERVESCENT ORAL at 21:01

## 2018-03-25 ASSESSMENT — PAIN SCALES - GENERAL
PAINLEVEL_OUTOF10: 8
PAINLEVEL_OUTOF10: 8
PAINLEVEL_OUTOF10: 6
PAINLEVEL_OUTOF10: 2
PAINLEVEL_OUTOF10: 8
PAINLEVEL_OUTOF10: 8

## 2018-03-25 ASSESSMENT — GAIT ASSESSMENTS
DEVIATION: ATAXIC;DECREASED HEEL STRIKE
GAIT LEVEL OF ASSIST: CONTACT GUARD ASSIST
DISTANCE (FEET): 400

## 2018-03-25 NOTE — PROGRESS NOTES
Received bedside report; assumed pt care. Pt A/O x 4, calm, and stable. Pt denies pain or discomfort. Pt resting comfortably in bed, family present, call light within reach when in room, safety precautions in place. Will continue to monitor.

## 2018-03-25 NOTE — CARE PLAN
Problem: Communication  Goal: The ability to communicate needs accurately and effectively will improve  Patient alert and oriented x 4,  Patient is able to communicate her needs effectively.     Problem: Pain Management  Goal: Pain level will decrease to patient's comfort goal   03/24/18 0916   OTHER   Nurse Pain Scale 0 - 10  6   Non Verbal Scale  Calm   Comfort Goal 0;Comfort at Rest;Comfort with Movement;Stay Alert   Medicated with Roxicodone for headache twice this shift.  Patient states medication is effective.

## 2018-03-25 NOTE — CARE PLAN
Problem: Pain Management  Goal: Pain level will decrease to patient's comfort goal  Patient medicated with PRN qamar due to a pain level of 8/10 in head.  Patient reports satisfaction with this intervention.  Patient assessed for pain, denies pain at this time.  No other s/s of pain such as grimacing, altered vitals, moaning, or decreased activity tolerance.

## 2018-03-25 NOTE — CARE PLAN
Problem: Safety  Goal: Will remain free from injury  Outcome: PROGRESSING AS EXPECTED  Pt can be impulsive at times. Positioned in room close to nurses station, bed alarm on, bed in low and locked position, call light and personal belongings within reach. Pt verbalized she will use call light tonight.    Problem: Pain Management  Goal: Pain level will decrease to patient's comfort goal  Outcome: PROGRESSING AS EXPECTED  Pt reported pain to head rated 8/10. Requested PRN pain medication. Roxicodone 5 mg given and pt assisted with repositioning for comfort.

## 2018-03-25 NOTE — PROGRESS NOTES
0715: Bedside report received, assumed care for this patient.  Patient is A&O x 4.  VSS.  Communication board updated, call light and belongings are within reach.  Bed is in low position. Patient appears in no distress, and has no complaints of SOB and pain in head.  Will be medicated per MAR.  Plan of care discussed and agreed upon with patient.

## 2018-03-25 NOTE — CARE PLAN
Problem: Safety  Goal: Will remain free from falls  Call light within reach, needs met.  Bed in low and locked position.  Patient educated on fall risk and verbalized agreement, patient does call appropriately for toileting, nutrition, and other needs.  Patient A&O x 4 and impulsive.    Alarms on the bed and wheelchair.  Room near nursing station.

## 2018-03-26 LAB
ALBUMIN SERPL BCP-MCNC: 3.5 G/DL (ref 3.2–4.9)
ALBUMIN/GLOB SERPL: 1.4 G/DL
ALP SERPL-CCNC: 78 U/L (ref 30–99)
ALT SERPL-CCNC: 28 U/L (ref 2–50)
ANION GAP SERPL CALC-SCNC: 6 MMOL/L (ref 0–11.9)
AST SERPL-CCNC: 24 U/L (ref 12–45)
BASOPHILS # BLD AUTO: 3.6 % (ref 0–1.8)
BASOPHILS # BLD: 0.14 K/UL (ref 0–0.12)
BILIRUB SERPL-MCNC: 0.7 MG/DL (ref 0.1–1.5)
BUN SERPL-MCNC: 11 MG/DL (ref 8–22)
CALCIUM SERPL-MCNC: 9.2 MG/DL (ref 8.5–10.5)
CHLORIDE SERPL-SCNC: 105 MMOL/L (ref 96–112)
CO2 SERPL-SCNC: 27 MMOL/L (ref 20–33)
CREAT SERPL-MCNC: 0.61 MG/DL (ref 0.5–1.4)
EOSINOPHIL # BLD AUTO: 0.27 K/UL (ref 0–0.51)
EOSINOPHIL NFR BLD: 7 % (ref 0–6.9)
ERYTHROCYTE [DISTWIDTH] IN BLOOD BY AUTOMATED COUNT: 52 FL (ref 35.9–50)
GLOBULIN SER CALC-MCNC: 2.5 G/DL (ref 1.9–3.5)
GLUCOSE SERPL-MCNC: 82 MG/DL (ref 65–99)
HCT VFR BLD AUTO: 34.3 % (ref 37–47)
HGB BLD-MCNC: 11.3 G/DL (ref 12–16)
IMM GRANULOCYTES # BLD AUTO: 0.01 K/UL (ref 0–0.11)
IMM GRANULOCYTES NFR BLD AUTO: 0.3 % (ref 0–0.9)
LYMPHOCYTES # BLD AUTO: 1.53 K/UL (ref 1–4.8)
LYMPHOCYTES NFR BLD: 39.7 % (ref 22–41)
MCH RBC QN AUTO: 35.9 PG (ref 27–33)
MCHC RBC AUTO-ENTMCNC: 32.9 G/DL (ref 33.6–35)
MCV RBC AUTO: 108.9 FL (ref 81.4–97.8)
MONOCYTES # BLD AUTO: 0.45 K/UL (ref 0–0.85)
MONOCYTES NFR BLD AUTO: 11.7 % (ref 0–13.4)
NEUTROPHILS # BLD AUTO: 1.45 K/UL (ref 2–7.15)
NEUTROPHILS NFR BLD: 37.7 % (ref 44–72)
NRBC # BLD AUTO: 0 K/UL
NRBC BLD-RTO: 0 /100 WBC
PLATELET # BLD AUTO: 462 K/UL (ref 164–446)
PMV BLD AUTO: 8.4 FL (ref 9–12.9)
POTASSIUM SERPL-SCNC: 3.5 MMOL/L (ref 3.6–5.5)
PROT SERPL-MCNC: 6 G/DL (ref 6–8.2)
RBC # BLD AUTO: 3.15 M/UL (ref 4.2–5.4)
SODIUM SERPL-SCNC: 138 MMOL/L (ref 135–145)
WBC # BLD AUTO: 3.9 K/UL (ref 4.8–10.8)

## 2018-03-26 PROCEDURE — 97530 THERAPEUTIC ACTIVITIES: CPT

## 2018-03-26 PROCEDURE — 97116 GAIT TRAINING THERAPY: CPT

## 2018-03-26 PROCEDURE — A9270 NON-COVERED ITEM OR SERVICE: HCPCS | Performed by: INTERNAL MEDICINE

## 2018-03-26 PROCEDURE — 97535 SELF CARE MNGMENT TRAINING: CPT

## 2018-03-26 PROCEDURE — 700102 HCHG RX REV CODE 250 W/ 637 OVERRIDE(OP): Performed by: PHYSICAL MEDICINE & REHABILITATION

## 2018-03-26 PROCEDURE — 36415 COLL VENOUS BLD VENIPUNCTURE: CPT

## 2018-03-26 PROCEDURE — 85025 COMPLETE CBC W/AUTO DIFF WBC: CPT

## 2018-03-26 PROCEDURE — A9270 NON-COVERED ITEM OR SERVICE: HCPCS | Performed by: PHYSICAL MEDICINE & REHABILITATION

## 2018-03-26 PROCEDURE — 700111 HCHG RX REV CODE 636 W/ 250 OVERRIDE (IP): Performed by: PHYSICAL MEDICINE & REHABILITATION

## 2018-03-26 PROCEDURE — 80053 COMPREHEN METABOLIC PANEL: CPT

## 2018-03-26 PROCEDURE — 770010 HCHG ROOM/CARE - REHAB SEMI PRIVAT*

## 2018-03-26 PROCEDURE — 700112 HCHG RX REV CODE 229: Performed by: PHYSICAL MEDICINE & REHABILITATION

## 2018-03-26 PROCEDURE — 97112 NEUROMUSCULAR REEDUCATION: CPT

## 2018-03-26 PROCEDURE — G0515 COGNITIVE SKILLS DEVELOPMENT: HCPCS

## 2018-03-26 PROCEDURE — 700102 HCHG RX REV CODE 250 W/ 637 OVERRIDE(OP): Performed by: INTERNAL MEDICINE

## 2018-03-26 RX ORDER — CHLORDIAZEPOXIDE HYDROCHLORIDE 25 MG/1
25 CAPSULE, GELATIN COATED ORAL EVERY 12 HOURS
Status: DISCONTINUED | OUTPATIENT
Start: 2018-03-26 | End: 2018-03-29

## 2018-03-26 RX ORDER — FERROUS SULFATE 325(65) MG
325 TABLET ORAL
Status: DISCONTINUED | OUTPATIENT
Start: 2018-03-27 | End: 2018-04-05 | Stop reason: HOSPADM

## 2018-03-26 RX ORDER — ASCORBIC ACID 500 MG
500 TABLET ORAL
Status: DISCONTINUED | OUTPATIENT
Start: 2018-03-27 | End: 2018-04-05 | Stop reason: HOSPADM

## 2018-03-26 RX ORDER — FOLIC ACID 1 MG/1
1 TABLET ORAL
Status: DISCONTINUED | OUTPATIENT
Start: 2018-03-27 | End: 2018-03-29

## 2018-03-26 RX ORDER — FLUDROCORTISONE ACETATE 0.1 MG/1
0.05 TABLET ORAL 2 TIMES DAILY
Status: DISCONTINUED | OUTPATIENT
Start: 2018-03-26 | End: 2018-03-28

## 2018-03-26 RX ORDER — GABAPENTIN 300 MG/1
300 CAPSULE ORAL 3 TIMES DAILY
Status: DISCONTINUED | OUTPATIENT
Start: 2018-03-26 | End: 2018-03-27

## 2018-03-26 RX ORDER — ESTRADIOL 1 MG/1
1 TABLET ORAL DAILY
Status: DISCONTINUED | OUTPATIENT
Start: 2018-03-26 | End: 2018-04-05 | Stop reason: HOSPADM

## 2018-03-26 RX ORDER — THIAMINE MONONITRATE (VIT B1) 100 MG
100 TABLET ORAL DAILY
Status: DISCONTINUED | OUTPATIENT
Start: 2018-03-26 | End: 2018-04-05 | Stop reason: HOSPADM

## 2018-03-26 RX ADMIN — POTASSIUM BICARBONATE 50 MEQ: 25 TABLET, EFFERVESCENT ORAL at 05:47

## 2018-03-26 RX ADMIN — POTASSIUM BICARBONATE 50 MEQ: 25 TABLET, EFFERVESCENT ORAL at 20:19

## 2018-03-26 RX ADMIN — DOCUSATE SODIUM 100 MG: 100 CAPSULE, LIQUID FILLED ORAL at 08:06

## 2018-03-26 RX ADMIN — OXYCODONE HYDROCHLORIDE 5 MG: 5 TABLET ORAL at 05:51

## 2018-03-26 RX ADMIN — ESTRADIOL 1 MG: 1 TABLET ORAL at 14:27

## 2018-03-26 RX ADMIN — GABAPENTIN 300 MG: 300 CAPSULE ORAL at 20:21

## 2018-03-26 RX ADMIN — CHOLECALCIFEROL TAB 25 MCG (1000 UNIT) 1000 UNITS: 25 TAB at 17:42

## 2018-03-26 RX ADMIN — POTASSIUM BICARBONATE 50 MEQ: 25 TABLET, EFFERVESCENT ORAL at 14:27

## 2018-03-26 RX ADMIN — OMEPRAZOLE 20 MG: 20 CAPSULE, DELAYED RELEASE ORAL at 08:06

## 2018-03-26 RX ADMIN — FLUDROCORTISONE ACETATE 0.05 MG: 0.1 TABLET ORAL at 20:20

## 2018-03-26 RX ADMIN — DULOXETINE HYDROCHLORIDE 30 MG: 30 CAPSULE, DELAYED RELEASE ORAL at 08:06

## 2018-03-26 RX ADMIN — CHLORDIAZEPOXIDE HYDROCHLORIDE 25 MG: 25 CAPSULE ORAL at 20:20

## 2018-03-26 RX ADMIN — Medication 100 MG: at 17:42

## 2018-03-26 RX ADMIN — FLUDROCORTISONE ACETATE 0.1 MG: 0.1 TABLET ORAL at 08:06

## 2018-03-26 RX ADMIN — STANDARDIZED SENNA CONCENTRATE AND DOCUSATE SODIUM 1 TABLET: 8.6; 5 TABLET, FILM COATED ORAL at 20:20

## 2018-03-26 RX ADMIN — Medication 2 G: at 05:48

## 2018-03-26 RX ADMIN — Medication 2 G: at 14:27

## 2018-03-26 RX ADMIN — ENOXAPARIN SODIUM 30 MG: 100 INJECTION SUBCUTANEOUS at 08:07

## 2018-03-26 RX ADMIN — ACETAMINOPHEN 650 MG: 325 TABLET, FILM COATED ORAL at 20:20

## 2018-03-26 RX ADMIN — ENOXAPARIN SODIUM 30 MG: 100 INJECTION SUBCUTANEOUS at 20:21

## 2018-03-26 RX ADMIN — ACETAMINOPHEN 650 MG: 325 TABLET, FILM COATED ORAL at 08:12

## 2018-03-26 RX ADMIN — GABAPENTIN 300 MG: 300 CAPSULE ORAL at 14:27

## 2018-03-26 RX ADMIN — OXYCODONE HYDROCHLORIDE 5 MG: 5 TABLET ORAL at 11:08

## 2018-03-26 RX ADMIN — GABAPENTIN 200 MG: 100 CAPSULE ORAL at 08:06

## 2018-03-26 RX ADMIN — CHLORDIAZEPOXIDE HYDROCHLORIDE 25 MG: 25 CAPSULE ORAL at 05:48

## 2018-03-26 RX ADMIN — OXYCODONE HYDROCHLORIDE 5 MG: 5 TABLET ORAL at 17:42

## 2018-03-26 ASSESSMENT — BALANCE ASSESSMENTS
PLACE ALTERNATE FOOT ON STEP OR STOOL WHILE STANDING UNSUPPORTED: 2
SITTING TO STANDING: 4
TURN 360 DEGREES: 0
SITTING UNSUPPORTED: 4
STANDING UNSUPPORTED WITH FEET TOGETHER: 4
STANDING TO SITTING: 4
PICK UP OBJECT FROM THE FLOOR FROM A STANDING POSITION: 3
STANDING UNSUPPORTED ONE FOOT IN FRONT: 3
LOOK OVER LEFT AND RIGHT SHOULDERS WHILE STANDING: 4
STANDING ON ONE LEG: 2
REACHING FORWARD WITH OUTSTRETCHED ARM WHILE STANDING: 4
STANDING UNSUPPORTED WITH EYES CLOSED: 3
STANDING UNSUPPORTED: 4
LONG VERSION TOTAL SCORE (MAX 56): 43
LONG VERSION TOTAL SCORE (MAX 56): 43
TRANSFERS: 2

## 2018-03-26 ASSESSMENT — PAIN SCALES - GENERAL
PAINLEVEL_OUTOF10: 8
PAINLEVEL_OUTOF10: 8
PAINLEVEL_OUTOF10: 2
PAINLEVEL_OUTOF10: 6

## 2018-03-26 NOTE — CARE PLAN
Problem: Safety  Goal: Will remain free from falls  Call light within reach, needs met.  Bed in low and locked position.  Patient educated on fall risk and verbalized agreement, patient does not call appropriately for toileting, nutrition, and other needs.  Patient A&O x 4 but impulsive.    Alarms on the bed and wheelchair.  Patient 's family members found walking the patient w/ gait belt.  Informed family members that this is only safely done with trained staff members, family/friends agreed.    Problem: Discharge Barriers/Planning  Goal: Patient's continuum of care needs will be met  Patient informed us that she is on estradiol 1mg daily, showed us the prescription.  MD notified, medication added to med regimen.

## 2018-03-26 NOTE — PROGRESS NOTES
Received bedside shift report from Jessie LOTT RN regarding patient and assumed care. Patient awake, calm and stable, currently positioned in bed for comfort and safety; call light within reach. Denies pain or discomfort at this time. Will continue to monitor.

## 2018-03-26 NOTE — PROGRESS NOTES
0715: Bedside report received, assumed care for this patient.  Patient is A&O x 4.  VSS.  Communication board updated, call light and belongings are within reach.  Bed is in low position. Patient appears in no distress, and has no complaints of SOB and 8/10 of pain.  Will be medicated per MAR and given pain med when due.  Emotional support given and repositioned.  Plan of care discussed and agreed upon with patient.  Per report impulsive last night.  ALarms on bed.

## 2018-03-26 NOTE — CARE PLAN
Problem: Grooming  Goal: STG-Within one week, patient will complete grooming  1) Individualized Goal:  In standing with SPV for oral care and hair care with no LOB using DME PRN and min cues for safety  2) Interventions:  OT Group Therapy, OT Self Care/ADL, OT Cognitive Skill Dev, OT Community Reintegration, OT Manual Ther Technique, OT Neuro Re-Ed/Balance, OT Therapeutic Activity, OT Evaluation and OT Therapeutic Exercise     Outcome: NOT MET  Pt had LOB during standing grooming that required CGA    Problem: Dressing  Goal: STG-Within one week, patient will dress UB  1) Individualized Goal:  At a SPV level using AE or compensatory techniques PRN  2) Interventions:  OT Group Therapy, OT Self Care/ADL, OT Cognitive Skill Dev, OT Community Reintegration, OT Manual Ther Technique, OT Neuro Re-Ed/Balance, OT Therapeutic Activity, OT Evaluation and OT Therapeutic Exercise   Outcome: MET Date Met: 03/26/18      Problem: Functional Transfers  Goal: STG-Within one week, patient will transfer to toilet  1) Individualized Goal:  At a SPV level using DME/AE with no cues for safety  2) Interventions:  OT Group Therapy, OT Self Care/ADL, OT Cognitive Skill Dev, OT Community Reintegration, OT Manual Ther Technique, OT Neuro Re-Ed/Balance, OT Therapeutic Activity, OT Evaluation and OT Therapeutic Exercise   Outcome: MET Date Met: 03/26/18      Problem: Functional Cognition  Goal: STG-Within one week, patient will demonstrate safety awareness  1) Individualized Goal:  By locking w/c breaks on 3/5 opportunities with min v/c.  2) Interventions:  OT Group Therapy, OT Self Care/ADL, OT Cognitive Skill Dev, OT Community Reintegration, OT Manual Ther Technique, OT Neuro Re-Ed/Balance, OT Therapeutic Activity, OT Evaluation and OT Therapeutic Exercise   Outcome: NOT MET

## 2018-03-26 NOTE — CARE PLAN
Problem: Communication  Goal: The ability to communicate needs accurately and effectively will improve  Encouraged to make needs known to staff and to use call light for staff assist.    Problem: Safety  Goal: Will remain free from falls  Call light kept within reach and encouraged to use for assistance and with toileting needs. Encouraged to call staff and to wait for staff before transfers.  Bed alarm is in place.  Room close to nurse's station.
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
## 2018-03-27 LAB
FOLATE SERPL-MCNC: 11.9 NG/ML
VIT B12 SERPL-MCNC: 850 PG/ML (ref 211–911)

## 2018-03-27 PROCEDURE — 97116 GAIT TRAINING THERAPY: CPT

## 2018-03-27 PROCEDURE — 97530 THERAPEUTIC ACTIVITIES: CPT

## 2018-03-27 PROCEDURE — 700102 HCHG RX REV CODE 250 W/ 637 OVERRIDE(OP): Performed by: PHYSICAL MEDICINE & REHABILITATION

## 2018-03-27 PROCEDURE — A9270 NON-COVERED ITEM OR SERVICE: HCPCS | Performed by: PHYSICAL MEDICINE & REHABILITATION

## 2018-03-27 PROCEDURE — 82607 VITAMIN B-12: CPT

## 2018-03-27 PROCEDURE — 700111 HCHG RX REV CODE 636 W/ 250 OVERRIDE (IP): Performed by: PHYSICAL MEDICINE & REHABILITATION

## 2018-03-27 PROCEDURE — G0515 COGNITIVE SKILLS DEVELOPMENT: HCPCS

## 2018-03-27 PROCEDURE — 770010 HCHG ROOM/CARE - REHAB SEMI PRIVAT*

## 2018-03-27 PROCEDURE — 97112 NEUROMUSCULAR REEDUCATION: CPT

## 2018-03-27 PROCEDURE — 700102 HCHG RX REV CODE 250 W/ 637 OVERRIDE(OP): Performed by: INTERNAL MEDICINE

## 2018-03-27 PROCEDURE — A9270 NON-COVERED ITEM OR SERVICE: HCPCS | Performed by: INTERNAL MEDICINE

## 2018-03-27 PROCEDURE — 82746 ASSAY OF FOLIC ACID SERUM: CPT

## 2018-03-27 PROCEDURE — 36415 COLL VENOUS BLD VENIPUNCTURE: CPT

## 2018-03-27 PROCEDURE — 700112 HCHG RX REV CODE 229: Performed by: PHYSICAL MEDICINE & REHABILITATION

## 2018-03-27 RX ORDER — GABAPENTIN 400 MG/1
400 CAPSULE ORAL 3 TIMES DAILY
Status: DISCONTINUED | OUTPATIENT
Start: 2018-03-27 | End: 2018-04-05 | Stop reason: HOSPADM

## 2018-03-27 RX ADMIN — CHLORDIAZEPOXIDE HYDROCHLORIDE 25 MG: 25 CAPSULE ORAL at 21:16

## 2018-03-27 RX ADMIN — OXYCODONE HYDROCHLORIDE 5 MG: 5 TABLET ORAL at 21:16

## 2018-03-27 RX ADMIN — POTASSIUM BICARBONATE 50 MEQ: 25 TABLET, EFFERVESCENT ORAL at 04:19

## 2018-03-27 RX ADMIN — CHOLECALCIFEROL TAB 25 MCG (1000 UNIT) 1000 UNITS: 25 TAB at 08:20

## 2018-03-27 RX ADMIN — FERROUS SULFATE TAB 325 MG (65 MG ELEMENTAL FE) 325 MG: 325 (65 FE) TAB at 08:19

## 2018-03-27 RX ADMIN — DOCUSATE SODIUM 100 MG: 100 CAPSULE, LIQUID FILLED ORAL at 08:21

## 2018-03-27 RX ADMIN — STANDARDIZED SENNA CONCENTRATE AND DOCUSATE SODIUM 1 TABLET: 8.6; 5 TABLET, FILM COATED ORAL at 21:17

## 2018-03-27 RX ADMIN — GABAPENTIN 300 MG: 300 CAPSULE ORAL at 08:21

## 2018-03-27 RX ADMIN — Medication 100 MG: at 08:20

## 2018-03-27 RX ADMIN — OXYCODONE HYDROCHLORIDE 5 MG: 5 TABLET ORAL at 04:21

## 2018-03-27 RX ADMIN — POTASSIUM BICARBONATE 50 MEQ: 25 TABLET, EFFERVESCENT ORAL at 15:18

## 2018-03-27 RX ADMIN — FOLIC ACID 1 MG: 1 TABLET ORAL at 08:21

## 2018-03-27 RX ADMIN — GABAPENTIN 400 MG: 400 CAPSULE ORAL at 15:18

## 2018-03-27 RX ADMIN — OMEPRAZOLE 20 MG: 20 CAPSULE, DELAYED RELEASE ORAL at 08:21

## 2018-03-27 RX ADMIN — FLUDROCORTISONE ACETATE 0.05 MG: 0.1 TABLET ORAL at 21:16

## 2018-03-27 RX ADMIN — ENOXAPARIN SODIUM 30 MG: 100 INJECTION SUBCUTANEOUS at 08:22

## 2018-03-27 RX ADMIN — ESTRADIOL 1 MG: 1 TABLET ORAL at 08:20

## 2018-03-27 RX ADMIN — OXYCODONE HYDROCHLORIDE AND ACETAMINOPHEN 500 MG: 500 TABLET ORAL at 08:18

## 2018-03-27 RX ADMIN — GABAPENTIN 400 MG: 400 CAPSULE ORAL at 21:15

## 2018-03-27 RX ADMIN — POTASSIUM BICARBONATE 50 MEQ: 25 TABLET, EFFERVESCENT ORAL at 21:15

## 2018-03-27 RX ADMIN — CHLORDIAZEPOXIDE HYDROCHLORIDE 25 MG: 25 CAPSULE ORAL at 08:18

## 2018-03-27 RX ADMIN — FLUDROCORTISONE ACETATE 0.05 MG: 0.1 TABLET ORAL at 08:19

## 2018-03-27 ASSESSMENT — PAIN SCALES - GENERAL
PAINLEVEL_OUTOF10: 8
PAINLEVEL_OUTOF10: 0
PAINLEVEL_OUTOF10: 8

## 2018-03-27 NOTE — CARE PLAN
Problem: Other Problem (see comments)  Goal: Other Goal  Monitor/Evaluation: Monitor PO intake, weight, labs, medication adjustments, skin integrity, GI function, vitals, I/Os, and overall hydration status.  Adjust nutritional POC pending clinical outcomes.   RD following weekly.    Goal: Maintain >/= 50% adequate oral nutrient/fluid intake to promote nutrition optimization/healing.      Outcome: MET Date Met: 03/27/18  Pt consuming adequate PO on Regular diet.  Weight up, but note difference in scales. Clinical picture reviewed. Continue current POC. RD following weekly per protocol.

## 2018-03-27 NOTE — CARE PLAN
Problem: Communication  Goal: The ability to communicate needs accurately and effectively will improve  Patient alert and oriented x 4, she is impulsive about 50% of the time and uses her call light about 50% of the time.

## 2018-03-27 NOTE — REHAB-DIETARY IDT TEAM NOTE
Dietary   Nutrition       Dietary Problems           Problem: Other Problem (see comments)     Description: Diagnosis: Inadequate oral intake related to lengthy clinical course s/p GLF and resultant TBI as evidenced by previous need for TF to maintain nutritional status and current PO intake of ~55%     Goal: Other Goal (Resolved)     Description: Monitor/Evaluation: Monitor PO intake, weight, labs, medication adjustments, skin integrity, GI function, vitals, I/Os, and overall hydration status.  Adjust nutritional POC pending clinical outcomes.   RD following weekly.    Goal: Maintain >/= 50% adequate oral nutrient/fluid intake to promote nutrition optimization/healing.                  Pt consuming adequate PO on Regular diet.  Weight up, but note difference in scales. Clinical picture reviewed. Continue current POC. RD following weekly per protocol.   Section completed by:  Li Lopez R.D.

## 2018-03-27 NOTE — CARE PLAN
Problem: Safety  Goal: Will remain free from injury  Pt with poor safety awareness. Encouraged pt to use call light before all transfers. Verbalized understanding but does not demonstrate. Hourly rounding, bed alarm and room closed to nurses station for safety precautions. Will continue to monitor.    Problem: Pain Management  Goal: Pain level will decrease to patient's comfort goal  Tylenol given PRN per MAR for c/o HA 8/10 with adequate relief. Pt sleeps good. Will continue to monitor.

## 2018-03-28 PROCEDURE — G0515 COGNITIVE SKILLS DEVELOPMENT: HCPCS

## 2018-03-28 PROCEDURE — 700112 HCHG RX REV CODE 229: Performed by: PHYSICAL MEDICINE & REHABILITATION

## 2018-03-28 PROCEDURE — 97112 NEUROMUSCULAR REEDUCATION: CPT

## 2018-03-28 PROCEDURE — 700102 HCHG RX REV CODE 250 W/ 637 OVERRIDE(OP): Performed by: INTERNAL MEDICINE

## 2018-03-28 PROCEDURE — A9270 NON-COVERED ITEM OR SERVICE: HCPCS | Performed by: PHYSICAL MEDICINE & REHABILITATION

## 2018-03-28 PROCEDURE — 97116 GAIT TRAINING THERAPY: CPT

## 2018-03-28 PROCEDURE — 770010 HCHG ROOM/CARE - REHAB SEMI PRIVAT*

## 2018-03-28 PROCEDURE — 97530 THERAPEUTIC ACTIVITIES: CPT

## 2018-03-28 PROCEDURE — 700102 HCHG RX REV CODE 250 W/ 637 OVERRIDE(OP): Performed by: PHYSICAL MEDICINE & REHABILITATION

## 2018-03-28 PROCEDURE — A9270 NON-COVERED ITEM OR SERVICE: HCPCS | Performed by: INTERNAL MEDICINE

## 2018-03-28 RX ADMIN — OMEPRAZOLE 20 MG: 20 CAPSULE, DELAYED RELEASE ORAL at 07:44

## 2018-03-28 RX ADMIN — FOLIC ACID 1 MG: 1 TABLET ORAL at 07:43

## 2018-03-28 RX ADMIN — POTASSIUM BICARBONATE 50 MEQ: 25 TABLET, EFFERVESCENT ORAL at 14:10

## 2018-03-28 RX ADMIN — CHLORDIAZEPOXIDE HYDROCHLORIDE 25 MG: 25 CAPSULE ORAL at 20:53

## 2018-03-28 RX ADMIN — Medication 100 MG: at 07:43

## 2018-03-28 RX ADMIN — CHOLECALCIFEROL TAB 25 MCG (1000 UNIT) 1000 UNITS: 25 TAB at 07:43

## 2018-03-28 RX ADMIN — GABAPENTIN 400 MG: 400 CAPSULE ORAL at 20:52

## 2018-03-28 RX ADMIN — POTASSIUM BICARBONATE 50 MEQ: 25 TABLET, EFFERVESCENT ORAL at 05:30

## 2018-03-28 RX ADMIN — ESTRADIOL 1 MG: 1 TABLET ORAL at 07:43

## 2018-03-28 RX ADMIN — OXYCODONE HYDROCHLORIDE 5 MG: 5 TABLET ORAL at 14:16

## 2018-03-28 RX ADMIN — GABAPENTIN 400 MG: 400 CAPSULE ORAL at 14:09

## 2018-03-28 RX ADMIN — DOCUSATE SODIUM 100 MG: 100 CAPSULE, LIQUID FILLED ORAL at 07:42

## 2018-03-28 RX ADMIN — CHLORDIAZEPOXIDE HYDROCHLORIDE 25 MG: 25 CAPSULE ORAL at 07:44

## 2018-03-28 RX ADMIN — STANDARDIZED SENNA CONCENTRATE AND DOCUSATE SODIUM 1 TABLET: 8.6; 5 TABLET, FILM COATED ORAL at 20:52

## 2018-03-28 RX ADMIN — OXYCODONE HYDROCHLORIDE 5 MG: 5 TABLET ORAL at 20:51

## 2018-03-28 RX ADMIN — OXYCODONE HYDROCHLORIDE AND ACETAMINOPHEN 500 MG: 500 TABLET ORAL at 07:43

## 2018-03-28 RX ADMIN — POTASSIUM BICARBONATE 50 MEQ: 25 TABLET, EFFERVESCENT ORAL at 20:51

## 2018-03-28 RX ADMIN — FERROUS SULFATE TAB 325 MG (65 MG ELEMENTAL FE) 325 MG: 325 (65 FE) TAB at 07:43

## 2018-03-28 RX ADMIN — GABAPENTIN 400 MG: 400 CAPSULE ORAL at 07:42

## 2018-03-28 RX ADMIN — LACTULOSE 30 ML: 20 SOLUTION ORAL at 07:41

## 2018-03-28 ASSESSMENT — PAIN SCALES - GENERAL
PAINLEVEL_OUTOF10: 8
PAINLEVEL_OUTOF10: 0
PAINLEVEL_OUTOF10: 0
PAINLEVEL_OUTOF10: 5

## 2018-03-28 NOTE — CARE PLAN
Problem: Communication  Goal: The ability to communicate needs accurately and effectively will improve  Patient is alert and oriented x 4.  She is impulsive even with bed and chair alarms and being reminded often to call for assistance.

## 2018-03-28 NOTE — PROGRESS NOTES
"Rehab Progress Note     Interval Events (Subjective)  CC:   Traumatic Brain injury  with cognitive deficts and balance deficits    Patient was seen several times today including physical therapy. She reports improvement in her headache. Her insight into her deficits is limited      Objective:  VITAL SIGNS: /78   Pulse 73   Temp 36.6 °C (97.9 °F)   Resp 20   Ht 1.727 m (5' 8\")   Wt 59.5 kg (131 lb 2.8 oz)   SpO2 94%   Breastfeeding? No   BMI 19.94 kg/m²   Cardiac: regular rate and rhythm, nl S1/S2   Lungs: clear to auscultation bilaterally.   Abdomen: soft; NT, ND, BS present.   Extremities: Without clubbing cyanosis or edema  Neuro: Significant improvement noted in balance still impulsive with some lability noted but greatly improved    Recent Results (from the past 72 hour(s))   COMP METABOLIC PANEL    Collection Time: 03/26/18  5:21 AM   Result Value Ref Range    Sodium 138 135 - 145 mmol/L    Potassium 3.5 (L) 3.6 - 5.5 mmol/L    Chloride 105 96 - 112 mmol/L    Co2 27 20 - 33 mmol/L    Anion Gap 6.0 0.0 - 11.9    Glucose 82 65 - 99 mg/dL    Bun 11 8 - 22 mg/dL    Creatinine 0.61 0.50 - 1.40 mg/dL    Calcium 9.2 8.5 - 10.5 mg/dL    AST(SGOT) 24 12 - 45 U/L    ALT(SGPT) 28 2 - 50 U/L    Alkaline Phosphatase 78 30 - 99 U/L    Total Bilirubin 0.7 0.1 - 1.5 mg/dL    Albumin 3.5 3.2 - 4.9 g/dL    Total Protein 6.0 6.0 - 8.2 g/dL    Globulin 2.5 1.9 - 3.5 g/dL    A-G Ratio 1.4 g/dL   CBC WITH DIFFERENTIAL    Collection Time: 03/26/18  5:21 AM   Result Value Ref Range    WBC 3.9 (L) 4.8 - 10.8 K/uL    RBC 3.15 (L) 4.20 - 5.40 M/uL    Hemoglobin 11.3 (L) 12.0 - 16.0 g/dL    Hematocrit 34.3 (L) 37.0 - 47.0 %    .9 (H) 81.4 - 97.8 fL    MCH 35.9 (H) 27.0 - 33.0 pg    MCHC 32.9 (L) 33.6 - 35.0 g/dL    RDW 52.0 (H) 35.9 - 50.0 fL    Platelet Count 462 (H) 164 - 446 K/uL    MPV 8.4 (L) 9.0 - 12.9 fL    Neutrophils-Polys 37.70 (L) 44.00 - 72.00 %    Lymphocytes 39.70 22.00 - 41.00 %    Monocytes 11.70 0.00 " - 13.40 %    Eosinophils 7.00 (H) 0.00 - 6.90 %    Basophils 3.60 (H) 0.00 - 1.80 %    Immature Granulocytes 0.30 0.00 - 0.90 %    Nucleated RBC 0.00 /100 WBC    Neutrophils (Absolute) 1.45 (L) 2.00 - 7.15 K/uL    Lymphs (Absolute) 1.53 1.00 - 4.80 K/uL    Monos (Absolute) 0.45 0.00 - 0.85 K/uL    Eos (Absolute) 0.27 0.00 - 0.51 K/uL    Baso (Absolute) 0.14 (H) 0.00 - 0.12 K/uL    Immature Granulocytes (abs) 0.01 0.00 - 0.11 K/uL    NRBC (Absolute) 0.00 K/uL   ESTIMATED GFR    Collection Time: 03/26/18  5:21 AM   Result Value Ref Range    GFR If African American >60 >60 mL/min/1.73 m 2    GFR If Non African American >60 >60 mL/min/1.73 m 2   FOLATE    Collection Time: 03/27/18  5:24 AM   Result Value Ref Range    Folate -Folic Acid 11.9 >4.0 ng/mL   VITAMIN B12    Collection Time: 03/27/18  5:24 AM   Result Value Ref Range    Vitamin B12 -True Cobalamin 850 211 - 911 pg/mL       Current Facility-Administered Medications   Medication Frequency   • gabapentin (NEURONTIN) capsule 400 mg TID   • estradiol (ESTRACE) tablet 1 mg DAILY   • chlordiazePOXIDE (LIBRIUM) capsule 25 mg Q12HRS   • fludrocortisone (FLORINEF) tablet 0.05 mg BID   • vitamin D (cholecalciferol) tablet 1,000 Units DAILY   • thiamine (THIAMINE) tablet 100 mg DAILY   • ferrous sulfate tablet 325 mg QDAY with Breakfast    And   • folic acid (FOLVITE) tablet 1 mg QDAY with Breakfast    And   • ascorbic acid (ascorbic acid) tablet 500 mg QDAY with Breakfast   • Respiratory Care per Protocol Continuous RT   • Pharmacy Consult Request ...Pain Management Review 1 Each PRN   • docusate sodium (COLACE) capsule 100 mg DAILY    And   • senna-docusate (PERICOLACE or SENOKOT S) 8.6-50 MG per tablet 1 Tab Q EVENING    And   • lactulose 20 GM/30ML solution 30 mL Q24HRS PRN    And   • bisacodyl (DULCOLAX) suppository 10 mg QDAY PRN   • artificial tears 1.4 % ophthalmic solution 1 Drop PRN   • benzocaine-menthol (CEPACOL) lozenge 1 Lozenge Q2HRS PRN   • hydrALAZINE  (APRESOLINE) tablet 25 mg Q8HRS PRN   • mag hydrox-al hydrox-simeth (MAALOX PLUS ES or MYLANTA DS) suspension 20 mL Q2HRS PRN   • ondansetron (ZOFRAN ODT) dispertab 4 mg 4X/DAY PRN    Or   • ondansetron (ZOFRAN) syringe/vial injection 4 mg 4X/DAY PRN   • traZODone (DESYREL) tablet 50 mg QHS PRN   • omeprazole (PRILOSEC) capsule 20 mg DAILY   • potassium bicarbonate (KLYTE) 25 MEQ effervescent tablet TBEF 50 mEq Q8HRS   • acetaminophen (TYLENOL) tablet 650 mg Q4HRS PRN   • oxyCODONE immediate-release (ROXICODONE) tablet 5 mg Q4HRS PRN       Orders Placed This Encounter   Procedures   • DIET ORDER     Standing Status:   Standing     Number of Occurrences:   1     Order Specific Question:   Diet:     Answer:   Regular [1]       Assessment:  Active Hospital Problems    Diagnosis   • *Traumatic intracranial hemorrhage (CMS-HCC)   • Hyponatremia   • Acute hyperactive alcohol withdrawal delirium (CMS-HCC)   • Moderate protein-calorie malnutrition (CMS-HCC)   • Anemia   • Alcohol abuse   • Cognitive and behavioral changes   • Lability emotional   • Impaired mobility and ADLs   • Balance disorder   • CARSON (generalized anxiety disorder)   • Tobacco dependence       Medical Decision Making and Plan:      Patient clearly is improving still has issues but will need further addressing I'm most concerned about her alcohol issues and lack of awareness. She fell yesterday because of her impulsivity            History of heavy alcohol use status post acute alcoholic withdrawal   Librium is now down to 25 twice a day  Her lack of awareness and her alcoholism is of some concern        Hyponatremia  Last sodium was 138  Now off salt tabs will recheck on Friday      Hypokalemia    Patient is on potassium replacement but likely secondary to [place] for which I discontinued. Will check labs on Friday     Dysphagia  Now on regular diet     Protein malnutrition    Patient's by mouth intake is than less than optimal we are adding supplements  to her     Bacterial conjunctivae of both eyes  Resolved     Anemia   H&H is creeping upward  We'll recheck labs on Friday  Continue iron and other replacement     Thrombocytopenia upon admission to the hospital   Doing okay at this time we'll check labs and Friday     History of tobacco abuse  Still not smoking       Headache  Improved a titrated Neurontin up to 40 mg 3 times a day     Cognitive deficits     Now off of Cymbalta improving still with limited site     Emotional lability  Now off duloxetine seems to be doing okay off of it      Tentative discharge date set for 4/5/2018 ree 20/9/2018 Will continue PT, OT and SLP  Team conference this Thursday will finalize discharge plans at that time     I spent 30 minutes face to face was spent with the patient with greater than 50% of the time spent counseling and coordinating care    Aundrea Mireles M.D.

## 2018-03-28 NOTE — REHAB-CM IDT TEAM NOTE
Case Management    DC Planning     DC destination/dispostion:  Patient has been living with boyfriend prior but he has moved out and locks are changed.  Home is a 2 level.  Patient's daughter will be staying with her at discharge.     Referrals: Patient will need resources for alcohol treatment/counseling.     DC Needs:  I am awaiting any dme recommendations. She will need follow up therapy probably outpatient.  I will confirm availability of transportation.  Patient indicates she has a friend who can help out.     Barriers to discharge:  Cognitive issues and hx of Etoh abuse.      Strengths: Her daughter is very supportive and will be able to stay with patient in her home.     Section completed by:  Tamar Dougherty R.N.

## 2018-03-28 NOTE — CARE PLAN
Problem: Grooming  Goal: STG-Within one week, patient will complete grooming  1) Individualized Goal:  In standing with SPV for oral care and hair care with no LOB using DME PRN and min cues for safety  2) Interventions:  OT Group Therapy, OT Self Care/ADL, OT Cognitive Skill Dev, OT Community Reintegration, OT Manual Ther Technique, OT Neuro Re-Ed/Balance, OT Therapeutic Activity, OT Evaluation and OT Therapeutic Exercise     Outcome: NOT MET      Problem: Functional Cognition  Goal: STG-Within one week, patient will demonstrate safety awareness  1) Individualized Goal:  By locking w/c breaks on 3/5 opportunities with min v/c.  2) Interventions:  OT Group Therapy, OT Self Care/ADL, OT Cognitive Skill Dev, OT Community Reintegration, OT Manual Ther Technique, OT Neuro Re-Ed/Balance, OT Therapeutic Activity, OT Evaluation and OT Therapeutic Exercise   Outcome: NOT MET  Pt continues to require cues for this task    Problem: IADL's  Goal: STG-Within one week, patient will prepare a meal  1) Individualized Goal:  At SBA level for mobility, and Min A for sequencing and problem solving  2) Interventions:  OT Group Therapy, OT Self Care/ADL, OT Cognitive Skill Dev, OT Community Reintegration, OT Manual Ther Technique, OT Neuro Re-Ed/Balance, OT Therapeutic Activity, OT Evaluation and OT Therapeutic Exercise     Outcome: NOT MET  To be assessed Friday  Goal: STG-Within one week, patient will utilize washer and dryer  1) Individualized Goal:  At SBA level for mobility, and Min A for sequencing and problem solving  2) Interventions:  OT Group Therapy, OT Self Care/ADL, OT Cognitive Skill Dev, OT Community Reintegration, OT Manual Ther Technique, OT Neuro Re-Ed/Balance, OT Therapeutic Activity, OT Evaluation and OT Therapeutic Exercise   Outcome: NOT MET  To be assessed today.

## 2018-03-28 NOTE — REHAB-PHARMACY IDT TEAM NOTE
Pharmacy   Pharmacy  Antibiotics/Antifungals/Antivirals:  Medication:      Active Orders     None        Route:         None  Stop Date:  N/A  Reason:   Antihypertensives/Cardiac:  Medication:    Orders (72h ago through future)    Start     Ordered    03/20/18 1042  hydrALAZINE (APRESOLINE) tablet 25 mg  EVERY 8 HOURS PRN      03/20/18 1042        Patient Vitals for the past 24 hrs:   BP Pulse   03/28/18 1314 (!) 90/60 84   03/28/18 1300 (!) 90/50 87   03/28/18 0647 102/68 89   03/27/18 1945 113/78 73       Anticoagulation:  Medication:  Not applicable  INR:         Other key medications:     A review of the medication list reveals no issues at this time.     Section completed by:  Vishnu Alvarado RPH

## 2018-03-28 NOTE — CARE PLAN
Problem: Safety  Goal: Will remain free from injury  Per report patient is impulsive. Patient educated on the importance of using call light for assistance, patient verbalized understanding. Patient has remained in bed so far this shift, no impulsivity noted. Call light and belongings within reach, bed in lowest and locked position, bed rails up x2, alarms inplace, and non skid socks on. Patient is in a room near nurse's station and hourly rounding is in place.    Problem: Pain Management  Goal: Pain level will decrease to patient's comfort goal  Patient complains of headache. Patient reports headache has been ongoing and has moved from just the right side of her head to anterior and left side of head. Patient given PRN qamar. Patient now asleep and appears comfortable. Will continue to assess and monitor pain.

## 2018-03-28 NOTE — REHAB-OT IDT TEAM NOTE
Occupational Therapy   Activities of Daily Living  Eating Initial:  7 - Independent  Eating Current:  7 - Independent   Eating Description:     Grooming Initial:  5 - Standby Prompting/Supervision or Set-up  Grooming Current:  5 - Standby Prompting/Supervision or Set-up   Grooming Description:  Increased time, Supervision for safety (pt blow dried himanshu after shower)  Bathing Initial:  5 - Standby Prompting/Supervision or Set-up  Bathing Current:  5 - Standby Prompting/Supervision or Set-up   Bathing Description:  Grab bar, Tub bench, Hand held shower, Supervision for safety, Set-up of equipment, Verbal cueing (occasional prompts)  Upper Body Dressing Initial:  4 - Minimal Assistance  Upper Body Dressing Current:  5 - Standby Prompting/Supervision or Set-up   Upper Body Dressing Description:   (SPV to don/doff pullover shirt and zipper/snap sports bra)  Lower Body Dressing Initial:  5 - Standby Prompting/Supervsion or Set-up  Lower Body Dressing Current:  4 - Minimal Assistance   Lower Body Dressing Description:  4 - Minimal Assistance  Toileting Initial:  5 - Standby Prompting/Supervision or Set-up  Toileting Current:  4 - Minimal Assistance   Toileting Description:  Increased time, Supervision for safety, Grab bar, Verbal cueing  Toilet Transfer Initial:  5 - Standby Prompting/Supervision or Set-up  Toilet Transfer Current:  5 - Standby Prompting/Supervision or Set-up   Toilet Transfer Description:  5 - Standby Prompting/Supervision or Set-up  Tub / Shower Transfer Initial:  5 - Standby Prompting/Supervision or Set-up  Tub / Shower Transfer Current:  5 - Standby Prompting/Supervision or Set-up   Tub / Shower Transfer Description:   (SBA w/o device <>shower bench)  IADL:  Pt completed a community outing for reintegration with mobility at [facility], wayfinding with min v/c, and Mod A with simple math. Pt to complete simple meal prep with primary OT by end of week.   Family Training/Education:  On going with pt, no FT has  occurred with pt's dtr. Needs to be scheduled prior to d/c home.   DME/DC Recommendations:  Pt will require shower chair, GB in shower, 24/7 assist for mobility and all IADLs for safety. Pt would also benefit from Home Health OT to assess home setup and function in home for safety.     Strengths:  Independent PLOF, Making steady progress towards goals, Motivated for self care and independence, Pleasant and cooperative, Supportive family and Willingly participates in therapeutic activities  Barriers:  Generalized weakness, Impulsive, Poor balance, Poor carryover of learning and Poor insight/denial of deficits     # of short term goals set= 4    # of short term goals met= 0, goals were reset on Monday, 3 were met on that day.          Occupational Therapy Goals           Problem: Functional Cognition     Dates: Start: 03/21/18       Goal: STG-Within one week, patient will demonstrate safety awareness     Dates: Start: 03/21/18       Description: 1) Individualized Goal:  By locking w/c breaks on 3/5 opportunities with min v/c.  2) Interventions:  OT Group Therapy, OT Self Care/ADL, OT Cognitive Skill Dev, OT Community Reintegration, OT Manual Ther Technique, OT Neuro Re-Ed/Balance, OT Therapeutic Activity, OT Evaluation and OT Therapeutic Exercise     Note:     Goal Note filed on 03/28/18 3077 by Geetha Askew MS,OTR/L    Goal: STG-Within one week, patient will demonstrate safety awareness  Outcome: NOT MET  Pt continues to require cues for this task                  Problem: Grooming     Dates: Start: 03/21/18       Goal: STG-Within one week, patient will complete grooming     Dates: Start: 03/21/18       Description: 1) Individualized Goal:  In standing with SPV for oral care and hair care with no LOB using DME PRN and min cues for safety  2) Interventions:  OT Group Therapy, OT Self Care/ADL, OT Cognitive Skill Dev, OT Community Reintegration, OT Manual Ther Technique, OT Neuro Re-Ed/Balance, OT Therapeutic  Activity, OT Evaluation and OT Therapeutic Exercise       Note:     Goal Note filed on 03/26/18 1544 by Geetha Askew, MS,OTR/L    Goal: STG-Within one week, patient will complete grooming  Outcome: NOT MET  Pt had LOB during standing grooming that required CGA                  Problem: IADL's     Dates: Start: 03/26/18       Goal: STG-Within one week, patient will prepare a meal     Dates: Start: 03/26/18       Description: 1) Individualized Goal:  At SBA level for mobility, and Min A for sequencing and problem solving  2) Interventions:  OT Group Therapy, OT Self Care/ADL, OT Cognitive Skill Dev, OT Community Reintegration, OT Manual Ther Technique, OT Neuro Re-Ed/Balance, OT Therapeutic Activity, OT Evaluation and OT Therapeutic Exercise       Note:     Goal Note filed on 03/28/18 1989 by Geetha Askew, MS,OTR/L    Goal: STG-Within one week, patient will prepare a meal  Outcome: NOT MET  To be assessed Friday                Goal: STG-Within one week, patient will utilize washer and dryer     Dates: Start: 03/26/18       Description: 1) Individualized Goal:  At SBA level for mobility, and Min A for sequencing and problem solving  2) Interventions:  OT Group Therapy, OT Self Care/ADL, OT Cognitive Skill Dev, OT Community Reintegration, OT Manual Ther Technique, OT Neuro Re-Ed/Balance, OT Therapeutic Activity, OT Evaluation and OT Therapeutic Exercise     Note:     Goal Note filed on 03/28/18 2209 by Geetha Askew, MS,OTR/L    Goal: STG-Within one week, patient will utilize washer and dryer  Outcome: NOT MET  To be assessed today.                    Problem: OT Long Term Goals     Dates: Start: 03/21/18       Goal: LTG-By discharge, patient will complete basic self care tasks     Dates: Start: 03/21/18       Description: 1) Individualized Goal:  At a SPV level using DME/AE or compensatory techniques PRN  2) Interventions:  OT Group Therapy, OT Self Care/ADL, OT Cognitive Skill Dev, OT Community  Reintegration, OT Manual Ther Technique, OT Neuro Re-Ed/Balance, OT Therapeutic Activity, OT Evaluation and OT Therapeutic Exercise           Goal: LTG-By discharge, patient will perform bathroom transfers     Dates: Start: 03/21/18       Description: 1) Individualized Goal:  At a SPV level using DME/AE or compensatory techniques PRN  2) Interventions:  OT Group Therapy, OT Self Care/ADL, OT Cognitive Skill Dev, OT Community Reintegration, OT Manual Ther Technique, OT Neuro Re-Ed/Balance, OT Therapeutic Activity, OT Evaluation and OT Therapeutic Exercise           Goal: LTG-By discharge, patient will complete basic home management     Dates: Start: 03/21/18       Description: 1) Individualized Goal:  At a Min A level using DME/AE or compensatory techniques PRN  2) Interventions:  OT Group Therapy, OT Self Care/ADL, OT Cognitive Skill Dev, OT Community Reintegration, OT Manual Ther Technique, OT Neuro Re-Ed/Balance, OT Therapeutic Activity, OT Evaluation and OT Therapeutic Exercise                 Section completed by:  Geetha Askew MS,OTR/L

## 2018-03-28 NOTE — NON-PROVIDER
"Rehab Progress Note     Interval Events (Subjective)  Ms. Krishnan had an outing with OT today at [facility] for which she had to make a list and shop for ingredients. She used a 4-point cane for the whole excursion with little difficulty. She still complains of headache that is better in the morning and progresses throughout the day. She reports blood pressure lability with symptoms of lightheadedness and dizziness.     Objective:  VITAL SIGNS: /68   Pulse 89   Temp 36.9 °C (98.5 °F)   Resp 18   Ht 1.727 m (5' 8\")   Wt 59.5 kg (131 lb 2.8 oz)   SpO2 91%   Breastfeeding? No   BMI 19.94 kg/m²     Physical exam:  HEENT: Ecchymoses resolving, R subconjunctival hemorrhage resolving  CV: Regular rate and rhythm, normal S1 S2  Pulm: Lungs clear to auscultation bilaterally     Recent Results (from the past 72 hour(s))   COMP METABOLIC PANEL    Collection Time: 03/26/18  5:21 AM   Result Value Ref Range    Sodium 138 135 - 145 mmol/L    Potassium 3.5 (L) 3.6 - 5.5 mmol/L    Chloride 105 96 - 112 mmol/L    Co2 27 20 - 33 mmol/L    Anion Gap 6.0 0.0 - 11.9    Glucose 82 65 - 99 mg/dL    Bun 11 8 - 22 mg/dL    Creatinine 0.61 0.50 - 1.40 mg/dL    Calcium 9.2 8.5 - 10.5 mg/dL    AST(SGOT) 24 12 - 45 U/L    ALT(SGPT) 28 2 - 50 U/L    Alkaline Phosphatase 78 30 - 99 U/L    Total Bilirubin 0.7 0.1 - 1.5 mg/dL    Albumin 3.5 3.2 - 4.9 g/dL    Total Protein 6.0 6.0 - 8.2 g/dL    Globulin 2.5 1.9 - 3.5 g/dL    A-G Ratio 1.4 g/dL   CBC WITH DIFFERENTIAL    Collection Time: 03/26/18  5:21 AM   Result Value Ref Range    WBC 3.9 (L) 4.8 - 10.8 K/uL    RBC 3.15 (L) 4.20 - 5.40 M/uL    Hemoglobin 11.3 (L) 12.0 - 16.0 g/dL    Hematocrit 34.3 (L) 37.0 - 47.0 %    .9 (H) 81.4 - 97.8 fL    MCH 35.9 (H) 27.0 - 33.0 pg    MCHC 32.9 (L) 33.6 - 35.0 g/dL    RDW 52.0 (H) 35.9 - 50.0 fL    Platelet Count 462 (H) 164 - 446 K/uL    MPV 8.4 (L) 9.0 - 12.9 fL    Neutrophils-Polys 37.70 (L) 44.00 - 72.00 %    Lymphocytes 39.70 22.00 - " 41.00 %    Monocytes 11.70 0.00 - 13.40 %    Eosinophils 7.00 (H) 0.00 - 6.90 %    Basophils 3.60 (H) 0.00 - 1.80 %    Immature Granulocytes 0.30 0.00 - 0.90 %    Nucleated RBC 0.00 /100 WBC    Neutrophils (Absolute) 1.45 (L) 2.00 - 7.15 K/uL    Lymphs (Absolute) 1.53 1.00 - 4.80 K/uL    Monos (Absolute) 0.45 0.00 - 0.85 K/uL    Eos (Absolute) 0.27 0.00 - 0.51 K/uL    Baso (Absolute) 0.14 (H) 0.00 - 0.12 K/uL    Immature Granulocytes (abs) 0.01 0.00 - 0.11 K/uL    NRBC (Absolute) 0.00 K/uL   ESTIMATED GFR    Collection Time: 03/26/18  5:21 AM   Result Value Ref Range    GFR If African American >60 >60 mL/min/1.73 m 2    GFR If Non African American >60 >60 mL/min/1.73 m 2   FOLATE    Collection Time: 03/27/18  5:24 AM   Result Value Ref Range    Folate -Folic Acid 11.9 >4.0 ng/mL   VITAMIN B12    Collection Time: 03/27/18  5:24 AM   Result Value Ref Range    Vitamin B12 -True Cobalamin 850 211 - 911 pg/mL       Current Facility-Administered Medications   Medication Frequency   • gabapentin (NEURONTIN) capsule 400 mg TID   • estradiol (ESTRACE) tablet 1 mg DAILY   • chlordiazePOXIDE (LIBRIUM) capsule 25 mg Q12HRS   • vitamin D (cholecalciferol) tablet 1,000 Units DAILY   • thiamine (THIAMINE) tablet 100 mg DAILY   • ferrous sulfate tablet 325 mg QDAY with Breakfast    And   • folic acid (FOLVITE) tablet 1 mg QDAY with Breakfast    And   • ascorbic acid (ascorbic acid) tablet 500 mg QDAY with Breakfast   • Respiratory Care per Protocol Continuous RT   • Pharmacy Consult Request ...Pain Management Review 1 Each PRN   • docusate sodium (COLACE) capsule 100 mg DAILY    And   • senna-docusate (PERICOLACE or SENOKOT S) 8.6-50 MG per tablet 1 Tab Q EVENING    And   • lactulose 20 GM/30ML solution 30 mL Q24HRS PRN    And   • bisacodyl (DULCOLAX) suppository 10 mg QDAY PRN   • artificial tears 1.4 % ophthalmic solution 1 Drop PRN   • benzocaine-menthol (CEPACOL) lozenge 1 Lozenge Q2HRS PRN   • hydrALAZINE (APRESOLINE) tablet  25 mg Q8HRS PRN   • mag hydrox-al hydrox-simeth (MAALOX PLUS ES or MYLANTA DS) suspension 20 mL Q2HRS PRN   • ondansetron (ZOFRAN ODT) dispertab 4 mg 4X/DAY PRN    Or   • ondansetron (ZOFRAN) syringe/vial injection 4 mg 4X/DAY PRN   • traZODone (DESYREL) tablet 50 mg QHS PRN   • omeprazole (PRILOSEC) capsule 20 mg DAILY   • potassium bicarbonate (KLYTE) 25 MEQ effervescent tablet TBEF 50 mEq Q8HRS   • acetaminophen (TYLENOL) tablet 650 mg Q4HRS PRN   • oxyCODONE immediate-release (ROXICODONE) tablet 5 mg Q4HRS PRN       Orders Placed This Encounter   Procedures   • DIET ORDER     Standing Status:   Standing     Number of Occurrences:   1     Order Specific Question:   Diet:     Answer:   Regular [1]       Assessment:  Active Hospital Problems    Diagnosis   • *Traumatic intracranial hemorrhage (CMS-HCC)   • Hyponatremia   • Acute hyperactive alcohol withdrawal delirium (CMS-HCC)   • Moderate protein-calorie malnutrition (CMS-HCC)   • Anemia   • Alcohol abuse   • Cognitive and behavioral changes   • Lability emotional   • Impaired mobility and ADLs   • Balance disorder   • CARSON (generalized anxiety disorder)   • Tobacco dependence     48 y.o. Female with PMH of alcohol abuse and tobacco use here for debility following intracranial hemorrhage secondary to GLF on 3/4/18.      Medical Decision Making and Plan:    TBI - Intracranial hemorrhage secondary to GLF  - Inpatient for acute rehab therapy  - Improving balance     History of alcohol abuse - S/p acute alcohol withdrawal in hospital, started on Librium   - Goal: decrease Librium as benzodiazapines have the potential to slow recovery, start taper on Monday  - Thiamine supplement      Pain - Headache  - Discontinued cymbalta due to possible side effect of headache  - Roxicodone - Pain uncontrolled by any other medication while inpatient. Decrease from 5 mg to 2.5 mg with goal of discontinuing next week   - Pain improved with increased Neurontin dose      Blood pressure  lability  - Low of 90/51 today  - Complains of dizziness/lightheadedness   - Just discontinued florinef yesterday     Electrolyte abnormalities - hypokalemia and hyponatremia, continue to monitor  BMP  - Hypokalemia - continue potassium bicarbonate, 3.5 on 3/26  - Hyponatremia - resolved, 138 on 3/26, discontinued salt tabs and florinef     Anemia - H/H 11.3/34/4, , RDW 52 on 3/21  - Consider iron replacement  - Continue to monitor thrombocytopenia that was present on admission  - Supplement folate     Cognitive deficits  - Consider additional stimulant     Bacterial conjunctivitis - Resolved following course of antibiotics, continue to monitor      Protein malnutrition - Monitor, consider supplementation       Amy Schroeder, Student

## 2018-03-28 NOTE — PROGRESS NOTES
Received shift report and assumed care of patient. Patient awake and resting in bed. All needs met at this time. Call light and belongings within reach.

## 2018-03-28 NOTE — CARE PLAN
Problem: Bowel/Gastric:  Goal: Normal bowel function is maintained or improved  Lactulose given per prn order.  Patient has not had a bowel movement x 3 days.

## 2018-03-28 NOTE — PROGRESS NOTES
B/P 90/60, P 84,   Patient denies any dizziness.  Fresh water given to patient.   Charge Nurse Cristel AZUL RN notified.

## 2018-03-29 PROCEDURE — 770010 HCHG ROOM/CARE - REHAB SEMI PRIVAT*

## 2018-03-29 PROCEDURE — 700102 HCHG RX REV CODE 250 W/ 637 OVERRIDE(OP): Performed by: INTERNAL MEDICINE

## 2018-03-29 PROCEDURE — 97116 GAIT TRAINING THERAPY: CPT

## 2018-03-29 PROCEDURE — 700102 HCHG RX REV CODE 250 W/ 637 OVERRIDE(OP): Performed by: PHYSICAL MEDICINE & REHABILITATION

## 2018-03-29 PROCEDURE — 97112 NEUROMUSCULAR REEDUCATION: CPT

## 2018-03-29 PROCEDURE — 700112 HCHG RX REV CODE 229: Performed by: PHYSICAL MEDICINE & REHABILITATION

## 2018-03-29 PROCEDURE — 97530 THERAPEUTIC ACTIVITIES: CPT

## 2018-03-29 PROCEDURE — A9270 NON-COVERED ITEM OR SERVICE: HCPCS | Performed by: PHYSICAL MEDICINE & REHABILITATION

## 2018-03-29 PROCEDURE — A9270 NON-COVERED ITEM OR SERVICE: HCPCS | Performed by: INTERNAL MEDICINE

## 2018-03-29 PROCEDURE — G0515 COGNITIVE SKILLS DEVELOPMENT: HCPCS

## 2018-03-29 RX ORDER — CHLORDIAZEPOXIDE HYDROCHLORIDE 25 MG/1
25 CAPSULE, GELATIN COATED ORAL DAILY
Status: DISCONTINUED | OUTPATIENT
Start: 2018-03-30 | End: 2018-04-05 | Stop reason: HOSPADM

## 2018-03-29 RX ORDER — FLUDROCORTISONE ACETATE 0.1 MG/1
0.05 TABLET ORAL EVERY MORNING
Status: DISCONTINUED | OUTPATIENT
Start: 2018-03-30 | End: 2018-04-05 | Stop reason: HOSPADM

## 2018-03-29 RX ORDER — OXYCODONE HYDROCHLORIDE 5 MG/1
2.5 TABLET ORAL EVERY 4 HOURS PRN
Status: DISCONTINUED | OUTPATIENT
Start: 2018-03-29 | End: 2018-04-05 | Stop reason: HOSPADM

## 2018-03-29 RX ADMIN — FERROUS SULFATE TAB 325 MG (65 MG ELEMENTAL FE) 325 MG: 325 (65 FE) TAB at 08:50

## 2018-03-29 RX ADMIN — ESTRADIOL 1 MG: 1 TABLET ORAL at 08:49

## 2018-03-29 RX ADMIN — STANDARDIZED SENNA CONCENTRATE AND DOCUSATE SODIUM 1 TABLET: 8.6; 5 TABLET, FILM COATED ORAL at 20:01

## 2018-03-29 RX ADMIN — OXYCODONE HYDROCHLORIDE 2.5 MG: 5 TABLET ORAL at 20:01

## 2018-03-29 RX ADMIN — OMEPRAZOLE 20 MG: 20 CAPSULE, DELAYED RELEASE ORAL at 08:48

## 2018-03-29 RX ADMIN — POTASSIUM BICARBONATE 50 MEQ: 25 TABLET, EFFERVESCENT ORAL at 20:01

## 2018-03-29 RX ADMIN — DOCUSATE SODIUM 100 MG: 100 CAPSULE, LIQUID FILLED ORAL at 08:48

## 2018-03-29 RX ADMIN — Medication 100 MG: at 08:49

## 2018-03-29 RX ADMIN — GABAPENTIN 400 MG: 400 CAPSULE ORAL at 20:01

## 2018-03-29 RX ADMIN — POTASSIUM BICARBONATE 50 MEQ: 25 TABLET, EFFERVESCENT ORAL at 05:55

## 2018-03-29 RX ADMIN — OXYCODONE HYDROCHLORIDE 5 MG: 5 TABLET ORAL at 08:52

## 2018-03-29 RX ADMIN — GABAPENTIN 400 MG: 400 CAPSULE ORAL at 08:49

## 2018-03-29 RX ADMIN — GABAPENTIN 400 MG: 400 CAPSULE ORAL at 14:27

## 2018-03-29 RX ADMIN — CHLORDIAZEPOXIDE HYDROCHLORIDE 25 MG: 25 CAPSULE ORAL at 08:48

## 2018-03-29 RX ADMIN — CHOLECALCIFEROL TAB 25 MCG (1000 UNIT) 1000 UNITS: 25 TAB at 08:49

## 2018-03-29 RX ADMIN — FOLIC ACID 1 MG: 1 TABLET ORAL at 08:49

## 2018-03-29 RX ADMIN — POTASSIUM BICARBONATE 50 MEQ: 25 TABLET, EFFERVESCENT ORAL at 14:24

## 2018-03-29 RX ADMIN — OXYCODONE HYDROCHLORIDE AND ACETAMINOPHEN 500 MG: 500 TABLET ORAL at 08:48

## 2018-03-29 ASSESSMENT — PAIN SCALES - GENERAL
PAINLEVEL_OUTOF10: 0
PAINLEVEL_OUTOF10: 8
PAINLEVEL_OUTOF10: 8

## 2018-03-29 NOTE — CARE PLAN
Problem: Safety  Goal: Will remain free from injury  No impulsivity noted so far this shift. Patient in room near nursing station with alarms in place.     Problem: Bowel/Gastric:  Goal: Normal bowel function is maintained or improved  Patient given lactulose by day shift nurse. Patient reports medications was effective and she had 2 bowel movements during the day. Abdomen is soft and non-tender, bowel sounds are normoactive in all 4 quadrants.      Problem: Pain Management  Goal: Pain level will decrease to patient's comfort goal  Patient complains of headache, patient given PRN qamar. Patient reports headache worsens during afternoon/evening. Patient now sleeping, appears comfortable. Will continue to assess and monitor pain.

## 2018-03-29 NOTE — NON-PROVIDER
"Rehab Progress Note     Interval Events (Subjective)  Ms. Krishnan was seen during speech therapy today where she is meeting her goals in problem-solving and memory. She still complains of headache that got worse yesterday afternoon and has not gone away, although she was able to sleep through the night. In addition, vision in the Right eye is still blurry. Mood is good and positive.     Objective:  VITAL SIGNS: /78   Pulse 82   Temp 36.9 °C (98.4 °F)   Resp 18   Ht 1.727 m (5' 8\")   Wt 59.5 kg (131 lb 2.8 oz)   SpO2 93%   Breastfeeding? No   BMI 19.94 kg/m²      Physical exam:  HEENT: Ecchymoses resolving, R subconjunctival hemorrhage resolving  CV: Regular rate and rhythm, normal S1 S2  Pulm: Lungs clear to auscultation bilaterally     Recent Results (from the past 72 hour(s))   FOLATE    Collection Time: 03/27/18  5:24 AM   Result Value Ref Range    Folate -Folic Acid 11.9 >4.0 ng/mL   VITAMIN B12    Collection Time: 03/27/18  5:24 AM   Result Value Ref Range    Vitamin B12 -True Cobalamin 850 211 - 911 pg/mL       Current Facility-Administered Medications   Medication Frequency   • gabapentin (NEURONTIN) capsule 400 mg TID   • estradiol (ESTRACE) tablet 1 mg DAILY   • chlordiazePOXIDE (LIBRIUM) capsule 25 mg Q12HRS   • vitamin D (cholecalciferol) tablet 1,000 Units DAILY   • thiamine (THIAMINE) tablet 100 mg DAILY   • ferrous sulfate tablet 325 mg QDAY with Breakfast    And   • folic acid (FOLVITE) tablet 1 mg QDAY with Breakfast    And   • ascorbic acid (ascorbic acid) tablet 500 mg QDAY with Breakfast   • Respiratory Care per Protocol Continuous RT   • Pharmacy Consult Request ...Pain Management Review 1 Each PRN   • docusate sodium (COLACE) capsule 100 mg DAILY    And   • senna-docusate (PERICOLACE or SENOKOT S) 8.6-50 MG per tablet 1 Tab Q EVENING    And   • lactulose 20 GM/30ML solution 30 mL Q24HRS PRN    And   • bisacodyl (DULCOLAX) suppository 10 mg QDAY PRN   • artificial tears 1.4 % " ophthalmic solution 1 Drop PRN   • benzocaine-menthol (CEPACOL) lozenge 1 Lozenge Q2HRS PRN   • hydrALAZINE (APRESOLINE) tablet 25 mg Q8HRS PRN   • mag hydrox-al hydrox-simeth (MAALOX PLUS ES or MYLANTA DS) suspension 20 mL Q2HRS PRN   • ondansetron (ZOFRAN ODT) dispertab 4 mg 4X/DAY PRN    Or   • ondansetron (ZOFRAN) syringe/vial injection 4 mg 4X/DAY PRN   • traZODone (DESYREL) tablet 50 mg QHS PRN   • omeprazole (PRILOSEC) capsule 20 mg DAILY   • potassium bicarbonate (KLYTE) 25 MEQ effervescent tablet TBEF 50 mEq Q8HRS   • acetaminophen (TYLENOL) tablet 650 mg Q4HRS PRN   • oxyCODONE immediate-release (ROXICODONE) tablet 5 mg Q4HRS PRN       Orders Placed This Encounter   Procedures   • DIET ORDER     Standing Status:   Standing     Number of Occurrences:   1     Order Specific Question:   Diet:     Answer:   Regular [1]       Assessment:  Active Hospital Problems    Diagnosis   • *Traumatic intracranial hemorrhage (CMS-HCC)   • Hyponatremia   • Acute hyperactive alcohol withdrawal delirium (CMS-HCC)   • Moderate protein-calorie malnutrition (CMS-HCC)   • Anemia   • Alcohol abuse   • Cognitive and behavioral changes   • Lability emotional   • Impaired mobility and ADLs   • Balance disorder   • CARSON (generalized anxiety disorder)   • Tobacco dependence     48 y.o. Female with PMH of alcohol abuse and tobacco use here for debility following intracranial hemorrhage secondary to GLF on 3/4/18.    Medical Decision Making and Plan:  TBI - Intracranial hemorrhage secondary to GLF  - Inpatient for acute rehab therapy  - Improving balance, memory, and problem-solving      History of alcohol abuse - S/p acute alcohol withdrawal in hospital, started on Librium   - Goal: decrease Librium as benzodiazapines have the potential to slow recovery  - Thiamine supplement      Pain - Headache  - Discontinued cymbalta due to possible side effect of headache  - Roxicodone - Pain uncontrolled by any other medication while inpatient.  Decrease from 5 mg to 2.5 mg with goal of discontinuing next week   - Pain improved with increased Neurontin dose    - Consider Right eye blurriness contribution to headache      Blood pressure lability  - Low yesterday at 90/51, but has increased today   - Continue to monitor, restart florinef if necessary, consider that SBP in the 90s could be normal for her      Electrolyte abnormalities - hypokalemia and hyponatremia, continue to monitor  BMP  - Hypokalemia - continue potassium bicarbonate, 3.5 on 3/26  - Hyponatremia - resolved, 138 on 3/26, discontinued salt tabs and florinef      Anemia - H/H 11.3/34/4, , RDW 52 on 3/21  - Consider iron replacement  - Continue to monitor thrombocytopenia that was present on admission  - Folate normal, discontinue folate supplement      Cognitive deficits  - Consider additional stimulant     Bacterial conjunctivitis - Resolved following course of antibiotics, continue to monitor      Protein malnutrition - Monitor, consider supplementation       Amy Schroeder, Student

## 2018-03-29 NOTE — CARE PLAN
Problem: Pain Management  Goal: Pain level will decrease to patient's comfort goal   03/28/18 1416   OTHER   Nurse Pain Scale 0 - 10  5   Comfort Goal Comfort at Rest;Comfort with Movement;Perform Activity;Stay Alert   Medicated with Roxicodone 5 mg per prn order.

## 2018-03-29 NOTE — CARE PLAN
Problem: Mobility  Goal: STG-Within one week, patient will ambulate household distance  1) Individualized goal:  With LRD at [facility] in order to progress towards PLOF  2) Interventions:  PT Group Therapy, PT E Stim Attended, PT Gait Training, PT Therapeutic Exercises, PT TENS Application, PT Neuro Re-Ed/Balance, PT Therapeutic Activity, PT Manual Therapy and PT Evaluation     Outcome: MET Date Met: 03/29/18    Goal: STG-Within one week, patient will ascend and descend four to six stairs  1) Individualized goal:  With [facility] at min A wth step to pattern in order to enter/exit home safely  2) Interventions:  PT Group Therapy, PT E Stim Attended, PT Gait Training, PT Therapeutic Exercises, PT TENS Application, PT Neuro Re-Ed/Balance, PT Therapeutic Activity, PT Manual Therapy and PT Evaluation     Outcome: MET Date Met: 03/29/18      Problem: Mobility Transfers  Goal: STG-Within one week, patient will transfer bed to chair  1) Individualized goal:  At [facility] with LRD In order to maximize self mobility  2) Interventions:   PT Group Therapy, PT E Stim Attended, PT Gait Training, PT Therapeutic Exercises, PT TENS Application, PT Neuro Re-Ed/Balance, PT Therapeutic Activity, PT Manual Therapy and PT Evaluation     Outcome: MET Date Met: 03/29/18

## 2018-03-29 NOTE — REHAB-COLLABORATIVE ONGOING IDT TEAM NOTE
Weekly Interdisciplinary Team Conference Note    Weekly Interdisciplinary Team Conference #  2  Date:  3/29/2018    Clinicians present and reporting at team conference include the following:   MD: Nathan Mireles MD   RN:  Oxana Figueroa RN   PT:   Ángel Ramirez, PT, DPT  OT:  MARIA INES SingerT, OTR/L   ST:  Marta Cortez, MS, CCC-SLP  CM:  Tamar Dougherty RN, [facility] [place]  REC:  None  RT:  None  RPh:  Gela De La Rosa [facility]  Other:   None  All reporting clinicians have a working knowledge of this patient's plan of care.    Targeted DC Date:  4/5/18     Medical    Patient Active Problem List    Diagnosis Date Noted   • Hyponatremia 03/10/2018     Priority: High   • Traumatic intracranial hemorrhage (CMS-HCC) 03/04/2018     Priority: High   • Acute hyperactive alcohol withdrawal delirium (CMS-HCC) 03/04/2018     Priority: High   • Oropharyngeal dysphagia 03/16/2018     Priority: Medium   • Moderate protein-calorie malnutrition (CMS-HCC) 03/08/2018     Priority: Low   • Bacterial conjunctivitis of both eyes 03/07/2018     Priority: Low   • Anemia 03/06/2018     Priority: Low   • Pharmacologic contraindication to deep vein thrombosis (DVT) prophylaxis 03/05/2018     Priority: Low   • Trauma 03/04/2018     Priority: Low   • Alcohol abuse 03/20/2018   • Cognitive and behavioral changes 03/20/2018   • Lability emotional 03/20/2018   • Impaired mobility and ADLs 03/20/2018   • Balance disorder 03/20/2018   • CARSON (generalized anxiety disorder) 10/12/2017   • Surgical menopause 03/06/2014   • Tobacco dependence 03/06/2014     Results     ** No results found for the last 24 hours. **           Nursing  Diet/Nutrition:  Regular and Thin Liquids  Medication Administration:  Whole with Liquid Wash  % consumed at meals in last 24 hours:  Consumed % of meals per documentation.  Breakfast:  100%   Lunch:  80%  Dinner:  50%   Snack schedule:  HS  Appetite:  Good  Fluid Intake/Output in past 24 hours: In: 720 [P.O.:720]  Out: -   Admit  Weight:  Weight: 57.2 kg (126 lb)  Weight Last 7 Days   [59.5 kg (131 lb 2.8 oz)] 59.5 kg (131 lb 2.8 oz) (03/25 0500)    Pain Issues:    Location:  Head (03/28 2050)  Right;Left;Anterior (03/28 2050)         Severity:  Moderate   Scheduled pain medications:  gabapentin (NEURONTIN)      PRN pain medications used in last 24 hours:  oxycodone immediate release (ROXICODONE)    Non Pharmacologic Interventions:  distraction, relaxation and rest  Effectiveness of pain management plan:  fair=improved comfort with interventions but does not always meet goal    Bowel:    Bowel Assist Initial Score:  5 - Standby Prompting/Supervision or Set-up  Bowel Assist Current Score:  5 - Standby Prompting/Supervision or Set-up  Bowl Accidents in last 7 days:  0  Last bowel movement: 03/28/18 (per patient)  Stool: Medium, Brown     Usual bowel pattern:  every 2-3 days  Scheduled bowel medications:  docusate sodium (COLACE) and senna-docusate (PERICOLACE or SENOKOT S)   PRN bowel medications used in last 24 hours:  lactulose   Nursing Interventions:  PRN medication, Scheduled medication, Positioning on commode/toilet, Increased time  Effectiveness of bowel program:   fair=sometimes needs prn bowel meds for constipation  Bladder:    Bladder Assist Initial Score:  5 - Standby Prompting/Supervision or Set-up  Bladder Assist Current Score:  5 - Standby Prompting/Supervision or Set-up  Bladder Accidents in last 7 days:  0  PVR range for past 24-48 hours: -- ()  Medications affecting bladder:  None    Time void schedule/voiding pattern:  Voiding every 2-4 hours  Interventions:  Supervision, Verbal cueing, Time void patient initiated  Effectiveness of bladder training:  Good=regular, predictable, emptying of bladder, patient initiates time voiding    Wound:         Patient Lines/Drains/Airways Status    Active Current Wounds     Name: Placement date: Placement time: Site: Days:    Wound POA Abrasion Face;Head Right Anterior;Lateral 03/05/18    0700    23                   Interventions:  Open to air  Effectiveness of intervention:  wound is improving     Sleep/wake cycle:   Average hours slept:  Sleeps greater than 6 hours without waking  Sleep medication usage:  None    Patient/Family Training/Education:  General Self Care, Medication Management, Pain Management and Safety  Strengths: Alert and oriented, Able to follow instructions and Pleasant and cooperative   Barriers:   Fatigue, Generalized weakness and Impulsive            Nursing Problems           Problem: Bowel/Gastric:     Goal: Normal bowel function is maintained or improved           Goal: Will not experience complications related to bowel motility             Problem: Communication     Goal: The ability to communicate needs accurately and effectively will improve             Problem: Discharge Barriers/Planning     Goal: Patient's continuum of care needs will be met             Problem: Infection     Goal: Will remain free from infection             Problem: Knowledge Deficit     Goal: Knowledge of disease process/condition, treatment plan, diagnostic tests, and medications will improve           Goal: Knowledge of the prescribed therapeutic regimen will improve             Problem: Mobility     Goal: Risk for activity intolerance will decrease             Problem: Pain Management     Goal: Pain level will decrease to patient's comfort goal     Flowsheet:     Taken at 03/28/18 1416    Nurse Pain Scale 0 - 10  5 by Anya Hallman, L.P.N.    Comfort Goal Comfort at Rest;Comfort with Movement;Perform Activity;Stay Alert by Anya Hallman, L.P.N.    Taken at 03/24/18 0916    Nurse Pain Scale 0 - 10  6 by Anya Hallman, L.P.N.    Comfort Goal 0;Comfort at Rest;Comfort with Movement;Stay Alert by Anya Hallman, L.P.N.    Non Verbal Scale  Calm by Anya Hallman, L.P.N.                  Problem: Safety     Goal: Will remain free from injury           Goal: Will remain free from  falls             Problem: Venous Thromboembolism (VTW)/Deep Vein Thrombosis (DVT) Prevention:     Goal: Patient will participate in Venous Thrombosis (VTE)/Deep Vein Thrombosis (DVT)Prevention Measures                  Long Term Goals:   At discharge patient will be able to function safely at home and in the community with support.    Section completed by:  Livier Hernandez R.N.              Mobility  Bed mobility:   SPV  Bed /Chair/Wheelchair Transfer Initial:  5 - Standby Prompting/Supervision or Set-up  Bed /Chair/Wheelchair Transfer Current:  5 - Standby Prompting/Supervision or Set-up   Bed/Chair/Wheelchair Transfer Description:  Increased time, Supervision for safety, Verbal cueing  Walk Initial:  1 - Total Assistance  Walk Current:  4 - Minimal Assistance   Walk Description:  Verbal cueing, Requires incidental assist, Safety concerns, Extra time, Assist device/equipment (CGA, 200 ft x 2, no LOB, occasional drift R with cues to correct)  Wheelchair Initial:  5 - Standby Prompting/Supervision or Set-up  Wheelchair Current:  6 - Modified Independent   Wheelchair Description:  Safety concerns, Extra time  Stairs Initial:  0 - Not tested,unsafe activity  Stairs Current: 5 - Standby Prompting/Supervision or Set-up   Stairs Description: Ascends/descends 12 to 14 steps, Supervision for safety, Extra time, Safety concerns, Verbal cueing, Hand rails  Patient/Family Training/Education:  Ongoing, wt shift into toes to avoid post LOB  DME/DC Recommendations:  4WW? SPC? Home vs OP PT? 24 hr SPV?  Strengths:  Able to follow instructions, Adequate strength, Making steady progress towards goals, Motivated for self care and independence, Pleasant and cooperative and Supportive family  Barriers:   Poor balance and Other: Poor coordination  # of short term goals set= 3  # of short term goals met=3       Physical Therapy Problems           Problem: PT-Long Term Goals     Dates: Start: 03/21/18       Goal: LTG-By discharge,  patient will maintain balance     Dates: Start: 03/21/18   Expected End: 04/11/18       Description: 1) Individualized goal: Will score > 44/56 on Casey balance assessment in order to decrease risk for falls  2) Interventions:   PT Group Therapy, PT E Stim Attended, PT Gait Training, PT Therapeutic Exercises, PT TENS Application, PT Neuro Re-Ed/Balance, PT Therapeutic Activity, PT Manual Therapy and PT Evaluation             Goal: LTG-By discharge, patient will ambulate     Dates: Start: 03/21/18   Expected End: 04/11/18       Description: 1) Individualized goal:  With LRD at [facility] in order to progress towards PLOF  2) Interventions:   PT Group Therapy, PT E Stim Attended, PT Gait Training, PT Therapeutic Exercises, PT TENS Application, PT Neuro Re-Ed/Balance, PT Therapeutic Activity, PT Manual Therapy and PT Evaluation             Goal: LTG-By discharge, patient will transfer one surface to another     Dates: Start: 03/21/18   Expected End: 04/11/18       Description: 1) Individualized goal:  At mod I with LRD In order to maximize self mobility  2) Interventions:   PT Group Therapy, PT E Stim Attended, PT Gait Training, PT Therapeutic Exercises, PT TENS Application, PT Neuro Re-Ed/Balance, PT Therapeutic Activity, PT Manual Therapy and PT Evaluation             Goal: LTG-By discharge, patient will ambulate up/down flight of stairs     Dates: Start: 03/21/18   Expected End: 04/11/18       Description: 1) Individualized goal:  With BHR at [facility] wth step to pattern in order to enter/exit home safely  2) Interventions:   PT Group Therapy, PT E Stim Attended, PT Gait Training, PT Therapeutic Exercises, PT TENS Application, PT Neuro Re-Ed/Balance, PT Therapeutic Activity, PT Manual Therapy and PT Evaluation                     Section completed by:  Jaya Ramirez, PT       Activities of Daily Living  Eating Initial:  7 - Independent  Eating Current:  7 - Independent   Eating Description:     Grooming Initial:  5 -  Standby Prompting/Supervision or Set-up  Grooming Current:  5 - Standby Prompting/Supervision or Set-up   Grooming Description:  Increased time, Supervision for safety (pt blow dried himanshu after shower)  Bathing Initial:  5 - Standby Prompting/Supervision or Set-up  Bathing Current:  5 - Standby Prompting/Supervision or Set-up   Bathing Description:  Grab bar, Tub bench, Hand held shower, Supervision for safety, Set-up of equipment, Verbal cueing (occasional prompts)  Upper Body Dressing Initial:  4 - Minimal Assistance  Upper Body Dressing Current:  5 - Standby Prompting/Supervision or Set-up   Upper Body Dressing Description:   (SPV to don/doff pullover shirt and zipper/snap sports bra)  Lower Body Dressing Initial:  5 - Standby Prompting/Supervsion or Set-up  Lower Body Dressing Current:  4 - Minimal Assistance   Lower Body Dressing Description:  4 - Minimal Assistance  Toileting Initial:  5 - Standby Prompting/Supervision or Set-up  Toileting Current:  4 - Minimal Assistance   Toileting Description:  Increased time, Supervision for safety, Grab bar, Verbal cueing  Toilet Transfer Initial:  5 - Standby Prompting/Supervision or Set-up  Toilet Transfer Current:  5 - Standby Prompting/Supervision or Set-up   Toilet Transfer Description:  5 - Standby Prompting/Supervision or Set-up  Tub / Shower Transfer Initial:  5 - Standby Prompting/Supervision or Set-up  Tub / Shower Transfer Current:  5 - Standby Prompting/Supervision or Set-up   Tub / Shower Transfer Description:   (SBA w/o device <>shower bench)  IADL:  Pt completed a community outing for reintegration with mobility at [facility], wayfinding with min v/c, and Mod A with simple math. Pt to complete simple meal prep with primary OT by end of week.   Family Training/Education:  On going with pt, no FT has occurred with pt's dtr. Needs to be scheduled prior to d/c home.   DME/DC Recommendations:  Pt will require shower chair, GB in shower, 24/7 assist for mobility and all  IADLs for safety. Pt would also benefit from Home Health OT to assess home setup and function in home for safety.     Strengths:  Independent PLOF, Making steady progress towards goals, Motivated for self care and independence, Pleasant and cooperative, Supportive family and Willingly participates in therapeutic activities  Barriers:  Generalized weakness, Impulsive, Poor balance, Poor carryover of learning and Poor insight/denial of deficits     # of short term goals set= 4    # of short term goals met= 0, goals were reset on Monday, 3 were met on that day.          Occupational Therapy Goals           Problem: Functional Cognition     Dates: Start: 03/21/18       Goal: STG-Within one week, patient will demonstrate safety awareness     Dates: Start: 03/21/18       Description: 1) Individualized Goal:  By locking w/c breaks on 3/5 opportunities with min v/c.  2) Interventions:  OT Group Therapy, OT Self Care/ADL, OT Cognitive Skill Dev, OT Community Reintegration, OT Manual Ther Technique, OT Neuro Re-Ed/Balance, OT Therapeutic Activity, OT Evaluation and OT Therapeutic Exercise     Note:     Goal Note filed on 03/28/18 1075 by Geetha Askew MS,OTR/L    Goal: STG-Within one week, patient will demonstrate safety awareness  Outcome: NOT MET  Pt continues to require cues for this task                  Problem: Grooming     Dates: Start: 03/21/18       Goal: STG-Within one week, patient will complete grooming     Dates: Start: 03/21/18       Description: 1) Individualized Goal:  In standing with SPV for oral care and hair care with no LOB using DME PRN and min cues for safety  2) Interventions:  OT Group Therapy, OT Self Care/ADL, OT Cognitive Skill Dev, OT Community Reintegration, OT Manual Ther Technique, OT Neuro Re-Ed/Balance, OT Therapeutic Activity, OT Evaluation and OT Therapeutic Exercise       Note:     Goal Note filed on 03/26/18 6572 by Geetha Askew MS,OTR/L    Goal: STG-Within one week, patient  will complete grooming  Outcome: NOT MET  Pt had LOB during standing grooming that required CGA                  Problem: IADL's     Dates: Start: 03/26/18       Goal: STG-Within one week, patient will prepare a meal     Dates: Start: 03/26/18       Description: 1) Individualized Goal:  At SBA level for mobility, and Min A for sequencing and problem solving  2) Interventions:  OT Group Therapy, OT Self Care/ADL, OT Cognitive Skill Dev, OT Community Reintegration, OT Manual Ther Technique, OT Neuro Re-Ed/Balance, OT Therapeutic Activity, OT Evaluation and OT Therapeutic Exercise       Note:     Goal Note filed on 03/28/18 1559 by Geetha Askew, MS,OTR/L    Goal: STG-Within one week, patient will prepare a meal  Outcome: NOT MET  To be assessed Friday                Goal: STG-Within one week, patient will utilize washer and dryer     Dates: Start: 03/26/18       Description: 1) Individualized Goal:  At SBA level for mobility, and Min A for sequencing and problem solving  2) Interventions:  OT Group Therapy, OT Self Care/ADL, OT Cognitive Skill Dev, OT Community Reintegration, OT Manual Ther Technique, OT Neuro Re-Ed/Balance, OT Therapeutic Activity, OT Evaluation and OT Therapeutic Exercise     Note:     Goal Note filed on 03/28/18 5735 by Geetha Askew, MS,OTR/L    Goal: STG-Within one week, patient will utilize washer and dryer  Outcome: NOT MET  To be assessed today.                    Problem: OT Long Term Goals     Dates: Start: 03/21/18       Goal: LTG-By discharge, patient will complete basic self care tasks     Dates: Start: 03/21/18       Description: 1) Individualized Goal:  At a SPV level using DME/AE or compensatory techniques PRN  2) Interventions:  OT Group Therapy, OT Self Care/ADL, OT Cognitive Skill Dev, OT Community Reintegration, OT Manual Ther Technique, OT Neuro Re-Ed/Balance, OT Therapeutic Activity, OT Evaluation and OT Therapeutic Exercise           Goal: LTG-By discharge, patient will  perform bathroom transfers     Dates: Start: 03/21/18       Description: 1) Individualized Goal:  At a SPV level using DME/AE or compensatory techniques PRN  2) Interventions:  OT Group Therapy, OT Self Care/ADL, OT Cognitive Skill Dev, OT Community Reintegration, OT Manual Ther Technique, OT Neuro Re-Ed/Balance, OT Therapeutic Activity, OT Evaluation and OT Therapeutic Exercise           Goal: LTG-By discharge, patient will complete basic home management     Dates: Start: 03/21/18       Description: 1) Individualized Goal:  At a Min A level using DME/AE or compensatory techniques PRN  2) Interventions:  OT Group Therapy, OT Self Care/ADL, OT Cognitive Skill Dev, OT Community Reintegration, OT Manual Ther Technique, OT Neuro Re-Ed/Balance, OT Therapeutic Activity, OT Evaluation and OT Therapeutic Exercise                 Section completed by:  Geetha Askew, MS,OTR/L       Cognitive Linquistic Functions  Comprehension Initial:  5 - Stand-by Prompting/Supervision or Set-up  Comprehension Current:  5 - Stand-by Prompting/Supervision or Set-up   Comprehension Description:  Verbal cues  Expression Initial:  5 - Stand-by Prompting/Supervision or Set-up  Expression Current:  5 - Stand-by Prompting/Supervision or Set-up   Expression Description:  Verbal cueing  Social Interaction Initial:  5 - Stand-by Prompting/Supervision or Set-up  Social Interaction Current:  5 - Stand-by Prompting/Supervision or Set-up   Social Interaction Description:  Verbal cues  Problem Solving Initial:  5 - Standby Prompting/Supervision or Set-up  Problem Solving Current:  5 - Standby Prompting/Supervision or Set-up   Problem Solving Description:  Verbal cueing, Therapy schedule  Memory Initial:  5 - Standby Prompting/Supervision or Set-up  Memory Current:  3 - Moderate Assistance   Memory Description:  Verbal cueing, Therapy schedule  Executive Functioning / Organization Initial:     Executive Functioning / Organization Current:  4 - Minimal  Assistance    Executive Functioning Desciption:  Verbal cues, extra time  Swallowing  Swallowing Status:  Pt not being followed for swallowing intervention.  Orders Placed This Encounter   Procedures   • DIET ORDER     Standing Status:   Standing     Number of Occurrences:   1     Order Specific Question:   Diet:     Answer:   Regular [1]     Behavior Modification Plan  Allow for rest time, Keep instructions simple/concrete, Give clear feedback, Set clear goals and Analyze tasks (break down into smaller steps)  Assistive Technology  Low/Impaired vision equipment, external aids for memory  Family Training/Education:  To be completed  DC Recommendations:   Prior living situation, continued SLP services via out patient recommended.   Strengths:  Able to follow instructions, Effective communication skills, Independent PLOF, Making steady progress towards goals, Motivated for self care and independence, Pleasant and cooperative and Willingly participates in therapeutic activities  Barriers:  Impulsive and Poor insight/denial of deficits  # of short term goals set=2  # of short term goals met=1 (2 goals added)        Speech Therapy Problems           Problem: Memory STGs     Dates: Start: 03/21/18       Goal: STG-Within one week, patient will     Dates: Start: 03/21/18       Description: 1) Individualized goal: recall information r/t TBI ed, goals, safety, sequences with 80% accuracy and MIN A.  2) Interventions:  SLP Cognitive Skill Development and SLP Group Treatment        Note:     Goal Note filed on 03/29/18 0705 by Hernandez Breen, Student    Goal: STG-Within one week, patient will  Outcome: NOT MET  TBI education initiated, will further assess pt's recall of information.   Pt demonstrates recall of safety precautions and transfer sequences with   80% accuracy and MIN A.                      Problem: Speech/Swallowing LTGs     Dates: Start: 03/21/18       Goal: LTG-By discharge, patient will solve complex  problem     Dates: Start: 03/21/18       Description: 1) Individualized goal:  Related to IADLs for return to PLOF with MOD I for safe d/c home.  2) Interventions:  SLP Cognitive Skill Development and SLP Group Treatment                    Section completed by:  Hernandez Breen, Student          Nutrition       Dietary Problems           Problem: Other Problem (see comments)     Description: Diagnosis: Inadequate oral intake related to lengthy clinical course s/p GLF and resultant TBI as evidenced by previous need for TF to maintain nutritional status and current PO intake of ~55%     Goal: Other Goal (Resolved)     Description: Monitor/Evaluation: Monitor PO intake, weight, labs, medication adjustments, skin integrity, GI function, vitals, I/Os, and overall hydration status.  Adjust nutritional POC pending clinical outcomes.   RD following weekly.    Goal: Maintain >/= 50% adequate oral nutrient/fluid intake to promote nutrition optimization/healing.                  Pt consuming adequate PO on Regular diet.  Weight up, but note difference in scales. Clinical picture reviewed. Continue current POC. RD following weekly per protocol.   Section completed by:  Li Lopez R.D.    REHAB-Pharmacy IDT Team Note by Vishnu Alvarado RPH at 3/28/2018  4:21 PM  Version 1 of 1    Author:  Vishnu Alvarado RPH Service:  (none) Author Type:  Pharmacist    Filed:  3/28/2018  4:22 PM Date of Service:  3/28/2018  4:21 PM Status:  Signed    :  Vishnu Alvarado RPH (Pharmacist)         Pharmacy[WR.1]   Pharmacy  Antibiotics/Antifungals/Antivirals:  Medication:      Active Orders     None        Route:         None  Stop Date:  N/A  Reason:   Antihypertensives/Cardiac:  Medication:    Orders (72h ago through future)    Start     Ordered    03/20/18 1042  hydrALAZINE (APRESOLINE) tablet 25 mg  EVERY 8 HOURS PRN      03/20/18 1042        Patient Vitals for the past 24 hrs:   BP Pulse   03/28/18 1314 (!) 90/60  84   03/28/18 1300 (!) 90/50 87   03/28/18 0647 102/68 89   03/27/18 1945 113/78 73       Anticoagulation:  Medication:  Not applicable  INR:         Other key medications:     A review of the medication list reveals no issues at this time.     Section completed by:[WR.2]  Vishnu Alvarado RPH[WR.1]        Attribution Cabrera     WR.1 - Vishnu Alvarado RPH on 3/28/2018  4:22 PM   WR.2 - Vishnu Alvarado RPH on 3/28/2018  4:21 PM                    DC Planning    DC destination/dispostion:  Patient has been living with boyfriend prior but he has moved out and locks are changed.  Home is a 2 level.  Patient's daughter will be staying with her at discharge.     Referrals: Patient will need resources for alcohol treatment/counseling.     DC Needs:  I am awaiting any dme recommendations. She will need follow up therapy probably outpatient.  I will confirm availability of transportation.  Patient indicates she has a friend who can help out.     Barriers to discharge:  Cognitive issues and hx of Etoh abuse.     Strengths: Her daughter is very supportive and will be able to stay with patient in her home.     Section completed by:  Tamar Dougherty R.N.    Physician Summary  Nathan Mireles MD participated and led team conference discussion.

## 2018-03-29 NOTE — CARE PLAN
Problem: Problem Solving STGs  Goal: STG-Within one week, patient will solve complex problems  1) Individualized goal: given complex attention tasks (divided/alternating) with 80% accuracy provided MIN cues.  2) Interventions:  SLP Cognitive Skill Development and SLP Group Treatment      Outcome: MET Date Met: 03/29/18  Pt able to solve complex problem solving with >80% accuracy independently and >90% with MIN cues.    Problem: Memory STGs  Goal: STG-Within one week, patient will  1) Individualized goal: recall information r/t TBI ed, goals, safety, sequences with 80% accuracy and MIN A.  2) Interventions:  SLP Cognitive Skill Development and SLP Group Treatment      Outcome: NOT MET  TBI education initiated, will further assess pt's recall of information. Pt demonstrates recall of safety precautions and transfer sequences with 80% accuracy and MIN A.

## 2018-03-29 NOTE — PROGRESS NOTES
"Rehab Progress Note     Interval Events (Subjective)  CC:   Traumatic Brain injury  with cognitive deficts and balance deficits    Patient was seen several times today including physical therapy. She reported that her headache was worse today. I Have  Discontinue the patient's Florinef and pressures dropped I wonder  If that is related to the patient's headache.   The patient was discussed at team conference results reviewed with the patient.  Notes to the therapist appreciated    IDT Team Meeting 3/29/2018    Nathan HATCH M.D., was present and led the interdisciplinary team conference on 3/29/2018.             Goals    1)  Address Cognitive limitations  2)  Mobility / Balance  3) Maintenance of Safety  4) Safe Discharge  5) arrange appropriate support          Barriers    1)  History of Etoh abuse with denial  2) Decreased safety judgment  3) Decreased insight improvement   4) Decreased balance  5) Headache  6) anxiety  7) Limited support     Slow improvement in balance with right drift  Static balance retropulsion    Outing required with     Slowed processing    Need to determien devicd for discharge  Finalize supprot net    Outpatietn Pt, OT and SLP        D/c 4/5/2018       DC/Disposition:  Home with family support    FIM/ Update  Therapy update 3/29/2018  Independent eating  Supervision grooming  Supervision bathing  Supervision upper body dressing  Min assist lower body dressing  Min assist toileting  Supervisionbladder  Supervision bowel  Supervision bed/chair transfer  Supervision toilet transfer  Supervision tub/shower transfer  Min assist ambulation  Modified Independent wheelchair propulsion  Min assist stairs  Supervision comprehension  Supervision expression  Supervision social interaction  Min assist problem solving  Mod assist memory    .        Objective:  VITAL SIGNS: /78   Pulse 82   Temp 36.9 °C (98.4 °F)   Resp 18   Ht 1.727 m (5' 8\")   Wt 59.5 kg (131 lb 2.8 oz)   SpO2 93%   " Breastfeeding? No   BMI 19.94 kg/m²   Cardiac: regular rate and rhythm, nl S1/S2   Lungs: clear to auscultation bilaterally.   Abdomen: soft; NT, ND, BS present.   Extremities: Without clubbing cyanosis or edema  Neuro: Significant improvement noted in balance still impulsive with some lability noted but greatly improved however the patient still has issues as discussed with the therapist at team conference    Recent Results (from the past 72 hour(s))   FOLATE    Collection Time: 03/27/18  5:24 AM   Result Value Ref Range    Folate -Folic Acid 11.9 >4.0 ng/mL   VITAMIN B12    Collection Time: 03/27/18  5:24 AM   Result Value Ref Range    Vitamin B12 -True Cobalamin 850 211 - 911 pg/mL       Current Facility-Administered Medications   Medication Frequency   • oxyCODONE immediate-release (ROXICODONE) tablet 2.5 mg Q4HRS PRN   • [START ON 3/30/2018] chlordiazePOXIDE (LIBRIUM) capsule 25 mg DAILY   • gabapentin (NEURONTIN) capsule 400 mg TID   • estradiol (ESTRACE) tablet 1 mg DAILY   • vitamin D (cholecalciferol) tablet 1,000 Units DAILY   • thiamine (THIAMINE) tablet 100 mg DAILY   • ferrous sulfate tablet 325 mg QDAY with Breakfast    And   • ascorbic acid (ascorbic acid) tablet 500 mg QDAY with Breakfast   • Respiratory Care per Protocol Continuous RT   • Pharmacy Consult Request ...Pain Management Review 1 Each PRN   • docusate sodium (COLACE) capsule 100 mg DAILY    And   • senna-docusate (PERICOLACE or SENOKOT S) 8.6-50 MG per tablet 1 Tab Q EVENING    And   • lactulose 20 GM/30ML solution 30 mL Q24HRS PRN    And   • bisacodyl (DULCOLAX) suppository 10 mg QDAY PRN   • artificial tears 1.4 % ophthalmic solution 1 Drop PRN   • benzocaine-menthol (CEPACOL) lozenge 1 Lozenge Q2HRS PRN   • hydrALAZINE (APRESOLINE) tablet 25 mg Q8HRS PRN   • mag hydrox-al hydrox-simeth (MAALOX PLUS ES or MYLANTA DS) suspension 20 mL Q2HRS PRN   • ondansetron (ZOFRAN ODT) dispertab 4 mg 4X/DAY PRN    Or   • ondansetron (ZOFRAN)  syringe/vial injection 4 mg 4X/DAY PRN   • traZODone (DESYREL) tablet 50 mg QHS PRN   • omeprazole (PRILOSEC) capsule 20 mg DAILY   • potassium bicarbonate (KLYTE) 25 MEQ effervescent tablet TBEF 50 mEq Q8HRS   • acetaminophen (TYLENOL) tablet 650 mg Q4HRS PRN       Orders Placed This Encounter   Procedures   • DIET ORDER     Standing Status:   Standing     Number of Occurrences:   1     Order Specific Question:   Diet:     Answer:   Regular [1]       Assessment:  Active Hospital Problems    Diagnosis   • *Traumatic intracranial hemorrhage (CMS-HCC)   • Hyponatremia   • Acute hyperactive alcohol withdrawal delirium (CMS-HCC)   • Moderate protein-calorie malnutrition (CMS-HCC)   • Anemia   • Alcohol abuse   • Cognitive and behavioral changes   • Lability emotional   • Impaired mobility and ADLs   • Balance disorder   • CARSON (generalized anxiety disorder)   • Tobacco dependence       Medical Decision Making and Plan:            Patient clearly is improving still has issues but will need further addressing I'm most concerned about her alcohol issues and lack of awareness. She fell fallen in past while in hospital    History of heavy alcohol use status post acute alcoholic withdrawal   Librium is now down to 25 twice a day  Her lack of awareness and her alcoholism is of some concern        Hyponatremia  Last sodium was 138  Now off salt tabs will recheck on Friday      Hypokalemia    Patient is on potassium replacement will check on Friday patient is back in a small dose of Florinef because of her headache may be secondary to low blood pressure       Dysphagia  Now on regular diet     Protein malnutrition    Patient's by mouth intake is than less than optimal we      Bacterial conjunctivae of both eyes  Resolved     Anemia   H&H is creeping upward  We'll recheck labs on Friday  Continue iron and other replacement     Thrombocytopenia upon admission to the hospital   Doing okay at this time we'll check labs and  Friday     History of tobacco abuse  Still not smoking       Headache  Improved a titrated Neurontin up to 400 mg 3 times a day   however she reports that today it was worse.  Her blood pressure was a bit lower I restart a small dose of Florinef     Cognitive deficits     Now off of Cymbalta improving still with limited site     Emotional lability  Now off duloxetine seems to be doing okay off of it      Tentative discharge date  Still on  4/5/2018  Will continue PT, OT and SLP   at team conference reconfirmed the April 5 date but we still certainly have issues with support that would investigate further    Team conference       I spent 40 minutes face to face was spent with the patient with greater than 50% of the time spent counseling and coordinating care    Aundrea Mireles M.D.

## 2018-03-29 NOTE — REHAB-PT IDT TEAM NOTE
Physical Therapy   Mobility  Bed mobility:   SPV  Bed /Chair/Wheelchair Transfer Initial:  5 - Standby Prompting/Supervision or Set-up  Bed /Chair/Wheelchair Transfer Current:  5 - Standby Prompting/Supervision or Set-up   Bed/Chair/Wheelchair Transfer Description:  Increased time, Supervision for safety, Verbal cueing  Walk Initial:  1 - Total Assistance  Walk Current:  4 - Minimal Assistance   Walk Description:  Verbal cueing, Requires incidental assist, Safety concerns, Extra time, Assist device/equipment (CGA, 200 ft x 2, no LOB, occasional drift R with cues to correct)  Wheelchair Initial:  5 - Standby Prompting/Supervision or Set-up  Wheelchair Current:  6 - Modified Independent   Wheelchair Description:  Safety concerns, Extra time  Stairs Initial:  0 - Not tested,unsafe activity  Stairs Current: 5 - Standby Prompting/Supervision or Set-up   Stairs Description: Ascends/descends 12 to 14 steps, Supervision for safety, Extra time, Safety concerns, Verbal cueing, Hand rails  Patient/Family Training/Education:  Ongoing, wt shift into toes to avoid post LOB  DME/DC Recommendations:  4WW? SPC? Home vs OP PT? 24 hr SPV?  Strengths:  Able to follow instructions, Adequate strength, Making steady progress towards goals, Motivated for self care and independence, Pleasant and cooperative and Supportive family  Barriers:   Poor balance and Other: Poor coordination  # of short term goals set= 3  # of short term goals met=3       Physical Therapy Problems           Problem: PT-Long Term Goals     Dates: Start: 03/21/18       Goal: LTG-By discharge, patient will maintain balance     Dates: Start: 03/21/18   Expected End: 04/11/18       Description: 1) Individualized goal: Will score > 44/56 on Casey balance assessment in order to decrease risk for falls  2) Interventions:   PT Group Therapy, PT E Stim Attended, PT Gait Training, PT Therapeutic Exercises, PT TENS Application, PT Neuro Re-Ed/Balance, PT Therapeutic Activity, PT  Manual Therapy and PT Evaluation             Goal: LTG-By discharge, patient will ambulate     Dates: Start: 03/21/18   Expected End: 04/11/18       Description: 1) Individualized goal:  With [facility] at [facility] [place] in order to progress towards PLOF  2) Interventions:   PT Group Therapy, PT E Stim Attended, PT Gait Training, PT Therapeutic Exercises, PT TENS Application, PT Neuro Re-Ed/Balance, PT Therapeutic Activity, PT Manual Therapy and PT Evaluation             Goal: LTG-By discharge, patient will transfer one surface to another     Dates: Start: 03/21/18   Expected End: 04/11/18       Description: 1) Individualized goal:  At mod I with LRD In order to maximize self mobility  2) Interventions:   PT Group Therapy, PT E Stim Attended, PT Gait Training, PT Therapeutic Exercises, PT TENS Application, PT Neuro Re-Ed/Balance, PT Therapeutic Activity, PT Manual Therapy and PT Evaluation             Goal: LTG-By discharge, patient will ambulate up/down flight of stairs     Dates: Start: 03/21/18   Expected End: 04/11/18       Description: 1) Individualized goal:  With [facility] at [facility] [place] wth step to pattern in order to enter/exit home safely  2) Interventions:   PT Group Therapy, PT E Stim Attended, PT Gait Training, PT Therapeutic Exercises, PT TENS Application, PT Neuro Re-Ed/Balance, PT Therapeutic Activity, PT Manual Therapy and PT Evaluation                     Section completed by:  Jaya Ramirez, PT

## 2018-03-29 NOTE — REHAB-SLP IDT TEAM NOTE
Speech Therapy   Cognitive Linquistic Functions  Comprehension Initial:  5 - Stand-by Prompting/Supervision or Set-up  Comprehension Current:  5 - Stand-by Prompting/Supervision or Set-up   Comprehension Description:  Verbal cues  Expression Initial:  5 - Stand-by Prompting/Supervision or Set-up  Expression Current:  5 - Stand-by Prompting/Supervision or Set-up   Expression Description:  Verbal cueing  Social Interaction Initial:  5 - Stand-by Prompting/Supervision or Set-up  Social Interaction Current:  5 - Stand-by Prompting/Supervision or Set-up   Social Interaction Description:  Verbal cues  Problem Solving Initial:  5 - Standby Prompting/Supervision or Set-up  Problem Solving Current:  5 - Standby Prompting/Supervision or Set-up   Problem Solving Description:  Verbal cueing, Therapy schedule  Memory Initial:  5 - Standby Prompting/Supervision or Set-up  Memory Current:  3 - Moderate Assistance   Memory Description:  Verbal cueing, Therapy schedule  Executive Functioning / Organization Initial:     Executive Functioning / Organization Current:  4 - Minimal Assistance    Executive Functioning Desciption:  Verbal cues, extra time  Swallowing  Swallowing Status:  Pt not being followed for swallowing intervention.  Orders Placed This Encounter   Procedures   • DIET ORDER     Standing Status:   Standing     Number of Occurrences:   1     Order Specific Question:   Diet:     Answer:   Regular [1]     Behavior Modification Plan  Allow for rest time, Keep instructions simple/concrete, Give clear feedback, Set clear goals and Analyze tasks (break down into smaller steps)  Assistive Technology  Low/Impaired vision equipment, external aids for memory  Family Training/Education:  To be completed  DC Recommendations:   Prior living situation, continued SLP services via out patient recommended.   Strengths:  Able to follow instructions, Effective communication skills, Independent PLOF, Making steady progress towards goals,  Motivated for self care and independence, Pleasant and cooperative and Willingly participates in therapeutic activities  Barriers:  Impulsive and Poor insight/denial of deficits  # of short term goals set=2  # of short term goals met=1 (2 goals added)        Speech Therapy Problems           Problem: Memory STGs     Dates: Start: 03/21/18       Goal: STG-Within one week, patient will     Dates: Start: 03/21/18       Description: 1) Individualized goal: recall information r/t TBI ed, goals, safety, sequences with 80% accuracy and MIN A.  2) Interventions:  SLP Cognitive Skill Development and SLP Group Treatment        Note:     Goal Note filed on 03/29/18 0705 by Hernandez Breen, Student    Goal: STG-Within one week, patient will  Outcome: NOT MET  TBI education initiated, will further assess pt's recall of information.   Pt demonstrates recall of safety precautions and transfer sequences with   80% accuracy and MIN A.                      Problem: Speech/Swallowing LTGs     Dates: Start: 03/21/18       Goal: LTG-By discharge, patient will solve complex problem     Dates: Start: 03/21/18       Description: 1) Individualized goal:  Related to IADLs for return to PLOF with MOD I for safe d/c home.  2) Interventions:  SLP Cognitive Skill Development and SLP Group Treatment                    Section completed by:  Hernandez Breen, Student

## 2018-03-29 NOTE — PROGRESS NOTES
Received shift report and assumed care of patient. Patient awake and resting in bed with visitor at bedside. Patient complaining of headache, will medicate per MAR. All other needs met at this time. Call light and belongings within reach.

## 2018-03-29 NOTE — PROGRESS NOTES
"Rehab Progress Note     Interval Events (Subjective)  CC:   Traumatic Brain injury  with cognitive deficts and balance deficits    Patient was seen several times today including physical therapy. She reports improvement in her headache. Her insight into her deficits is limited but clearly has improved that she now knows to call for help at night to try to avoid any further falls.  I review the therapist's notes discussed the case with them her balance remains an issue      Objective:  VITAL SIGNS: BP (!) 97/62   Pulse 88   Temp 36.4 °C (97.5 °F)   Resp 16   Ht 1.727 m (5' 8\")   Wt 59.5 kg (131 lb 2.8 oz)   SpO2 93%   Breastfeeding? No   BMI 19.94 kg/m²   Cardiac: regular rate and rhythm, nl S1/S2   Lungs: clear to auscultation bilaterally.   Abdomen: soft; NT, ND, BS present.   Extremities: Without clubbing cyanosis or edema  Neuro: Significant improvement noted in balance still impulsive  but greatly improved    Patient's  Exam is very similar to that of yesterday 3/27/2018    Recent Results (from the past 72 hour(s))   FOLATE    Collection Time: 03/27/18  5:24 AM   Result Value Ref Range    Folate -Folic Acid 11.9 >4.0 ng/mL   VITAMIN B12    Collection Time: 03/27/18  5:24 AM   Result Value Ref Range    Vitamin B12 -True Cobalamin 850 211 - 911 pg/mL       Current Facility-Administered Medications   Medication Frequency   • gabapentin (NEURONTIN) capsule 400 mg TID   • estradiol (ESTRACE) tablet 1 mg DAILY   • chlordiazePOXIDE (LIBRIUM) capsule 25 mg Q12HRS   • vitamin D (cholecalciferol) tablet 1,000 Units DAILY   • thiamine (THIAMINE) tablet 100 mg DAILY   • ferrous sulfate tablet 325 mg QDAY with Breakfast    And   • folic acid (FOLVITE) tablet 1 mg QDAY with Breakfast    And   • ascorbic acid (ascorbic acid) tablet 500 mg QDAY with Breakfast   • Respiratory Care per Protocol Continuous RT   • Pharmacy Consult Request ...Pain Management Review 1 Each PRN   • docusate sodium (COLACE) capsule 100 mg DAILY "    And   • senna-docusate (PERICOLACE or SENOKOT S) 8.6-50 MG per tablet 1 Tab Q EVENING    And   • lactulose 20 GM/30ML solution 30 mL Q24HRS PRN    And   • bisacodyl (DULCOLAX) suppository 10 mg QDAY PRN   • artificial tears 1.4 % ophthalmic solution 1 Drop PRN   • benzocaine-menthol (CEPACOL) lozenge 1 Lozenge Q2HRS PRN   • hydrALAZINE (APRESOLINE) tablet 25 mg Q8HRS PRN   • mag hydrox-al hydrox-simeth (MAALOX PLUS ES or MYLANTA DS) suspension 20 mL Q2HRS PRN   • ondansetron (ZOFRAN ODT) dispertab 4 mg 4X/DAY PRN    Or   • ondansetron (ZOFRAN) syringe/vial injection 4 mg 4X/DAY PRN   • traZODone (DESYREL) tablet 50 mg QHS PRN   • omeprazole (PRILOSEC) capsule 20 mg DAILY   • potassium bicarbonate (KLYTE) 25 MEQ effervescent tablet TBEF 50 mEq Q8HRS   • acetaminophen (TYLENOL) tablet 650 mg Q4HRS PRN   • oxyCODONE immediate-release (ROXICODONE) tablet 5 mg Q4HRS PRN       Orders Placed This Encounter   Procedures   • DIET ORDER     Standing Status:   Standing     Number of Occurrences:   1     Order Specific Question:   Diet:     Answer:   Regular [1]       Assessment:  Active Hospital Problems    Diagnosis   • *Traumatic intracranial hemorrhage (CMS-HCC)   • Hyponatremia   • Acute hyperactive alcohol withdrawal delirium (CMS-HCC)   • Moderate protein-calorie malnutrition (CMS-HCC)   • Anemia   • Alcohol abuse   • Cognitive and behavioral changes   • Lability emotional   • Impaired mobility and ADLs   • Balance disorder   • CARSON (generalized anxiety disorder)   • Tobacco dependence       Medical Decision Making and Plan:            Patient clearly is improving still has issues but will need further addressing I'm most concerned about her alcohol issues and lack of awareness. She fell yesterday because of her impulsivity            History of heavy alcohol use status post acute alcoholic withdrawal   Librium is now down to 25 twice a day  Her lack of awareness and her alcoholism is of some  concern        Hyponatremia  Last sodium was 138  Now off salt tabs will recheck on Friday      Hypokalemia    Patient is on potassium replacement but likely secondary to [place] for which I discontinued. Will check labs on Friday     Dysphagia  Now on regular diet     Protein malnutrition    Patient's by mouth intake is than less than optimal we are adding supplements to her     Bacterial conjunctivae of both eyes  Resolved     Anemia   H&H is creeping upward  We'll recheck labs on Friday  Continue iron and other replacement     Thrombocytopenia upon admission to the hospital   Doing okay at this time we'll check labs and Friday     History of tobacco abuse  Still not smoking       Headache  Improved a titrated Neurontin up to 400 mg 3 times a day headache is improved     Cognitive deficits     Now off of Cymbalta improving still with limited insight     Emotional lability  Now off duloxetine seems to be doing okay off of it      Tentative discharge date set for 4/5/2018 r Will continue PT, OT and SLP  Team conference  Tomorrow we will finalize discharge plans at that time       I spent 30 minutes face to face was spent with the patient with greater than 50% of the time spent counseling and coordinating care    Aundrea Mireles M.D.

## 2018-03-30 LAB
ANION GAP SERPL CALC-SCNC: 7 MMOL/L (ref 0–11.9)
BASOPHILS # BLD AUTO: 2.7 % (ref 0–1.8)
BASOPHILS # BLD: 0.13 K/UL (ref 0–0.12)
BUN SERPL-MCNC: 13 MG/DL (ref 8–22)
CALCIUM SERPL-MCNC: 9.9 MG/DL (ref 8.5–10.5)
CHLORIDE SERPL-SCNC: 102 MMOL/L (ref 96–112)
CO2 SERPL-SCNC: 29 MMOL/L (ref 20–33)
CREAT SERPL-MCNC: 0.63 MG/DL (ref 0.5–1.4)
EOSINOPHIL # BLD AUTO: 0.28 K/UL (ref 0–0.51)
EOSINOPHIL NFR BLD: 5.9 % (ref 0–6.9)
ERYTHROCYTE [DISTWIDTH] IN BLOOD BY AUTOMATED COUNT: 49.5 FL (ref 35.9–50)
GLUCOSE SERPL-MCNC: 88 MG/DL (ref 65–99)
HCT VFR BLD AUTO: 37.8 % (ref 37–47)
HGB BLD-MCNC: 12.2 G/DL (ref 12–16)
IMM GRANULOCYTES # BLD AUTO: 0.01 K/UL (ref 0–0.11)
IMM GRANULOCYTES NFR BLD AUTO: 0.2 % (ref 0–0.9)
LYMPHOCYTES # BLD AUTO: 2.21 K/UL (ref 1–4.8)
LYMPHOCYTES NFR BLD: 46.2 % (ref 22–41)
MCH RBC QN AUTO: 34.9 PG (ref 27–33)
MCHC RBC AUTO-ENTMCNC: 32.3 G/DL (ref 33.6–35)
MCV RBC AUTO: 108 FL (ref 81.4–97.8)
MONOCYTES # BLD AUTO: 0.55 K/UL (ref 0–0.85)
MONOCYTES NFR BLD AUTO: 11.5 % (ref 0–13.4)
NEUTROPHILS # BLD AUTO: 1.6 K/UL (ref 2–7.15)
NEUTROPHILS NFR BLD: 33.5 % (ref 44–72)
NRBC # BLD AUTO: 0 K/UL
NRBC BLD-RTO: 0 /100 WBC
PLATELET # BLD AUTO: 425 K/UL (ref 164–446)
PMV BLD AUTO: 8.5 FL (ref 9–12.9)
POTASSIUM SERPL-SCNC: 3.9 MMOL/L (ref 3.6–5.5)
RBC # BLD AUTO: 3.5 M/UL (ref 4.2–5.4)
SODIUM SERPL-SCNC: 138 MMOL/L (ref 135–145)
WBC # BLD AUTO: 4.8 K/UL (ref 4.8–10.8)

## 2018-03-30 PROCEDURE — 97535 SELF CARE MNGMENT TRAINING: CPT

## 2018-03-30 PROCEDURE — 97530 THERAPEUTIC ACTIVITIES: CPT

## 2018-03-30 PROCEDURE — A9270 NON-COVERED ITEM OR SERVICE: HCPCS | Performed by: INTERNAL MEDICINE

## 2018-03-30 PROCEDURE — 36415 COLL VENOUS BLD VENIPUNCTURE: CPT

## 2018-03-30 PROCEDURE — 80048 BASIC METABOLIC PNL TOTAL CA: CPT

## 2018-03-30 PROCEDURE — 700102 HCHG RX REV CODE 250 W/ 637 OVERRIDE(OP): Performed by: INTERNAL MEDICINE

## 2018-03-30 PROCEDURE — A9270 NON-COVERED ITEM OR SERVICE: HCPCS | Performed by: PHYSICAL MEDICINE & REHABILITATION

## 2018-03-30 PROCEDURE — 85025 COMPLETE CBC W/AUTO DIFF WBC: CPT

## 2018-03-30 PROCEDURE — 700112 HCHG RX REV CODE 229: Performed by: PHYSICAL MEDICINE & REHABILITATION

## 2018-03-30 PROCEDURE — 770010 HCHG ROOM/CARE - REHAB SEMI PRIVAT*

## 2018-03-30 PROCEDURE — 97116 GAIT TRAINING THERAPY: CPT

## 2018-03-30 PROCEDURE — G0515 COGNITIVE SKILLS DEVELOPMENT: HCPCS

## 2018-03-30 PROCEDURE — 97112 NEUROMUSCULAR REEDUCATION: CPT

## 2018-03-30 PROCEDURE — 700102 HCHG RX REV CODE 250 W/ 637 OVERRIDE(OP): Performed by: PHYSICAL MEDICINE & REHABILITATION

## 2018-03-30 RX ORDER — AMITRIPTYLINE HYDROCHLORIDE 10 MG/1
10 TABLET, FILM COATED ORAL EVERY EVENING
Status: CANCELLED | OUTPATIENT
Start: 2018-03-30

## 2018-03-30 RX ORDER — AMITRIPTYLINE HYDROCHLORIDE 10 MG/1
10 TABLET, FILM COATED ORAL EVERY EVENING
Status: DISCONTINUED | OUTPATIENT
Start: 2018-03-30 | End: 2018-04-02

## 2018-03-30 RX ADMIN — OXYCODONE HYDROCHLORIDE 2.5 MG: 5 TABLET ORAL at 18:56

## 2018-03-30 RX ADMIN — OXYCODONE HYDROCHLORIDE AND ACETAMINOPHEN 500 MG: 500 TABLET ORAL at 08:07

## 2018-03-30 RX ADMIN — ESTRADIOL 1 MG: 1 TABLET ORAL at 08:08

## 2018-03-30 RX ADMIN — POTASSIUM BICARBONATE 50 MEQ: 25 TABLET, EFFERVESCENT ORAL at 05:06

## 2018-03-30 RX ADMIN — AMITRIPTYLINE HYDROCHLORIDE 10 MG: 10 TABLET, FILM COATED ORAL at 20:22

## 2018-03-30 RX ADMIN — GABAPENTIN 400 MG: 400 CAPSULE ORAL at 15:08

## 2018-03-30 RX ADMIN — CHOLECALCIFEROL TAB 25 MCG (1000 UNIT) 1000 UNITS: 25 TAB at 08:07

## 2018-03-30 RX ADMIN — GABAPENTIN 400 MG: 400 CAPSULE ORAL at 20:22

## 2018-03-30 RX ADMIN — CHLORDIAZEPOXIDE HYDROCHLORIDE 25 MG: 25 CAPSULE ORAL at 08:06

## 2018-03-30 RX ADMIN — OXYCODONE HYDROCHLORIDE 2.5 MG: 5 TABLET ORAL at 05:08

## 2018-03-30 RX ADMIN — POTASSIUM BICARBONATE 50 MEQ: 25 TABLET, EFFERVESCENT ORAL at 15:08

## 2018-03-30 RX ADMIN — Medication 100 MG: at 08:06

## 2018-03-30 RX ADMIN — POTASSIUM BICARBONATE 50 MEQ: 25 TABLET, EFFERVESCENT ORAL at 20:22

## 2018-03-30 RX ADMIN — FLUDROCORTISONE ACETATE 0.05 MG: 0.1 TABLET ORAL at 10:12

## 2018-03-30 RX ADMIN — DOCUSATE SODIUM 100 MG: 100 CAPSULE, LIQUID FILLED ORAL at 08:06

## 2018-03-30 RX ADMIN — STANDARDIZED SENNA CONCENTRATE AND DOCUSATE SODIUM 1 TABLET: 8.6; 5 TABLET, FILM COATED ORAL at 20:22

## 2018-03-30 RX ADMIN — GABAPENTIN 400 MG: 400 CAPSULE ORAL at 10:13

## 2018-03-30 RX ADMIN — FERROUS SULFATE TAB 325 MG (65 MG ELEMENTAL FE) 325 MG: 325 (65 FE) TAB at 08:07

## 2018-03-30 RX ADMIN — OMEPRAZOLE 20 MG: 20 CAPSULE, DELAYED RELEASE ORAL at 08:06

## 2018-03-30 ASSESSMENT — PAIN SCALES - GENERAL
PAINLEVEL_OUTOF10: 0
PAINLEVEL_OUTOF10: 4
PAINLEVEL_OUTOF10: 6
PAINLEVEL_OUTOF10: 9

## 2018-03-30 NOTE — DISCHARGE PLANNING
Case Management;  I have contacted patient's daughter and provided update of team conference discussion.  Therapist will contact her for family training.  I also provided update to patient's mother.  Patient's daughter confirms that she will be the only person for training initially.  She confirms that they have not yet enlisted the assistance of her neighbor as patient has discussed prior.  Will follow.

## 2018-03-30 NOTE — PROGRESS NOTES
Rehab Progress Note     Interval Events (Subjective)  CC:   Traumatic Brain injury  with cognitive deficts and balance deficits    Patient was seen in the dining room today she is complaining of recurrence of her headache. The reinitiation of her Midrin did not affected this time. The patient also is reporting visual disturbance that are improved totally eliminated with pinhole glasses and we discussed treatment options and decided to add amitriptyline 10 mg daily at bedtime to her regimen to see if this helps her to some extent with her headache symptoms. The patient demonstrates demonstrating some pain behavior activities as well which is of some concern she has tolerated the weaning of the Librium so far     Labs reviewed      IDT Team Meeting 3/29/2018    Nathan HATCH M.D., was present and led the interdisciplinary team conference on 3/29/2018.             Goals    1)  Address Cognitive limitations  2)  Mobility / Balance  3) Maintenance of Safety  4) Safe Discharge  5) arrange appropriate support          Barriers    1)  History of Etoh abuse with denial  2) Decreased safety judgment  3) Decreased insight improvement   4) Decreased balance  5) Headache  6) anxiety  7) Limited support     Slow improvement in balance with right drift  Static balance retropulsion    Outing required with     Slowed processing    Need to determien devic for discharge  Finalize supprot net    Outpatietn Pt, OT and SLP        D/c 4/5/2018       DC/Disposition:  Home with family support    FIM/ Update  Therapy update 3/29/2018  Independent eating  Supervision grooming  Supervision bathing  Supervision upper body dressing  Min assist lower body dressing  Min assist toileting  Supervisionbladder  Supervision bowel  Supervision bed/chair transfer  Supervision toilet transfer  Supervision tub/shower transfer  Min assist ambulation  Modified Independent wheelchair propulsion  Min assist stairs  Supervision comprehension  Supervision  "expression  Supervision social interaction  Min assist problem solving  Mod assist memory    .        Objective:  VITAL SIGNS: /60   Pulse 80   Temp 36.5 °C (97.7 °F)   Resp 18   Ht 1.727 m (5' 8\")   Wt 59.5 kg (131 lb 2.8 oz)   SpO2 93%   Breastfeeding? No   BMI 19.94 kg/m²   Cardiac: regular rate and rhythm, nl S1/S2   Lungs: clear to auscultation bilaterally.   Abdomen: soft; NT, ND, BS present.   Extremities: Without clubbing cyanosis or edema  Neuro: Significant improvement noted in balance still impulsive with some lability noted but greatly improved however the patient still has issues as discussed with the therapist at team conference    This exam is unchanged from that of of yesterday 3/29/2018    Recent Results (from the past 72 hour(s))   CBC WITH DIFFERENTIAL    Collection Time: 03/30/18  5:17 AM   Result Value Ref Range    WBC 4.8 4.8 - 10.8 K/uL    RBC 3.50 (L) 4.20 - 5.40 M/uL    Hemoglobin 12.2 12.0 - 16.0 g/dL    Hematocrit 37.8 37.0 - 47.0 %    .0 (H) 81.4 - 97.8 fL    MCH 34.9 (H) 27.0 - 33.0 pg    MCHC 32.3 (L) 33.6 - 35.0 g/dL    RDW 49.5 35.9 - 50.0 fL    Platelet Count 425 164 - 446 K/uL    MPV 8.5 (L) 9.0 - 12.9 fL    Neutrophils-Polys 33.50 (L) 44.00 - 72.00 %    Lymphocytes 46.20 (H) 22.00 - 41.00 %    Monocytes 11.50 0.00 - 13.40 %    Eosinophils 5.90 0.00 - 6.90 %    Basophils 2.70 (H) 0.00 - 1.80 %    Immature Granulocytes 0.20 0.00 - 0.90 %    Nucleated RBC 0.00 /100 WBC    Neutrophils (Absolute) 1.60 (L) 2.00 - 7.15 K/uL    Lymphs (Absolute) 2.21 1.00 - 4.80 K/uL    Monos (Absolute) 0.55 0.00 - 0.85 K/uL    Eos (Absolute) 0.28 0.00 - 0.51 K/uL    Baso (Absolute) 0.13 (H) 0.00 - 0.12 K/uL    Immature Granulocytes (abs) 0.01 0.00 - 0.11 K/uL    NRBC (Absolute) 0.00 K/uL   BASIC METABOLIC PANEL    Collection Time: 03/30/18  5:17 AM   Result Value Ref Range    Sodium 138 135 - 145 mmol/L    Potassium 3.9 3.6 - 5.5 mmol/L    Chloride 102 96 - 112 mmol/L    Co2 29 20 - " 33 mmol/L    Glucose 88 65 - 99 mg/dL    Bun 13 8 - 22 mg/dL    Creatinine 0.63 0.50 - 1.40 mg/dL    Calcium 9.9 8.5 - 10.5 mg/dL    Anion Gap 7.0 0.0 - 11.9   ESTIMATED GFR    Collection Time: 03/30/18  5:17 AM   Result Value Ref Range    GFR If African American >60 >60 mL/min/1.73 m 2    GFR If Non African American >60 >60 mL/min/1.73 m 2       Current Facility-Administered Medications   Medication Frequency   • amitriptyline (ELAVIL) tablet 10 mg Q EVENING   • oxyCODONE immediate-release (ROXICODONE) tablet 2.5 mg Q4HRS PRN   • chlordiazePOXIDE (LIBRIUM) capsule 25 mg DAILY   • fludrocortisone (FLORINEF) tablet 0.05 mg QAM   • gabapentin (NEURONTIN) capsule 400 mg TID   • estradiol (ESTRACE) tablet 1 mg DAILY   • vitamin D (cholecalciferol) tablet 1,000 Units DAILY   • thiamine (THIAMINE) tablet 100 mg DAILY   • ferrous sulfate tablet 325 mg QDAY with Breakfast    And   • ascorbic acid (ascorbic acid) tablet 500 mg QDAY with Breakfast   • Respiratory Care per Protocol Continuous RT   • Pharmacy Consult Request ...Pain Management Review 1 Each PRN   • docusate sodium (COLACE) capsule 100 mg DAILY    And   • senna-docusate (PERICOLACE or SENOKOT S) 8.6-50 MG per tablet 1 Tab Q EVENING    And   • lactulose 20 GM/30ML solution 30 mL Q24HRS PRN    And   • bisacodyl (DULCOLAX) suppository 10 mg QDAY PRN   • artificial tears 1.4 % ophthalmic solution 1 Drop PRN   • benzocaine-menthol (CEPACOL) lozenge 1 Lozenge Q2HRS PRN   • hydrALAZINE (APRESOLINE) tablet 25 mg Q8HRS PRN   • mag hydrox-al hydrox-simeth (MAALOX PLUS ES or MYLANTA DS) suspension 20 mL Q2HRS PRN   • ondansetron (ZOFRAN ODT) dispertab 4 mg 4X/DAY PRN    Or   • ondansetron (ZOFRAN) syringe/vial injection 4 mg 4X/DAY PRN   • traZODone (DESYREL) tablet 50 mg QHS PRN   • omeprazole (PRILOSEC) capsule 20 mg DAILY   • potassium bicarbonate (KLYTE) 25 MEQ effervescent tablet TBEF 50 mEq Q8HRS   • acetaminophen (TYLENOL) tablet 650 mg Q4HRS PRN       Orders Placed  This Encounter   Procedures   • DIET ORDER     Standing Status:   Standing     Number of Occurrences:   1     Order Specific Question:   Diet:     Answer:   Regular [1]       Assessment:  Active Hospital Problems    Diagnosis   • *Traumatic intracranial hemorrhage (CMS-HCC)   • Hyponatremia   • Acute hyperactive alcohol withdrawal delirium (CMS-HCC)   • Moderate protein-calorie malnutrition (CMS-HCC)   • Anemia   • Alcohol abuse   • Cognitive and behavioral changes   • Lability emotional   • Impaired mobility and ADLs   • Balance disorder   • CARSON (generalized anxiety disorder)   • Tobacco dependence       Medical Decision Making and Plan:          Patient clearly is improving still has issues but will need further addressing I'm most concerned about her alcohol issues and lack of awareness. She fell fallen in past while in hospital          History of heavy alcohol use status post acute alcoholic withdrawal   Librium is now down to 25Daily  Her lack of awareness and her alcoholism is of some concern        Hyponatremia  Sodium was 138 this morning        Hypokalemia     patient's potassium today was 3.9       Dysphagia  Now on regular diet     Protein malnutrition    Patient's by mouth intake is than less than optimal we      Bacterial conjunctivae of both eyes  Resolved     Anemia   H&H is creeping upward  Hemoglobin and hematocrit are 12.2/38.8 respectively today  Continue iron and other replacement     Thrombocytopenia upon admission to the hospital   Platelets 425 today     History of tobacco abuse  Still not smoking       Headache  Has worsened today with start the patient on amitriptyline 10 mg daily at bedtime       Cognitive deficits     Still problems with processing speed improving was still decreased awareness     Emotional lability  We'll monitor her on amitriptyline    Decreased visual acuity    Improvement. With pinhole glasses continue the mitral follow-up with ophthalmology      Tentative discharge  date  Still on  4/5/2018  Will continue PT, OT and SLP   at team conference reconfirmed the April 5 date but we still certainly have issues with support that would investigate further         I spent 30 minutes face to face was spent with the patient with greater than 50% of the time spent counseling and coordinating care    Aundrea Mireles M.D.

## 2018-03-30 NOTE — CARE PLAN
Problem: Pain Management  Goal: Pain level will decrease to patient's comfort goal   03/29/18 0845   OTHER   Nurse Pain Scale 0 - 10  8   Non Verbal Scale  Calm;Unlabored Breathing   Comfort Goal Comfort at Rest;Comfort with Movement;Sleep Comfortably;Stay Alert;Perform Activity   Medicated with Roxicodone 5 mg per complaint of head ache.    1000 no further complaint of pain.

## 2018-03-30 NOTE — NON-PROVIDER
"Rehab Progress Note     Interval Events (Subjective)  Ms. Krishnan still complains of headache today, although she is able to participate in therapies. She brought her glasses from home and says that the vision improves with use but the headache does not. Unfortunately the glasses frames are broken and cannot be worn during therapy to help with balance. Pinhole glasses improved refractive error in both eyes. There is still a chance that the decreased clarity in the right eye is related to occipital lobe injury.     Objective:  VITAL SIGNS: /60   Pulse 80   Temp 36.5 °C (97.7 °F)   Resp 18   Ht 1.727 m (5' 8\")   Wt 59.5 kg (131 lb 2.8 oz)   SpO2 93%   Breastfeeding? No   BMI 19.94 kg/m²     Physical exam:  HEENT: Ecchymoses resolving, R subconjunctival hemorrhage resolving, bilateral myopia improved with use of pinhole glasses   CV: Regular rate and rhythm, normal S1 S2  Pulm: Lungs clear to auscultation bilaterally     Recent Results (from the past 72 hour(s))   CBC WITH DIFFERENTIAL    Collection Time: 03/30/18  5:17 AM   Result Value Ref Range    WBC 4.8 4.8 - 10.8 K/uL    RBC 3.50 (L) 4.20 - 5.40 M/uL    Hemoglobin 12.2 12.0 - 16.0 g/dL    Hematocrit 37.8 37.0 - 47.0 %    .0 (H) 81.4 - 97.8 fL    MCH 34.9 (H) 27.0 - 33.0 pg    MCHC 32.3 (L) 33.6 - 35.0 g/dL    RDW 49.5 35.9 - 50.0 fL    Platelet Count 425 164 - 446 K/uL    MPV 8.5 (L) 9.0 - 12.9 fL    Neutrophils-Polys 33.50 (L) 44.00 - 72.00 %    Lymphocytes 46.20 (H) 22.00 - 41.00 %    Monocytes 11.50 0.00 - 13.40 %    Eosinophils 5.90 0.00 - 6.90 %    Basophils 2.70 (H) 0.00 - 1.80 %    Immature Granulocytes 0.20 0.00 - 0.90 %    Nucleated RBC 0.00 /100 WBC    Neutrophils (Absolute) 1.60 (L) 2.00 - 7.15 K/uL    Lymphs (Absolute) 2.21 1.00 - 4.80 K/uL    Monos (Absolute) 0.55 0.00 - 0.85 K/uL    Eos (Absolute) 0.28 0.00 - 0.51 K/uL    Baso (Absolute) 0.13 (H) 0.00 - 0.12 K/uL    Immature Granulocytes (abs) 0.01 0.00 - 0.11 K/uL    NRBC " (Absolute) 0.00 K/uL   BASIC METABOLIC PANEL    Collection Time: 03/30/18  5:17 AM   Result Value Ref Range    Sodium 138 135 - 145 mmol/L    Potassium 3.9 3.6 - 5.5 mmol/L    Chloride 102 96 - 112 mmol/L    Co2 29 20 - 33 mmol/L    Glucose 88 65 - 99 mg/dL    Bun 13 8 - 22 mg/dL    Creatinine 0.63 0.50 - 1.40 mg/dL    Calcium 9.9 8.5 - 10.5 mg/dL    Anion Gap 7.0 0.0 - 11.9   ESTIMATED GFR    Collection Time: 03/30/18  5:17 AM   Result Value Ref Range    GFR If African American >60 >60 mL/min/1.73 m 2    GFR If Non African American >60 >60 mL/min/1.73 m 2       Current Facility-Administered Medications   Medication Frequency   • amitriptyline (ELAVIL) tablet 10 mg Q EVENING   • oxyCODONE immediate-release (ROXICODONE) tablet 2.5 mg Q4HRS PRN   • chlordiazePOXIDE (LIBRIUM) capsule 25 mg DAILY   • fludrocortisone (FLORINEF) tablet 0.05 mg QAM   • gabapentin (NEURONTIN) capsule 400 mg TID   • estradiol (ESTRACE) tablet 1 mg DAILY   • vitamin D (cholecalciferol) tablet 1,000 Units DAILY   • thiamine (THIAMINE) tablet 100 mg DAILY   • ferrous sulfate tablet 325 mg QDAY with Breakfast    And   • ascorbic acid (ascorbic acid) tablet 500 mg QDAY with Breakfast   • Respiratory Care per Protocol Continuous RT   • Pharmacy Consult Request ...Pain Management Review 1 Each PRN   • docusate sodium (COLACE) capsule 100 mg DAILY    And   • senna-docusate (PERICOLACE or SENOKOT S) 8.6-50 MG per tablet 1 Tab Q EVENING    And   • lactulose 20 GM/30ML solution 30 mL Q24HRS PRN    And   • bisacodyl (DULCOLAX) suppository 10 mg QDAY PRN   • artificial tears 1.4 % ophthalmic solution 1 Drop PRN   • benzocaine-menthol (CEPACOL) lozenge 1 Lozenge Q2HRS PRN   • hydrALAZINE (APRESOLINE) tablet 25 mg Q8HRS PRN   • mag hydrox-al hydrox-simeth (MAALOX PLUS ES or MYLANTA DS) suspension 20 mL Q2HRS PRN   • ondansetron (ZOFRAN ODT) dispertab 4 mg 4X/DAY PRN    Or   • ondansetron (ZOFRAN) syringe/vial injection 4 mg 4X/DAY PRN   • traZODone  (DESYREL) tablet 50 mg QHS PRN   • omeprazole (PRILOSEC) capsule 20 mg DAILY   • potassium bicarbonate (KLYTE) 25 MEQ effervescent tablet TBEF 50 mEq Q8HRS   • acetaminophen (TYLENOL) tablet 650 mg Q4HRS PRN       Orders Placed This Encounter   Procedures   • DIET ORDER     Standing Status:   Standing     Number of Occurrences:   1     Order Specific Question:   Diet:     Answer:   Regular [1]       Assessment:  Active Hospital Problems    Diagnosis   • *Traumatic intracranial hemorrhage (CMS-HCC)   • Hyponatremia   • Acute hyperactive alcohol withdrawal delirium (CMS-HCC)   • Moderate protein-calorie malnutrition (CMS-HCC)   • Anemia   • Alcohol abuse   • Cognitive and behavioral changes   • Lability emotional   • Impaired mobility and ADLs   • Balance disorder   • CARSON (generalized anxiety disorder)   • Tobacco dependence     48 y.o. Female with PMH of alcohol abuse and tobacco use here for debility following intracranial hemorrhage secondary to GLF on 3/4/18.    Medical Decision Making and Plan:  TBI - Intracranial hemorrhage secondary to GLF  - Inpatient for acute rehab therapy  - Improving balance, memory, and problem-solving      History of alcohol abuse - S/p acute alcohol withdrawal in hospital, started on Librium   - Continue to wean Librium   - Thiamine supplement      Pain - Headache  - Discontinued cymbalta due to possible side effect of headache  - Roxicodone - Pain uncontrolled by any other medication while inpatient. Decrease from 5 mg to 2.5 mg with goal of discontinuing next week   - Pain improved with increased Neurontin dose    - Restarted florinef yesterday which did not help with headache   - Consider Right eye blurriness contribution to headache   - Start Amitriptyline 10 mg      Blood pressure lability  - Low yesterday at 90/51, but has increased today   - Restarted florinef yesterday   - Consider that SBP in the 90s could be normal for her      Electrolyte abnormalities - hypokalemia and  hyponatremia, continue to monitor  BMP  - Hypokalemia - continue potassium bicarbonate, 3.9 on 3/30, stop potassium bicarb after next normal lab   - Hyponatremia - resolved, 138 on 3/30, discontinued salt tabs      Anemia - H/H 12.2/37.8, , RDW 49 on 3/30  - Anemia and thrombocytopenia resoled     Cognitive deficits  - Consider additional stimulant     Bacterial conjunctivitis - Resolved following course of antibiotics, continue to monitor      Protein malnutrition - Monitor, consider supplementation         Amy Schroeder, Student

## 2018-03-30 NOTE — CARE PLAN
Problem: Bowel/Gastric:  Goal: Normal bowel function is maintained or improved  Bowel sounds present x 4.  Patient is continent of bowel and bladder.     Problem: Pain Management  Goal: Pain level will decrease to patient's comfort goal  Patient has denied breakthrough pain this shift.

## 2018-03-30 NOTE — CARE PLAN
Problem: Communication  Goal: The ability to communicate needs accurately and effectively will improve  Outcome: PROGRESSING AS EXPECTED  Patient is alert and oriented x4 and able to communicate her needs. Patient understands how to use her call light, no episodes of impulsivity this shift.     Problem: Pain Management  Goal: Pain level will decrease to patient's comfort goal  Patient continues to complain of headache. Patient given PRN qamar 2.5 mg. Patient also offered non pharmacological methods of pain relief, patient refused. Patient asleep upon reassessment. Will continue to assess and monitor pain.

## 2018-03-31 PROCEDURE — 770010 HCHG ROOM/CARE - REHAB SEMI PRIVAT*

## 2018-03-31 PROCEDURE — A9270 NON-COVERED ITEM OR SERVICE: HCPCS | Performed by: PHYSICAL MEDICINE & REHABILITATION

## 2018-03-31 PROCEDURE — 700102 HCHG RX REV CODE 250 W/ 637 OVERRIDE(OP): Performed by: INTERNAL MEDICINE

## 2018-03-31 PROCEDURE — 700102 HCHG RX REV CODE 250 W/ 637 OVERRIDE(OP): Performed by: PHYSICAL MEDICINE & REHABILITATION

## 2018-03-31 PROCEDURE — 700112 HCHG RX REV CODE 229: Performed by: PHYSICAL MEDICINE & REHABILITATION

## 2018-03-31 PROCEDURE — A9270 NON-COVERED ITEM OR SERVICE: HCPCS | Performed by: INTERNAL MEDICINE

## 2018-03-31 RX ADMIN — FERROUS SULFATE TAB 325 MG (65 MG ELEMENTAL FE) 325 MG: 325 (65 FE) TAB at 08:00

## 2018-03-31 RX ADMIN — GABAPENTIN 400 MG: 400 CAPSULE ORAL at 08:10

## 2018-03-31 RX ADMIN — ESTRADIOL 1 MG: 1 TABLET ORAL at 09:00

## 2018-03-31 RX ADMIN — OXYCODONE HYDROCHLORIDE 2.5 MG: 5 TABLET ORAL at 07:47

## 2018-03-31 RX ADMIN — OXYCODONE HYDROCHLORIDE 2.5 MG: 5 TABLET ORAL at 21:13

## 2018-03-31 RX ADMIN — AMITRIPTYLINE HYDROCHLORIDE 10 MG: 10 TABLET, FILM COATED ORAL at 21:13

## 2018-03-31 RX ADMIN — Medication 100 MG: at 08:10

## 2018-03-31 RX ADMIN — POTASSIUM BICARBONATE 50 MEQ: 25 TABLET, EFFERVESCENT ORAL at 14:34

## 2018-03-31 RX ADMIN — OXYCODONE HYDROCHLORIDE AND ACETAMINOPHEN 500 MG: 500 TABLET ORAL at 08:00

## 2018-03-31 RX ADMIN — POTASSIUM BICARBONATE 50 MEQ: 25 TABLET, EFFERVESCENT ORAL at 21:12

## 2018-03-31 RX ADMIN — CHLORDIAZEPOXIDE HYDROCHLORIDE 25 MG: 25 CAPSULE ORAL at 08:09

## 2018-03-31 RX ADMIN — GABAPENTIN 400 MG: 400 CAPSULE ORAL at 14:35

## 2018-03-31 RX ADMIN — GABAPENTIN 400 MG: 400 CAPSULE ORAL at 21:13

## 2018-03-31 RX ADMIN — DOCUSATE SODIUM 100 MG: 100 CAPSULE, LIQUID FILLED ORAL at 08:10

## 2018-03-31 RX ADMIN — CHOLECALCIFEROL TAB 25 MCG (1000 UNIT) 1000 UNITS: 25 TAB at 08:10

## 2018-03-31 RX ADMIN — FLUDROCORTISONE ACETATE 0.05 MG: 0.1 TABLET ORAL at 08:10

## 2018-03-31 RX ADMIN — STANDARDIZED SENNA CONCENTRATE AND DOCUSATE SODIUM 1 TABLET: 8.6; 5 TABLET, FILM COATED ORAL at 21:13

## 2018-03-31 RX ADMIN — POTASSIUM BICARBONATE 50 MEQ: 25 TABLET, EFFERVESCENT ORAL at 05:13

## 2018-03-31 RX ADMIN — OMEPRAZOLE 20 MG: 20 CAPSULE, DELAYED RELEASE ORAL at 08:10

## 2018-03-31 ASSESSMENT — PAIN SCALES - GENERAL
PAINLEVEL_OUTOF10: 8
PAINLEVEL_OUTOF10: 8
PAINLEVEL_OUTOF10: 6

## 2018-03-31 NOTE — PROGRESS NOTES
Received report from RN, assumed patient care.  Patient resting comfortably in bed.  Discussed plan of care with patient.  Call light within reach, bed in low position.  VSS.  Pain reported 8/10; interventions in place; medication administered as needed per MAR.

## 2018-03-31 NOTE — CARE PLAN
Problem: Safety  Goal: Will remain free from falls  Outcome: PROGRESSING AS EXPECTED  CLIP.  Patient oriented to call light system and is calling appropriately.  Patient oriented to bathroom location, TV, bed controls, clock.  Patient asked to use call light when ever they need to transfer out of bed/chair or to ambulate and patient verbalized understanding.  POC discussed with patient and patient oriented to white board.  Schedule for therapy delivered to patient.  Patient verbalized understanding.  Patients fall risk assessed.  Patient moved into a comfortable position.  Patient medicated for pain as needed and interventions in place.  Patient offered/helped to the restroom.  Patient's personal belongings within reach.  Bed in low position.  Patient wearing non-skid footwear.  Bed rails up times three.  Bed and chair alarms in place.  Wheels locked on bed and wheelchairs.  Hourly rounding in place.      Problem: Bowel/Gastric:  Goal: Normal bowel function is maintained or improved  Outcome: PROGRESSING AS EXPECTED  Patient had BM within six shifts.

## 2018-03-31 NOTE — PROGRESS NOTES
Received bedside report; assumed pt care. Pt A/O x 4, calm, and stable. Pt denies pain or discomfort. Pt resting comfortably in bed, friend present, call light within reach when in room, safety precautions in place. Will continue to monitor.

## 2018-03-31 NOTE — CARE PLAN
Problem: Safety  Goal: Will remain free from injury  Outcome: PROGRESSING AS EXPECTED  Per report pt can be impulsive at times. She is in bed with alarms on and bed in low and locked position. Pt educated to wait for assistance before attempting self transfer.     Problem: Bowel/Gastric:  Goal: Normal bowel function is maintained or improved  Outcome: PROGRESSING AS EXPECTED  Pt is having regular bowel movements. Denies constipation. Abdomen is soft and non-tender. Bowel sounds auscultated in all four quadrants.

## 2018-04-01 PROCEDURE — 700112 HCHG RX REV CODE 229: Performed by: PHYSICAL MEDICINE & REHABILITATION

## 2018-04-01 PROCEDURE — A9270 NON-COVERED ITEM OR SERVICE: HCPCS | Performed by: PHYSICAL MEDICINE & REHABILITATION

## 2018-04-01 PROCEDURE — 97112 NEUROMUSCULAR REEDUCATION: CPT

## 2018-04-01 PROCEDURE — G0515 COGNITIVE SKILLS DEVELOPMENT: HCPCS

## 2018-04-01 PROCEDURE — A9270 NON-COVERED ITEM OR SERVICE: HCPCS | Performed by: INTERNAL MEDICINE

## 2018-04-01 PROCEDURE — 97116 GAIT TRAINING THERAPY: CPT

## 2018-04-01 PROCEDURE — 700102 HCHG RX REV CODE 250 W/ 637 OVERRIDE(OP): Performed by: PHYSICAL MEDICINE & REHABILITATION

## 2018-04-01 PROCEDURE — 97530 THERAPEUTIC ACTIVITIES: CPT

## 2018-04-01 PROCEDURE — 770010 HCHG ROOM/CARE - REHAB SEMI PRIVAT*

## 2018-04-01 PROCEDURE — 700102 HCHG RX REV CODE 250 W/ 637 OVERRIDE(OP): Performed by: INTERNAL MEDICINE

## 2018-04-01 RX ADMIN — CHLORDIAZEPOXIDE HYDROCHLORIDE 25 MG: 25 CAPSULE ORAL at 08:17

## 2018-04-01 RX ADMIN — DOCUSATE SODIUM 100 MG: 100 CAPSULE, LIQUID FILLED ORAL at 08:13

## 2018-04-01 RX ADMIN — ACETAMINOPHEN 650 MG: 325 TABLET, FILM COATED ORAL at 10:58

## 2018-04-01 RX ADMIN — ESTRADIOL 1 MG: 1 TABLET ORAL at 08:13

## 2018-04-01 RX ADMIN — POTASSIUM BICARBONATE 50 MEQ: 25 TABLET, EFFERVESCENT ORAL at 05:16

## 2018-04-01 RX ADMIN — POTASSIUM BICARBONATE 50 MEQ: 25 TABLET, EFFERVESCENT ORAL at 21:19

## 2018-04-01 RX ADMIN — STANDARDIZED SENNA CONCENTRATE AND DOCUSATE SODIUM 1 TABLET: 8.6; 5 TABLET, FILM COATED ORAL at 21:19

## 2018-04-01 RX ADMIN — GABAPENTIN 400 MG: 400 CAPSULE ORAL at 14:31

## 2018-04-01 RX ADMIN — OXYCODONE HYDROCHLORIDE 2.5 MG: 5 TABLET ORAL at 12:51

## 2018-04-01 RX ADMIN — FLUDROCORTISONE ACETATE 0.05 MG: 0.1 TABLET ORAL at 08:13

## 2018-04-01 RX ADMIN — FERROUS SULFATE TAB 325 MG (65 MG ELEMENTAL FE) 325 MG: 325 (65 FE) TAB at 08:13

## 2018-04-01 RX ADMIN — GABAPENTIN 400 MG: 400 CAPSULE ORAL at 08:13

## 2018-04-01 RX ADMIN — Medication 100 MG: at 08:13

## 2018-04-01 RX ADMIN — CHOLECALCIFEROL TAB 25 MCG (1000 UNIT) 1000 UNITS: 25 TAB at 08:13

## 2018-04-01 RX ADMIN — AMITRIPTYLINE HYDROCHLORIDE 10 MG: 10 TABLET, FILM COATED ORAL at 21:19

## 2018-04-01 RX ADMIN — OXYCODONE HYDROCHLORIDE 2.5 MG: 5 TABLET ORAL at 07:26

## 2018-04-01 RX ADMIN — OXYCODONE HYDROCHLORIDE AND ACETAMINOPHEN 500 MG: 500 TABLET ORAL at 08:13

## 2018-04-01 RX ADMIN — POTASSIUM BICARBONATE 50 MEQ: 25 TABLET, EFFERVESCENT ORAL at 14:31

## 2018-04-01 RX ADMIN — GABAPENTIN 400 MG: 400 CAPSULE ORAL at 21:19

## 2018-04-01 RX ADMIN — OMEPRAZOLE 20 MG: 20 CAPSULE, DELAYED RELEASE ORAL at 08:13

## 2018-04-01 RX ADMIN — OXYCODONE HYDROCHLORIDE 2.5 MG: 5 TABLET ORAL at 21:20

## 2018-04-01 ASSESSMENT — PAIN SCALES - GENERAL
PAINLEVEL_OUTOF10: 2
PAINLEVEL_OUTOF10: 6
PAINLEVEL_OUTOF10: 9
PAINLEVEL_OUTOF10: 9

## 2018-04-01 ASSESSMENT — GAIT ASSESSMENTS
DISTANCE (FEET): 700
DEVIATION: DECREASED HEEL STRIKE;DECREASED TOE OFF
GAIT LEVEL OF ASSIST: STAND BY ASSIST

## 2018-04-01 NOTE — CARE PLAN
Problem: Safety  Goal: Will remain free from injury  Outcome: PROGRESSING AS EXPECTED  Pt can be impulsive at times. Currently positioned in bed with alarms on and bed in low and locked position, side rails up x2. Pt verbalized she will call before attempting self transfer. Will continue with hourly rounding.    Problem: Pain Management  Goal: Pain level will decrease to patient's comfort goal  Outcome: PROGRESSING AS EXPECTED  Pt c/o headache 6/10 and requested PRN pain medication. Roxicodone 2.5 mg given and pt able to fall asleep comfortably.

## 2018-04-01 NOTE — CARE PLAN
Problem: Safety  Goal: Will remain free from falls  Outcome: PROGRESSING SLOWER THAN EXPECTED  CLIP.  Patient oriented to call light system and is calling appropriately.  Patient oriented to bathroom location, TV, bed controls, clock.  Patient asked to use call light when ever they need to transfer out of bed/chair or to ambulate and patient verbalized understanding.  POC discussed with patient and patient oriented to white board.  Schedule for therapy delivered to patient.  Patient verbalized understanding.  Patients fall risk assessed.  Patient moved into a comfortable position.  Patient medicated for pain as needed and interventions in place.  Patient offered/helped to the restroom.  Patient's personal belongings within reach.  Bed in low position.  Patient wearing non-skid footwear.  Bed rails up times three.  Bed and chair alarms in place.  Wheels locked on bed and wheelchairs.  Hourly rounding in place.      Problem: Bowel/Gastric:  Goal: Normal bowel function is maintained or improved  Outcome: PROGRESSING AS EXPECTED  Patient had BM within six shifts.

## 2018-04-01 NOTE — PROGRESS NOTES
Received bedside report; assumed pt care. Pt A/O x 4, calm, and stable. Pt requests pain medication with evening medication. Will bring as requested. Pt resting comfortably in bed, call light within reach when in room, safety precautions in place. Will continue to monitor.

## 2018-04-02 PROCEDURE — A9270 NON-COVERED ITEM OR SERVICE: HCPCS | Performed by: PHYSICAL MEDICINE & REHABILITATION

## 2018-04-02 PROCEDURE — 97112 NEUROMUSCULAR REEDUCATION: CPT

## 2018-04-02 PROCEDURE — A9270 NON-COVERED ITEM OR SERVICE: HCPCS | Performed by: INTERNAL MEDICINE

## 2018-04-02 PROCEDURE — 700112 HCHG RX REV CODE 229: Performed by: PHYSICAL MEDICINE & REHABILITATION

## 2018-04-02 PROCEDURE — 700102 HCHG RX REV CODE 250 W/ 637 OVERRIDE(OP): Performed by: PHYSICAL MEDICINE & REHABILITATION

## 2018-04-02 PROCEDURE — 97530 THERAPEUTIC ACTIVITIES: CPT

## 2018-04-02 PROCEDURE — 99232 SBSQ HOSP IP/OBS MODERATE 35: CPT | Performed by: PHYSICAL MEDICINE & REHABILITATION

## 2018-04-02 PROCEDURE — 770010 HCHG ROOM/CARE - REHAB SEMI PRIVAT*

## 2018-04-02 PROCEDURE — 97116 GAIT TRAINING THERAPY: CPT

## 2018-04-02 PROCEDURE — 700102 HCHG RX REV CODE 250 W/ 637 OVERRIDE(OP): Performed by: INTERNAL MEDICINE

## 2018-04-02 PROCEDURE — G0515 COGNITIVE SKILLS DEVELOPMENT: HCPCS

## 2018-04-02 RX ORDER — AMITRIPTYLINE HYDROCHLORIDE 25 MG/1
25 TABLET, FILM COATED ORAL EVERY EVENING
Status: DISCONTINUED | OUTPATIENT
Start: 2018-04-02 | End: 2018-04-05 | Stop reason: HOSPADM

## 2018-04-02 RX ORDER — ACETAMINOPHEN 500 MG
1000 TABLET ORAL 3 TIMES DAILY
Status: DISCONTINUED | OUTPATIENT
Start: 2018-04-02 | End: 2018-04-05 | Stop reason: HOSPADM

## 2018-04-02 RX ADMIN — AMITRIPTYLINE HYDROCHLORIDE 25 MG: 25 TABLET, FILM COATED ORAL at 20:31

## 2018-04-02 RX ADMIN — OMEPRAZOLE 20 MG: 20 CAPSULE, DELAYED RELEASE ORAL at 08:37

## 2018-04-02 RX ADMIN — POTASSIUM BICARBONATE 50 MEQ: 25 TABLET, EFFERVESCENT ORAL at 06:25

## 2018-04-02 RX ADMIN — DOCUSATE SODIUM 100 MG: 100 CAPSULE, LIQUID FILLED ORAL at 08:37

## 2018-04-02 RX ADMIN — GABAPENTIN 400 MG: 400 CAPSULE ORAL at 08:37

## 2018-04-02 RX ADMIN — POTASSIUM BICARBONATE 50 MEQ: 25 TABLET, EFFERVESCENT ORAL at 14:34

## 2018-04-02 RX ADMIN — FLUDROCORTISONE ACETATE 0.05 MG: 0.1 TABLET ORAL at 08:37

## 2018-04-02 RX ADMIN — GABAPENTIN 400 MG: 400 CAPSULE ORAL at 14:33

## 2018-04-02 RX ADMIN — GABAPENTIN 400 MG: 400 CAPSULE ORAL at 20:31

## 2018-04-02 RX ADMIN — STANDARDIZED SENNA CONCENTRATE AND DOCUSATE SODIUM 1 TABLET: 8.6; 5 TABLET, FILM COATED ORAL at 20:31

## 2018-04-02 RX ADMIN — CHOLECALCIFEROL TAB 25 MCG (1000 UNIT) 1000 UNITS: 25 TAB at 08:37

## 2018-04-02 RX ADMIN — OXYCODONE HYDROCHLORIDE AND ACETAMINOPHEN 500 MG: 500 TABLET ORAL at 08:37

## 2018-04-02 RX ADMIN — FERROUS SULFATE TAB 325 MG (65 MG ELEMENTAL FE) 325 MG: 325 (65 FE) TAB at 08:37

## 2018-04-02 RX ADMIN — ESTRADIOL 1 MG: 1 TABLET ORAL at 08:37

## 2018-04-02 RX ADMIN — ACETAMINOPHEN 1000 MG: 500 TABLET ORAL at 11:18

## 2018-04-02 RX ADMIN — ACETAMINOPHEN 1000 MG: 500 TABLET ORAL at 14:33

## 2018-04-02 RX ADMIN — ACETAMINOPHEN 1000 MG: 500 TABLET ORAL at 20:31

## 2018-04-02 RX ADMIN — Medication 100 MG: at 08:37

## 2018-04-02 RX ADMIN — POTASSIUM BICARBONATE 50 MEQ: 25 TABLET, EFFERVESCENT ORAL at 20:33

## 2018-04-02 RX ADMIN — CHLORDIAZEPOXIDE HYDROCHLORIDE 25 MG: 25 CAPSULE ORAL at 10:27

## 2018-04-02 ASSESSMENT — PAIN SCALES - GENERAL
PAINLEVEL_OUTOF10: 5
PAINLEVEL_OUTOF10: ASSUMED PAIN PRESENT

## 2018-04-02 NOTE — CARE PLAN
Problem: Communication  Goal: The ability to communicate needs accurately and effectively will improve  Makes needs known to staff.  Encouraged to use call light for staff assist.    Problem: Safety  Goal: Will remain free from falls  Call light kept within reach and encouraged to use for assistance and with toileting needs. Encouraged to call staff and to wait for staff before transfers.    Problem: Pain Management  Goal: Pain level will decrease to patient's comfort goal  Complains of headache.  Medicated with Oxycodone tonight with + relief.  See doc flow sheets.  Will continue to monitor patient.

## 2018-04-02 NOTE — PROGRESS NOTES
Patient is AOx4 can be impulsive and is close to the nurses station. Patient has not attempted to self-transfer so far this shift. Patient states her pain is controlled with scheduled pain medications and has been up participating in therapies.

## 2018-04-02 NOTE — PROGRESS NOTES
Received bedside shift report from Frankie COLIN RN regarding patient and assumed care. Patient awake, calm and stable, currently positioned in bed for comfort and safety; call light within reach. Denies pain or discomfort at this time. Will continue to monitor.

## 2018-04-02 NOTE — CARE PLAN
Problem: Safety  Goal: Will remain free from falls    Intervention: Assess risk factors for falls  Patient is AOx4, made Mod I in her room today with a cane. Patient has call light close by, non-skid socks on, bed in low position.      Problem: Bowel/Gastric:  Goal: Will not experience complications related to bowel motility    Intervention: Assess baseline bowel pattern  Patient encouraged throughout the day to increase fluid intake to promote regular bowel movements and proper hydration status.

## 2018-04-03 LAB
ANION GAP SERPL CALC-SCNC: 6 MMOL/L (ref 0–11.9)
BUN SERPL-MCNC: 17 MG/DL (ref 8–22)
CALCIUM SERPL-MCNC: 9.3 MG/DL (ref 8.4–10.2)
CHLORIDE SERPL-SCNC: 103 MMOL/L (ref 96–112)
CO2 SERPL-SCNC: 30 MMOL/L (ref 20–33)
CREAT SERPL-MCNC: 0.81 MG/DL (ref 0.5–1.4)
GLUCOSE SERPL-MCNC: 84 MG/DL (ref 65–99)
MAGNESIUM SERPL-MCNC: 1.8 MG/DL (ref 1.5–2.5)
POTASSIUM SERPL-SCNC: 4 MMOL/L (ref 3.6–5.5)
SODIUM SERPL-SCNC: 139 MMOL/L (ref 135–145)

## 2018-04-03 PROCEDURE — 97116 GAIT TRAINING THERAPY: CPT

## 2018-04-03 PROCEDURE — 770010 HCHG ROOM/CARE - REHAB SEMI PRIVAT*

## 2018-04-03 PROCEDURE — 80048 BASIC METABOLIC PNL TOTAL CA: CPT

## 2018-04-03 PROCEDURE — 83735 ASSAY OF MAGNESIUM: CPT

## 2018-04-03 PROCEDURE — 700102 HCHG RX REV CODE 250 W/ 637 OVERRIDE(OP): Performed by: INTERNAL MEDICINE

## 2018-04-03 PROCEDURE — A9270 NON-COVERED ITEM OR SERVICE: HCPCS | Performed by: PHYSICAL MEDICINE & REHABILITATION

## 2018-04-03 PROCEDURE — 700102 HCHG RX REV CODE 250 W/ 637 OVERRIDE(OP): Performed by: PHYSICAL MEDICINE & REHABILITATION

## 2018-04-03 PROCEDURE — A9270 NON-COVERED ITEM OR SERVICE: HCPCS | Performed by: INTERNAL MEDICINE

## 2018-04-03 PROCEDURE — 97535 SELF CARE MNGMENT TRAINING: CPT

## 2018-04-03 PROCEDURE — 700112 HCHG RX REV CODE 229: Performed by: PHYSICAL MEDICINE & REHABILITATION

## 2018-04-03 PROCEDURE — 99231 SBSQ HOSP IP/OBS SF/LOW 25: CPT | Performed by: PHYSICAL MEDICINE & REHABILITATION

## 2018-04-03 PROCEDURE — 97530 THERAPEUTIC ACTIVITIES: CPT

## 2018-04-03 PROCEDURE — 36415 COLL VENOUS BLD VENIPUNCTURE: CPT

## 2018-04-03 PROCEDURE — G0515 COGNITIVE SKILLS DEVELOPMENT: HCPCS

## 2018-04-03 PROCEDURE — 97112 NEUROMUSCULAR REEDUCATION: CPT

## 2018-04-03 RX ADMIN — CHLORDIAZEPOXIDE HYDROCHLORIDE 25 MG: 25 CAPSULE ORAL at 08:26

## 2018-04-03 RX ADMIN — GABAPENTIN 400 MG: 400 CAPSULE ORAL at 14:31

## 2018-04-03 RX ADMIN — GABAPENTIN 400 MG: 400 CAPSULE ORAL at 08:26

## 2018-04-03 RX ADMIN — DOCUSATE SODIUM 100 MG: 100 CAPSULE, LIQUID FILLED ORAL at 08:26

## 2018-04-03 RX ADMIN — ACETAMINOPHEN 1000 MG: 500 TABLET ORAL at 08:25

## 2018-04-03 RX ADMIN — POTASSIUM BICARBONATE 50 MEQ: 25 TABLET, EFFERVESCENT ORAL at 05:12

## 2018-04-03 RX ADMIN — AMITRIPTYLINE HYDROCHLORIDE 25 MG: 25 TABLET, FILM COATED ORAL at 21:35

## 2018-04-03 RX ADMIN — FERROUS SULFATE TAB 325 MG (65 MG ELEMENTAL FE) 325 MG: 325 (65 FE) TAB at 08:26

## 2018-04-03 RX ADMIN — ACETAMINOPHEN 1000 MG: 500 TABLET ORAL at 21:35

## 2018-04-03 RX ADMIN — FLUDROCORTISONE ACETATE 0.05 MG: 0.1 TABLET ORAL at 08:26

## 2018-04-03 RX ADMIN — OMEPRAZOLE 20 MG: 20 CAPSULE, DELAYED RELEASE ORAL at 08:26

## 2018-04-03 RX ADMIN — ESTRADIOL 1 MG: 1 TABLET ORAL at 08:26

## 2018-04-03 RX ADMIN — POTASSIUM BICARBONATE 50 MEQ: 25 TABLET, EFFERVESCENT ORAL at 21:35

## 2018-04-03 RX ADMIN — Medication 100 MG: at 08:26

## 2018-04-03 RX ADMIN — OXYCODONE HYDROCHLORIDE AND ACETAMINOPHEN 500 MG: 500 TABLET ORAL at 08:26

## 2018-04-03 RX ADMIN — CHOLECALCIFEROL TAB 25 MCG (1000 UNIT) 1000 UNITS: 25 TAB at 08:26

## 2018-04-03 RX ADMIN — STANDARDIZED SENNA CONCENTRATE AND DOCUSATE SODIUM 1 TABLET: 8.6; 5 TABLET, FILM COATED ORAL at 21:35

## 2018-04-03 RX ADMIN — ACETAMINOPHEN 1000 MG: 500 TABLET ORAL at 14:31

## 2018-04-03 RX ADMIN — POTASSIUM BICARBONATE 50 MEQ: 25 TABLET, EFFERVESCENT ORAL at 14:31

## 2018-04-03 RX ADMIN — GABAPENTIN 400 MG: 400 CAPSULE ORAL at 21:35

## 2018-04-03 ASSESSMENT — PAIN SCALES - GENERAL
PAINLEVEL_OUTOF10: 0
PAINLEVEL_OUTOF10: 0

## 2018-04-03 NOTE — PROGRESS NOTES
Patient is AOx4 is Mod I on the unit with a cane. Patient has been up ambulating around facility. Patient states her pain controlled with scheduled pain management and has been up participating in therapy sessions.

## 2018-04-03 NOTE — PROGRESS NOTES
"Rehab Progress Note     Date of Service: 4/3/2018  Chief complaint: none, follow up TBI    Interval Events (Subjective)    Patient seen and examined in her room. She denies any headache this morning or today, so she's happy with the increased dose of Elavil and the scheduled tylenol. She reports she has to do a \"test\" with PT. Very eager for her planned discharge in 2 days. No new complaints.    Objective:  VITAL SIGNS: /66   Pulse 82   Temp 36.4 °C (97.6 °F)   Resp 18   Ht 1.727 m (5' 8\")   Wt 59.5 kg (131 lb 2.8 oz)   SpO2 93%   Breastfeeding? No   BMI 19.94 kg/m²    Physical Exam   Constitutional: She is well-developed, well-nourished, and in no distress.   HENT:   Bruising and edema right face   Cardiovascular: Normal rate, regular rhythm and normal heart sounds.    Pulmonary/Chest: Effort normal and breath sounds normal.   Abdominal: Soft. Bowel sounds are normal.         Recent Results (from the past 72 hour(s))   BASIC METABOLIC PANEL    Collection Time: 04/03/18  5:46 AM   Result Value Ref Range    Sodium 139 135 - 145 mmol/L    Potassium 4.0 3.6 - 5.5 mmol/L    Chloride 103 96 - 112 mmol/L    Co2 30 20 - 33 mmol/L    Glucose 84 65 - 99 mg/dL    Bun 17 8 - 22 mg/dL    Creatinine 0.81 0.50 - 1.40 mg/dL    Calcium 9.3 8.4 - 10.2 mg/dL    Anion Gap 6.0 0.0 - 11.9   MAGNESIUM    Collection Time: 04/03/18  5:46 AM   Result Value Ref Range    Magnesium 1.8 1.5 - 2.5 mg/dL   ESTIMATED GFR    Collection Time: 04/03/18  5:46 AM   Result Value Ref Range    GFR If African American >60 >60 mL/min/1.73 m 2    GFR If Non African American >60 >60 mL/min/1.73 m 2       Current Facility-Administered Medications   Medication Frequency   • acetaminophen (TYLENOL) tablet 1,000 mg TID   • amitriptyline (ELAVIL) tablet 25 mg Q EVENING   • oxyCODONE immediate-release (ROXICODONE) tablet 2.5 mg Q4HRS PRN   • chlordiazePOXIDE (LIBRIUM) capsule 25 mg DAILY   • fludrocortisone (FLORINEF) tablet 0.05 mg QAM   • gabapentin " (NEURONTIN) capsule 400 mg TID   • estradiol (ESTRACE) tablet 1 mg DAILY   • vitamin D (cholecalciferol) tablet 1,000 Units DAILY   • thiamine (THIAMINE) tablet 100 mg DAILY   • ferrous sulfate tablet 325 mg QDAY with Breakfast    And   • ascorbic acid (ascorbic acid) tablet 500 mg QDAY with Breakfast   • Respiratory Care per Protocol Continuous RT   • Pharmacy Consult Request ...Pain Management Review 1 Each PRN   • docusate sodium (COLACE) capsule 100 mg DAILY    And   • senna-docusate (PERICOLACE or SENOKOT S) 8.6-50 MG per tablet 1 Tab Q EVENING    And   • lactulose 20 GM/30ML solution 30 mL Q24HRS PRN    And   • bisacodyl (DULCOLAX) suppository 10 mg QDAY PRN   • artificial tears 1.4 % ophthalmic solution 1 Drop PRN   • benzocaine-menthol (CEPACOL) lozenge 1 Lozenge Q2HRS PRN   • hydrALAZINE (APRESOLINE) tablet 25 mg Q8HRS PRN   • mag hydrox-al hydrox-simeth (MAALOX PLUS ES or MYLANTA DS) suspension 20 mL Q2HRS PRN   • ondansetron (ZOFRAN ODT) dispertab 4 mg 4X/DAY PRN    Or   • ondansetron (ZOFRAN) syringe/vial injection 4 mg 4X/DAY PRN   • traZODone (DESYREL) tablet 50 mg QHS PRN   • omeprazole (PRILOSEC) capsule 20 mg DAILY   • potassium bicarbonate (KLYTE) 25 MEQ effervescent tablet TBEF 50 mEq Q8HRS       Orders Placed This Encounter   Procedures   • DIET ORDER     Standing Status:   Standing     Number of Occurrences:   1     Order Specific Question:   Diet:     Answer:   Regular [1]       Assessment:  Active Hospital Problems    Diagnosis   • *Traumatic intracranial hemorrhage (CMS-HCC)   • Hyponatremia   • Acute hyperactive alcohol withdrawal delirium (CMS-HCC)   • Moderate protein-calorie malnutrition (CMS-HCC)   • Anemia   • Alcohol abuse   • Cognitive and behavioral changes   • Lability emotional   • Impaired mobility and ADLs   • Balance disorder   • CARSON (generalized anxiety disorder)   • Tobacco dependence     49 yo female with history of alcohol abuse admitted to acute inpatient rehabilitation on  3/20/2018 for TBI.     For discharge 4/5.    Medical Decision Making and Plan:    TBI -   - continue full rehab program.    Headache -   - add scheduled tylenol, increase Elavil to 25 mg at night, improved today - no headache  - continue gabapentin, have room to go up on this as well    History of Alcohol abuse -   - continue Librium and thiamine    Anemia -   - continue ferrous sulfate and Vit C    Hypotension -   - continue Florinef    Hypokalemia -  - continue supplementation  - recheck in am, along with magnesium - normal with supplementation, so will continue    Bowel - meds as needed. Last BM 4/2    Bladder - check PVRs, scheduled toileting.    DVT prophylaxis - contra-indicated. Ambulating long distances.    Cyndie West M.D.    Total time:  15 minutes.  I spent greater than 50% of the time for patient care, counseling, and coordination on this date, including patient face-to face time, unit/floor time with review of records/pertinent lab data and studies, as well as discussing diagnostic evaluation/work up, planned therapeutic interventions, and future disposition of care, as per the interval events/subjective and the assessment and plan as noted above.

## 2018-04-03 NOTE — CARE PLAN
Problem: Safety  Goal: Will remain free from falls    Intervention: Assess risk factors for falls  Patient is AOx4 is Mod I on the unit with a cane. Patient does ask for assistance when needed and has call light close by. Patient has bed in low position, shoes on when out of bed, single point cane when ambulating.      Problem: Bowel/Gastric:  Goal: Will not experience complications related to bowel motility    Intervention: Assess baseline bowel pattern  Patient encouraged to increase fluid intake to promote regular bowel movements and proper hydration status. Patient educated that movement and exercise also help promote gastric motility.

## 2018-04-03 NOTE — DISCHARGE PLANNING
DME referral sent to [facility].  Outpatient therapy referral sent to [facility] per choice form.  Awaiting responses.

## 2018-04-03 NOTE — DISCHARGE PLANNING
Case Management;  Met with patient and called her daughter.  Also, spoke with her mother per phone.  Family training completed today.  Daughter is prepared to go home with her mother.  I will confirm dme need of spc with therapy and order  per our discussion.  Will refer [facility] per our discussion also.  Will follow.

## 2018-04-03 NOTE — PROGRESS NOTES
"Rehab Progress Note     Date of Service: 4/2/2018  Chief complaint: headache, follow up TBI    Interval Events (Subjective)    Patient seen and examined in her room. She is complaining of severe headache that she wakes up with and it's constant. No light sensitivity. No headaches prior to her TBI. The Elavil hasn't helped much or at all. No other complaints.    Objective:  VITAL SIGNS: /67   Pulse 90   Temp 36.4 °C (97.5 °F)   Resp 18   Ht 1.727 m (5' 8\")   Wt 59.5 kg (131 lb 2.8 oz)   SpO2 92%   Breastfeeding? No   BMI 19.94 kg/m²    Physical Exam   Constitutional: She is well-developed, well-nourished, and in no distress.   HENT:   Bruising and edema right face   Cardiovascular: Normal rate, regular rhythm and normal heart sounds.    Pulmonary/Chest: Effort normal and breath sounds normal.   Abdominal: Soft. Bowel sounds are normal.         No results found for this or any previous visit (from the past 72 hour(s)).    Current Facility-Administered Medications   Medication Frequency   • acetaminophen (TYLENOL) tablet 1,000 mg TID   • amitriptyline (ELAVIL) tablet 25 mg Q EVENING   • oxyCODONE immediate-release (ROXICODONE) tablet 2.5 mg Q4HRS PRN   • chlordiazePOXIDE (LIBRIUM) capsule 25 mg DAILY   • fludrocortisone (FLORINEF) tablet 0.05 mg QAM   • gabapentin (NEURONTIN) capsule 400 mg TID   • estradiol (ESTRACE) tablet 1 mg DAILY   • vitamin D (cholecalciferol) tablet 1,000 Units DAILY   • thiamine (THIAMINE) tablet 100 mg DAILY   • ferrous sulfate tablet 325 mg QDAY with Breakfast    And   • ascorbic acid (ascorbic acid) tablet 500 mg QDAY with Breakfast   • Respiratory Care per Protocol Continuous RT   • Pharmacy Consult Request ...Pain Management Review 1 Each PRN   • docusate sodium (COLACE) capsule 100 mg DAILY    And   • senna-docusate (PERICOLACE or SENOKOT S) 8.6-50 MG per tablet 1 Tab Q EVENING    And   • lactulose 20 GM/30ML solution 30 mL Q24HRS PRN    And   • bisacodyl (DULCOLAX) " suppository 10 mg QDAY PRN   • artificial tears 1.4 % ophthalmic solution 1 Drop PRN   • benzocaine-menthol (CEPACOL) lozenge 1 Lozenge Q2HRS PRN   • hydrALAZINE (APRESOLINE) tablet 25 mg Q8HRS PRN   • mag hydrox-al hydrox-simeth (MAALOX PLUS ES or MYLANTA DS) suspension 20 mL Q2HRS PRN   • ondansetron (ZOFRAN ODT) dispertab 4 mg 4X/DAY PRN    Or   • ondansetron (ZOFRAN) syringe/vial injection 4 mg 4X/DAY PRN   • traZODone (DESYREL) tablet 50 mg QHS PRN   • omeprazole (PRILOSEC) capsule 20 mg DAILY   • potassium bicarbonate (KLYTE) 25 MEQ effervescent tablet TBEF 50 mEq Q8HRS       Orders Placed This Encounter   Procedures   • DIET ORDER     Standing Status:   Standing     Number of Occurrences:   1     Order Specific Question:   Diet:     Answer:   Regular [1]       Assessment:  Active Hospital Problems    Diagnosis   • *Traumatic intracranial hemorrhage (CMS-HCC)   • Hyponatremia   • Acute hyperactive alcohol withdrawal delirium (CMS-HCC)   • Moderate protein-calorie malnutrition (CMS-HCC)   • Anemia   • Alcohol abuse   • Cognitive and behavioral changes   • Lability emotional   • Impaired mobility and ADLs   • Balance disorder   • CARSON (generalized anxiety disorder)   • Tobacco dependence     47 yo female with history of alcohol abuse admitted to acute inpatient rehabilitation on 3/20/2018 for TBI.     For discharge 4/5    Medical Decision Making and Plan:    TBI -   - continue full rehab program.    Headache -   - add scheduled tylenol, increase Elavil to 25 mg at night  - continue gabapentin, have room to go up on this as well    History of Alcohol abuse -   - continue Librium and thiamine    Anemia -   - continue ferrous sulfate and Vit C    Hypotension -   - continue Florinef    Hypokalemia -  - continue supplementation  - recheck in am, along with magnesium    Bowel - meds as needed. Last BM 3/31    Bladder - check PVRs, scheduled toileting.    DVT prophylaxis - contra-indicated. Ambulating long  constantino.    Cyndie West M.D.    Total time:  25 minutes.  I spent greater than 50% of the time for patient care, counseling, and coordination on this date, including patient face-to face time, unit/floor time with review of records/pertinent lab data and studies, as well as discussing diagnostic evaluation/work up, planned therapeutic interventions, and future disposition of care, as per the interval events/subjective and the assessment and plan as noted above.

## 2018-04-03 NOTE — CARE PLAN
Problem: Safety  Goal: Will remain free from injury  Pt is free from accidental injury on this shift.MOD I in room with SPC.Use of call light encouraged to make needs known.Bed in low position.Call light and things within reach.Will continue to monitor and assess needs and safety.    Problem: Bowel/Gastric:  Goal: Normal bowel function is maintained or improved    Intervention: Educate patient and significant other/support system about signs and symptoms of constipation and interventions to implement  Pt is continen to f bowel per report.LBM 4/2.Scheduled medication given.Will continue to monitor and assess for s/sx of constipation.      Problem: Pain Management  Goal: Pain level will decrease to patient's comfort goal    Intervention: Follow pain managment plan developed in collaboration with patient and Interdisciplinary Team  Pain is manageable with scheduled medication.Repositions self frequently while in bed for comfort.Will continue to monitor and assess pain level and medicate as needed.

## 2018-04-04 PROCEDURE — 99232 SBSQ HOSP IP/OBS MODERATE 35: CPT | Performed by: PHYSICAL MEDICINE & REHABILITATION

## 2018-04-04 PROCEDURE — 700102 HCHG RX REV CODE 250 W/ 637 OVERRIDE(OP): Performed by: PHYSICAL MEDICINE & REHABILITATION

## 2018-04-04 PROCEDURE — 770010 HCHG ROOM/CARE - REHAB SEMI PRIVAT*

## 2018-04-04 PROCEDURE — 700102 HCHG RX REV CODE 250 W/ 637 OVERRIDE(OP): Performed by: INTERNAL MEDICINE

## 2018-04-04 PROCEDURE — 700112 HCHG RX REV CODE 229: Performed by: PHYSICAL MEDICINE & REHABILITATION

## 2018-04-04 PROCEDURE — A9270 NON-COVERED ITEM OR SERVICE: HCPCS | Performed by: PHYSICAL MEDICINE & REHABILITATION

## 2018-04-04 PROCEDURE — G0515 COGNITIVE SKILLS DEVELOPMENT: HCPCS

## 2018-04-04 PROCEDURE — 97116 GAIT TRAINING THERAPY: CPT

## 2018-04-04 PROCEDURE — 97110 THERAPEUTIC EXERCISES: CPT

## 2018-04-04 PROCEDURE — 97530 THERAPEUTIC ACTIVITIES: CPT

## 2018-04-04 PROCEDURE — A9270 NON-COVERED ITEM OR SERVICE: HCPCS | Performed by: INTERNAL MEDICINE

## 2018-04-04 PROCEDURE — 97535 SELF CARE MNGMENT TRAINING: CPT

## 2018-04-04 RX ADMIN — CHLORDIAZEPOXIDE HYDROCHLORIDE 25 MG: 25 CAPSULE ORAL at 08:08

## 2018-04-04 RX ADMIN — CHOLECALCIFEROL TAB 25 MCG (1000 UNIT) 1000 UNITS: 25 TAB at 08:09

## 2018-04-04 RX ADMIN — POTASSIUM BICARBONATE 50 MEQ: 25 TABLET, EFFERVESCENT ORAL at 05:35

## 2018-04-04 RX ADMIN — OXYCODONE HYDROCHLORIDE AND ACETAMINOPHEN 500 MG: 500 TABLET ORAL at 08:07

## 2018-04-04 RX ADMIN — POTASSIUM BICARBONATE 50 MEQ: 25 TABLET, EFFERVESCENT ORAL at 15:15

## 2018-04-04 RX ADMIN — ESTRADIOL 1 MG: 1 TABLET ORAL at 08:08

## 2018-04-04 RX ADMIN — STANDARDIZED SENNA CONCENTRATE AND DOCUSATE SODIUM 1 TABLET: 8.6; 5 TABLET, FILM COATED ORAL at 20:16

## 2018-04-04 RX ADMIN — FLUDROCORTISONE ACETATE 0.05 MG: 0.1 TABLET ORAL at 08:09

## 2018-04-04 RX ADMIN — Medication 100 MG: at 08:08

## 2018-04-04 RX ADMIN — ACETAMINOPHEN 1000 MG: 500 TABLET ORAL at 20:16

## 2018-04-04 RX ADMIN — ACETAMINOPHEN 1000 MG: 500 TABLET ORAL at 08:08

## 2018-04-04 RX ADMIN — AMITRIPTYLINE HYDROCHLORIDE 25 MG: 25 TABLET, FILM COATED ORAL at 20:16

## 2018-04-04 RX ADMIN — FERROUS SULFATE TAB 325 MG (65 MG ELEMENTAL FE) 325 MG: 325 (65 FE) TAB at 08:07

## 2018-04-04 RX ADMIN — OMEPRAZOLE 20 MG: 20 CAPSULE, DELAYED RELEASE ORAL at 08:08

## 2018-04-04 RX ADMIN — POTASSIUM BICARBONATE 50 MEQ: 25 TABLET, EFFERVESCENT ORAL at 20:16

## 2018-04-04 RX ADMIN — GABAPENTIN 400 MG: 400 CAPSULE ORAL at 08:06

## 2018-04-04 RX ADMIN — GABAPENTIN 400 MG: 400 CAPSULE ORAL at 15:16

## 2018-04-04 RX ADMIN — ACETAMINOPHEN 1000 MG: 500 TABLET ORAL at 15:16

## 2018-04-04 RX ADMIN — GABAPENTIN 400 MG: 400 CAPSULE ORAL at 20:16

## 2018-04-04 RX ADMIN — DOCUSATE SODIUM 100 MG: 100 CAPSULE, LIQUID FILLED ORAL at 08:06

## 2018-04-04 ASSESSMENT — ACTIVITIES OF DAILY LIVING (ADL)
TOILET_TRANSFER_LEVEL_OF_ASSIST: ABLE TO COMPLETE TOILET TRANSFER WITHOUT ASSIST
TOILETING_LEVEL_OF_ASSIST: ABLE TO COMPLETE TOILETING WITHOUT ASSIST
SHOWER_TRANSFER_LEVEL_OF_ASSIST: ABLE TO COMPLETE SHOWER TRANSFER WITHOUT ASSIST

## 2018-04-04 ASSESSMENT — PAIN SCALES - GENERAL
PAINLEVEL_OUTOF10: 0

## 2018-04-04 NOTE — PROGRESS NOTES
"Rehab Progress Note     Date of Service: 4/4/2018  Chief complaint: none, follow up TBI    Interval Events (Subjective)    Patient seen and examined in her room. She is just starting her PT session. She continues to have no headache. She wants to know when she can return to driving. Had discussion about this as an outpatient discussion with Dr. Mireles in his clinic, using outpatient therapy assessments to help with this determination, and if there is a concern, we will suggest an driving assessment with DMV.    Objective:  VITAL SIGNS: /68   Pulse 82   Temp 36.4 °C (97.6 °F)   Resp 18   Ht 1.727 m (5' 8\")   Wt 59.5 kg (131 lb 2.8 oz)   SpO2 93%   Breastfeeding? No   BMI 19.94 kg/m²    Physical Exam   Constitutional: She is well-developed, well-nourished, and in no distress.   HENT:   Bruising and edema right face   Cardiovascular: Normal rate, regular rhythm and normal heart sounds.    Pulmonary/Chest: Effort normal and breath sounds normal.   Abdominal: Soft. Bowel sounds are normal.         Recent Results (from the past 72 hour(s))   BASIC METABOLIC PANEL    Collection Time: 04/03/18  5:46 AM   Result Value Ref Range    Sodium 139 135 - 145 mmol/L    Potassium 4.0 3.6 - 5.5 mmol/L    Chloride 103 96 - 112 mmol/L    Co2 30 20 - 33 mmol/L    Glucose 84 65 - 99 mg/dL    Bun 17 8 - 22 mg/dL    Creatinine 0.81 0.50 - 1.40 mg/dL    Calcium 9.3 8.4 - 10.2 mg/dL    Anion Gap 6.0 0.0 - 11.9   MAGNESIUM    Collection Time: 04/03/18  5:46 AM   Result Value Ref Range    Magnesium 1.8 1.5 - 2.5 mg/dL   ESTIMATED GFR    Collection Time: 04/03/18  5:46 AM   Result Value Ref Range    GFR If African American >60 >60 mL/min/1.73 m 2    GFR If Non African American >60 >60 mL/min/1.73 m 2       Current Facility-Administered Medications   Medication Frequency   • acetaminophen (TYLENOL) tablet 1,000 mg TID   • amitriptyline (ELAVIL) tablet 25 mg Q EVENING   • oxyCODONE immediate-release (ROXICODONE) tablet 2.5 mg Q4HRS " PRN   • chlordiazePOXIDE (LIBRIUM) capsule 25 mg DAILY   • fludrocortisone (FLORINEF) tablet 0.05 mg QAM   • gabapentin (NEURONTIN) capsule 400 mg TID   • estradiol (ESTRACE) tablet 1 mg DAILY   • vitamin D (cholecalciferol) tablet 1,000 Units DAILY   • thiamine (THIAMINE) tablet 100 mg DAILY   • ferrous sulfate tablet 325 mg QDAY with Breakfast    And   • ascorbic acid (ascorbic acid) tablet 500 mg QDAY with Breakfast   • Respiratory Care per Protocol Continuous RT   • Pharmacy Consult Request ...Pain Management Review 1 Each PRN   • docusate sodium (COLACE) capsule 100 mg DAILY    And   • senna-docusate (PERICOLACE or SENOKOT S) 8.6-50 MG per tablet 1 Tab Q EVENING    And   • lactulose 20 GM/30ML solution 30 mL Q24HRS PRN    And   • bisacodyl (DULCOLAX) suppository 10 mg QDAY PRN   • artificial tears 1.4 % ophthalmic solution 1 Drop PRN   • benzocaine-menthol (CEPACOL) lozenge 1 Lozenge Q2HRS PRN   • hydrALAZINE (APRESOLINE) tablet 25 mg Q8HRS PRN   • mag hydrox-al hydrox-simeth (MAALOX PLUS ES or MYLANTA DS) suspension 20 mL Q2HRS PRN   • ondansetron (ZOFRAN ODT) dispertab 4 mg 4X/DAY PRN    Or   • ondansetron (ZOFRAN) syringe/vial injection 4 mg 4X/DAY PRN   • traZODone (DESYREL) tablet 50 mg QHS PRN   • omeprazole (PRILOSEC) capsule 20 mg DAILY   • potassium bicarbonate (KLYTE) 25 MEQ effervescent tablet TBEF 50 mEq Q8HRS       Orders Placed This Encounter   Procedures   • DIET ORDER     Standing Status:   Standing     Number of Occurrences:   1     Order Specific Question:   Diet:     Answer:   Regular [1]       Assessment:  Active Hospital Problems    Diagnosis   • *Traumatic intracranial hemorrhage (CMS-HCC)   • Hyponatremia   • Acute hyperactive alcohol withdrawal delirium (CMS-HCC)   • Moderate protein-calorie malnutrition (CMS-HCC)   • Anemia   • Alcohol abuse   • Cognitive and behavioral changes   • Lability emotional   • Impaired mobility and ADLs   • Balance disorder   • CARSON (generalized anxiety  disorder)   • Tobacco dependence     49 yo female with history of alcohol abuse admitted to acute inpatient rehabilitation on 3/20/2018 for TBI.     Therapy update 4/4/2018  Modified Independent eating  Supervision grooming  Supervision bathing  Supervision upper body dressing  Supervision lower body dressing  Modified Independent toileting  Independentbladder  Modified Independent bowel  Modified Independent bed/chair transfer  Modified Independent toilet transfer  Modified Independent tub/shower transfer  Modified Independent ambulation  Modified Independent wheelchair propulsion  Modified Independent stairs  Supervision comprehension  Supervision expression  Supervision social interaction  Supervision problem solving  Min assist memory      For discharge 4/5.    Medical Decision Making and Plan:    TBI -   - continue full rehab program  - can see Dr. Mireles in outpatient clinic to discuss return to driving    Headache -   - add scheduled tylenol, increase Elavil to 25 mg at night, resolved - no headache  - continue gabapentin  - advised patient Dr. Mireles can titrate off these medications after discharge     History of Alcohol abuse -   - continue Librium and thiamine    Anemia -   - continue ferrous sulfate and Vit C    Hypotension -   - continue Florinef    Hypokalemia -  - continue supplementation  - recheck in am, along with magnesium - normal with supplementation, so will continue    Bowel - meds as needed. Last BM 4/2    Bladder - check PVRs, scheduled toileting.    DVT prophylaxis - contra-indicated. Ambulating long distances.    Cyndie West M.D.    Total time:  25 minutes.  I spent greater than 50% of the time for patient care, counseling, and coordination on this date, including patient face-to face time, unit/floor time with review of records/pertinent lab data and studies, as well as discussing diagnostic evaluation/work up, planned therapeutic interventions, and future disposition of care, as  per the interval events/subjective and the assessment and plan as noted above.

## 2018-04-04 NOTE — PROGRESS NOTES
At 1915 received report from the day shift RN and assumed care of patient. Patient is alert and oriented and denies any pain or discomfort at this time. Bed is in the lowest position with the call light in reach, all patient's needs are met at this time. Will continue to monitor.

## 2018-04-04 NOTE — CARE PLAN
Problem: Mobility  Goal: STG-Within one week, patient will ambulate household distance  1) Individualized goal: With LRD at [facility] in order to progress towards PLOF  2) Interventions: PT Group Therapy, PT E Stim Attended, PT Gait Training, PT Therapeutic Exercises, PT TENS Application, PT Neuro Re-Ed/Balance, PT Therapeutic Activity, PT Manual Therapy and PT Evaluation     Outcome: MET Date Met: 04/04/18    Goal: STG-Within one week, patient will ambulate up/down flight of stairs  1) Individualized goal: With BHR at [facility] wth step to pattern in order to enter/exit home safely  2) Interventions: PT Group Therapy, PT E Stim Attended, PT Gait Training, PT Therapeutic Exercises, PT TENS Application, PT Neuro Re-Ed/Balance, PT Therapeutic Activity, PT Manual Therapy and PT Evaluation     Outcome: MET Date Met: 04/04/18      Problem: Mobility Transfers  Goal: STG-Within one week, patient will transfer bed to chair  1) Individualized goal: At mod I with LRD In order to maximize self mobility  2) Interventions: PT Group Therapy, PT E Stim Attended, PT Gait Training, PT Therapeutic Exercises, PT TENS Application, PT Neuro Re-Ed/Balance, PT Therapeutic Activity, PT Manual Therapy and PT Evaluation     Outcome: MET Date Met: 04/04/18      Problem: PT-Long Term Goals  Goal: LTG-By discharge, patient will maintain balance  1) Individualized goal: Will score > 44/56 on Casey balance assessment in order to decrease risk for falls  2) Interventions:   PT Group Therapy, PT E Stim Attended, PT Gait Training, PT Therapeutic Exercises, PT TENS Application, PT Neuro Re-Ed/Balance, PT Therapeutic Activity, PT Manual Therapy and PT Evaluation     Outcome: NOT MET  Not retested at [place] dt focus on other goals, FGA 23/30 demoing low fall risk  Goal: LTG-By discharge, patient will ambulate  1) Individualized goal:  With LRD at [facility] in order to progress towards PLOF  2) Interventions:   PT Group Therapy, PT E Stim Attended, PT Gait Training, PT  Therapeutic Exercises, PT TENS Application, PT Neuro Re-Ed/Balance, PT Therapeutic Activity, PT Manual Therapy and PT Evaluation     Outcome: MET Date Met: 04/04/18    Goal: LTG-By discharge, patient will transfer one surface to another  1) Individualized goal:  At mod I with LRD In order to maximize self mobility  2) Interventions:   PT Group Therapy, PT E Stim Attended, PT Gait Training, PT Therapeutic Exercises, PT TENS Application, PT Neuro Re-Ed/Balance, PT Therapeutic Activity, PT Manual Therapy and PT Evaluation     Outcome: MET Date Met: 04/04/18    Goal: LTG-By discharge, patient will ambulate up/down flight of stairs  1) Individualized goal:  With BHR at [facility] wth step to pattern in order to enter/exit home safely  2) Interventions:   PT Group Therapy, PT E Stim Attended, PT Gait Training, PT Therapeutic Exercises, PT TENS Application, PT Neuro Re-Ed/Balance, PT Therapeutic Activity, PT Manual Therapy and PT Evaluation     Outcome: MET Date Met: 04/04/18

## 2018-04-04 NOTE — CARE PLAN
Problem: Safety  Goal: Will remain free from falls  Outcome: PROGRESSING AS EXPECTED  Educated patient on the use of her call light, patient verbalized understanding. Her bed is locked and in the lowest position with 3 bed rails up. Bedside table is next to her with her call light in reach, all needs met at this time.    Problem: Knowledge Deficit  Goal: Knowledge of disease process/condition, treatment plan, diagnostic tests, and medications will improve  Outcome: PROGRESSING AS EXPECTED  Educated patient on the plan of care for the evening, patient verbalized understanding. Educated her on her medications to which she had no questions. Hourly rounding in place.

## 2018-04-04 NOTE — CARE PLAN
Problem: Communication  Goal: The ability to communicate needs accurately and effectively will improve  Patient alert and oriented x 4, Terri is able to communicate her needs accurately.       Problem: Safety  Goal: Will remain free from falls  No falls this shift.  Patient is mod I on the unit.     Problem: Bowel/Gastric:  Goal: Normal bowel function is maintained or improved  Bowel sounds present in all 4 quadrants.  Continent of bowel and bladder.     Problem: Pain Management  Goal: Pain level will decrease to patient's comfort goal  Patient has denies pain so far this shift.

## 2018-04-05 VITALS
SYSTOLIC BLOOD PRESSURE: 110 MMHG | WEIGHT: 131.17 LBS | RESPIRATION RATE: 18 BRPM | BODY MASS INDEX: 19.88 KG/M2 | HEART RATE: 72 BPM | HEIGHT: 68 IN | DIASTOLIC BLOOD PRESSURE: 72 MMHG | OXYGEN SATURATION: 94 % | TEMPERATURE: 97.8 F

## 2018-04-05 PROBLEM — E87.1 HYPONATREMIA: Status: RESOLVED | Noted: 2018-03-10 | Resolved: 2018-04-05

## 2018-04-05 PROBLEM — R51.9 HEADACHE: Status: ACTIVE | Noted: 2018-04-05

## 2018-04-05 PROBLEM — Z74.09 IMPAIRED MOBILITY AND ADLS: Status: RESOLVED | Noted: 2018-03-20 | Resolved: 2018-04-05

## 2018-04-05 PROBLEM — F10.931 ACUTE HYPERACTIVE ALCOHOL WITHDRAWAL DELIRIUM (HCC): Status: RESOLVED | Noted: 2018-03-04 | Resolved: 2018-04-05

## 2018-04-05 PROBLEM — Z78.9 IMPAIRED MOBILITY AND ADLS: Status: RESOLVED | Noted: 2018-03-20 | Resolved: 2018-04-05

## 2018-04-05 PROCEDURE — A9270 NON-COVERED ITEM OR SERVICE: HCPCS | Performed by: PHYSICAL MEDICINE & REHABILITATION

## 2018-04-05 PROCEDURE — 700102 HCHG RX REV CODE 250 W/ 637 OVERRIDE(OP): Performed by: INTERNAL MEDICINE

## 2018-04-05 PROCEDURE — 99238 HOSP IP/OBS DSCHRG MGMT 30/<: CPT | Performed by: PHYSICAL MEDICINE & REHABILITATION

## 2018-04-05 PROCEDURE — 700102 HCHG RX REV CODE 250 W/ 637 OVERRIDE(OP): Performed by: PHYSICAL MEDICINE & REHABILITATION

## 2018-04-05 PROCEDURE — A9270 NON-COVERED ITEM OR SERVICE: HCPCS | Performed by: INTERNAL MEDICINE

## 2018-04-05 PROCEDURE — 700112 HCHG RX REV CODE 229: Performed by: PHYSICAL MEDICINE & REHABILITATION

## 2018-04-05 RX ORDER — AMITRIPTYLINE HYDROCHLORIDE 25 MG/1
25 TABLET, FILM COATED ORAL EVERY EVENING
Qty: 30 TAB | Refills: 1 | Status: SHIPPED | OUTPATIENT
Start: 2018-04-05 | End: 2018-06-18

## 2018-04-05 RX ORDER — FERROUS SULFATE 325(65) MG
325 TABLET ORAL
Qty: 30 TAB | COMMUNITY
Start: 2018-04-05 | End: 2018-06-18

## 2018-04-05 RX ORDER — LANOLIN ALCOHOL/MO/W.PET/CERES
100 CREAM (GRAM) TOPICAL DAILY
Qty: 30 TAB | COMMUNITY
Start: 2018-04-05 | End: 2018-06-18

## 2018-04-05 RX ORDER — GABAPENTIN 400 MG/1
400 CAPSULE ORAL 3 TIMES DAILY
Qty: 90 CAP | Refills: 1 | Status: SHIPPED | OUTPATIENT
Start: 2018-04-05 | End: 2018-06-18

## 2018-04-05 RX ORDER — OMEPRAZOLE 20 MG/1
20 CAPSULE, DELAYED RELEASE ORAL DAILY
Qty: 30 CAP | Refills: 0 | Status: SHIPPED | OUTPATIENT
Start: 2018-04-05 | End: 2018-06-18

## 2018-04-05 RX ORDER — PSEUDOEPHEDRINE HCL 30 MG
100 TABLET ORAL DAILY
Qty: 60 CAP | COMMUNITY
Start: 2018-04-05 | End: 2018-06-18

## 2018-04-05 RX ORDER — CHLORDIAZEPOXIDE HYDROCHLORIDE 25 MG/1
25 CAPSULE, GELATIN COATED ORAL DAILY
Qty: 30 CAP | Refills: 0 | Status: SHIPPED | OUTPATIENT
Start: 2018-04-05 | End: 2018-05-05

## 2018-04-05 RX ORDER — ACETAMINOPHEN 500 MG
1000 TABLET ORAL 3 TIMES DAILY
Qty: 30 TAB | Refills: 0 | COMMUNITY
Start: 2018-04-05 | End: 2018-06-18

## 2018-04-05 RX ORDER — FLUDROCORTISONE ACETATE 0.1 MG/1
0.05 TABLET ORAL EVERY MORNING
Qty: 30 TAB | Refills: 0 | Status: SHIPPED | OUTPATIENT
Start: 2018-04-05 | End: 2018-06-18

## 2018-04-05 RX ORDER — ESTRADIOL 1 MG/1
1 TABLET ORAL DAILY
Qty: 30 TAB | Refills: 0 | Status: SHIPPED | OUTPATIENT
Start: 2018-04-05 | End: 2018-05-09 | Stop reason: SDUPTHER

## 2018-04-05 RX ORDER — AMOXICILLIN 250 MG
1 CAPSULE ORAL EVERY EVENING
Qty: 30 TAB | Refills: 0 | COMMUNITY
Start: 2018-04-05 | End: 2018-06-18

## 2018-04-05 RX ORDER — ASCORBIC ACID 500 MG
500 TABLET ORAL
Qty: 30 TAB | Refills: 3 | COMMUNITY
Start: 2018-04-05 | End: 2018-06-18

## 2018-04-05 RX ADMIN — FLUDROCORTISONE ACETATE 0.05 MG: 0.1 TABLET ORAL at 09:22

## 2018-04-05 RX ADMIN — ACETAMINOPHEN 1000 MG: 500 TABLET ORAL at 09:21

## 2018-04-05 RX ADMIN — POTASSIUM BICARBONATE 50 MEQ: 25 TABLET, EFFERVESCENT ORAL at 06:02

## 2018-04-05 RX ADMIN — DOCUSATE SODIUM 100 MG: 100 CAPSULE, LIQUID FILLED ORAL at 09:24

## 2018-04-05 RX ADMIN — OXYCODONE HYDROCHLORIDE AND ACETAMINOPHEN 500 MG: 500 TABLET ORAL at 09:24

## 2018-04-05 RX ADMIN — Medication 100 MG: at 09:23

## 2018-04-05 RX ADMIN — OMEPRAZOLE 20 MG: 20 CAPSULE, DELAYED RELEASE ORAL at 09:23

## 2018-04-05 RX ADMIN — FERROUS SULFATE TAB 325 MG (65 MG ELEMENTAL FE) 325 MG: 325 (65 FE) TAB at 09:23

## 2018-04-05 RX ADMIN — CHLORDIAZEPOXIDE HYDROCHLORIDE 25 MG: 25 CAPSULE ORAL at 09:22

## 2018-04-05 RX ADMIN — ESTRADIOL 1 MG: 1 TABLET ORAL at 09:24

## 2018-04-05 RX ADMIN — GABAPENTIN 400 MG: 400 CAPSULE ORAL at 09:23

## 2018-04-05 RX ADMIN — CHOLECALCIFEROL TAB 25 MCG (1000 UNIT) 1000 UNITS: 25 TAB at 09:23

## 2018-04-05 ASSESSMENT — PAIN SCALES - GENERAL
PAINLEVEL_OUTOF10: 0
PAINLEVEL_OUTOF10: 0

## 2018-04-05 NOTE — PROGRESS NOTES
1115 All discharge instructions explained to patient and her daughter.  All medications and side effects explained.  Belongings sheet signed and belongings packed and sent with patient.  Assisted to private vehicle for discharge to home.

## 2018-04-05 NOTE — CARE PLAN
"Problem: Speech/Swallowing LTGs  Goal: LTG-By discharge, patient will solve complex problem  1) Individualized goal:  Related to IADLs for return to PLOF with MOD I for safe d/c home.  2) Interventions:  SLP Cognitive Skill Development and SLP Group Treatment     Outcome: DISCHARGED-GOAL NOT MET Date Met: 04/05/18  Pt is SPV for IADLs     Problem: Problem Solving STGs  Goal: STG-Within one week, patient will  1) Individualized goal:  Complete time and money management tasks with 80% accuracy given SPV.   2) Interventions:  SLP Cognitive Skill Development     Outcome: DISCHARGED-GOAL NOT MET Date Met: 04/05/18    Goal: STG-Within one week, patient will  1) Individualized goal: complete medication management tasks including recall of medication purpose, admin time and functional sorting of mock \"pills\" with 100% accuracy provided SPV  2) Interventions:  SLP Cognitive Skill Development     Outcome: DISCHARGED-GOAL NOT MET Date Met: 04/05/18      Problem: Memory STGs  Goal: STG-Within one week, patient will  1) Individualized goal: recall information r/t TBI ed, goals, safety, sequences with 80% accuracy and MIN A.  2) Interventions:  SLP Cognitive Skill Development and SLP Group Treatment      Outcome: MET Date Met: 04/05/18        "

## 2018-04-05 NOTE — DISCHARGE INSTRUCTIONS
[facility] NURSING DISCHARGE INSTRUCTIONS    Blood Pressure: 111/76  Weight: 59.5 kg (131 lb 2.8 oz)  Nursing recommendations for Terri Krishnan at time of discharge are as follows:  Client verbalized understanding of all discharge instructions and prescriptions.     Review all your home medications and newly ordered medications with your doctor and/or pharmacist. Follow medication instructions as directed by your doctor and/or pharmacist.    Pain Management:   Discharge Pain Medication Instructions:  Comfort Goal: Comfort at Rest, Comfort with Movement, Perform Activity  Notify your primary care provider if pain is unrelieved with these measures, if the pain is new, or increased in intensity.    Discharge Skin Characteristics: Warm, Other (Comments) (Bruising on her face)  Discharge Skin Exam: Clear, Other (Comments) (Bruising)     Skin / Wound Care Instructions: Please contact your primary care physician for any change in skin integrity.     If You Have Surgical Incisions / Wounds:  Monitor surgical site(s) for signs of increased swelling, redness or symptoms of drainage from the site or fever as this could indicate signs and symptoms of infection. If these symptoms are noted, notifiy your primary care provider.      Discharge Safety Instructions: Should Have ADULT SUPERVISION     Discharge Safety Concerns: History Of Falls, Other (Comments) (Etoh)  The interdisciplinary team has made recommendation that you should have adult supervision in the house due to history of falls  Anti-embolic stockings are required during the day and off at night to increase circulation to the lower extremities.    Discharge Diet: Regular     Discharge Liquids: Thin  Discharge Bowel Function: Continent  Please contact your primary care physician for any changes in bowel habits.  Discharge Bowel Program:    Discharge Bladder Function: Continent  Discharge Urinary Devices: None      Nursing Discharge Plan:  "  Smoking Cessation Offered: Patient Counseled  Influenza Vaccine Indication: Patient Refuses    Case Management Discharge Instructions:   Discharge Location: Home with Outpatient Services  Agency Name/Address/Phone: [facility] at [address], [address], [place] [address], [phone] (they will call you to schedule visits)  Home Health:    Outpatient Services: Occupational Therapy, Physical Therapy, Speech Therapy  DME Provider/Phone: [facility] at [phone]  Medical Equipment Ordered: Cane  Prescription Faxed to:        Discharge Medication Instructions:  Below are the medications your physician expects you to take upon discharge:                                  Prevent Falls in Your Home    \"Falling once doubles your chance of falling again\"        -Center for Disease Control and Prevention    Falls in the home can lead to serious injury (fractures, brain injuries), hospitalizations, increased medical costs, and could even be fatal.  The good news is, there are many precautions you can take to avoid falls in your home and help keep you safe:     · If prescribed an assistive device (walker, crutches), use as instructed by the healthcare provider\"   · Remove any tripping hazards from your home, including loose cords, throw rugs and clutter  · Keep a nightlight on in dark (hallways, bathrooms, etc)   · Get up slowly, to make sure you feel okay before getting up  · Be aware of any side effects of your medications: some medications may make you dizzy  · Place a non-skid rubber mat in your shower or tub-consider a shower bench or chair if unsteady on your feet  · Wear supportive shoes or non-skid socks when moving around  · Start an exercise program once approved by your provider.  If you are feeling weak following a hospital stay, talk to your doctor about home health or outpatient therapy programs designed to help rebuild your strength and endurance            Suicidal Feelings: How to " Help Yourself  Suicide is the taking of one's own life. If you feel as though life is getting too tough to handle and are thinking about suicide, get help right away. To get help:  · Call your local emergency services (911 in the [place]  · Call a suicide hotline to speak with a trained counselor who understands how you are feeling. The following is a list of suicide hotlines in the [place]. For a list of hotlines in [place], visit www.suicide.org/hotlines/international/ffpaiz-hotcobo-rymujwbz.html.  ¨ 2-076-226-TALK (1-846.237.8751).  ¨ 6-665-RXKLBGP (1-213.873.1897).  ¨ 1-449.413.5094. This is a hotline for Hungarian speakers.  ¨ 7-239-026-4TTY (1-868.418.1028). This is a hotline for TTY users.  ¨ 7-502-0-U-JAMES (1-576.648.2589). This is a hotline for lesbian, bailey, bisexual, transgender, or questioning youth.  · Contact a crisis center or a local suicide prevention center. To find a crisis center or suicide prevention center:  ¨ Call your local hospital, clinic, community service organization, mental health center, social service provider, or health department. Ask for assistance in connecting to a crisis center.  ¨ Visit www.suicidepreventionlifeline.org/getinvolved/ for a list of crisis centers in the [place], or visit www.suicideprevention.ca/rsuzymkv-lwakg-ahjyyzm/find-a-crisis-centre for a list of centers in [place].  · Visit the following websites:  ¨ National Suicide Prevention Lifeline: www.suicidepreventionlifeline.org  ¨ Hopeline: www.hopeline.com  ¨ American Foundation for Suicide Prevention: www.afsp.org  ¨ The James Project (for lesbian, bailey, bisexual, transgender, or questioning youth): www.thetrevorproject.org  How can I help myself feel better?  · Promise yourself that you will not do anything drastic when you have suicidal feelings. Remember, there is hope. Many people have gotten through suicidal thoughts and feelings, and you will, too. You may have gotten through them before,  and this proves that you can get through them again.  · Let family, friends, teachers, or counselors know how you are feeling. Try not to isolate yourself from those who care about you. Remember, they will want to help you. Talk with someone every day, even if you do not feel sociable. Face-to-face conversation is best.  · Call a mental health professional and see one regularly.  · Visit your primary health care provider every year.  · Eat a well-balanced diet, and space your meals so you eat regularly.  · Get plenty of rest.  · Avoid alcohol and drugs, and remove them from your home. They will only make you feel worse.  · If you are thinking of taking a lot of medicine, give your medicine to someone who can give it to you one day at a time. If you are on antidepressants and are concerned you will overdose, let your health care provider know so he or she can give you safer medicines. Ask your mental health professional about the possible side effects of any medicines you are taking.  · Remove weapons, poisons, knives, and anything else that could harm you from your home.  · Try to stick to routines. Follow a schedule every day. Put self-care on your schedule.  · Make a list of realistic goals, and cross them off when you achieve them. Accomplishments give a sense of worth.  · Wait until you are feeling better before doing the things you find difficult or unpleasant.  · Exercise if you are able. You will feel better if you exercise for even a half hour each day.  · Go out in the sun or into nature. This will help you recover from depression faster. If you have a favorite place to walk, go there.  · Do the things that have always given you pleasure. Play your favorite music, read a good book, paint a picture, play your favorite instrument, or do anything else that takes your mind off your depression if it is safe to do.  · Keep your living space well lit.  · When you are feeling well, write yourself a letter about  tips and support that you can read when you are not feeling well.  · Remember that life’s difficulties can be sorted out with help. Conditions can be treated. You can work on thoughts and strategies that serve you well.  This information is not intended to replace advice given to you by your health care provider. Make sure you discuss any questions you have with your health care provider.  Document Released: 06/23/2004 Document Revised: 08/16/2017 Document Reviewed: 04/14/2015  [employer] Interactive Patient Education © 2017 [employer]      Speech Therapy Discharge Instructions for Terri Krishnan    4/5/2018    Diet: Thin Liquids - any liquid like water, coffee, tea, juices, or clear fluids like Gatorade, etc., Regular - all foods allowed  Swallow Strategies: NA  Other Diet Instructions: NA  Supervision: 24 hour supervision is recommended upon d/c for safety at this time.   Cognition / Communication: It has been a pleasure working with you, Terri. Please keep up the good work at home. Allow and budget extra time to get tasks done.  When you are about to try a task that you haven’t done in a while (or struggled with the last time), think about the steps involved, try to anticipate possible challenges and identify what might give you trouble, come up with a plan to compensate of those challenges, and evaluate after the fact - to determine if your plan worked or if it needs adjustment. If you experience an outcome that you didn’t expect, think back to what may have contributed to the problem. Break complex problems down into smaller parts.   Sometimes a complicated task can be overwhelming, but if you break it down into components, you will be able to find a place where you can cope with the information.   Be patient and persistent.  Develop tools and strategies that will help organize, filter and narrow down information so that you can deal with it effectively. Please remember it takes longer to complete tasks  at this time, plan accordingly and allow your daughter to help with tasks like meds and finances. Best of luck in your continued recovery! ~ Marta Cortez MS, CCC-SLP      Physical Therapy Discharge Instructions for Terri Krishnan    4/4/2018    Level of Assist Required for Ambulation: No Assist on Flat Surfaces, No Assist on Curbs, No Assist on Stairs  Distance Patient May Ambulate: 1000 ft+ (community distances)  Device Recommended for Ambulation: Single Point Cane  Level of Assist Required for Transfers: Requires No Assist  Device Recommended for Transfers: Single Point Cane  Home Exercise Program: Refer to Home Exercise Program Handout for Details    Thanks for working hard in PT, it was a pleasure working with you!  MARIA INES PerryT        Occupational Therapy Discharge Instructions for Terri Krishnan    4/5/2018    Level of Assist Required for Eating: Able to Complete Eating without Assist  Level of Assist Required for Grooming: Able to Complete Grooming without Assist  Level of Assist Required for Dressing: Able to Complete Dressing without Assist  Level of Assist Required for Toileting: Able to Complete Toileting without Assist  Level of Assist Required for Toilet Transfer: Able to Complete Toilet Transfer without Assist  Level of Assist Required for Bathing: Requires Supervision with Bathing  Equipment for Bathing: Grab Bars in Tub / Shower  Level of Assist Required for Shower Transfer: Able to Complete Shower Transfer without Assist  Level of Assist Required for Home Mgmt: Requires Supervision with Home Management  Level of Assist Required for Meal Prep: Requires Supervision with Meal Preparation  Driving: Please Contact Physician Prior to Driving  Home Exercise Program: Refer to Home Exercise Program Handout for Details    Terri,   It was a pleasure working with you. Make sure you remember to take your time and don't rush, that's when you forget things.  Take care!  Geetha Askew  "OTR/L        Alcohol and Headaches  Alcohol is a chemical known as ethanol. It is found in beverages such as beer, wine and liquor. Greater amounts are often found in liquor such as whiskey, vodka, scotch, mixed drinks and others. These drinks can also contain chemicals called congeners. Both ethanol and the congeners can effect how the person feels after drinking it. These \"after effects\" are often referred to as a hangover. Hangovers are rare with moderate alcohol drinking (1 to 3 average drinks). However, hangovers increase when the amount of alcohol consumed is more than moderate.  SYMPTOMS   A hangover can be accompanied by:   · Headache.   · Upset stomach.   · Nausea and vomiting.   · Dehydration.   A hangover is actually a withdrawal state from moderate to heavy alcohol consumption. The recovery process will take longer if you attempt to relieve this withdrawal with more alcohol. Drinking caffeine may relieve some of the fatigue associated with a hangover. However, this can cause more stomach irritation. Caffeine also makes a person urinate more (diuretic) and can worsen dehydration.  TREATMENT   There are a few actions that can reduce a hangover's severity and length.  · Drink 1 to 2 glasses of water (16 to 24 ounces) after you have quit drinking alcohol.   · Use pain medicines carefully and as told by your caregiver. For a headache, avoid acetaminophen. This drug is hard on the liver. Take aspirin instead and drink more water. If aspirin causes more stomach upset, ibuprofen may be a second choice.   · Get rest.   · Avoid high-fat foods and consider eating bananas (restores potassium and magnesium that will help both your stomach and headache). Oranges, apples and pears are good choices too.   · Take vitamins. Alcohol depletes the body's stores of vitamins A, B (especially B6) and C, which can intensify hangover symptoms.   · Drink 16 ounces of water each hour. This will rehydrate your body and make you feel " better. Sports drinks containing electrolytes may help your body rehydrate quicker than drinking water alone. The faster you restore proper fluid balance, the sooner you will feel better. Hydration is vital when treating a hangover.   · Exercise. As soon as you feel up to it, sweating helps remove toxins from the body faster.   SEEK MEDICAL CARE IF:   · Hangovers become more frequent.   · Hangovers interfere with major life activities such as job, family, health and relationships.   · You are unable to control your drinking, professional help may be needed. Contact your physician for a referral to specialized treatment.   SEEK IMMEDIATE MEDICAL CARE IF:   · You throw up blood.   · You pass dark or tarry stools.   · Your headache worsens over the next 24 hours instead getting better.   · Your abdominal or stomach pain worsens instead of getting better over the next 24 hours.   Document Released: 12/20/2004 Document Revised: 03/11/2013 Document Reviewed: 08/05/2009  [employer]® Patient Information ©2013 [employer].

## 2018-04-05 NOTE — DISCHARGE PLANNING
04/03/18  1548   Discharge Instructions - Completed by Case Mgmt   Discharge Location   Home with Outpatient Services     Agency Name / Address / Phone     [facility] at [address], [place]. [address], [phone] (they will call you to schedule visits)     Outpatient Services   Occupational Therapy; Physical Therapy; Speech Therapy     Medical equipment Provider / Phone   [facility] at [phone]     Medical Equipment Ordered   Cane     Comments   Resources provided for counseling services.           Follow-Up       Follow-up With  Details  Why  Contact Ghdaa Rebolledo P.A.-C. (Primary Care)  On 4/9/2018 Monday at 1:30 pm (please check in at 1:15 pm)(records will be sent)  [address] [place] [address]  [phone]     Frankie eMnchaca M.D. (Neurosurgery)  On 4/13/2018 Friday at 9:45 am  [address] [facility] [address]  [phone]     Nathan Mireles M.D. (Physiatry)  On 5/14/2018 Monday at 9:00 am  [address]  [address]  [facility] [address] [phone]

## 2018-04-05 NOTE — DISCHARGE PLANNING
Case Management;  Met with patient and her daughter prior to discharge.  Reviewed all follow up appointments.  [facility] ready to follow.  Provided resources for substance abuse counseling.  Also, gave patient paperwork to be completed from Dr. Simmons's office.  She has cane delivered from [facility].  All questions answered.  Patient and her daughter verbalize understanding of follow up care and appointments.

## 2018-04-05 NOTE — DISCHARGE SUMMARY
Rehab Discharge Note  Admission 3/20/2018  Discharge 4/5/2018    Admission Diagnosis:   Active Hospital Problems    Diagnosis   • *Traumatic intracranial hemorrhage (CMS-HCC)   • Moderate protein-calorie malnutrition (CMS-HCC)   • Anemia   • Headache   • Alcohol abuse   • Cognitive and behavioral changes   • Lability emotional   • Balance disorder   • CARSON (generalized anxiety disorder)   • Tobacco dependence       Discharge Diagnosis:  Active Hospital Problems    Diagnosis   • *Traumatic intracranial hemorrhage (CMS-HCC)   • Moderate protein-calorie malnutrition (CMS-HCC)   • Anemia   • Headache   • Alcohol abuse   • Cognitive and behavioral changes   • Lability emotional   • Balance disorder   • CARSON (generalized anxiety disorder)   • Tobacco dependence       HPI prior to admission  The patient is a 48 y.o. female with a past medical history of tobacco and alcohol abuse admitted for acute inpatient rehabilitation with severe functional debility after a ground-level fall under somewhat suspicious circumstances on March 4, 2018 with resultant intracranial hemorrhage. Upon admission to the hospital she was notably thrombocytopenic as well as anemic and had positive blood alcohol level. Upon evaluation a CT scan of the head demonstrated right subarachnoid hemorrhage within the sylvian fissure, parafalcine hemorrhage as well as blood along the tentorium, with 6 mm shift to the left. Complications have been numerous including hypokalemia, hyponatremia, severe alcohol withdrawal. Se is currently on Librium 25 mg 3 times a day. She had a approximately week of posttraumatic amnesia, and has significant bruising to her right face. Patient was evaluated by Rehab Medicine physician and Physical Therapy, Occupational Therapy and Speech Therapy and determined to be appropriate for acute inpatient rehab and was transferred to [facility] on 3/20/2108    Rehab Hospital Course    Patient made good functional gains  throughout her stay, and was discharged home under the care of her sister. She had headache that improved with gabapentin, scheduled tylenol, and nightly Elavil. She was titrated down on her libruim, but still discharge on once daily dosing. This can be titrated off outpatient, as well as her headache medications. She will see Dr. Mireles in outpatient clinic to discuss titration, as well as eventual return to driving. She was continued on potassium supplementation, and needs outpatient follow up for this as well. She had orthostatic hypotension that improved with Florinef, which may be able to be titrated off after discharge as well.     Lab Results   Component Value Date/Time    SODIUM 139 2018 05:46 AM    POTASSIUM 4.0 2018 05:46 AM    CHLORIDE 103 2018 05:46 AM    CO2 30 2018 05:46 AM    GLUCOSE 84 2018 05:46 AM    BUN 17 2018 05:46 AM    CREATININE 0.81 2018 05:46 AM          Discharge PE  Physical Exam   Constitutional: She is well-developed, well-nourished, and in no distress.   HENT:   Bruising on right face   Cardiovascular: Normal rate, regular rhythm and normal heart sounds.    Pulmonary/Chest: Effort normal and breath sounds normal.   Abdominal: Soft. Bowel sounds are normal.       Functional Status at Discharge  Eatin - Independent  Eating Description:  Increased time  Groomin - Independent  Grooming Description:   (Ind with standing oral care)  Bathin - Standby Prompting/Supervision or Set-up  Bathing Description:   (Mod I for 10/10 parts in standing. Pt completed bathing and failed to remove bra before task completion. Will need SPV from dtr upon d/c)  Upper Body Dressin - Independent  Upper Body Dressing Description:   (Ind to don/doff pullover shirt/bra)  Lower Body Dressin - Independent  Lower Body Dressing Description:  7 - Independent  Discharge Location : Home  Patient Discharging with Assist of: Family  (Dtr)  Level of  Supervision Required: Intermittent Supervision  Recommended Equipment for Discharge: Single Point Cane  Recommended Services Upon Discharge: Outpatient Occupational Therapy  Long Term Goals Met: 3  Long Term Goals Not Met: 0  Reason(s) for Goals Not Met: NA  Criteria for Termination of Services: Maximum Function Achieved for Inpatient Rehabilitation  Walk:  6 - Modified Independent  Distance Walked:  Walks a minimum of 150 feet  Walk Description:  Assist device/equipment  Wheelchair:  6 - Modified Independent  Distance Propelled:  Propels a minimum of 150 feet   Wheelchair Description:  Safety concerns, Extra time  Stairs 6 - Modified Independent  Stairs DescriptionAscends/descends 12 to 14 steps, Extra time  Discharge Location: Home  Patient Discharging with Assist of: Family  Level of Supervision Required Upon Discharge: No Supervision  Recommended Equipment for Discharge: Single Point Cane  Recommeded Services Upon Discharge: Outpatient Physical Therapy  Long Term Goals Met: 3  Long Term Goals Not Met: 1  Reason(s) for Goals Not Met: Casey not retested dt passing FGA  Criteria for Termination of Services: Maximum Function Achieved for Inpatient Rehabilitation  Comprehension Mode:  Both  Comprehension:  5 - Stand-by Prompting/Supervision or Set-up  Comprehension Description:  Verbal cues  Expression Mode:  Both  Expression:  6 - Modified Independent  Expression Description:  Verbal cueing  Social Interaction:  6 - Modified Independent  Social Interaction Description:  Increased time  Problem Solvin - Standby Prompting/Supervision or Set-up  Problem Solving Description:  Verbal cueing, Therapy schedule, Increased time  Memory:  5 - Standby Prompting/Supervision or Set-up  Memory Description:  Verbal cueing, Therapy schedule  Progress since Admit: Pt has made fair progress since admission. Skilled SLP services include training in compensatory strategies for memory, problem solving and complex attention. Pt  continues to demonstrate deficits with complex attention, organization as it relates to medication and financial mngt. Pt will require supervision with IADLs at home for safety and accuracy. Pt benefits from extra time to complete complex tasks as well as planning out prior to initiation. RECOMMEND: Pt d/c home with support of daughter, 24-7 supervision is recommended. Continued SLP services via out patient recommended to target executive function in an effort to return to work.   Discharge Location : Home  Patient Discharging with Assist of: Family   Level of Supervision Required: 24 Hour Supervision  Recommended Services Upon Discharge: Outpatient Speech Therapy  Long Term Goals Met: 0/1  Long Term Goals Not Met: 1/1  Reason(s) for Goals Not Met: Pt is SPV for IADLs at this time (LTG was MOD I)  Criteria for Termination of Services: Maximum Function Achieved for Inpatient Rehabilitation    Discharge Medication:     Medication List      START taking these medications      Instructions   acetaminophen 500 MG Tabs  Commonly known as:  TYLENOL   Take 2 Tabs by mouth 3 times a day.  Dose:  1000 mg     amitriptyline 25 MG Tabs  Commonly known as:  ELAVIL   Take 1 Tab by mouth every evening.  Dose:  25 mg     ascorbic acid 500 MG tablet  Commonly known as:  VITAMIN C   Take 1 Tab by mouth every morning with breakfast.  Dose:  500 mg     Cholecalciferol 1000 UNIT Tabs  Commonly known as:  VITAMIND D3   Take 1 Tab by mouth every day.  Dose:  1000 Units     docusate sodium 100 MG Caps  Notes to patient:  Hold for loose stools.   Take 100 mg by mouth every day.  Dose:  100 mg     estradiol 1 MG Tabs  Commonly known as:  ESTRACE   Take 1 Tab by mouth every day.  Dose:  1 mg     ferrous sulfate 325 (65 Fe) MG tablet   Take 1 Tab by mouth every morning with breakfast.  Dose:  325 mg     gabapentin 400 MG Caps  Commonly known as:  NEURONTIN   Take 1 Cap by mouth 3 times a day.  Dose:  400 mg     senna-docusate 8.6-50 MG  Tabs  Commonly known as:  PERICOLACE or SENOKOT S   Take 1 Tab by mouth every evening.  Dose:  1 Tab     thiamine 100 MG tablet  Commonly known as:  THIAMINE   Take 1 Tab by mouth every day.  Dose:  100 mg        CHANGE how you take these medications      Instructions   chlordiazePOXIDE 25 MG Caps  What changed:  when to take this  Commonly known as:  LIBRIUM   Take 1 Cap by mouth every day for 30 days.  Dose:  25 mg     fludrocortisone 0.1 MG Tabs  What changed:  · how much to take  · when to take this  Commonly known as:  FLORINEF   Take 0.5 Tabs by mouth every morning.  Dose:  0.05 mg        CONTINUE taking these medications      Instructions   omeprazole 20 MG delayed-release capsule  Commonly known as:  PRILOSEC   Take 1 Cap by mouth every day.  Dose:  20 mg     potassium bicarbonate 25 MEQ tablet  Commonly known as:  KLYTE   Take 2 Tabs by mouth every 8 hours.  Dose:  50 mEq        STOP taking these medications    acetaminophen/caffeine/butalbital 325-40-50 mg -40 MG Tabs  Commonly known as:  FIORICET     oxyCODONE immediate-release 5 MG Tabs  Commonly known as:  ROXICODONE     sodium chloride 1 GM Tabs  Commonly known as:  SALT          Discharge Location    Home with Outpatient Services      Agency Name / Address / Phone       [facility] at [address] [place] [address], [address], [place]. [address], [phone] (they will call you to schedule visits)      Outpatient Services    Occupational Therapy; Physical Therapy; Speech Therapy      Medical equipment Provider / Phone    [facility] at [phone]      Medical Equipment Ordered    Cane      Comments    Resources provided for counseling services.            Follow-Up         Follow-up With   Details   Why   Contact Ghada Rebolledo P.A.-C. (Primary Care)   On 4/9/2018 Monday at 1:30 pm (please check in at 1:15 pm)(records will be sent)   [address] [place] [address] [phone]      Frankie Menchaca M.D. (Neurosurgery)   On  4/13/2018 Friday at 9:45 am   [address]  [place] [address]  [phone]      Nathan Mireles M.D. (Physiatry)   On 5/14/2018 Monday at 9:00 am   [address]  [address]  [place] [address]  [phone]             Condition on Discharge:  Good.    Less than 30 minutes was spent on discharging this patient, including face-to-face time, prescription management, and the dictation of this note.

## 2018-04-05 NOTE — CARE PLAN
Problem: Grooming  Goal: STG-Within one week, patient will complete grooming  1) Individualized Goal:  In standing with SPV for oral care and hair care with no LOB using DME PRN and min cues for safety  2) Interventions:  OT Group Therapy, OT Self Care/ADL, OT Cognitive Skill Dev, OT Community Reintegration, OT Manual Ther Technique, OT Neuro Re-Ed/Balance, OT Therapeutic Activity, OT Evaluation and OT Therapeutic Exercise     Outcome: MET Date Met: 04/05/18      Problem: Functional Cognition  Goal: STG-Within one week, patient will demonstrate safety awareness  1) Individualized Goal:  By locking w/c breaks on 3/5 opportunities with min v/c.  2) Interventions:  OT Group Therapy, OT Self Care/ADL, OT Cognitive Skill Dev, OT Community Reintegration, OT Manual Ther Technique, OT Neuro Re-Ed/Balance, OT Therapeutic Activity, OT Evaluation and OT Therapeutic Exercise   Outcome: DISCHARGED-GOAL NOT MET Date Met: 04/05/18  Pt no longer using w/c      Problem: OT Long Term Goals  Goal: LTG-By discharge, patient will complete basic self care tasks  1) Individualized Goal:  At a SPV level using DME/AE or compensatory techniques PRN  2) Interventions:  OT Group Therapy, OT Self Care/ADL, OT Cognitive Skill Dev, OT Community Reintegration, OT Manual Ther Technique, OT Neuro Re-Ed/Balance, OT Therapeutic Activity, OT Evaluation and OT Therapeutic Exercise   Outcome: MET Date Met: 04/05/18    Goal: LTG-By discharge, patient will perform bathroom transfers  1) Individualized Goal:  At a SPV level using DME/AE or compensatory techniques PRN  2) Interventions:  OT Group Therapy, OT Self Care/ADL, OT Cognitive Skill Dev, OT Community Reintegration, OT Manual Ther Technique, OT Neuro Re-Ed/Balance, OT Therapeutic Activity, OT Evaluation and OT Therapeutic Exercise   Outcome: MET Date Met: 04/05/18    Goal: LTG-By discharge, patient will complete basic home management  1) Individualized Goal:  At a Min A level using DME/AE or  compensatory techniques PRN  2) Interventions:  OT Group Therapy, OT Self Care/ADL, OT Cognitive Skill Dev, OT Community Reintegration, OT Manual Ther Technique, OT Neuro Re-Ed/Balance, OT Therapeutic Activity, OT Evaluation and OT Therapeutic Exercise   Outcome: MET Date Met: 04/05/18      Problem: IADL's  Goal: STG-Within one week, patient will prepare a meal  1) Individualized Goal:  At SBA level for mobility, and Min A for sequencing and problem solving  2) Interventions:  OT Group Therapy, OT Self Care/ADL, OT Cognitive Skill Dev, OT Community Reintegration, OT Manual Ther Technique, OT Neuro Re-Ed/Balance, OT Therapeutic Activity, OT Evaluation and OT Therapeutic Exercise     Outcome: MET Date Met: 04/05/18    Goal: STG-Within one week, patient will utilize washer and dryer  1) Individualized Goal:  At SBA level for mobility, and Min A for sequencing and problem solving  2) Interventions:  OT Group Therapy, OT Self Care/ADL, OT Cognitive Skill Dev, OT Community Reintegration, OT Manual Ther Technique, OT Neuro Re-Ed/Balance, OT Therapeutic Activity, OT Evaluation and OT Therapeutic Exercise   Outcome: MET Date Met: 04/05/18

## 2018-04-09 ENCOUNTER — OFFICE VISIT (OUTPATIENT)
Dept: MEDICAL GROUP | Age: 48
End: 2018-04-09
Payer: COMMERCIAL

## 2018-04-09 VITALS
OXYGEN SATURATION: 95 % | BODY MASS INDEX: 23.04 KG/M2 | WEIGHT: 130 LBS | HEIGHT: 63 IN | HEART RATE: 103 BPM | SYSTOLIC BLOOD PRESSURE: 102 MMHG | DIASTOLIC BLOOD PRESSURE: 64 MMHG | TEMPERATURE: 97.3 F

## 2018-04-09 DIAGNOSIS — Z12.39 BREAST CANCER SCREENING: ICD-10-CM

## 2018-04-09 DIAGNOSIS — R26.89 BALANCE DISORDER: ICD-10-CM

## 2018-04-09 DIAGNOSIS — F17.200 TOBACCO DEPENDENCE: ICD-10-CM

## 2018-04-09 DIAGNOSIS — F10.10 ALCOHOL ABUSE: ICD-10-CM

## 2018-04-09 DIAGNOSIS — F41.1 GAD (GENERALIZED ANXIETY DISORDER): ICD-10-CM

## 2018-04-09 DIAGNOSIS — S06.309D INTRACRANIAL HEMORRHAGE FOLLOWING INJURY, WITH LOSS OF CONSCIOUSNESS, SUBSEQUENT ENCOUNTER: ICD-10-CM

## 2018-04-09 PROCEDURE — 99215 OFFICE O/P EST HI 40 MIN: CPT | Performed by: PHYSICIAN ASSISTANT

## 2018-04-10 ENCOUNTER — SPEECH THERAPY (OUTPATIENT)
Dept: SPEECH THERAPY | Facility: REHABILITATION | Age: 48
End: 2018-04-10
Attending: PHYSICAL MEDICINE & REHABILITATION
Payer: COMMERCIAL

## 2018-04-10 ENCOUNTER — OCCUPATIONAL THERAPY (OUTPATIENT)
Dept: OCCUPATIONAL THERAPY | Facility: REHABILITATION | Age: 48
End: 2018-04-10
Attending: PHYSICAL MEDICINE & REHABILITATION
Payer: COMMERCIAL

## 2018-04-10 DIAGNOSIS — R41.9 COGNITIVE COMPLAINTS: ICD-10-CM

## 2018-04-10 DIAGNOSIS — R26.9 ABNORMALITY OF GAIT: ICD-10-CM

## 2018-04-10 DIAGNOSIS — R41.841 COGNITIVE COMMUNICATION DEFICIT: ICD-10-CM

## 2018-04-10 DIAGNOSIS — M62.81 MUSCLE WEAKNESS (GENERALIZED): ICD-10-CM

## 2018-04-10 PROCEDURE — G8985 CARRY GOAL STATUS: HCPCS | Mod: CI

## 2018-04-10 PROCEDURE — 97166 OT EVAL MOD COMPLEX 45 MIN: CPT

## 2018-04-10 PROCEDURE — 97530 THERAPEUTIC ACTIVITIES: CPT

## 2018-04-10 PROCEDURE — 92523 SPEECH SOUND LANG COMPREHEN: CPT

## 2018-04-10 PROCEDURE — G8984 CARRY CURRENT STATUS: HCPCS | Mod: CI

## 2018-04-10 SDOH — ECONOMIC STABILITY: GENERAL: QUALITY OF LIFE: FAIR

## 2018-04-10 ASSESSMENT — ACTIVITIES OF DAILY LIVING (ADL)
POOR_BALANCE: 1
AMBULATION_WITHOUT_ASSISTIVE_DEVICE: INDEPENDENT

## 2018-04-10 ASSESSMENT — BALANCE ASSESSMENTS
BALANCE - STANDING STATIC: FAIR +
BALANCE - STANDING DYNAMIC: FAIR +
BALANCE - SITTING STATIC: GOOD
BALANCE - SITTING DYNAMIC: GOOD

## 2018-04-10 ASSESSMENT — ENCOUNTER SYMPTOMS: ALLEVIATING FACTORS: PAIN MEDICATION

## 2018-04-10 NOTE — OP THERAPY EVALUATION
"  Outpatient Speech Therapy  INITIAL EVALUATION    [place] [address]  [address]  [place] [address]  Phone:  [phone]  Fax:  [phone]    Date of Evaluation: 04/10/2018    Patient: Terri Krishnan  YOB: 1970  MRN: [id number]     Referring Provider: Nathan Mireles M.D.  [address]  [address]  [place] [address]   Referring Diagnosis Unspecified symptoms and signs involving cognitive functions and awareness [R41.9];Muscle weakness (generalized) [M62.81];Unspecified abnormalities of gait and mobility [R26.9]     Time Calculation  Start time: 0932  Stop time: 1030 Time Calculation (min): 58 minutes     Speech Therapy Occurrence Codes           Chief Complaint: Other (Wants to drive)    Visit Diagnoses     ICD-10-CM   1. Cognitive communication deficit R41.841     Subjective:   Reason for Therapy:     Reason For Evaluation:  Cognition    Onset Description:  Status post intracranial hemorrhage due to GLF and \"suspicious circumstances\". Per CT right subarachnoid hemorrhage and L subdural hemorrhage, resolving per recent CT. Prior to hospitalization, pt independent w/ IADLs and working full time. Currently Pt receives 24-hour supervision and needs assist for IADLs. Pt would benefit from skilled services to return to work and driving.     Past Medical History:   Diagnosis Date   • Surgical menopause    • Tobacco dependence      Past Surgical History:   Procedure Laterality Date   • ABDOMINAL HYSTERECTOMY TOTAL  age 27    Total     Objective:   Treatments/Interventions Performed:  Cognitive-Linguistic training and Patient/Caregiver education    Speech Therapy Assessment:     Expressive Language Assessment:     Ability to exhibit appropriate naming: WFL.    Patient's ability to verbalize wants and needs is WFL.    Patient's ability to sustain dialogues within a given topic is WFL.    Cognitive Linguistic Assessment:     Portions of the Other ([place] Cognitive " Assessment (MoCA)) are used.    Patient orientation to day: WF    Patient orientation to month: WFL    Patient orientation to time: [facility]    Patient orientation to place: WFL    Patient recent and short term memory: Minimal    Patient ability to organize thoughts: Minimal (mod)    Patient complex reasoning ability: Minimal    Patient complex problem solving ability: Minimal (mod)    Patient executive functioning ability: Minimal    Patient decision making and planning ability: Moderate (min)    Patient spontaneous clock drawing ability: Supervision Required (hands were same size and not centered)    Pt lost points on the MoCA during sentence repetition due to lack of attention to details. While she received full points during the selective attention task, Pt did have a delayed reaction time. Decreased planning and attention to detail noted during higher level executive functioning tasks. Pt achieved 5/10 (50%) accuracy on functional math (time) problems independently. Informal higher level assessment given.  Pt demonstrated difficulty with organizing and planning detailed info for sequencing task.  Also had difficulty with functional time problems.  Overall slower processing of detailed info.        Speech Therapy Plan :   Prognosis: Good  Goals  Short Term Goals:    1. Pt will demo functional math skills (time, money) with 80% accuracy and min clinician support.   2. Pt will immediately recall and mentally manipulate 4 units of information with 90% accuracy independently.   3. Pt will complete executive functioning tasks with 90% accuracy in a timely manner with min cues for use of planning and organizational strategies.   Short Term Goal Duration (Weeks):  2-4 weeks  Long Term Goals:    1. Pt will demo use of planning, self awareness, initiation, and self monitoring during daily activities to improve safety and awareness in functional living environment.   Long Term Goal Duration (Weeks):  4-6 weeks  Patient Stated  Goal:  To return to work and driving.   Therapy Recommendations  Recommendation:  Individual Speech Therapy,  Planned Therapy Interventions:  Compensatory Strategy Training, Home Program, Cognitive-Linguistic training and Patient/Caregiver Education,   Frequency:  1x week (1-2)  Duration (in weeks):  6    I have reviewed and agree with the following documentation. I was present and participating in this session. No changes needed to be made.     Debora Crisostomo M.A. CCC-SLP    Functional Limitation G-Codes and Severity Modifiers     Current:     Goal:         Referring provider co-signature:  I have reviewed this plan of care and my co-signature certifies the need for services.  Certification Dates:   From 4/10/18    To 5/22/18    Physician Signature: ________________________________ Date: ______________
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
## 2018-04-12 ENCOUNTER — APPOINTMENT (OUTPATIENT)
Dept: SPEECH THERAPY | Facility: REHABILITATION | Age: 48
End: 2018-04-12
Attending: PHYSICAL MEDICINE & REHABILITATION
Payer: COMMERCIAL

## 2018-04-15 PROBLEM — T14.90XA TRAUMA: Status: RESOLVED | Noted: 2018-03-04 | Resolved: 2018-04-15

## 2018-04-15 PROBLEM — R41.89 COGNITIVE AND BEHAVIORAL CHANGES: Status: RESOLVED | Noted: 2018-03-20 | Resolved: 2018-04-15

## 2018-04-15 PROBLEM — R45.86 LABILITY EMOTIONAL: Status: RESOLVED | Noted: 2018-03-20 | Resolved: 2018-04-15

## 2018-04-15 PROBLEM — R46.89 COGNITIVE AND BEHAVIORAL CHANGES: Status: RESOLVED | Noted: 2018-03-20 | Resolved: 2018-04-15

## 2018-04-15 PROBLEM — B96.89 BACTERIAL CONJUNCTIVITIS OF BOTH EYES: Status: RESOLVED | Noted: 2018-03-07 | Resolved: 2018-04-15

## 2018-04-15 PROBLEM — E44.0 MODERATE PROTEIN-CALORIE MALNUTRITION (HCC): Status: RESOLVED | Noted: 2018-03-08 | Resolved: 2018-04-15

## 2018-04-15 PROBLEM — Z53.09: Status: RESOLVED | Noted: 2018-03-05 | Resolved: 2018-04-15

## 2018-04-15 PROBLEM — H10.9 BACTERIAL CONJUNCTIVITIS OF BOTH EYES: Status: RESOLVED | Noted: 2018-03-07 | Resolved: 2018-04-15
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
## 2018-04-17 ENCOUNTER — OCCUPATIONAL THERAPY (OUTPATIENT)
Dept: OCCUPATIONAL THERAPY | Facility: REHABILITATION | Age: 48
End: 2018-04-17
Attending: PHYSICAL MEDICINE & REHABILITATION
Payer: COMMERCIAL

## 2018-04-17 DIAGNOSIS — R41.9 COGNITIVE COMPLAINTS: ICD-10-CM

## 2018-04-17 DIAGNOSIS — M62.81 MUSCLE WEAKNESS (GENERALIZED): ICD-10-CM

## 2018-04-17 PROCEDURE — 97530 THERAPEUTIC ACTIVITIES: CPT

## 2018-04-17 PROCEDURE — G0515 COGNITIVE SKILLS DEVELOPMENT: HCPCS

## 2018-04-17 ASSESSMENT — ACTIVITIES OF DAILY LIVING (ADL): POOR_BALANCE: 1

## 2018-04-17 NOTE — OP THERAPY DAILY TREATMENT
"  Outpatient Occupational Therapy   NEUROLOGICAL DAILY TREATMENT     [facility] Occupational Therapy [address].  [address]  [place] [address]  Phone:  [phone]  Fax:  [phone]    Date: 04/17/2018    Patient: Terri Krishnan  YOB: 1970  MRN: [id number]     Time Calculation  Start time: 1040  Stop time: 1130 Time Calculation (min): 50 minutes     Chief Complaint: Other (Cognitive deficits) and Loss Of Balance    Visit #: 2    Subjective:   Activities of Daily Living:     Patient reported ADL status: \"I've been packing my things so I can move in with my daughter. The landlord sold my house.\" Pt also reporting depression that she has already discussed with her PCP within the last 7 days, who will be further addressing this matter. Pt reports that it is related to all the changes with moving, not being able to drive, and not having more independence.  Pt arrived 10 min late to this visit.  Patient Goals:     Patient goals for therapy:  Return to work, independence with ADLs/IADLs and improved balance    Other patient goals:  Return to driving      Objective:     Cognition:     Cognition Comments:  SLUMS: 27/30 normal range  Road Sign Identification: 10/10          Therapeutic Treatments and Modalities:    1. Therapeutic Activities (CPT 42097), Dynamic standing balance with card sorting: Stood on balance pad with SBA for safety, in front of raised therapy table and sorted playing cards by suit into for piles at arms length 2x, once with each hand. Required min v/c for standing posture to correct from posterior drifting. Completed rapid card flipping bilaterally with noted increased apraxia with L-hand use. Discussed use of playing cards for shuffling and rapid flipping as part of HEP, pt demonstrated understanding and good carryover., Nuts and Bolts: Varied thread type and sized, using one hand at a time to screw/un-screw to increase FMC with rapid movements for ADLs. Completed " in standing with no cues for attention to task. Pt performed opposition finger/finger 5x each hand with moderate apraxia. Pt also performed isolated digital ab/aducction for 5x each hand with the same apraxia.    2. Cognitive Skill Development (CPT 27741), Administered SLUMS and Road Sign Identification as part of cognitive assessment for pre-driving tasks with noted scores above. Pt completed both within a reasonable amount of time without assist.    Time-based treatments/modalities:  Therapeutic activity minutes (CPT 90015): 35 minutes  Cognitive skill development minutes (CPT ): 15 minutes     Assessment, Response and Plan:   Impairments: abnormal coordination, difficulty performing job, fine motor function, impaired balance and safety issue    Other Impairments:  Cognitive deficits  Assessment details:  Pt performed within normal scoring ranges for both SLUMS and Road sign identification assessments. Observed posterior drift when standing on an unstable surface during dynamic tasks, but pt was able to self-correct with min v/c. Pt continues to perform opposition with moderate apraxia bilaterally. Updated HEP and pt demo good carryover.    Plan:   Therapy options:  Occupational therapy treatment to continue  Planned therapy interventions:  Therapeutic Activities (CPT 66687), Therapeutic Exercise (CPT 81717), Neuromuscular Re-education (CPT 16309), Cognitive Skill Development (CPT 93158) and Functional Training, Self Care (CPT 07105)  Discussed with:  Patient  Plan details:  Cont with OT POC.

## 2018-04-20 ENCOUNTER — APPOINTMENT (OUTPATIENT)
Dept: SPEECH THERAPY | Facility: REHABILITATION | Age: 48
End: 2018-04-20
Attending: PHYSICAL MEDICINE & REHABILITATION
Payer: COMMERCIAL

## 2018-04-20 ENCOUNTER — APPOINTMENT (OUTPATIENT)
Dept: PHYSICAL THERAPY | Facility: REHABILITATION | Age: 48
End: 2018-04-20
Attending: PHYSICAL MEDICINE & REHABILITATION
Payer: COMMERCIAL

## 2018-04-26 ENCOUNTER — OCCUPATIONAL THERAPY (OUTPATIENT)
Dept: OCCUPATIONAL THERAPY | Facility: REHABILITATION | Age: 48
End: 2018-04-26
Attending: PHYSICAL MEDICINE & REHABILITATION
Payer: COMMERCIAL

## 2018-04-26 DIAGNOSIS — M62.81 MUSCLE WEAKNESS (GENERALIZED): ICD-10-CM

## 2018-04-26 DIAGNOSIS — R41.9 COGNITIVE COMPLAINTS: ICD-10-CM

## 2018-04-26 PROCEDURE — 97530 THERAPEUTIC ACTIVITIES: CPT

## 2018-04-26 PROCEDURE — G0515 COGNITIVE SKILLS DEVELOPMENT: HCPCS

## 2018-04-26 ASSESSMENT — ACTIVITIES OF DAILY LIVING (ADL): POOR_BALANCE: 1
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
## 2018-04-27 ENCOUNTER — SPEECH THERAPY (OUTPATIENT)
Dept: SPEECH THERAPY | Facility: REHABILITATION | Age: 48
End: 2018-04-27
Attending: PHYSICAL MEDICINE & REHABILITATION
Payer: COMMERCIAL

## 2018-04-27 DIAGNOSIS — R41.841 COGNITIVE COMMUNICATION DEFICIT: ICD-10-CM

## 2018-04-27 PROCEDURE — 92507 TX SP LANG VOICE COMM INDIV: CPT

## 2018-04-27 PROCEDURE — G0515 COGNITIVE SKILLS DEVELOPMENT: HCPCS

## 2018-04-27 NOTE — OP THERAPY DAILY TREATMENT
"  Outpatient Speech Therapy  DAILY TREATMENT     [facility] Speech [address].  [address]  [place] [address]  Phone:  [phone]  Fax:  [phone]    Date: 04/27/2018    Patient: Terri Krishnan  YOB: 1970  MRN: [id number]     Time Calculation  Start time: 0900  Stop time: 1000 Time Calculation (min): 60 minutes     Chief Complaint: Poor Memory    Visit #: 2    Subjective Evaluation  Pt arrived on time to therapy and in a positive mood. Pt stated that her life has been \"chaos\" because she's currently moving. Pt stated that she still feels \"absent-minded\" and often forgets the reason she came into certain rooms.     Objective:   Treatments/Interventions Performed:  Cognitive-Linguistic training, Home program, Compensatory strategy training and Patient/Caregiver education     Given 3 words verbally, Pt used mental manipulation (working memory) to put the words in alphabetical order with 10/10 (100%) accuracy. Given 4 words verbally, Pt used mental manipulation to put the words in alphabetical order with 5/10 (50%) accuracy, increased to 100% when given a cue or repetition. Pt followed 2 step (if, then) written directions with 6/6 (100%) accuracy. Pt introduced to Slated brett. Pt completed a divided attention task (train station) with 22/24 (92%) and 19/23 (83%) accuracy with min cues, 17/23 (85%) accuracy independently. Pt needed mod cues to complete a divided and alternating attention task (familiar faces).     Speech Therapy Assessment:     Cognitive Linguistic Assessment:     Patient attention sustained: WFL    Patient attention divided: Minimal (mod)    Patient recent and short term memory: Minimal (mod)      Pt observed to have longer processing time during Slated games. Attention during structured written tasks is better than ones that incorporate working memory.     Speech Therapy Plan :   Prognosis: Good  Therapy Recommendations  Recommendation:  Individual " Speech Therapy,  Planned Therapy Interventions:  Development of Cognitive Skills, Home Program, Compensatory Strategy Training and Patient/Caregiver Education,   Frequency:  1x week    I have reviewed and agree with the following documentation. I was present and participating in this session. No changes needed to be made.     Debora Crisostomo M.A. CCC-SLP

## 2018-04-30 ENCOUNTER — SPEECH THERAPY (OUTPATIENT)
Dept: SPEECH THERAPY | Facility: REHABILITATION | Age: 48
End: 2018-04-30
Attending: PHYSICAL MEDICINE & REHABILITATION
Payer: COMMERCIAL

## 2018-04-30 ENCOUNTER — OCCUPATIONAL THERAPY (OUTPATIENT)
Dept: OCCUPATIONAL THERAPY | Facility: REHABILITATION | Age: 48
End: 2018-04-30
Attending: PHYSICAL MEDICINE & REHABILITATION
Payer: COMMERCIAL

## 2018-04-30 ENCOUNTER — APPOINTMENT (OUTPATIENT)
Dept: PHYSICAL THERAPY | Facility: REHABILITATION | Age: 48
End: 2018-04-30
Attending: PHYSICAL MEDICINE & REHABILITATION
Payer: COMMERCIAL

## 2018-04-30 DIAGNOSIS — R41.9 COGNITIVE COMPLAINTS: ICD-10-CM

## 2018-04-30 DIAGNOSIS — M62.81 MUSCLE WEAKNESS (GENERALIZED): ICD-10-CM

## 2018-04-30 DIAGNOSIS — R41.841 COGNITIVE COMMUNICATION DEFICIT: ICD-10-CM

## 2018-04-30 PROCEDURE — 92507 TX SP LANG VOICE COMM INDIV: CPT

## 2018-04-30 PROCEDURE — G0515 COGNITIVE SKILLS DEVELOPMENT: HCPCS

## 2018-04-30 PROCEDURE — 97530 THERAPEUTIC ACTIVITIES: CPT

## 2018-04-30 NOTE — OP THERAPY DAILY TREATMENT
"  Outpatient Speech Therapy  DAILY TREATMENT     [facility] Speech [address]  [address]  [place] [address]  Phone:  [phone]  Fax:  [phone]    Date: 04/30/2018    Patient: Terri Krishnan  YOB: 1970  MRN: [id number]     Time Calculation  Start time: 0930  Stop time: 1030 Time Calculation (min): 60 minutes     Chief Complaint: Poor Memory    Visit #: 3    Subjective Evaluation  Pt arrived on time to therapy and stated that she was tired from a long weekend of moving. Pt stated that she's almost completely moved out of her home and staying at her daughter's house. Pt states that cognitively, she still continues to have short term memory deficits that she is aware of. Pt brought completed home program to session.     Objective:   Treatments/Interventions Performed:  Cognitive-Linguistic training, Home program, Compensatory strategy training and Patient/Caregiver education     Pt completed home program (money fxl math/deduction puzzle). Math problems were 18/20 (90%) accurate and the deductions puzzles were completed 100% accurate. Pt completed a time fxl math worksheet with 100% accuracy. Given 3 words verbally, Pt was asked to put the words in order that they would most likely occur - achieved 9/10 (90%) accuracy independently with no repetitions. Pt had difficulty with a mental manipulation task that incorporated opposites and a delay (divided attention, selective attention) and that task was abandoned. Pt completed an alternating/divided attention task where she was asked to read a story while remembering content and cross out the words \"a\", \"and\" and \"ed\" endings. Pt identified 23/30 (77%) of the words/endings after reading the passage once. Pt provided 4/5 (80%) details about the story. Pt educated on internal and external memory strategies. Pt encouraged to buy a planner as an external strategy and given internal strategies to use day-to-day.     Speech " Therapy Assessment:     Cognitive Linguistic Assessment:     Patient attention selective: Minimal    Patient attention divided: Minimal    Attention and short term memory continues to be most challenging deficit. Selective, divided, alternating, and attention to details all contribute to Pt's difficulty with short term memory. Use of external and internal strategies should lower Pt's frustration with memory.     Speech Therapy Plan :   Prognosis: Good  Therapy Recommendations  Recommendation:  Individual Speech Therapy,  Planned Therapy Interventions:  Development of Cognitive Skills, Home Program, Patient/Caregiver Education and Compensatory Strategy Training,   Frequency:  1x week    Home program is to download and use [employer] brett, use internal memory strategies, and get a planner to use for appointments/lists as an external memory strategy.     I have reviewed and agree with the following documentation. I was present and participating in this session. No changes needed to be made.     Debora Crisostomo M.A. CCC-SLP

## 2018-04-30 NOTE — OP THERAPY DAILY TREATMENT
"  Outpatient Occupational Therapy  DAILY TREATMENT     [facility] Occupational [address]  [address]  [place] [address]  Phone:  [phone]  Fax:  [phone]    Date: 04/30/2018    Patient: Terri Krishnan  YOB: 1970  MRN: [id number]     Time Calculation  Start time: 1035  Stop time: 1135 Time Calculation (min): 60 minutes     Chief Complaint: Other (cognitive deficits)    Visit #: 4    SUBJECTIVE: \"I'm exhausted but otherwise ok\"    OBJECTIVE:  Current objective measures:   MVPT-3 Visual Closure Subtest: 9/13    3 minutes 42 seconds  Visual Short term memory: 8/8        Therapeutic Treatments and Modalities:    1. Cognitive Skill Development (CPT 14691)    2. Therapeutic Activities (CPT 08894)    Therapeutic Treatments and Modalities Summary: MVPT-3 Visual Closure Subtest: 9/13 in 3 minutes 42 seconds, reviewed errors with min cues to identify the correct answer.  Visual Short term memory: 8/8 correct  Multi-matrix board using numbers 1-35, able to perform within a normal time frame, repeated with alphabet forward and backwards with 1 error in each sequence, min extra time.  Replaced discs back into bucket in correct sequence.  Parquetry patterns: basic to moderately complex, required min cues and mod extra time in mildly distracting environment to correctly copy pattern placed in right visual space.      Time-based treatments/modalities:  Therapeutic activity minutes (CPT 93088): 30 minutes  Cognitive skill development minutes (CPT ): 30 minutes   ASSESSMENT:   Response to treatment: Pt making gains with cognitive skills in the areas of attention, memory, and visual information processing skills for daily routine.  Plan to re-assess driving simulator next visit.    PLAN/RECOMMENDATIONS:   Plan for treatment: therapy treatment to continue next visit.  Planned interventions for next visit: continue with current treatment, cognitive skill development (CPT 89129) " and therapeutic activities (CPT 90045)

## 2018-05-02 ENCOUNTER — OCCUPATIONAL THERAPY (OUTPATIENT)
Dept: OCCUPATIONAL THERAPY | Facility: REHABILITATION | Age: 48
End: 2018-05-02
Attending: PHYSICAL MEDICINE & REHABILITATION
Payer: COMMERCIAL

## 2018-05-02 DIAGNOSIS — R41.9 COGNITIVE COMPLAINTS: ICD-10-CM

## 2018-05-02 DIAGNOSIS — M62.81 MUSCLE WEAKNESS (GENERALIZED): ICD-10-CM

## 2018-05-02 PROCEDURE — 97530 THERAPEUTIC ACTIVITIES: CPT
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
## 2018-05-04 ENCOUNTER — APPOINTMENT (OUTPATIENT)
Dept: PHYSICAL THERAPY | Facility: REHABILITATION | Age: 48
End: 2018-05-04
Attending: PHYSICAL MEDICINE & REHABILITATION
Payer: COMMERCIAL

## 2018-05-08 ENCOUNTER — OCCUPATIONAL THERAPY (OUTPATIENT)
Dept: OCCUPATIONAL THERAPY | Facility: REHABILITATION | Age: 48
End: 2018-05-08
Attending: PHYSICAL MEDICINE & REHABILITATION
Payer: COMMERCIAL

## 2018-05-08 DIAGNOSIS — R41.9 COGNITIVE COMPLAINTS: ICD-10-CM

## 2018-05-08 DIAGNOSIS — M62.81 MUSCLE WEAKNESS (GENERALIZED): ICD-10-CM

## 2018-05-08 PROCEDURE — G8986 CARRY D/C STATUS: HCPCS | Mod: CI

## 2018-05-08 PROCEDURE — 97530 THERAPEUTIC ACTIVITIES: CPT

## 2018-05-08 PROCEDURE — G8985 CARRY GOAL STATUS: HCPCS | Mod: CI

## 2018-05-08 NOTE — OP THERAPY DAILY TREATMENT
"  Outpatient Occupational Therapy  DAILY TREATMENT     [facility] Occupational Therapy [address]  [address]  [place] [address]  Phone:  [phone]  Fax:  [phone]    Date: 05/08/2018    Patient: Terri Krishnan  YOB: 1970  MRN: [id number]     Time Calculation  Start time: 1030  Stop time: 1130 Time Calculation (min): 60 minutes     Chief Complaint: Other (ability to drive)    Visit #: 6    SUBJECTIVE: \"I feel ready to drive\"    OBJECTIVE:  Current objective measures:  Grooved pegboard:  RH: 67 seconds;  LH: 84 seconds  MVPT-3 Visual Closure Subtest: 13/13 correct, completed in 156 seconds  ADLs/ IADLs: independent   strength: Left 58 lbs  Right: 65 lbs  Motor control: WFL opposition and FNF  Cognition: able to recall 3/3 items after 2 minutes  Balance: standing static/dynamic  Good/Good  HEP: reviewed HEP for BUE with good understanding  OT Functional Assessment Tool Used: Grooved pegboard  OT Functional Assessment Score: RH: 67 seconds;  LH: 84 seconds     Therapeutic Treatments and Modalities:    1. Therapeutic Activities (CPT 36142)    Therapeutic Treatments and Modalities Summary: Driving simulator:  Brake reaction time: 0.8 seconds  Configurable crash avoidance scenarios:  Rural, daytime: safely passed tractor, maintained speed limit and mid-juan david position  Rural, dusk: able to able to maintain mid-juan david position, obeyed speed limit, decreased speed with safe following distances behind motorcycle  City,day: yielded before entering traffic [place], drove in correct juan david in [place], safely exited, good verbal use of turn signals  City, dusk, moderate rain, avg visibility:  obeyed traffic lights and speed limit, avoided hitting pedestrian  City, night, poor visibility:  Safely passed 2 stationary vehicles, drove at slow speed to accommodate for poor visibility, safely turned at intersection  City, dusk, heavy rain:  drove at slow speed r/t heavy rain, safely followed " behind a motorcycle  City, day, good visibility:  Safely passed stationary vehicles, obeyed all traffic signs, safely turned at intersections, avoided accidents with other vehicles    Time-based treatments/modalities:  Therapeutic activity minutes (CPT 29154): 60 minutes      ASSESSMENT:   Response to treatment: Pt has actively participated in skilled OT and has made excellent progress to fully meet LTGs.  Pt is independent ADLs, IADLs, and driving with good strength, motor control, balance, and cognitive skills.  The objective findings indicate that Ms. Krishnan has the physical, visual, visual-perceptual, and cognitive skills necessary to safely operate a vehicle.  She exhibited adequate reaction times and good safety distances with all simulator tasks.  In both rural and city settings where there were mild to moderate distractions, she did not have any accidents in multiple crash avoidance scenarios with pedestrians, animals, vehicles, or stationary objects.   She obeyed all regulatory signs, speed limits, maintained mid-juan david position at all times, followed all verbal directions, safely passed other vehicles, and was able to divide and focus her attention throughout the driving simulation without difficulty.   Overall, Ms. Krishnan demonstrated good safety awareness, judgement, and anticipatory skills.  Based on her performance today, I have no concerns regarding her ability to drive safely in the community.  Recommendations to re-integrate back to driving include driving during the day, under good weather conditions, on familiar routes, avoiding the freeway, limiting distractions, and having a friend accompany her on her first few drives for any feedback.  These findings were discussed with Ms. Krishnan with good understanding.  At this time, pt is safe for d/c to HEP.      PLAN/RECOMMENDATIONS:   Plan for treatment: discharge patient due to accomplished goals.  Planned interventions for next visit: Pt is safe to  d/c to HEP.  D/C OT.

## 2018-05-08 NOTE — OP THERAPY DISCHARGE SUMMARY
Outpatient Occupational Therapy  DISCHARGE SUMMARY NOTE    [facility] Occupational Therapy [address].  [address]  [place] [address]  Phone:  [phone]  Fax:  [phone]    Date of Visit: 05/08/2018    Patient: Terri Krishnan  YOB: 1970  MRN: [id number]     Referring Provider: Nathan Mireles M.D.  [address] [facility]  [address]  [place] [address]   Referring Diagnosis: Unspecified symptoms and signs involving cognitive functions and awareness [R41.9];Muscle weakness (generalized) [M62.81];Unspecified abnormalities of gait and mobility [R26.9]     Occupational Therapy Occurrence Codes    Date of onset of impairment:  3/4/18   Date of occupational therapy care plan established or reviewed:  4/10/18   Date of occupational therapy treatment started:  4/10/18          Functional Limitation G-Codes and Severity Modifiers  OT Functional Assessment Tool Used: Grooved pegboard  OT Functional Assessment Score: RH: 67 seconds;  LH: 84 seconds   Goal: Carry: Goal  (): CI   Discharge: Carry: Discharge (): CI       Your patient is being discharged from Occupational Therapy with the following comments:   · Goals met    Comments:  Pt has actively participated in skilled OT and has made excellent progress to fully meet LTGs.  Pt is independent ADLs, IADLs, and driving with good strength, motor control, balance, and cognitive skills.  The objective findings indicate that Ms. Krishnan has the physical, visual, visual-perceptual, and cognitive skills necessary to safely operate a vehicle.  She exhibited adequate reaction times and good safety distances with all simulator tasks.  In both rural and city settings where there were mild to moderate distractions, she did not have any accidents in multiple crash avoidance scenarios with pedestrians, animals, vehicles, or stationary objects.   She obeyed all regulatory signs, speed limits, maintained mid-juan david position at all times, followed all  verbal directions, safely passed other vehicles, and was able to divide and focus her attention throughout the driving simulation without difficulty.   Overall, Ms. Krishnan demonstrated good safety awareness, judgement, and anticipatory skills.  Based on her performance today, I have no concerns regarding her ability to drive safely in the community.  Recommendations to re-integrate back to driving include driving during the day, under good weather conditions, on familiar routes, avoiding the freeway, limiting distractions, and having a friend accompany her on her first few drives for any feedback.  These findings were discussed with Ms. Krishnan with good understanding.  At this time, pt is safe for d/c to HEP.      Limitations Remaining:  None    Recommendations:  D/C OT    Alan Clancy MS,OTR/L    Date: 5/8/2018

## 2018-05-09 RX ORDER — ESTRADIOL 1 MG/1
1 TABLET ORAL DAILY
Qty: 30 TAB | Refills: 0 | Status: SHIPPED | OUTPATIENT
Start: 2018-05-09 | End: 2018-06-14 | Stop reason: SDUPTHER

## 2018-05-14 ENCOUNTER — APPOINTMENT (OUTPATIENT)
Dept: PHYSICAL MEDICINE AND REHAB | Facility: REHABILITATION | Age: 48
End: 2018-05-14
Payer: MEDICAID

## 2018-05-18 ENCOUNTER — SPEECH THERAPY (OUTPATIENT)
Dept: SPEECH THERAPY | Facility: REHABILITATION | Age: 48
End: 2018-05-18
Attending: PHYSICAL MEDICINE & REHABILITATION
Payer: COMMERCIAL

## 2018-05-18 DIAGNOSIS — R41.840 COGNITIVE ATTENTION DEFICIT: ICD-10-CM

## 2018-05-18 PROCEDURE — 92507 TX SP LANG VOICE COMM INDIV: CPT

## 2018-05-18 NOTE — OP THERAPY DAILY TREATMENT
Outpatient Speech Therapy  DAILY TREATMENT     [facility] Speech [address]  [address]  [place] [address]  Phone:  [phone]  Fax:  [phone]    Date: 05/18/2018    Patient: Terri Krishnan  YOB: 1970  MRN: [id number]     Time Calculation  Start time: 1401  Stop time: 1458 Time Calculation (min): 57 minutes     Chief Complaint: Poor Memory    Visit #: 4    Subjective Evaluation  Pt on time.  Reports back to driving which is going well.  Greatest c/o is poor STM.  Has started using internal and external memory strategies (association/repetition/day planner/lists).      Objective:   Treatments/Interventions Performed:  Cognitive-Linguistic training, Compensatory strategy training and Home program       Pt is doing well trying to test her memory and use memory strategies (trying to recall 3 items on grocery list).  Continued discussion about internal vs external memory strategies and to use both.  Worked on working memory/mental manipulation.  Pt able to recall 4 units of info with 100% accuracy immediately, able to mentally manipulate info with 96% accuracy.  Completed planning task involving choosing menu option for dinner for 2 with given criteria $40 or under.  Did in timely manner w/o cues.      Assessments  Improvement noted with use of memory strategies which pt is successful with.  Still very frustrated with memory in general.  Doing well with attention/planning tasks.     Speech Therapy Plan :   Prognosis: Excellent  Therapy Recommendations  Recommendation:  Individual Speech Therapy,  Planned Therapy Interventions:  Home Program, Cognitive-Linguistic training and Compensatory Strategy Training,   Frequency:  1x week

## 2018-05-21 ENCOUNTER — APPOINTMENT (OUTPATIENT)
Dept: MEDICAL GROUP | Age: 48
End: 2018-05-21
Payer: MEDICAID

## 2018-05-25 ENCOUNTER — SPEECH THERAPY (OUTPATIENT)
Dept: SPEECH THERAPY | Facility: REHABILITATION | Age: 48
End: 2018-05-25
Attending: PHYSICAL MEDICINE & REHABILITATION
Payer: COMMERCIAL

## 2018-05-25 ENCOUNTER — PHYSICAL THERAPY (OUTPATIENT)
Dept: PHYSICAL THERAPY | Facility: REHABILITATION | Age: 48
End: 2018-05-25
Attending: PHYSICAL MEDICINE & REHABILITATION
Payer: COMMERCIAL

## 2018-05-25 DIAGNOSIS — M62.81 MUSCLE WEAKNESS (GENERALIZED): ICD-10-CM

## 2018-05-25 DIAGNOSIS — R41.840 COGNITIVE ATTENTION DEFICIT: ICD-10-CM

## 2018-05-25 DIAGNOSIS — R26.9 ABNORMALITY OF GAIT: ICD-10-CM

## 2018-05-25 PROCEDURE — 92507 TX SP LANG VOICE COMM INDIV: CPT

## 2018-05-25 PROCEDURE — 97162 PT EVAL MOD COMPLEX 30 MIN: CPT

## 2018-05-25 PROCEDURE — 97110 THERAPEUTIC EXERCISES: CPT

## 2018-05-25 ASSESSMENT — ENCOUNTER SYMPTOMS
ALLEVIATING FACTORS: PHARMACOTHERAPY
EXACERBATED BY: NOTHING
PAIN TIMING: ALL DAY
PAIN TIMING: INTERMITTENT
PAIN SCALE AT HIGHEST: 7
PAIN SCALE AT LOWEST: 3
PAIN SCALE: 6

## 2018-05-25 ASSESSMENT — GAIT ASSESSMENTS
GAIT AND PIVOT TURN: NORMAL: PIVOT TURNS SAFELY WITHIN 3 SECONDS AND STOPS QUICKLY WITH NO LOSS OF BALANCE
CHANGE IN GAIT SPEED: NORMAL: ABLE TO SMOOTHLY CHANGE WALKING SPEED WITHOUT LOSS OF BALANCE OR GAIT DEVIATION. SHOWS A SIGNIFICANT DIFFERENCE IN WALKING SPEEDS BETWEEN NORMAL, FAST AND SLOW SPEEDS
DYNAMIC GAIT INDEX SCORE: 100
STEP AROUND OBSTACLES: NORMAL: IS ABLE TO WALK AROUND CONES SAFELY WITHOUT CHANGING GAIT SPEED, NO EVIDENCE OF IMBALANCE
GAIT WITH VERTICAL HEAD TURNS: NORMAL: PERFORMS HEAD TURNS SMOOTHLY WITH NO CHANGE IN GAIT
GAIT LEVEL SURFACE: NORMAL: WALKS 20', NO ASSISTIVE DEVICES, GOOD SPEED, NO EVIDENCE FOR IMBALANCE, NORMAL GAIT PATTERN
STEPS: NORMAL: ALTERNATING FEET, NO RAIL
GAIT WITH HORIZONTAL HEAD TURNS: NORMAL: PERFORMS HEAD TURNS SMOOTHLY WITH NO CHANGE IN GAIT
STEP OVER OBSTACLE: NORMAL: IS ABLE TO STEP OVER THE BOX WITHOUT CHANGING GAIT SPEED, NO EVIDENCE OF IMBALANCE

## 2018-05-25 ASSESSMENT — ACTIVITIES OF DAILY LIVING (ADL)
POOR_BALANCE: 1
AMBULATION_WITHOUT_ASSISTIVE_DEVICE: INDEPENDENT

## 2018-05-25 NOTE — OP THERAPY DAILY TREATMENT
"  Outpatient Speech Therapy  DAILY TREATMENT     [facility] Speech [address]  [address]  [place] [address]  Phone:  [phone]  Fax:  [phone]    Date: 05/25/2018    Patient: Terri Krishnan  YOB: 1970  MRN: [id number]     Time Calculation  Start time: 0838  Stop time: 0930 Time Calculation (min): 52 minutes     Chief Complaint: Poor Memory and Other (attention)    Visit #: 5    Subjective Evaluation Pt running a few mins late, in good spirits.  Completed home program.  Has been \"testing\" memory and using memory strategies.     Objective:   Treatments/Interventions Performed:  Cognitive-Linguistic training, Patient/Caregiver education, Home program and Compensatory strategy training  Other Treatment Interventions:  Speech tx- 52 mins     Pt is incorporating use of memory strategies into daily living such as association/categorization, use of phone/ day planner/calendar.  Pt completed higher level sequencing/following detailed criteria task with 100% accuracy, much less impulsivity at this time and able to complete with conversation going on during task.  Started executive function/trip planning task, pt wrote info in a very organized manner.  Couple detailed missed, pt took home to review and complete. Will review next session.     Speech Therapy Assessment:     Cognitive Linguistic Assessment:     Patient attention selective: Minimal    Patient attention divided: Minimal    Patient immediate memory: Minimal    Patient recent and short term memory: Moderate    Patient prospective and time delay memory: Moderate    Patient ability to organize thoughts: Minimal    Patient executive functioning ability: Minimal    Noted progress towards goals every week.  Memory and attn are greatest barriers.     Speech Therapy Plan :   Prognosis: Excellent  Therapy Recommendations  Recommendation:  Individual Speech Therapy,  Planned Therapy Interventions:  Home Program, " Cognitive-Linguistic training, Compensatory Strategy Training and Patient/Caregiver Education,   Frequency:  1x week

## 2018-05-25 NOTE — OP THERAPY EVALUATION
Outpatient Physical Therapy  INITIAL NEUROLOGICAL EVALUATION    [address]  [address]  [place] [address]  Phone:  [phone]  Fax:  [phone]    Date of Evaluation: 05/25/2018    Patient: Terri Krishnan  YOB: 1970  MRN: [id number]     Referring Provider: Nathan Mireles M.D.  [address] [place] [address]  [address]  [place] [address]   Referring Diagnosis Unspecified symptoms and signs involving cognitive functions and awareness [R41.9];Muscle weakness (generalized) [M62.81];Unspecified abnormalities of gait and mobility [R26.9]     Time Calculation  Start time: 0940  Stop time: 1030 Time Calculation (min): 50 minutes     Physical Therapy Occurrence Codes    Date of onset of impairment:  3/20/18   Date physical therapy care plan established or reviewed:  5/25/18   Date physical therapy treatment started:  5/25/18           Chief Complaint: pain   Visit Diagnoses     ICD-10-CM   1. Abnormality of gait R26.9   2. Muscle weakness (generalized) M62.81       Subjective:   History of Present Illness:     Mechanism of injury:  Patient is a 48 year old who admitted to hospital secondary to ground level fall resulting in ICH. Patient had complications in hospital with additional seizures and alcohol withdrawal. Patient was transferred to Rehab on 3/20/18 and d/c on 4/5/18. Patient d/c with a friend using a SPC. Patient reports that she stopped using the SPC after the first day, feeling it was hederence. She has reported no falls since discharge .     Patient is currently living with her daughter. Patient's daughter lives in one story home with no steps to enter.     Patient reports now her biggest issue is pain. She reports full body pain, most significantly in in face and neck. She reports the symptoms started once she came home. She reports that she is taking 3-4 every 6 hours (200 mg)   Headache comments: HA everyday   Sleep disturbance:  Not disrupted (she does  take medication at night to assist with sleep)  Pain:     Current pain ratin    At best pain rating:  3 (with meds )    At worst pain ratin    Quality: head squeezing sensation, tightness in the rest of the body     Pain timing:  All day and intermittent    Relieving factors:  Pharmacotherapy (stretching only helps for a few minutes )    Aggravating factors:  Nothing  Diagnostic Tests:     CT scan: abnormal      Diagnostic Tests Comments:  1.  Interval decrease in subarachnoid and subdural hemorrhage from the prior exam previously described left subdural hemorrhage is not appreciated on the current exam.    2.  6 mm midline shift to the left is unchanged.  Patient Goals:     Other patient goals:  To feel better, decrease pain      Past Medical History:   Diagnosis Date   • Surgical menopause    • Tobacco dependence      Past Surgical History:   Procedure Laterality Date   • ABDOMINAL HYSTERECTOMY TOTAL  age 27    Total     Social History   Substance Use Topics   • Smoking status: Current Every Day Smoker     Packs/day: 0.50     Years: 15.00   • Smokeless tobacco: Never Used   • Alcohol use 0.5 oz/week     1 Glasses of wine per week     Family and Occupational History     Social History   • Marital status: Single     Spouse name: N/A   • Number of children: N/A   • Years of education: N/A       Objective:   Active Range of Motion:   Upper extremity (left):     All left upper extremity active range of motion: All within functional limits    AROM Left Shoulder Internal Rotation: pain in anterior shoulder with HBB.  Upper extremity (right):     All right upper extremity active range of motion: All within functional limits    AROM Right Shoulder Internal Rotation: pain in anterior shoulder with HBB.  Lower extremity (left):     All left lower extremity active range of motion: All within functional limits  Lower extremity (right):     All right lower extremity active range of motion: All within functional  limits      Strength:     Left upper extremity strength within functional limits.      Right upper extremity strength within functional limits.      Left lower extremity strength within functional limits.      Right lower extremity strength within functional limits.  Lower extremity (right):     Right hip flexors strength: pain with testing     Right knee extension strength: pain with testing     Tone, Sensation and Coordination:   Tone:     Left lower extremity muscle tone: Normal    Right lower extremity muscle tone: Normal    Sensation   Lower extremity (left):     Light touch: Intact    Sharp/dull: Intact     Proprioception: Intact   Lower extremity (right):     Light touch: Impaired (R medial anterior calf and medial dorsal portion of the foot )    Sharp/dull: Impaired (R medial anterior calf and medial dorsal portion of the foot , posterior calf )     Proprioception: Intact     Cognition:     Attention span: impaired    Cognition Comments:  Patient was able to follow directions, but required increased time to process information during evaluation     Vision/Perception:     Visual tracking: intact    Convergence: intact    Ambulation: Level Surfaces   Ambulation without assistive device: independent    Ambulation: Stairs   Ascend stairs: independent  Pattern: reciprocal  Railings: without rails  Descend stairs: independent  Pattern: reciprocal  Railings: without rails    Observational Gait   Gait: within functional limits   Walking speed within functional limits.     Balance/Gait Comments   Romber seconds EO/EC no LOB  Tandem: 30 seconds EO     SLS: L LE 10 seconds            R LE 5 seconds     DGI:      Posture fair with noted rounded forward shoulders          Therapeutic Exercises (CPT 00552):     1. Foam roller , Introduced patient to full and 1/2 foam roller , handout provided     2. SLS next to counter support , (HEP)     3. Tandem stance , (HEP)      Time-based  treatments/modalities:  Therapeutic exercise minutes (CPT 51487): 15 minutes       Assessment, Response and Plan:   Impairments: impaired balance, lacks appropriate home exercise program and pain with function    Assessment details:  Patient is a 48 year old female who presents impaired high level static balance, decreased attention, impaired R anterior/posterior calf sensation, and global body pain limiting patient from resuming PLOF. Upon examination, patient reported increased global pain not changed with mobility after leaving the Rehab hospital. Recommending follow up with MD regarding pain. Patient does demonstrate safety with gait, but can benefit from PT sessions to improve high level balance and increased attention with mobile tasks.    Goals:   Short Term Goals:   1) Patient will be able to tolerate SLS 15 seconds on L LE   2) Patient will be able to tolerate SLS 25 seconds on R LE   Short term goal time span:  2-4 weeks      Long Term Goals:    1) Patient will be independent with HEP   2) Patient will be able to tolerate SLS 20 seconds on L LE   3) Patient will be able to tolerate SLS 20 seconds on L LE   Long term goal time span:  4-6 weeks    Plan:   Therapy options:  Physical therapy treatment to continue (with MD follow up regarding change in pain sxs)  Planned therapy interventions:  Neuromuscular Re-education (CPT 49437), Therapeutic Exercise (CPT 45480) and Manual Therapy (CPT 47680)  Frequency:  2x month  Duration in weeks:  6  Duration in visits:  2  Discussed with:  Patient      Functional Limitation G-Codes and Severity Modifiers  Dynamic Gait Index Total Score: 100   Current:   n/a    Goal:   n/a       Referring provider co-signature:  I have reviewed this plan of care and my co-signature certifies the need for services.  Certification Dates:   From 5/25/2018       To 07/06/18    Physician Signature: ________________________________ Date: ______________

## 2018-05-30 ENCOUNTER — APPOINTMENT (OUTPATIENT)
Dept: PHYSICAL THERAPY | Facility: REHABILITATION | Age: 48
End: 2018-05-30
Payer: COMMERCIAL

## 2018-05-30 ENCOUNTER — SPEECH THERAPY (OUTPATIENT)
Dept: SPEECH THERAPY | Facility: REHABILITATION | Age: 48
End: 2018-05-30
Attending: PHYSICAL MEDICINE & REHABILITATION
Payer: COMMERCIAL

## 2018-05-30 DIAGNOSIS — R41.840 COGNITIVE ATTENTION DEFICIT: ICD-10-CM

## 2018-05-30 PROCEDURE — 92507 TX SP LANG VOICE COMM INDIV: CPT

## 2018-06-01 NOTE — OP THERAPY DAILY TREATMENT
Provider did not have access during this appointment time.  Note documented on paper to be scanned into .

## 2018-06-15 RX ORDER — ESTRADIOL 1 MG/1
TABLET ORAL
Qty: 30 TAB | Refills: 0 | Status: SHIPPED | OUTPATIENT
Start: 2018-06-15 | End: 2018-07-20 | Stop reason: SDUPTHER

## 2018-06-18 ENCOUNTER — OFFICE VISIT (OUTPATIENT)
Dept: PHYSICAL MEDICINE AND REHAB | Facility: REHABILITATION | Age: 48
End: 2018-06-18
Payer: COMMERCIAL

## 2018-06-18 VITALS
TEMPERATURE: 97.4 F | SYSTOLIC BLOOD PRESSURE: 108 MMHG | DIASTOLIC BLOOD PRESSURE: 68 MMHG | HEART RATE: 83 BPM | BODY MASS INDEX: 19.1 KG/M2 | WEIGHT: 126 LBS | OXYGEN SATURATION: 97 % | HEIGHT: 68 IN

## 2018-06-18 DIAGNOSIS — S06.305D: ICD-10-CM

## 2018-06-18 DIAGNOSIS — G44.321 INTRACTABLE CHRONIC POST-TRAUMATIC HEADACHE: ICD-10-CM

## 2018-06-18 PROBLEM — R13.12 OROPHARYNGEAL DYSPHAGIA: Status: RESOLVED | Noted: 2018-03-16 | Resolved: 2018-06-18

## 2018-06-18 PROBLEM — D64.9 ANEMIA: Status: RESOLVED | Noted: 2018-03-06 | Resolved: 2018-06-18

## 2018-06-18 PROCEDURE — 99213 OFFICE O/P EST LOW 20 MIN: CPT | Performed by: PHYSICAL MEDICINE & REHABILITATION

## 2018-06-18 RX ORDER — TOPIRAMATE SPINKLE 25 MG/1
25 CAPSULE ORAL 2 TIMES DAILY
Qty: 100 CAP | Refills: 1 | Status: SHIPPED | OUTPATIENT
Start: 2018-06-18 | End: 2018-07-23

## 2018-06-18 RX ORDER — FLUDROCORTISONE ACETATE 0.1 MG/1
0.05 TABLET ORAL 2 TIMES DAILY
Qty: 60 TAB | Refills: 1 | Status: SHIPPED | OUTPATIENT
Start: 2018-06-18 | End: 2018-08-27

## 2018-06-18 NOTE — PROGRESS NOTES
"Rehab Progress Note     Interval Events (Subjective)  CC:   Traumatic Brain injury  with cognitive deficts and balance deficits    Patient is improving, notes the therapist appreciated. She still is complaining of headaches          Objective:  VITAL SIGNS: /72   Pulse 72   Temp 36.6 °C (97.8 °F)   Resp 18   Ht 1.727 m (5' 8\")   Wt 59.5 kg (131 lb 2.8 oz)   SpO2 94%   Breastfeeding? No   BMI 19.94 kg/m²   Cardiac: regular rate and rhythm, nl S1/S2   Lungs: clear to auscultation bilaterally.   Abdomen: soft; NT, ND, BS present.   Extremities: Without clubbing cyanosis or edema  Neuro: Significant improvement noted in balance      This  examination is unchanged from that of yesterday 3/22/2018            Current Facility-Administered Medications   Medication Frequency   • Respiratory Care per Protocol Continuous RT   • Pharmacy Consult Request ...Pain Management Review 1 Each PRN   • docusate sodium (COLACE) capsule 100 mg DAILY     And   • senna-docusate (PERICOLACE or SENOKOT S) 8.6-50 MG per tablet 1 Tab Q EVENING     And   • lactulose 20 GM/30ML solution 30 mL Q24HRS PRN     And   • bisacodyl (DULCOLAX) suppository 10 mg QDAY PRN   • artificial tears 1.4 % ophthalmic solution 1 Drop PRN   • benzocaine-menthol (CEPACOL) lozenge 1 Lozenge Q2HRS PRN   • hydrALAZINE (APRESOLINE) tablet 25 mg Q8HRS PRN   • mag hydrox-al hydrox-simeth (MAALOX PLUS ES or MYLANTA DS) suspension 20 mL Q2HRS PRN   • ondansetron (ZOFRAN ODT) dispertab 4 mg 4X/DAY PRN     Or   • ondansetron (ZOFRAN) syringe/vial injection 4 mg 4X/DAY PRN   • traZODone (DESYREL) tablet 50 mg QHS PRN   • chlordiazePOXIDE (LIBRIUM) capsule 25 mg Q8HRS   • enoxaparin (LOVENOX) inj 30 mg Q12HRS   • fludrocortisone (FLORINEF) tablet 0.1 mg BID   • omeprazole (PRILOSEC) capsule 20 mg DAILY   • potassium bicarbonate (KLYTE) 25 MEQ effervescent tablet TBEF 50 mEq Q8HRS   • sodium chloride (SALT) tablet 2 g Q6HRS   • acetaminophen (TYLENOL) tablet 650 mg " Q6HRS   • [START ON 3/25/2018] acetaminophen (TYLENOL) tablet 650 mg Q4HRS PRN   • oxyCODONE immediate-release (ROXICODONE) tablet 5 mg Q4HRS PRN   • DULoxetine (CYMBALTA) capsule 30 mg DAILY               Orders Placed This Encounter   Procedures   • DIET ORDER       Standing Status:   Standing       Number of Occurrences:   1       Order Specific Question:   Diet:       Answer:   Regular [1]       Order Specific Question:   Texture/Fiber modifications:       Answer:   Dysphagia 3(Mechanical Soft)specify fluid consistency(question 6) [3]       Order Specific Question:   Consistency/Fluid modifications:       Answer:   Thin Liquids [3]         Assessment:  Active Hospital Problems    Diagnosis   • *Traumatic intracranial hemorrhage (CMS-HCC)   • Hyponatremia   • Acute hyperactive alcohol withdrawal delirium (CMS-HCC)   • Moderate protein-calorie malnutrition (CMS-HCC)   • Anemia   • Alcohol abuse   • Cognitive and behavioral changes   • Lability emotional   • Impaired mobility and ADLs   • Balance disorder   • CARSON (generalized anxiety disorder)   • Tobacco dependence       Medical Decision Making and Plan.                 History of heavy alcohol use status post acute alcoholic withdrawal   We have started the wean of Librium will monitor for DVTs     Hyponatremia  Last sodium was 136 all over the gram sodium to 2 every 8 hours and watch how she does. We'll recheck next week       Dysphagia  Patient is on dysphagia 3 thin liquid diets was seen by speech and my expectation is rapid advancement regular diet     Protein malnutrition  Continue supplements     Bacterial conjunctivae of both eyes  Improved     Anemia   Stable     Thrombocytopenia upon admission to the hospital   We'll follow labs latest platelets were more than adequate     History of tobacco abuse  Still not smoking      Headache  Still complaining of headaches have added a small dose of Neurontin to see if it helps I think long-term use of narcotics is  not an acceptable solution without significant improvement     Cognitive deficits     Significantly improved on the Cymbalta will continue to follow     Emotional lability  Patient's has been started on duloxetine will monitor with improvement noted     Continue PT, OT and SLP     At team conference resected tentative discharge date of 4/5/2018  Team conference next week 3/29/2018     I spent 30 minutes face to face was spent with the patient with greater than 50% of the time spent counseling and coordinating care    Aundrea Mireles M.D.

## 2018-06-18 NOTE — PROGRESS NOTES
"Subjective:      Terri Krishnan is a 48 y.o. female who presents for follow up for her hospitalization after TBI          HPI        The patient is a 48 y.o. female with a past medical history of tobacco and alcohol abuse admitted for acute inpatient rehabilitation with severe functional debility after a ground-level fall under somewhat suspicious circumstances on March 4, 2018 with resultant intracranial hemorrhage. Upon admission to the hospital she was notably thrombocytopenic as well as anemic and had positive blood alcohol level. Upon evaluation a CT scan of the head demonstrated right subarachnoid hemorrhage within the sylvian fissure, parafalcine hemorrhage as well as blood along the tentorium, with 6 mm shift to the left. Complications have been numerous including hypokalemia, hyponatremia, severe alcohol withdrawal. Se is currently on Librium 25 mg 3 times a day. She had a approximately week of posttraumatic amnesia, and has significant bruising to her right face. Patient was evaluated by Rehab Medicine physician and Physical Therapy, Occupational Therapy and Speech Therapy and determined to be appropriate for acute inpatient rehab and was transferred to [facility] on 3/20/2108      Issues that were a major issue include headache, anxiety, decreased balance and history of alcohol abuse  Another issue of significance was light headedness.  The patient has stopped most of her medications and has stopped her florinef . She reports that her symptoms are worse when she stands up. The patient's biggest complaint is her headache which starts from the time she wakes up until she goes to sleep. She reports that it is a 10 before ibuprofen  And a hour after taking it it drops to a 7. She reports substantial photophobia as  Well  The patient reports that is totally abstaining from alcohol    ROS       Objective:     /68   Pulse 83   Temp 36.3 °C (97.4 °F)   Ht 1.727 m (5' 8\")   Wt " 57.2 kg (126 lb)   SpO2 97%   BMI 19.16 kg/m²      Physical Exam    Cardiac: regular rate and rhythm, nl S1/S2   Lungs: clear to auscultation bilaterally.   Abdomen: soft; NT, ND, BS present.   Extremities: without C,C or E  Neuro: Significantly improved since her last visits  Still with issues with attention and recall but can encode because she can get items at 5 minutes given a clue or multiple choice.  He motor strength is 5/5 and finger to nose is intact bilaterally  S he can perform tandem gait and has a negative Romberg.  She can stand with difficulty on the right lleg and has much more difficulty on the left leg            Assessment/Plan:     TBI     Chronic post traumatic headache    Decreased balance improved    Orthostatic hypotension      Plan    1) start patient  On topamax 25 mg bid and increase to 50 mb bid in 1 week  2) restart florienf  3) follow up in 1 month

## 2018-06-19 ENCOUNTER — APPOINTMENT (OUTPATIENT)
Dept: MEDICAL GROUP | Age: 48
End: 2018-06-19
Payer: MEDICAID

## 2018-06-22 ENCOUNTER — HOSPITAL ENCOUNTER (OUTPATIENT)
Dept: RADIOLOGY | Facility: MEDICAL CENTER | Age: 48
End: 2018-06-22
Attending: NURSE PRACTITIONER
Payer: COMMERCIAL

## 2018-06-22 DIAGNOSIS — R51.9 FACIAL PAIN: ICD-10-CM

## 2018-06-22 PROCEDURE — 70450 CT HEAD/BRAIN W/O DYE: CPT

## 2018-06-26 ENCOUNTER — APPOINTMENT (OUTPATIENT)
Dept: MEDICAL GROUP | Age: 48
End: 2018-06-26
Payer: MEDICAID

## 2018-07-02 ENCOUNTER — TELEPHONE (OUTPATIENT)
Dept: SPEECH THERAPY | Facility: REHABILITATION | Age: 48
End: 2018-07-02

## 2018-07-02 NOTE — OP THERAPY DISCHARGE SUMMARY
Outpatient Speech Language Pathology  DISCHARGE SUMMARY NOTE      [facility] Speech Language Pathology [address]  [address]  [place] [address]  Phone:  [phone]  Fax:  [phone]        Patient Name:  Terri Krishnan    :  1970  MR#:  [id number]    Visits: 6            Diagnosis/ICD-10: Cog-linguistic- R41.9    Referring Provider: Nathan KIM Date: 4/10/18  Onset Date: 3/4/18      Your patient is being discharged from Speech therapy with the following comments:  Goals Met and Pt. has failed to schedule or reschedule follow up visits      Comments:  Pt demonstrated good progress and met all her short term goals, she did not return for final couple of sessions.  She continues to present with short term memory and higher level attn deficits which she successfully uses strategies for.          Limitations/Remaining:  Mild STM/attn deficits- good use of strategies.       Recommendations:  Cont with use of strategies and brain games to work on memory and higher level attn deficits.       Debora Mcnulty MA, CCC-SLP

## 2018-07-12 NOTE — ADDENDUM NOTE
Encounter addended by: Nathan Mireles M.D. on: 7/11/2018  8:25 PM<BR>    Actions taken: Sign clinical note

## 2018-07-18 ENCOUNTER — HOSPITAL ENCOUNTER (OUTPATIENT)
Dept: RADIOLOGY | Facility: MEDICAL CENTER | Age: 48
End: 2018-07-18
Attending: NURSE PRACTITIONER
Payer: COMMERCIAL

## 2018-07-18 DIAGNOSIS — I63.9 CEREBRAL INFARCTION, UNSPECIFIED MECHANISM (HCC): ICD-10-CM

## 2018-07-18 PROCEDURE — A9585 GADOBUTROL INJECTION: HCPCS | Performed by: NURSE PRACTITIONER

## 2018-07-18 PROCEDURE — 70553 MRI BRAIN STEM W/O & W/DYE: CPT

## 2018-07-18 PROCEDURE — 700117 HCHG RX CONTRAST REV CODE 255: Performed by: NURSE PRACTITIONER

## 2018-07-18 RX ORDER — GADOBUTROL 604.72 MG/ML
6 INJECTION INTRAVENOUS ONCE
Status: COMPLETED | OUTPATIENT
Start: 2018-07-18 | End: 2018-07-18

## 2018-07-18 RX ADMIN — GADOBUTROL 7.5 ML: 604.72 INJECTION INTRAVENOUS at 12:17

## 2018-07-23 ENCOUNTER — OFFICE VISIT (OUTPATIENT)
Dept: PHYSICAL MEDICINE AND REHAB | Facility: REHABILITATION | Age: 48
End: 2018-07-23
Payer: MEDICAID

## 2018-07-23 VITALS
OXYGEN SATURATION: 98 % | TEMPERATURE: 98.6 F | BODY MASS INDEX: 19.1 KG/M2 | DIASTOLIC BLOOD PRESSURE: 72 MMHG | WEIGHT: 126 LBS | HEIGHT: 68 IN | HEART RATE: 79 BPM | SYSTOLIC BLOOD PRESSURE: 112 MMHG

## 2018-07-23 RX ORDER — ESTRADIOL 1 MG/1
1 TABLET ORAL
Qty: 30 TAB | Refills: 0 | Status: SHIPPED | OUTPATIENT
Start: 2018-07-23 | End: 2018-08-20 | Stop reason: SDUPTHER

## 2018-07-23 RX ORDER — IBUPROFEN 800 MG/1
800 TABLET ORAL EVERY 8 HOURS PRN
COMMUNITY
End: 2018-12-14 | Stop reason: CLARIF

## 2018-07-23 RX ORDER — DULOXETIN HYDROCHLORIDE 30 MG/1
30 CAPSULE, DELAYED RELEASE ORAL DAILY
Qty: 30 CAP | Refills: 1 | Status: SHIPPED | OUTPATIENT
Start: 2018-07-23 | End: 2018-08-27

## 2018-08-03 ENCOUNTER — TELEPHONE (OUTPATIENT)
Dept: PHYSICAL THERAPY | Facility: REHABILITATION | Age: 48
End: 2018-08-03

## 2018-08-03 NOTE — OP THERAPY DISCHARGE SUMMARY
Outpatient Physical Therapy  DISCHARGE SUMMARY NOTE      [address]  [address]  [place] [address]  Phone:  [phone]  Fax:  [phone]    Date of Visit: 08/03/2018    Patient: Terri Krishnan  YOB: 1970  MRN: [id number]     Referring Provider: Nathan Mireles M.D.  [address] [facility] [address]  [place] [address]   Referring Diagnosis Unspecified symptoms and signs involving cognitive functions and awareness [R41.9];Muscle weakness (generalized) [M62.81];Unspecified abnormalities of gait and mobility [R26.9]     Physical Therapy Occurrence Codes    Date of onset of impairment:  3/20/18   Date physical therapy care plan established or reviewed:  5/25/18   Date physical therapy treatment started:  5/25/18          Functional Limitation G-Codes and Severity Modifiers      Goal:   n/a   Discharge:   n/a       Your patient is being discharged from Physical Therapy with the following comments:   · Patient has failed to schedule or reschedule follow-up visits    Comments:  Patient was seen for evaluation, but did not reschedule additional follow ups in >30 days      Limitations Remaining:  Unknown at this time     Recommendations:  D/c from PT services     Thank you for the referral,     Terri Carrera, PT, DPT    Date: 8/3/2018

## 2018-08-06 ENCOUNTER — TELEPHONE (OUTPATIENT)
Dept: PHYSICAL MEDICINE AND REHAB | Facility: MEDICAL CENTER | Age: 48
End: 2018-08-06

## 2018-08-07 ENCOUNTER — HOSPITAL ENCOUNTER (EMERGENCY)
Facility: MEDICAL CENTER | Age: 48
End: 2018-08-07
Attending: EMERGENCY MEDICINE
Payer: MEDICAID

## 2018-08-07 VITALS
WEIGHT: 133.6 LBS | OXYGEN SATURATION: 96 % | DIASTOLIC BLOOD PRESSURE: 73 MMHG | HEIGHT: 68 IN | TEMPERATURE: 97 F | RESPIRATION RATE: 16 BRPM | HEART RATE: 68 BPM | SYSTOLIC BLOOD PRESSURE: 120 MMHG | BODY MASS INDEX: 20.25 KG/M2

## 2018-08-07 DIAGNOSIS — R51.9 INTRACTABLE HEADACHE, UNSPECIFIED CHRONICITY PATTERN, UNSPECIFIED HEADACHE TYPE: ICD-10-CM

## 2018-08-07 LAB
ALBUMIN SERPL BCP-MCNC: 3.7 G/DL (ref 3.2–4.9)
ALBUMIN/GLOB SERPL: 1.9 G/DL
ALP SERPL-CCNC: 57 U/L (ref 30–99)
ALT SERPL-CCNC: 15 U/L (ref 2–50)
ANION GAP SERPL CALC-SCNC: 8 MMOL/L (ref 0–11.9)
AST SERPL-CCNC: 14 U/L (ref 12–45)
BASOPHILS # BLD AUTO: 0.5 % (ref 0–1.8)
BASOPHILS # BLD: 0.05 K/UL (ref 0–0.12)
BILIRUB SERPL-MCNC: 0.4 MG/DL (ref 0.1–1.5)
BUN SERPL-MCNC: 20 MG/DL (ref 8–22)
CALCIUM SERPL-MCNC: 8.4 MG/DL (ref 8.5–10.5)
CHLORIDE SERPL-SCNC: 110 MMOL/L (ref 96–112)
CO2 SERPL-SCNC: 24 MMOL/L (ref 20–33)
CREAT SERPL-MCNC: 0.71 MG/DL (ref 0.5–1.4)
EOSINOPHIL # BLD AUTO: 0.11 K/UL (ref 0–0.51)
EOSINOPHIL NFR BLD: 1.2 % (ref 0–6.9)
ERYTHROCYTE [DISTWIDTH] IN BLOOD BY AUTOMATED COUNT: 44.3 FL (ref 35.9–50)
ETHANOL BLD-MCNC: 0 G/DL
GLOBULIN SER CALC-MCNC: 2 G/DL (ref 1.9–3.5)
GLUCOSE SERPL-MCNC: 90 MG/DL (ref 65–99)
HCT VFR BLD AUTO: 44.4 % (ref 37–47)
HGB BLD-MCNC: 15.1 G/DL (ref 12–16)
IMM GRANULOCYTES # BLD AUTO: 0.03 K/UL (ref 0–0.11)
IMM GRANULOCYTES NFR BLD AUTO: 0.3 % (ref 0–0.9)
LYMPHOCYTES # BLD AUTO: 2.42 K/UL (ref 1–4.8)
LYMPHOCYTES NFR BLD: 25.4 % (ref 22–41)
MCH RBC QN AUTO: 32.1 PG (ref 27–33)
MCHC RBC AUTO-ENTMCNC: 34 G/DL (ref 33.6–35)
MCV RBC AUTO: 94.3 FL (ref 81.4–97.8)
MONOCYTES # BLD AUTO: 0.55 K/UL (ref 0–0.85)
MONOCYTES NFR BLD AUTO: 5.8 % (ref 0–13.4)
NEUTROPHILS # BLD AUTO: 6.37 K/UL (ref 2–7.15)
NEUTROPHILS NFR BLD: 66.8 % (ref 44–72)
NRBC # BLD AUTO: 0 K/UL
NRBC BLD-RTO: 0 /100 WBC
PLATELET # BLD AUTO: 348 K/UL (ref 164–446)
PMV BLD AUTO: 9.6 FL (ref 9–12.9)
POTASSIUM SERPL-SCNC: 3.5 MMOL/L (ref 3.6–5.5)
PROT SERPL-MCNC: 5.7 G/DL (ref 6–8.2)
RBC # BLD AUTO: 4.71 M/UL (ref 4.2–5.4)
SODIUM SERPL-SCNC: 142 MMOL/L (ref 135–145)
WBC # BLD AUTO: 9.5 K/UL (ref 4.8–10.8)

## 2018-08-07 PROCEDURE — 700105 HCHG RX REV CODE 258: Performed by: EMERGENCY MEDICINE

## 2018-08-07 PROCEDURE — 700111 HCHG RX REV CODE 636 W/ 250 OVERRIDE (IP): Performed by: EMERGENCY MEDICINE

## 2018-08-07 PROCEDURE — 80307 DRUG TEST PRSMV CHEM ANLYZR: CPT

## 2018-08-07 PROCEDURE — 80053 COMPREHEN METABOLIC PANEL: CPT

## 2018-08-07 PROCEDURE — 99284 EMERGENCY DEPT VISIT MOD MDM: CPT

## 2018-08-07 PROCEDURE — 85025 COMPLETE CBC W/AUTO DIFF WBC: CPT

## 2018-08-07 PROCEDURE — 96374 THER/PROPH/DIAG INJ IV PUSH: CPT

## 2018-08-07 PROCEDURE — 96375 TX/PRO/DX INJ NEW DRUG ADDON: CPT

## 2018-08-07 PROCEDURE — 96376 TX/PRO/DX INJ SAME DRUG ADON: CPT

## 2018-08-07 RX ORDER — DEXAMETHASONE SODIUM PHOSPHATE 4 MG/ML
10 INJECTION, SOLUTION INTRA-ARTICULAR; INTRALESIONAL; INTRAMUSCULAR; INTRAVENOUS; SOFT TISSUE ONCE
Status: COMPLETED | OUTPATIENT
Start: 2018-08-07 | End: 2018-08-07

## 2018-08-07 RX ORDER — MORPHINE SULFATE 4 MG/ML
4 INJECTION, SOLUTION INTRAMUSCULAR; INTRAVENOUS ONCE
Status: COMPLETED | OUTPATIENT
Start: 2018-08-07 | End: 2018-08-07

## 2018-08-07 RX ORDER — SODIUM CHLORIDE 9 MG/ML
1000 INJECTION, SOLUTION INTRAVENOUS ONCE
Status: COMPLETED | OUTPATIENT
Start: 2018-08-07 | End: 2018-08-07

## 2018-08-07 RX ORDER — DIPHENHYDRAMINE HYDROCHLORIDE 50 MG/ML
25 INJECTION INTRAMUSCULAR; INTRAVENOUS ONCE
Status: COMPLETED | OUTPATIENT
Start: 2018-08-07 | End: 2018-08-07

## 2018-08-07 RX ADMIN — MORPHINE SULFATE 4 MG: 4 INJECTION INTRAVENOUS at 12:05

## 2018-08-07 RX ADMIN — SODIUM CHLORIDE 1000 ML: 9 INJECTION, SOLUTION INTRAVENOUS at 11:06

## 2018-08-07 RX ADMIN — DEXAMETHASONE SODIUM PHOSPHATE 10 MG: 4 INJECTION, SOLUTION INTRAMUSCULAR; INTRAVENOUS at 11:06

## 2018-08-07 RX ADMIN — MORPHINE SULFATE 4 MG: 4 INJECTION INTRAVENOUS at 11:06

## 2018-08-07 RX ADMIN — PROCHLORPERAZINE EDISYLATE 10 MG: 5 INJECTION INTRAMUSCULAR; INTRAVENOUS at 11:06

## 2018-08-07 RX ADMIN — DIPHENHYDRAMINE HYDROCHLORIDE 25 MG: 50 INJECTION, SOLUTION INTRAMUSCULAR; INTRAVENOUS at 11:06

## 2018-08-07 ASSESSMENT — PAIN SCALES - GENERAL
PAINLEVEL_OUTOF10: 10
PAINLEVEL_OUTOF10: 5
PAINLEVEL_OUTOF10: 7

## 2018-08-07 ASSESSMENT — ENCOUNTER SYMPTOMS
NAUSEA: 1
WEAKNESS: 0
FEVER: 0
VOMITING: 0
SHORTNESS OF BREATH: 0
HEADACHES: 1
DIZZINESS: 0
INSOMNIA: 1
BACK PAIN: 0

## 2018-08-07 NOTE — ED TRIAGE NOTES
Pt comes in complaining of a HA for 3 days. History of TBI in March and pt stating worsening HAs since. Pt stating no improvement with medication.

## 2018-08-07 NOTE — ED NOTES
Verbalizes understanding of discharge and followup instructions. PIV removed, VSS. Ambulates with steady gait to discharge with family.

## 2018-08-07 NOTE — ED PROVIDER NOTES
ED Provider Note    ED Provider Note    Primary care provider: Earlene Rebolledo P.A.-C.  Means of arrival: POV  History obtained from: PAtient, friend/family  History limited by: none    CHIEF COMPLAINT  Chief Complaint   Patient presents with   • Head Ache       HPI  Terri Krishnan is a 48 y.o. female who presents to the Emergency Department complaining of a headache.  Patient did not have problems with headache until March of this year when she fell sustaining a subarachnoid hemorrhage.  She was admitted here to this hospital and subsequently went to subacute rehab.  She states that she did well for about a month and then the headaches started in April of this year.  At that time, they came on suddenly and have been persistent, gradually getting worse since that time.  She has had follow-up CTs as well as MRI.  She was being seen by her primary care doctor most recently a few weeks ago when some medication changes were made.  She has not however, received any significant relief from her occasions and presents here with worsening pain.  Patient denies any new injuries or falls.  She does state that she has discontinued her alcohol intake since her injury in March.  She denies any drug use.    REVIEW OF SYSTEMS  Review of Systems   Constitutional: Negative for fever.   HENT: Negative for congestion.    Respiratory: Negative for shortness of breath.    Cardiovascular: Negative for chest pain.   Gastrointestinal: Positive for nausea. Negative for vomiting.   Genitourinary: Negative for dysuria.   Musculoskeletal: Negative for back pain.   Neurological: Positive for headaches. Negative for dizziness and weakness.   Psychiatric/Behavioral: The patient has insomnia.    All other systems reviewed and are negative.      PAST MEDICAL HISTORY   has a past medical history of Surgical menopause and Tobacco dependence.    SURGICAL HISTORY   has a past surgical history that includes abdominal hysterectomy total (age  "27).    SOCIAL HISTORY  Social History   Substance Use Topics   • Smoking status: Current Every Day Smoker     Packs/day: 0.50     Years: 15.00   • Smokeless tobacco: Never Used   • Alcohol use 0.5 oz/week     1 Glasses of wine per week      History   Drug Use No       FAMILY HISTORY  Family History   Problem Relation Age of Onset   • Cancer Neg Hx    • Diabetes Neg Hx    • Heart Disease Neg Hx    • Stroke Neg Hx    • Hyperlipidemia Neg Hx    • Hypertension Neg Hx        CURRENT MEDICATIONS  Home Medications    **Home medications have not yet been reviewed for this encounter**         ALLERGIES  Allergies   Allergen Reactions   • Shrimp (Diagnostic) Anaphylaxis     poa stated that this patient had a reaction a couple years ago after eating shrimp   • Shrimp Flavor Anaphylaxis       PHYSICAL EXAM  VITAL SIGNS: /73   Pulse 68   Temp 36.1 °C (97 °F)   Resp 16   Ht 1.727 m (5' 8\")   Wt 60.6 kg (133 lb 9.6 oz)   SpO2 96%   BMI 20.31 kg/m²   Vitals reviewed.  Constitutional: Patient is oriented to person, place, and time. Appears well-developed and well-nourished.  Mild to moderate distress.    Head: Normocephalic and atraumatic.   Ears: Normal external ears bilaterally.   Mouth/Throat: Oropharynx is clear and moist  Eyes: Conjunctivae are normal. Pupils are equal, round, and reactive to light.   Neck: Normal range of motion. Neck supple.  Cardiovascular: Normal rate, regular rhythm and normal heart sounds.   Pulmonary/Chest: Effort normal and breath sounds normal. No respiratory distress, no wheezes, rhonchi, or rales.   Abdominal: Soft. Bowel sounds are normal. There is no tenderness. No rebound or guarding, or peritoneal signs.   Musculoskeletal: No edema and no tenderness.   Neurological: No focal deficits. Cranial nerve deficits.  No motor or sensory deficits or changes.  Skin: Skin is warm and dry. No erythema. No pallor.   Psychiatric: Patient has a normal mood and affect.     COURSE & MEDICAL DECISION " MAKING  Pertinent Labs & Imaging studies reviewed. (See chart for details)    Obtained and reviewed past medical records.  I reviewed previous records from her initial injury.  Patient had a subsequent, follow-up MRI on March 18 which showed resolution of the subarachnoid hemorrhage.  I reviewed clinic notes from June 18 with the patient was advised to double up on her Topamax and start Florinef.  She apparently, was seen 2 weeks ago after she had done this and was started on Cymbalta and told to discontinue Florinef and Topamax.  This note is not in the chart.    1:22 PM - Patient seen and examined at bedside.  This is a pleasant 48-year-old female who presents with a headache.  She unfortunately, suffered a traumatic brain injury several months ago.  She has had headaches since about a month after that.  No recent traumas.  She has had imaging as recently as approximately 3 weeks ago.  I do not see indication for repeat imaging at this time.  She has no neurologic deficits.  I discussed with her continued healing from her brain injury as well as the possibility of chronic headaches related to her recent brain injury.  She is quite frustrated that the symptoms have not resolved.  She will be treated with IV fluids for suspicion for migraine headache as these are vascular headaches and patient's typically benefit from IV fluids in addition, she will be treated with Decadron, anti-emetics because she is nauseated as well as Benadryl.  Will hold off on treatment with NSAIDs due to recent intracranial bleed.  She will be treated with pain medication as well.    Patient is reevaluated at the bedside.  She reports marked improvement in her headache.  Is about 50% gone.  I have advised, that she follow-up with neurology.  Her apparently, recent PCP, has left the practice but she is Wayne establish care with another one.  She will be discharged home with follow-up Dr. Harrison gray neurology.  Patient is given strict return  precautions.  She is advised to continue not using alcohol or drugs.  Patient is well-appearing and nontoxic.  He will be discharged home in stable condition.      FINAL IMPRESSION  1. Intractable headache, unspecified chronicity pattern, unspecified headache type

## 2018-08-07 NOTE — ED NOTES
Patient reports decreased pain (headache) rates 7/10 now instead of > 10. RN informed MD, order obtained for 4mg morphine IV. Updated patient on plan of care. Patient resting in bed. Side rails up. Call light in reach. No questions. Vital signs stable.

## 2018-08-20 RX ORDER — ESTRADIOL 1 MG/1
1 TABLET ORAL
Qty: 90 TAB | Refills: 1 | Status: SHIPPED | OUTPATIENT
Start: 2018-08-20 | End: 2018-12-14 | Stop reason: CLARIF

## 2018-08-20 RX ORDER — ESTRADIOL 1 MG/1
TABLET ORAL
Qty: 30 TAB | Refills: 0 | OUTPATIENT
Start: 2018-08-20

## 2018-08-20 NOTE — TELEPHONE ENCOUNTER
From: Terri Krishnan  Sent: 8/20/2018 6:30 AM PDT  Subject: Medication Renewal Request    Terri Krishnan would like a refill of the following medications:     estradiol (ESTRACE) 1 MG Tab [Earlene Rebolledo, P.A.-C.]    Preferred pharmacy: [facility] [address] [place] [address], [place] - [address] AT [facility]

## 2018-08-21 ENCOUNTER — HOSPITAL ENCOUNTER (EMERGENCY)
Facility: MEDICAL CENTER | Age: 48
End: 2018-08-21
Attending: EMERGENCY MEDICINE
Payer: MEDICAID

## 2018-08-21 VITALS
OXYGEN SATURATION: 94 % | WEIGHT: 134.7 LBS | HEART RATE: 80 BPM | RESPIRATION RATE: 16 BRPM | BODY MASS INDEX: 20.48 KG/M2 | DIASTOLIC BLOOD PRESSURE: 69 MMHG | TEMPERATURE: 97.6 F | SYSTOLIC BLOOD PRESSURE: 102 MMHG

## 2018-08-21 DIAGNOSIS — G44.221 CHRONIC TENSION-TYPE HEADACHE, INTRACTABLE: ICD-10-CM

## 2018-08-21 PROCEDURE — 99284 EMERGENCY DEPT VISIT MOD MDM: CPT

## 2018-08-21 PROCEDURE — 96372 THER/PROPH/DIAG INJ SC/IM: CPT

## 2018-08-21 PROCEDURE — 700111 HCHG RX REV CODE 636 W/ 250 OVERRIDE (IP): Performed by: EMERGENCY MEDICINE

## 2018-08-21 RX ORDER — ONDANSETRON 4 MG/1
4 TABLET, ORALLY DISINTEGRATING ORAL ONCE
Status: COMPLETED | OUTPATIENT
Start: 2018-08-21 | End: 2018-08-21

## 2018-08-21 RX ORDER — HYDROMORPHONE HYDROCHLORIDE 2 MG/ML
2 INJECTION, SOLUTION INTRAMUSCULAR; INTRAVENOUS; SUBCUTANEOUS ONCE
Status: COMPLETED | OUTPATIENT
Start: 2018-08-21 | End: 2018-08-21

## 2018-08-21 RX ORDER — TRAMADOL HYDROCHLORIDE 50 MG/1
50-100 TABLET ORAL EVERY 6 HOURS PRN
Qty: 20 TAB | Refills: 0 | Status: SHIPPED | OUTPATIENT
Start: 2018-08-21 | End: 2018-08-24

## 2018-08-21 RX ADMIN — ONDANSETRON 4 MG: 4 TABLET, ORALLY DISINTEGRATING ORAL at 20:40

## 2018-08-21 RX ADMIN — HYDROMORPHONE HYDROCHLORIDE 2 MG: 2 INJECTION, SOLUTION INTRAMUSCULAR; INTRAVENOUS; SUBCUTANEOUS at 20:40

## 2018-08-21 ASSESSMENT — PAIN SCALES - GENERAL
PAINLEVEL_OUTOF10: 10
PAINLEVEL_OUTOF10: 10

## 2018-08-22 NOTE — ED PROVIDER NOTES
ED Provider Note    CHIEF COMPLAINT  Chief Complaint   Patient presents with   • Head Ache       HPI  Terri Krishnan is a 48 y.o. female who presents for evaluation of ongoing headache. The patient was a victim of a traumatic brain injury with intracranial hemorrhage around 5 months ago. She did not require any surgery. She has had intermittent headaches ever since. She has been followed by neurosurgery and her PCP. She just recently an MRI of her brain around a month ago. There is some residual hemosiderin deposits but no new injury. She has been taking NSAIDs for daily headache. She denies any new trauma. Specifically no seizures numbness weakness or tingling to the arms or legs or face. No high fevers or neck stiffness. She denies any other symptoms. She is currently not on any narcotics    REVIEW OF SYSTEMS  See HPI for further details. Positive for headache nausea no numbness weakness or tingling All other systems are negative.     PAST MEDICAL HISTORY  Past Medical History:   Diagnosis Date   • Surgical menopause    • Tobacco dependence        FAMILY HISTORY  Noncontributory    SOCIAL HISTORY  Social History     Social History   • Marital status: Single     Spouse name: N/A   • Number of children: N/A   • Years of education: N/A     Social History Main Topics   • Smoking status: Current Every Day Smoker     Packs/day: 0.50     Years: 15.00   • Smokeless tobacco: Never Used   • Alcohol use 0.5 oz/week     1 Glasses of wine per week   • Drug use: No   • Sexual activity: Yes     Partners: Male     Birth control/ protection: Surgical     Other Topics Concern   • Not on file     Social History Narrative   • No narrative on file     No IV drugs daily smoker  SURGICAL HISTORY  Past Surgical History:   Procedure Laterality Date   • ABDOMINAL HYSTERECTOMY TOTAL  age 27    Total       CURRENT MEDICATIONS  Home Medications     Reviewed by Suzy Pino R.N. (Registered Nurse) on 08/21/18 at 2004  Med List  Status: Complete   Medication Last Dose Status   DULoxetine (CYMBALTA) 30 MG Cap DR Particles 8/21/2018 Active   estradiol (ESTRACE) 1 MG Tab 8/21/2018 Active   fludrocortisone (FLORINEF) 0.1 MG Tab 8/21/2018 Active   ibuprofen (MOTRIN) 800 MG Tab 8/21/2018 Active   vitamin D (VITAMIND D3) 1000 UNIT Tab 8/21/2018 Active                ALLERGIES  Allergies   Allergen Reactions   • Shrimp (Diagnostic) Anaphylaxis     poa stated that this patient had a reaction a couple years ago after eating shrimp   • Shrimp Flavor Anaphylaxis       PHYSICAL EXAM  VITAL SIGNS: /69   Pulse 98   Temp 36.4 °C (97.6 °F)   Resp 14   Wt 61.1 kg (134 lb 11.2 oz)   SpO2 97%   BMI 20.48 kg/m²  Room air O2: 97    Constitutional: Patient appears uncomfortable   HENT: Normocephalic, Atraumatic, Bilateral external ears normal, Oropharynx moist, No oral exudates, Nose normal.   Eyes: PERRLA, EOMI, Conjunctiva normal, No discharge.   Neck: Normal range of motion, No tenderness, Supple, No stridor.   Cardiovascular: Normal heart rate, Normal rhythm, No murmurs, No rubs, No gallops.   Thorax & Lungs: Normal breath sounds, No respiratory distress, No wheezing, No chest tenderness.   Abdomen: Bowel sounds normal, Soft, No tenderness, No masses, No pulsatile masses.   Skin: Warm, Dry, No erythema, No rash.   Back: No tenderness, No CVA tenderness.   Extremities: Intact distal pulses, No edema, No tenderness, No cyanosis, No clubbing.   Neurologic: Alert & oriented x 3, Normal motor function, Normal sensory function, No focal deficits noted. No focal neurological deficit. Cranial nerves II through XII grossly intact  Psychiatric: Anxious      COURSE & MEDICAL DECISION MAKING  Pertinent Labs & Imaging studies reviewed. (See chart for details)  Reviewed the patient's visit history as well as her imaging studies. With no new trauma fever or neurological deficit I do not feel that any blood testing or repeat imaging is indicated. She is given  sublingual Zofran and an intramuscular dose of Dilaudid. I reviewed her prescription history on the [facility]. I will give her a small prescription of tramadol as she has been overusing NSAIDs recently. She was consented for opioid distribution and an opioid risk assessment was utilized    FINAL IMPRESSION  1.   1. Chronic tension-type headache, intractable               Electronically signed by: Ghassan Ramos, 8/21/2018 8:13 PM

## 2018-08-22 NOTE — ED NOTES
"Patient states she \"feels loopy but a little better. I still have some pain in the back of my head\". Patient up for re-eval.   "

## 2018-08-22 NOTE — ED TRIAGE NOTES
PT ambulated to triage c/o a head ache since 3 am.  Per pt she had a TBI in march and has been having head aches since,  Pt reports her home medications are not helping   Chief Complaint   Patient presents with   • Head Ache     Blood pressure 102/69, pulse 98, temperature 36.4 °C (97.6 °F), resp. rate 14, weight 61.1 kg (134 lb 11.2 oz), SpO2 97 %.

## 2018-08-22 NOTE — ED NOTES
D/C home with written and verbal instructions re: Rx, activity, f/u.  Verbalizes understanding.  Ambulated out with steady gait. Patient left with family.

## 2018-08-22 NOTE — DISCHARGE INSTRUCTIONS
"Headaches, Frequently Asked Questions  MIGRAINE HEADACHES  Q: What is migraine? What causes it? How can I treat it?  A: Generally, migraine headaches begin as a dull ache. Then they develop into a constant, throbbing, and pulsating pain. You may experience pain at the temples. You may experience pain at the front or back of one or both sides of the head. The pain is usually accompanied by a combination of:  · Nausea.   · Vomiting.   · Sensitivity to light and noise.   Some people (about 15%) experience an aura (see below) before an attack. The cause of migraine is believed to be chemical reactions in the brain. Treatment for migraine may include over-the-counter or prescription medications. It may also include self-help techniques. These include relaxation training and biofeedback.   Q: What is an aura?  A: About 15% of people with migraine get an \"aura\". This is a sign of neurological symptoms that occur before a migraine headache. You may see wavy or jagged lines, dots, or flashing lights. You might experience tunnel vision or blind spots in one or both eyes. The aura can include visual or auditory hallucinations (something imagined). It may include disruptions in smell (such as strange odors), taste or touch. Other symptoms include:  · Numbness.   · A \"pins and needles\" sensation.   · Difficulty in recalling or speaking the correct word.   These neurological events may last as long as 60 minutes. These symptoms will fade as the headache begins.  Q: What is a trigger?  A: Certain physical or environmental factors can lead to or \"trigger\" a migraine. These include:  · Foods.   · Hormonal changes.   · Weather.   · Stress.   It is important to remember that triggers are different for everyone. To help prevent migraine attacks, you need to figure out which triggers affect you. Keep a headache diary. This is a good way to track triggers. The diary will help you talk to your healthcare professional about your " "condition.  Q: Does weather affect migraines?  A: Bright sunshine, hot, humid conditions, and drastic changes in barometric pressure may lead to, or \"trigger,\" a migraine attack in some people. But studies have shown that weather does not act as a trigger for everyone with migraines.  Q: What is the link between migraine and hormones?  A: Hormones start and regulate many of your body's functions. Hormones keep your body in balance within a constantly changing environment. The levels of hormones in your body are unbalanced at times. Examples are during menstruation, pregnancy, or menopause. That can lead to a migraine attack. In fact, about three quarters of all women with migraine report that their attacks are related to the menstrual cycle.   Q: Is there an increased risk of stroke for migraine sufferers?  A: The likelihood of a migraine attack causing a stroke is very remote. That is not to say that migraine sufferers cannot have a stroke associated with their migraines. In persons under age 40, the most common associated factor for stroke is migraine headache. But over the course of a person's normal life span, the occurrence of migraine headache may actually be associated with a reduced risk of dying from cerebrovascular disease due to stroke.   Q: What are acute medications for migraine?  A: Acute medications are used to treat the pain of the headache after it has started. Examples over-the-counter medications, NSAIDs, ergots, and triptans.   Q: What are the triptans?  A: Triptans are the newest class of abortive medications. They are specifically targeted to treat migraine. Triptans are vasoconstrictors. They moderate some chemical reactions in the brain. The triptans work on receptors in your brain. Triptans help to restore the balance of a neurotransmitter called serotonin. Fluctuations in levels of serotonin are thought to be a main cause of migraine.   Q: Are over-the-counter medications for migraine " "effective?  A: Over-the-counter, or \"OTC,\" medications may be effective in relieving mild to moderate pain and associated symptoms of migraine. But you should see your caregiver before beginning any treatment regimen for migraine.   Q: What are preventive medications for migraine?  A: Preventive medications for migraine are sometimes referred to as \"prophylactic\" treatments. They are used to reduce the frequency, severity, and length of migraine attacks. Examples of preventive medications include antiepileptic medications, antidepressants, beta-blockers, calcium channel blockers, and NSAIDs (nonsteroidal anti-inflammatory drugs).  Q: Why are anticonvulsants used to treat migraine?  A: During the past few years, there has been an increased interest in antiepileptic drugs for the prevention of migraine. They are sometimes referred to as \"anticonvulsants\". Both epilepsy and migraine may be caused by similar reactions in the brain.   Q: Why are antidepressants used to treat migraine?  A: Antidepressants are typically used to treat people with depression. They may reduce migraine frequency by regulating chemical levels, such as serotonin, in the brain.   Q: What alternative therapies are used to treat migraine?  A: The term \"alternative therapies\" is often used to describe treatments considered outside the scope of conventional Western medicine. Examples of alternative therapy include acupuncture, acupressure, and yoga. Another common alternative treatment is herbal therapy. Some herbs are believed to relieve headache pain. Always discuss alternative therapies with your caregiver before proceeding. Some herbal products contain arsenic and other toxins.  TENSION HEADACHES  Q: What is a tension-type headache? What causes it? How can I treat it?  A: Tension-type headaches occur randomly. They are often the result of temporary stress, anxiety, fatigue, or anger. Symptoms include soreness in your temples, a tightening " "band-like sensation around your head (a \"vice-like\" ache). Symptoms can also include a pulling feeling, pressure sensations, and elissa head and neck muscles. The headache begins in your forehead, temples, or the back of your head and neck. Treatment for tension-type headache may include over-the-counter or prescription medications. Treatment may also include self-help techniques such as relaxation training and biofeedback.  CLUSTER HEADACHES  Q: What is a cluster headache? What causes it? How can I treat it?  A: Cluster headache gets its name because the attacks come in groups. The pain arrives with little, if any, warning. It is usually on one side of the head. A tearing or bloodshot eye and a runny nose on the same side of the headache may also accompany the pain. Cluster headaches are believed to be caused by chemical reactions in the brain. They have been described as the most severe and intense of any headache type. Treatment for cluster headache includes prescription medication and oxygen.  SINUS HEADACHES  Q: What is a sinus headache? What causes it? How can I treat it?  A: When a cavity in the bones of the face and skull (a sinus) becomes inflamed, the inflammation will cause localized pain. This condition is usually the result of an allergic reaction, a tumor, or an infection. If your headache is caused by a sinus blockage, such as an infection, you will probably have a fever. An x-ray will confirm a sinus blockage. Your caregiver's treatment might include antibiotics for the infection, as well as antihistamines or decongestants.   REBOUND HEADACHES  Q: What is a rebound headache? What causes it? How can I treat it?  A: A pattern of taking acute headache medications too often can lead to a condition known as \"rebound headache.\" A pattern of taking too much headache medication includes taking it more than 2 days per week or in excessive amounts. That means more than the label or a caregiver advises. " With rebound headaches, your medications not only stop relieving pain, they actually begin to cause headaches. Doctors treat rebound headache by tapering the medication that is being overused. Sometimes your caregiver will gradually substitute a different type of treatment or medication. Stopping may be a challenge. Regularly overusing a medication increases the potential for serious side effects. Consult a caregiver if you regularly use headache medications more than 2 days per week or more than the label advises.  ADDITIONAL QUESTIONS AND ANSWERS  Q: What is biofeedback?  A: Biofeedback is a self-help treatment. Biofeedback uses special equipment to monitor your body's involuntary physical responses. Biofeedback monitors:  · Breathing.   · Pulse.   · Heart rate.   · Temperature.   · Muscle tension.   · Brain activity.   Biofeedback helps you refine and perfect your relaxation exercises. You learn to control the physical responses that are related to stress. Once the technique has been mastered, you do not need the equipment any more.  Q: Are headaches hereditary?  A: Four out of five (80%) of people that suffer report a family history of migraine. Scientists are not sure if this is genetic or a family predisposition. Despite the uncertainty, a child has a 50% chance of having migraine if one parent suffers. The child has a 75% chance if both parents suffer.   Q: Can children get headaches?  A: By the time they reach high school, most young people have experienced some type of headache. Many safe and effective approaches or medications can prevent a headache from occurring or stop it after it has begun.   Q: What type of doctor should I see to diagnose and treat my headache?  A: Start with your primary caregiver. Discuss his or her experience and approach to headaches. Discuss methods of classification, diagnosis, and treatment. Your caregiver may decide to recommend you to a headache specialist, depending upon  your symptoms or other physical conditions. Having diabetes, allergies, etc., may require a more comprehensive and inclusive approach to your headache. The National Head[employer] Foundation will provide, upon request, a list of NHF physician members in your state.  Document Released: 03/09/2005 Document Revised: 03/11/2013 Document Reviewed: 08/17/2009  [employer]® Patient Information ©2013 [employer], [employer].

## 2018-08-27 ENCOUNTER — OFFICE VISIT (OUTPATIENT)
Dept: PHYSICAL MEDICINE AND REHAB | Facility: REHABILITATION | Age: 48
End: 2018-08-27
Payer: MEDICAID

## 2018-08-27 VITALS
WEIGHT: 126 LBS | TEMPERATURE: 98.5 F | OXYGEN SATURATION: 99 % | SYSTOLIC BLOOD PRESSURE: 112 MMHG | BODY MASS INDEX: 19.1 KG/M2 | HEIGHT: 68 IN | HEART RATE: 77 BPM | DIASTOLIC BLOOD PRESSURE: 74 MMHG

## 2018-08-27 DIAGNOSIS — G37.2 CENTRAL PONTINE MYELINOLYSIS (HCC): ICD-10-CM

## 2018-08-27 DIAGNOSIS — G44.321 INTRACTABLE CHRONIC POST-TRAUMATIC HEADACHE: ICD-10-CM

## 2018-08-27 DIAGNOSIS — S06.305D: ICD-10-CM

## 2018-08-27 PROCEDURE — 99214 OFFICE O/P EST MOD 30 MIN: CPT | Performed by: PHYSICAL MEDICINE & REHABILITATION

## 2018-08-27 RX ORDER — TRAMADOL HYDROCHLORIDE 50 MG/1
50 TABLET ORAL EVERY 4 HOURS PRN
COMMUNITY
End: 2018-12-14 | Stop reason: CLARIF

## 2018-08-27 RX ORDER — DIVALPROEX SODIUM 250 MG/1
250 TABLET, DELAYED RELEASE ORAL 2 TIMES DAILY
Qty: 60 TAB | Refills: 0 | Status: SHIPPED | OUTPATIENT
Start: 2018-08-27 | End: 2018-12-14 | Stop reason: CLARIF

## 2018-08-27 NOTE — LETTER
[facility]     August 27, 2018    Patient: Terri Krishnan   YOB: 1970   Date of Visit: 8/27/2018       To Whom It May Concern:    Terri Krishnan was seen and treated in our department on 8/27/2018.     She has been involved in my care since March 20/2018, and has been unable to work since that time due to her injuries.    I will be following her in clinic to determine when she can return to work.    Please call my office with any questions.    Sincerely,         Cyndie West M.D.

## 2018-08-27 NOTE — PROGRESS NOTES
REHABILITATION MEDICINE CLINIC NEW PATIENT NOTE  8/27/2018      HPI: Terri Krishnan is a 48 y.o. female who presents as a follow up to the clinic after an inpatient rehabilitation stay from 3/20/2018 to 4/5/2018 for a diagnosis of TBI.    Headaches during inpatient rehab - on Elavil 25 mg at night, and gabapentin 400 mg TID. Went home with good headache, then returned late May.    Saw Dr. Mireles in clinic several times - started her on Topamax, didn't help. Then he started Cymbalta, which also didn't help. Was off the Elavil and gabapentin at that time.    Has been to ED twice, headache better first time with steroids, Benadryl, morphine, which helped. Second time, they gave her dilaudid, which helped.    Never had headaches prior her her TBI.    Takes Estradiol for hysterectomy when she was 27.     Did med rec. Is taking about 3000 mg Motrin per day. Is also tramadol daily, 50 mg PRN.     Headache - 7/10 right now, 5/10 at best, 10/10. No aura. No photosensitivity.     Pain is felt on her forehead, bands around to the back. No shoulder tightness.    Has history of bruxism for which she has .    Better with medications.     Worse with nothing.    Able to sleep through the night with Benadryl.    She completed all her outpatient therapies.     She hasn't returned to work yet. Was working as a  at [employer] at [employer]. Goal to go back to work.     We reviewed her imaging, discussed whether there is an association with headaches.       ROS:  no F/C, N/V, vision changes/dizziness, CP/SOB, abd pain/constipation, diarrhea, dysuria/frequency/urgency, bowel/bladder incontinence, calf pain/swelling, joint pain/swelling/myalgias, anxiety/depression/mood changes.    PMH:  Past Medical History:   Diagnosis Date   • Alcohol abuse    • Surgical menopause    • TBI (traumatic brain injury) (HCC)    • Tobacco dependence        PSH:  Past Surgical History:   Procedure Laterality Date   • ABDOMINAL  "HYSTERECTOMY TOTAL  age 27    Total       Medications:  Current Outpatient Prescriptions   Medication   • tramadol (ULTRAM) 50 MG Tab   • divalproex (DEPAKOTE) 250 MG Tablet Delayed Response   • estradiol (ESTRACE) 1 MG Tab   • ibuprofen (MOTRIN) 800 MG Tab   • vitamin D (VITAMIND D3) 1000 UNIT Tab     No current facility-administered medications for this visit.        Allergies:  Allergies   Allergen Reactions   • Shrimp (Diagnostic) Anaphylaxis     poa stated that this patient had a reaction a couple years ago after eating shrimp   • Shrimp Flavor Anaphylaxis       Social History     Social History   • Marital status: Single     Spouse name: N/A   • Number of children: N/A   • Years of education: N/A     Occupational History   • Not on file.     Social History Main Topics   • Smoking status: Current Every Day Smoker     Packs/day: 0.50     Years: 15.00   • Smokeless tobacco: Never Used   • Alcohol use 0.5 oz/week     1 Glasses of wine per week   • Drug use: No   • Sexual activity: Yes     Partners: Male     Birth control/ protection: Surgical     Other Topics Concern   • Not on file     Social History Narrative   • No narrative on file         Physical Exam:  Vitals: Blood pressure 112/74, pulse 77, temperature 36.9 °C (98.5 °F), height 1.727 m (5' 8\"), weight 57.2 kg (126 lb), SpO2 99 %.  Gen: alert, no apparent distress  CV: regular rate and rhythm, no murmurs, no peripheral edema  Resp: clear to ascultation bilaterally, normal respiratory effort  GI: soft, non-tender abdomen, bowel sounds present    Neuro:  Mental status:  A&Ox4 (person, place, date, situation) answers questions appropriately follows commands  Speech: fluent, no aphasia or dysarthria    CRANIAL NERVES:  2,3: visual acuity grossly intact, PERRL  3,4,6: EOMI bilaterally, no nystagmus or diplopia  5: sensation intact to light touch bilaterally and symmetric  7: no facial asymmetry  8: hearing grossly intact  9,10: symmetric palate elevation  11: " SCM/Trapezius strength 5/5 bilaterally  12: tongue protrudes midline    Motor:  5/5 throughout UE and LE      Sensory:   intact to light touch through out    ASSESSMENT/PLAN:    48 yr old female with history of TBI after fall from alcohol use in March, 2018, with intractable headaches, as well as recent finding of central pontine myelinolysis since her hospital discharge.    She has been tried on multiple medications:    Elavil, gabapentin - helpful in rehab hospital, then effective after discharge.    Topamax, then Cymbalta - tried in outpatient setting, not effective.    Has been to ED twice for steroids, benadryl, and opiates.    Currently on Motrin and Tramadol.    I am suspicious that she may be currently suffering from rebound headaches, and that her headaches started possibly due to the new diagnosis of central pontine myelinolysis??    Plan to try Depakote - start 250 mg BID, have patient call in one week, may need to increase to 50 0mg BID. Advised to try and not use the tramadol and Motrin due to rebound.    If this is not effective, will consider other medications, including possible Medrol dose pack.    Advised to keep her neurology appointment for December. She may be a candidate for toxin injections as she has failed so many different medications.     Patient given an excuse from work letter.    Cyndie West MD  Physical Medicine and Rehabilitation

## 2018-08-28 ENCOUNTER — HOSPITAL ENCOUNTER (OUTPATIENT)
Dept: LAB | Facility: MEDICAL CENTER | Age: 48
End: 2018-08-28
Attending: PHYSICAL MEDICINE & REHABILITATION
Payer: MEDICAID

## 2018-08-28 DIAGNOSIS — S06.305D: ICD-10-CM

## 2018-08-28 DIAGNOSIS — G37.2 CENTRAL PONTINE MYELINOLYSIS (HCC): ICD-10-CM

## 2018-08-28 DIAGNOSIS — G44.321 INTRACTABLE CHRONIC POST-TRAUMATIC HEADACHE: ICD-10-CM

## 2018-08-28 LAB
ALBUMIN SERPL BCP-MCNC: 4 G/DL (ref 3.2–4.9)
ALBUMIN/GLOB SERPL: 1.5 G/DL
ALP SERPL-CCNC: 68 U/L (ref 30–99)
ALT SERPL-CCNC: 9 U/L (ref 2–50)
ANION GAP SERPL CALC-SCNC: 11 MMOL/L (ref 0–11.9)
ANISOCYTOSIS BLD QL SMEAR: ABNORMAL
AST SERPL-CCNC: 25 U/L (ref 12–45)
BASOPHILS # BLD AUTO: 1 % (ref 0–1.8)
BASOPHILS # BLD: 0.05 K/UL (ref 0–0.12)
BILIRUB SERPL-MCNC: 0.5 MG/DL (ref 0.1–1.5)
BUN SERPL-MCNC: 20 MG/DL (ref 8–22)
CALCIUM SERPL-MCNC: 9.2 MG/DL (ref 8.5–10.5)
CHLORIDE SERPL-SCNC: 107 MMOL/L (ref 96–112)
CO2 SERPL-SCNC: 21 MMOL/L (ref 20–33)
CREAT SERPL-MCNC: 0.81 MG/DL (ref 0.5–1.4)
EOSINOPHIL # BLD AUTO: 0.27 K/UL (ref 0–0.51)
EOSINOPHIL NFR BLD: 5 % (ref 0–6.9)
ERYTHROCYTE [DISTWIDTH] IN BLOOD BY AUTOMATED COUNT: 49.2 FL (ref 35.9–50)
GLOBULIN SER CALC-MCNC: 2.6 G/DL (ref 1.9–3.5)
GLUCOSE SERPL-MCNC: 74 MG/DL (ref 65–99)
HCT VFR BLD AUTO: 44.8 % (ref 37–47)
HGB BLD-MCNC: 14.1 G/DL (ref 12–16)
LYMPHOCYTES # BLD AUTO: 2.07 K/UL (ref 1–4.8)
LYMPHOCYTES NFR BLD: 39 % (ref 22–41)
MACROCYTES BLD QL SMEAR: ABNORMAL
MANUAL DIFF BLD: NORMAL
MCH RBC QN AUTO: 31.8 PG (ref 27–33)
MCHC RBC AUTO-ENTMCNC: 31.5 G/DL (ref 33.6–35)
MCV RBC AUTO: 100.9 FL (ref 81.4–97.8)
MONOCYTES # BLD AUTO: 0.21 K/UL (ref 0–0.85)
MONOCYTES NFR BLD AUTO: 4 % (ref 0–13.4)
MORPHOLOGY BLD-IMP: NORMAL
NEUTROPHILS # BLD AUTO: 2.7 K/UL (ref 2–7.15)
NEUTROPHILS NFR BLD: 49 % (ref 44–72)
NEUTS BAND NFR BLD MANUAL: 2 % (ref 0–10)
NRBC # BLD AUTO: 0 K/UL
NRBC BLD-RTO: 0 /100 WBC
PLATELET # BLD AUTO: 327 K/UL (ref 164–446)
PLATELET BLD QL SMEAR: NORMAL
PMV BLD AUTO: 8.7 FL (ref 9–12.9)
POTASSIUM SERPL-SCNC: 4.6 MMOL/L (ref 3.6–5.5)
PROT SERPL-MCNC: 6.6 G/DL (ref 6–8.2)
RBC # BLD AUTO: 4.44 M/UL (ref 4.2–5.4)
RBC BLD AUTO: PRESENT
SODIUM SERPL-SCNC: 139 MMOL/L (ref 135–145)
WBC # BLD AUTO: 5.3 K/UL (ref 4.8–10.8)

## 2018-08-28 PROCEDURE — 85027 COMPLETE CBC AUTOMATED: CPT

## 2018-08-28 PROCEDURE — 85007 BL SMEAR W/DIFF WBC COUNT: CPT

## 2018-08-28 PROCEDURE — 36415 COLL VENOUS BLD VENIPUNCTURE: CPT

## 2018-08-28 PROCEDURE — 80053 COMPREHEN METABOLIC PANEL: CPT

## 2018-09-04 ENCOUNTER — TELEPHONE (OUTPATIENT)
Dept: PHYSICAL MEDICINE AND REHAB | Facility: MEDICAL CENTER | Age: 48
End: 2018-09-04

## 2018-09-04 NOTE — TELEPHONE ENCOUNTER
Pt Called to say that her current dosage of Depakote is not working very well. She asked if she could get it increased.

## 2018-09-24 ENCOUNTER — OFFICE VISIT (OUTPATIENT)
Dept: PHYSICAL MEDICINE AND REHAB | Facility: REHABILITATION | Age: 48
End: 2018-09-24
Payer: MEDICAID

## 2018-09-24 VITALS
BODY MASS INDEX: 19.7 KG/M2 | WEIGHT: 130 LBS | OXYGEN SATURATION: 96 % | DIASTOLIC BLOOD PRESSURE: 76 MMHG | HEART RATE: 96 BPM | SYSTOLIC BLOOD PRESSURE: 113 MMHG | TEMPERATURE: 98.7 F | HEIGHT: 68 IN

## 2018-09-24 DIAGNOSIS — S06.305D: ICD-10-CM

## 2018-09-24 DIAGNOSIS — M53.82 MUSCULOSKELETAL DISORDER OF THE SUBOCCIPITAL: ICD-10-CM

## 2018-09-24 DIAGNOSIS — G44.321 INTRACTABLE CHRONIC POST-TRAUMATIC HEADACHE: ICD-10-CM

## 2018-09-24 PROCEDURE — 99214 OFFICE O/P EST MOD 30 MIN: CPT | Performed by: PHYSICAL MEDICINE & REHABILITATION

## 2018-09-24 RX ORDER — PROPRANOLOL HYDROCHLORIDE 10 MG/1
10 TABLET ORAL 3 TIMES DAILY
Qty: 90 TAB | Refills: 11 | Status: SHIPPED | OUTPATIENT
Start: 2018-09-24 | End: 2018-12-14 | Stop reason: CLARIF

## 2018-09-24 NOTE — PATIENT INSTRUCTIONS
If you feel dizzy or lightheaded, take your blood pressure and record.    Call with any questions about your medication.

## 2018-09-24 NOTE — PROGRESS NOTES
REHABILITATION MEDICINE CLINIC NEW PATIENT NOTE  8/27/2018      HPI: Terri Krishnan is a 48 y.o. female who presents as a follow up to the clinic after an inpatient rehabilitation stay from 3/20/2018 to 4/5/2018 for a diagnosis of TBI.    Headaches during inpatient rehab - on Elavil 25 mg at night, and gabapentin 400 mg TID. Went home with good headache, then returned late May.    Saw Dr. Mireles in clinic several times - started her on Topamax, didn't help. Then he started Cymbalta, which also didn't help. Was off the Elavil and gabapentin at that time.    Has been to ED twice, headache better first time with steroids, Benadryl, morphine, which helped. Second time, they gave her dilaudid, which helped.    Never had headaches prior her her TBI.    Takes Estradiol for hysterectomy when she was 27.     Did med rec. Is taking about 3000 mg Motrin per day. Is also tramadol daily, 50 mg PRN.     Headache - 7/10 right now, 5/10 at best, 10/10. No aura. No photosensitivity.     Pain is felt on her forehead, bands around to the back. No shoulder tightness.    Has history of bruxism for which she has .    Better with medications.     Worse with nothing.    Able to sleep through the night with Benadryl.    She completed all her outpatient therapies.     She hasn't returned to work yet. Was working as a  at [employer] at [employer]. Goal to go back to work.     We reviewed her imaging, discussed whether there is an association with headaches.     9/24/2018    10/10 headache today, but slept bad last night. At best it gets to a 7/10. Reports no aura or visual changes. Feels likes a tight band around her head.    Is out of her depakote and doesn't think it was even working. Hasn't been to the ED again.     Is taking tylenol as well, doesn't know how many she is taking daily. Is also taking ibuprofen every day, also doesn't know how much.     Is keeping a headache diary, doesn't have it with her  today.    Her best friend was killed last month in a motor cycle accident so she is very stressed. She denies any depression, suicidal ideation, or thoughts of death. She is just so tired of having a daily headache.    Is smoking 1 pack every 4 days. Is not using marijuana or drugs. No alcohol for 7 months. Hasn't needed to go to any AA meetings.    ROS:  no F/C, N/V, vision changes/dizziness, CP/SOB, abd pain/constipation, diarrhea, dysuria/frequency/urgency, bowel/bladder incontinence, calf pain/swelling, joint pain/swelling/myalgias, anxiety/depression/mood changes.    PMH:  Past Medical History:   Diagnosis Date   • Alcohol abuse    • Surgical menopause    • TBI (traumatic brain injury) (HCC)    • Tobacco dependence        PSH:  Past Surgical History:   Procedure Laterality Date   • ABDOMINAL HYSTERECTOMY TOTAL  age 27    Total       Medications:  Current Outpatient Prescriptions   Medication   • estradiol (ESTRACE) 1 MG Tab   • ibuprofen (MOTRIN) 800 MG Tab   • vitamin D (VITAMIND D3) 1000 UNIT Tab   • tramadol (ULTRAM) 50 MG Tab   • divalproex (DEPAKOTE) 250 MG Tablet Delayed Response     No current facility-administered medications for this visit.        Allergies:  Allergies   Allergen Reactions   • Shrimp (Diagnostic) Anaphylaxis     poa stated that this patient had a reaction a couple years ago after eating shrimp   • Shrimp Flavor Anaphylaxis       Social History     Social History   • Marital status: Single     Spouse name: N/A   • Number of children: N/A   • Years of education: N/A     Occupational History   • Not on file.     Social History Main Topics   • Smoking status: Current Every Day Smoker     Packs/day: 0.50     Years: 15.00   • Smokeless tobacco: Never Used   • Alcohol use 0.5 oz/week     1 Glasses of wine per week   • Drug use: No   • Sexual activity: Yes     Partners: Male     Birth control/ protection: Surgical     Other Topics Concern   • Not on file     Social History Narrative   • No  "narrative on file         Physical Exam:  Vitals: Blood pressure 113/76, pulse 96, temperature 37.1 °C (98.7 °F), height 1.727 m (5' 8\"), weight 59 kg (130 lb), SpO2 96 %, not currently breastfeeding.  Gen: alert, mild distress, tearful  CV: regular rate and rhythm, no murmurs, no peripheral edema  Resp: clear to ascultation bilaterally, normal respiratory effort  GI: soft, non-tender abdomen, bowel sounds present    Msk: tenderness to touch/palpation on the posterior suboccipital muscles    Neuro:  Mental status:  A&Ox4     Motor:  5/5 throughout UE and LE      Sensory:   intact to light touch through out    ASSESSMENT/PLAN:    48 yr old female with history of TBI after fall from alcohol use in March, 2018, with intractable headaches, as well as recent finding of central pontine myelinolysis since her hospital discharge.    She has been tried on multiple medications:    Elavil, gabapentin - helpful in rehab hospital, then effective after discharge. No longer taking.     Topamax, then Cymbalta - tried in outpatient setting, not effective. No longer taking.     Has been to ED twice for steroids, benadryl, and opiates. Has not returned to ED recently.     Currently on Motrin and Tramadol. Needs to keep track of use. Discussed concern for rebound headaches.    Depakote titrated up to 500 mg BID. Not effective.    Next will try low dose propranolol to attempt a different drug category.    Also will refer her to Dr. Blas to consider an occipital nerve block, as she is very tender in this area. Unclear if her headaches are causing this tension, or the other way around.    I will call the neurology clinic to see if I can get her into an earlier appointment.   I am suspicious that she may be currently suffering from rebound headaches, and that her headaches started possibly due to the new diagnosis of central pontine myelinolysis??    Advised to keep her neurology appointment for December. She may be a candidate for " toxin injections as she has failed so many different medications.     Return to clinic in 2 weeks.    Cyndie West MD  Physical Medicine and Rehabilitation    Patient was seen for 26 minutes face to face of which > 50% of appointment time was spent on counseling and coordination of care regarding the above.

## 2018-09-27 ENCOUNTER — TELEPHONE (OUTPATIENT)
Dept: PHYSICAL MEDICINE AND REHAB | Facility: MEDICAL CENTER | Age: 48
End: 2018-09-27

## 2018-09-27 NOTE — TELEPHONE ENCOUNTER
Pt called in to schedule an apt for a referral to . There is no referral in the system at all for Physiatry, so we cant re-use the Physiatry referral that got her to /[facility]. Please Advise.

## 2018-09-28 DIAGNOSIS — G44.321 INTRACTABLE CHRONIC POST-TRAUMATIC HEADACHE: ICD-10-CM

## 2018-10-05 ENCOUNTER — TELEPHONE (OUTPATIENT)
Dept: NEUROLOGY | Facility: MEDICAL CENTER | Age: 48
End: 2018-10-05

## 2018-10-05 NOTE — TELEPHONE ENCOUNTER
Called pt and no answer, I left VM and let her know that we can get her in sooner at 10/10/18. I gave her my call back number.

## 2018-10-09 ENCOUNTER — APPOINTMENT (OUTPATIENT)
Dept: PHYSICAL MEDICINE AND REHAB | Facility: REHABILITATION | Age: 48
End: 2018-10-09
Payer: MEDICAID

## 2018-10-10 ENCOUNTER — OFFICE VISIT (OUTPATIENT)
Dept: NEUROLOGY | Facility: MEDICAL CENTER | Age: 48
End: 2018-10-10
Payer: MEDICAID

## 2018-10-10 VITALS
WEIGHT: 140 LBS | TEMPERATURE: 97.1 F | HEART RATE: 100 BPM | DIASTOLIC BLOOD PRESSURE: 64 MMHG | SYSTOLIC BLOOD PRESSURE: 108 MMHG | OXYGEN SATURATION: 94 % | BODY MASS INDEX: 21.22 KG/M2 | HEIGHT: 68 IN

## 2018-10-10 DIAGNOSIS — G44.321 INTRACTABLE CHRONIC POST-TRAUMATIC HEADACHE: ICD-10-CM

## 2018-10-10 PROBLEM — G44.309 POST-TRAUMATIC HEADACHE: Status: ACTIVE | Noted: 2018-04-05

## 2018-10-10 PROCEDURE — 99214 OFFICE O/P EST MOD 30 MIN: CPT | Performed by: PHYSICIAN ASSISTANT

## 2018-10-10 RX ORDER — AMITRIPTYLINE HYDROCHLORIDE 10 MG/1
10 TABLET, FILM COATED ORAL DAILY
Qty: 150 TAB | Refills: 3 | Status: SHIPPED | OUTPATIENT
Start: 2018-10-10 | End: 2018-12-14 | Stop reason: CLARIF

## 2018-10-10 RX ORDER — GABAPENTIN 100 MG/1
100 CAPSULE ORAL 3 TIMES DAILY
Qty: 90 CAP | Refills: 3 | Status: SHIPPED | OUTPATIENT
Start: 2018-10-10 | End: 2018-10-23 | Stop reason: SDUPTHER

## 2018-10-10 NOTE — PROGRESS NOTES
Subjective:      Terri Krishnan is a 48 y.o. female who presents with New Patient (severe headaches)    Chief Complaint/Reason for referral:  headaches    History of Present Illness:   Describe your headaches to me:  Headaches started around May or June.  They started at that time probably due to TBI which occurred in March.  She was released in April but about 1-2 months later they started.  Fell at home and had TBI.    Bank like feeling around her head.  Occur constantly.  Nothing makes it feel better.  She said she has tried lots of prescriptions but nothing is working    She has gone to ER twice and have given her benadryl, steroid, morphine.  Second time they gave her dilauded.  Both of those worked but then it wore off.    Denies nausea, denies light or sound sensitivity.    Describes pain as severe intensity - cannot work due to headaches.   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      She feels like head is in a vice.  Wakes with a headache and goes to bed with headache.      Pt is agitated in office - annoyed - answers questions seeming like she is extremely frustrated.  REview of EMR shows hx of etoh abuse and recent death of close friend.  These stressors likely contributing to daily headache.    review of meds:  Propranol 10 mg - one pill three times daily  Denies taking tramadol at this time  Denies taking depakote    How many days do you keep ANY type of headache in any given month?  3 or fewer days______   Between 3 and 6 days______   Between 6 and 10 days_____  Between 11 and 14 days____  15 or more headache/days per month__X___  Has severe headaches _X___ days per month but not migraine    Family members with headaches: nobody in family    Co--morbid conditions:    Conditions that affect diagnosis and treatment:  Depression  N        Anxiety     Yes feels anxious now because she cannot work        Sleep disorders:   N       Obesity  N    History of TBI N         "    Pregnancy/family planning   N  Counseled on teratogenicity of migraine medications.  Patient verbalized their understanding.    Fibromyalgia   N    Social History:  Do you drink any caffeine? Yes___X__ No____   How many days per week?  2 or fewer days______  3 or more days___X___    Do you drink alcohol?  None    Do you use recreational drugs including medicinal marijuana? none    Do you smoke cigarettes? yes    What do you do for work? Not working since incident due to head pain    How do your headaches affect your ability to work?    Who and where do you live[place] friends    What is your exercise program: yes - walking - cannot \"overdue it because of headaches\"    Have you had an MRI done? July of 2018    1.  Mild cerebral atrophy.  2.  Interval resolution of posttraumatic intracranial subarachnoid and subdural hemorrhages with residual hemosiderin deposition as described above.  3.  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 No clinical significance. No follow-up imaging required.  4.  Minimal supratentorial white matter disease with a single punctate focus of FLAIR hyperintensity in the left peritrigonal deep white matter. Nonspecific finding consistent with microvascular ischemic change versus demyelination or gliosis.  5.  Chronic sequela of central pontine myelinolysis.  6.  Note, in the medical record, the rehabilitation history and physical and post admission evaluation from 3/20/2018 mentions complications while in hospital of hypokalemia and hyponatremia with severe alcohol withdrawal. History of hyponatremia may be   pertinent to the CPM.    PMH reviewed - no kidney stones, no asthma, no MI, no stroke    Medications and Allergies Reviewed     What are you taking right now for your headaches/#days per month of acute medication use:     See above      Prior acute treatments:  Medication/dose/timing/route/worked or side-effects?    See above    What are you taking right now - daily - to prevent " "headaches?/dose    Propranolol 10 mg TID      Any prior prophylactic treatments:  Medications/dose/frequency/duration of treatment/worked or side effects?    Elavil  25 mg at night.  No s/e ran out of rx  Gabapentin 400 mg - no s/e ran out of rx  topamax - didn't work.  Took only one twice daily.    Cymbalta - didn't work but unclear dose  depakote - 250 mg - ran out per Dr. Moseley note.  Wasn't taking more than once daily per patient  ONBs are pending with rehab department.    Allergies reviewed                                                                                                               HPI    ROS       Objective:     /64 (BP Location: Left arm, Patient Position: Sitting, BP Cuff Size: Adult)   Pulse 100   Temp 36.2 °C (97.1 °F) (Temporal)   Ht 1.727 m (5' 8\")   Wt 63.5 kg (140 lb)   SpO2 94%   BMI 21.29 kg/m²      Physical Exam    Well developed, thin - vital signs reviewed  Alert and Oriented x 3, Affect agitated, Fund of knowledge within normal limits, Memory intact  Cranial Nerves: PERRL, EOMI without nystagmus or opthalmoplegia,   face symmetric in strength and sensation, no facial droop, tongue midline without atrophy or fasiculations, shoulder shrug is normal bilaterally, speech clear and fluent no aphasia  Motor:  Upper and lower extremities 5/5, equal bilaterally.  No drift.  Sensation: decreased right face since TBI  Cerebellar:  Finger to nose intact.  No dysmetria.  No tremor.  Gait: normal gait without ataxia.  Negative Romberg but cannot tandem  CV:  no peripheral edema          Assessment/Plan:     Post traumatic intractable headache:      Daily Preventative Treatment:    Daily medicine -   Elavil 10 mg at bedtime and weekly increase by one extra tab to goal of 50 mg at bedtime or stop titration if headaches better  neurontin - gabapentin resume 400 mg by taking one at bedtime x 3 days and then increasing to twice daily x 3 days, then one capsule three times daily, then " mychart me and let me know how things are going  Don't have to stop the propranol this week.  Try and keep taking it until headaches are better controlled. Considered increasing but with bp just above 100 suspect she will not tolerate.    Other:    ONB and/or Trigger point injection - call us if it doesn't work out for you to get this thru Dr. Moseley    Referral for acupuncture/physical therapy neck exercises - [facility] referral made.  They will call you to schedule.    Return in December - urmila in meantime.

## 2018-10-10 NOTE — PATIENT INSTRUCTIONS
Plan:      Daily Preventative Treatment:  Vitamins - handout provided  Daily medicine -   Elavil 10 mg at bedtime and weekly increase by one extra tab to goal of 50 mg at bedtime or stop titration if headaches better  neurontin - gabapentin resume 400 mg by taking one at bedtime x 3 days and then increasing to twice daily x 3 days, then one capsule three times daily, then mychart me and let me know how things are going  Don't have to stop the propranol this week.  Try and keep taking it until headaches are better controlled    Other:    ONB and/or Trigger point injection - call us if it doesn't work out for you to get this thru Dr. Moseley    Referral for acupuncture/physical therapy neck exercises - [facility] referral made.  They will call you to schedule.    Return in December - [email or web address] in meantime.

## 2018-10-15 ENCOUNTER — TELEPHONE (OUTPATIENT)
Dept: MEDICAL GROUP | Age: 48
End: 2018-10-15

## 2018-10-15 NOTE — TELEPHONE ENCOUNTER
1. Caller Name: [facility]                                         Call Back Number: [phone] (fax)      Patient approves a detailed voicemail message: N\A    Fax recieved from [facility] stating patient was seen in their emergency room 10/11/2018.  Sent fax over to request more information about the ER visit.     PALAK

## 2018-10-23 ENCOUNTER — OFFICE VISIT (OUTPATIENT)
Dept: PHYSICAL MEDICINE AND REHAB | Facility: MEDICAL CENTER | Age: 48
End: 2018-10-23
Payer: MEDICAID

## 2018-10-23 VITALS
WEIGHT: 144.4 LBS | OXYGEN SATURATION: 97 % | HEIGHT: 68 IN | BODY MASS INDEX: 21.89 KG/M2 | HEART RATE: 87 BPM | TEMPERATURE: 97.4 F | SYSTOLIC BLOOD PRESSURE: 116 MMHG | DIASTOLIC BLOOD PRESSURE: 72 MMHG

## 2018-10-23 DIAGNOSIS — S06.305D: ICD-10-CM

## 2018-10-23 DIAGNOSIS — F17.200 TOBACCO DEPENDENCE: ICD-10-CM

## 2018-10-23 DIAGNOSIS — G44.321 INTRACTABLE CHRONIC POST-TRAUMATIC HEADACHE: ICD-10-CM

## 2018-10-23 DIAGNOSIS — G37.2 CENTRAL PONTINE MYELINOLYSIS (HCC): ICD-10-CM

## 2018-10-23 PROCEDURE — 99406 BEHAV CHNG SMOKING 3-10 MIN: CPT | Performed by: PHYSICAL MEDICINE & REHABILITATION

## 2018-10-23 PROCEDURE — 99215 OFFICE O/P EST HI 40 MIN: CPT | Mod: 25 | Performed by: PHYSICAL MEDICINE & REHABILITATION

## 2018-10-23 RX ORDER — DIPHENHYDRAMINE HCL 25 MG
25 TABLET ORAL EVERY 6 HOURS PRN
COMMUNITY

## 2018-10-23 RX ORDER — GABAPENTIN 300 MG/1
300 CAPSULE ORAL 3 TIMES DAILY
Qty: 90 CAP | Refills: 6 | Status: SHIPPED | OUTPATIENT
Start: 2018-10-23 | End: 2018-11-15 | Stop reason: SDUPTHER

## 2018-10-23 ASSESSMENT — PATIENT HEALTH QUESTIONNAIRE - PHQ9
5. POOR APPETITE OR OVEREATING: 0 - NOT AT ALL
SUM OF ALL RESPONSES TO PHQ QUESTIONS 1-9: 9
CLINICAL INTERPRETATION OF PHQ2 SCORE: 1

## 2018-10-23 ASSESSMENT — PAIN SCALES - GENERAL: PAINLEVEL: 10=SEVERE PAIN

## 2018-10-23 NOTE — PATIENT INSTRUCTIONS
I advise quitting smoking with multiple health benefits for you have those around you. Please call [phone] or  visit our website at [email or web address] to see if you are a candidate for the QUIT tobacco program at [facility]. .

## 2018-10-23 NOTE — PROGRESS NOTES
Physiatry (physical medicine and  Rehabilitation), interventional spine and sports medicine    Date of Service: 10/23/2018    Chief complaint:   Chief Complaint   Patient presents with   • New Patient     Intractable chronic post-traumatic headache        HISTORY    HPI: Terri Krishnan 48 y.o. female who presents today with Diagnoses of Intractable chronic post-traumatic headache, Central pontine myelinolysis (HCC), Intracranial hemorrhage following injury with prolonged (more than 24 hours) loss of consciousness with return to pre-existing conscious level, subsequent encounter, and Tobacco dependence were pertinent to this visit.       The headache started approximately 1 month after the traumatic brain injury.  The patient states that the headaches have been severe since that point constant, diffuse throughout the head including the anterior aspect, 10/10 in intensity, nonradiating.  No associated neck pain.  Denies numbness and tingling.  Denies photosensitivity she has tried multiple medications none of which have been effective including acetaminophen, tricyclic antidepressants, NSAIDs, sleep aids, low-dose gabapentin, Depakote, tramadol, Benadryl.  None of these have been effective at all for the patient.  This is affecting the patient's stress, sleep, daily functions.  Denies suicidal and homicidal ideation     Opioid Risk Score: 4    Interpretation of Opioid Risk Score   Score 0-3 = Low risk of abuse. Do UDS at least once per year.  Score 4-7 = Moderate risk of abuse. Do UDS 1-4 times per year.  Score 8+ = High risk of abuse. Refer to specialist.    PHQ  Depression Screen (PHQ-2/PHQ-9) 10/12/2017 3/20/2018 10/23/2018   PHQ-2 Total Score - 0 -   PHQ-2 Total Score - - -   PHQ-2 Total Score 0 - 1   PHQ-9 Total Score - 0 -   PHQ-9 Total Score - - 9       Interpretation of PHQ-9 Total Score   Score Severity   1-4 No Depression   5-9 Mild Depression   10-14 Moderate Depression   15-19 Moderately Severe  Depression   20-27 Severe Depression        Medical records review:  I reviewed the note from the referring provider Cyndie West M.D. including the note dated 9/24/2018.  Patient is tried multiple medications.  Started on Depakote.  Medications have been ineffective, considering patient for nerve blocks.  Referred to Neurology.    I also reviewed multiple visits to the emergency department, the patient was reimaged with no acute changes on her MRI.  I also reviewed the notes from Dr. Mireles when the patient was started on several different medications for headaches however these were not effective as well.    Previous treatments:    Psychology, physical therapy, Occupational Therapy, speech therapy.    Medications the patient is tried: Multiple medicine irritation as described above    Previous interventions: none    Previous surgeries to relieve the above pain:  none      ROS:   Red Flags ROS:   Fever, Chills, Sweats: Denies  Involuntary Weight Loss: Denies  Bladder Incontinence: Denies  Bowel Incontinence: denies  Saddle Anesthesia: Denies    All other systems reviewed and negative.       PMHx:   Past Medical History:   Diagnosis Date   • Alcohol abuse    • Surgical menopause    • TBI (traumatic brain injury) (HCC)    • Tobacco dependence        PSHx:   Past Surgical History:   Procedure Laterality Date   • ABDOMINAL HYSTERECTOMY TOTAL  age 27    Total       Family history   Family History   Problem Relation Age of Onset   • Cancer Neg Hx    • Diabetes Neg Hx    • Heart Disease Neg Hx    • Stroke Neg Hx    • Hyperlipidemia Neg Hx    • Hypertension Neg Hx          Medications: reviewed on [facility].   Outpatient Prescriptions Marked as Taking for the 10/23/18 encounter (Office Visit) with Chase Blas M.D.   Medication Sig Dispense Refill   • diphenhydrAMINE (BENADRYL) 25 MG Tab Take 25 mg by mouth every 6 hours as needed for Sleep.     • gabapentin (NEURONTIN) 300 MG Cap Take 1 Cap by mouth 3 times a day.  "90 Cap 6   • amitriptyline (ELAVIL) 10 MG Tab Take 1 Tab by mouth every day. Titrate from one nightly to 5 with instructions provided in clinic. 150 Tab 3        Allergies:   Allergies   Allergen Reactions   • Shrimp (Diagnostic) Anaphylaxis     poa stated that this patient had a reaction a couple years ago after eating shrimp   • Shrimp Flavor Anaphylaxis       Social Hx:   Social History     Social History   • Marital status: Single     Spouse name: N/A   • Number of children: N/A   • Years of education: N/A     Occupational History   • Not on file.     Social History Main Topics   • Smoking status: Current Every Day Smoker     Packs/day: 0.25     Years: 15.00     Types: Cigarettes   • Smokeless tobacco: Never Used   • Alcohol use No      Comment: quit in March 2018   • Drug use: No   • Sexual activity: Yes     Partners: Male     Birth control/ protection: Surgical     Other Topics Concern   •  Service No   • Blood Transfusions No   • Caffeine Concern No   • Occupational Exposure No   • Hobby Hazards No   • Sleep Concern Yes   • Stress Concern Yes   • Weight Concern No   • Special Diet No   • Back Care No   • Exercise No   • Bike Helmet Yes   • Seat Belt Yes   • Self-Exams Yes     Social History Narrative   • No narrative on file         EXAMINATION     Physical Exam:   Vitals: Blood pressure 116/72, pulse 87, temperature 36.3 °C (97.4 °F), height 1.727 m (5' 8\"), weight 65.5 kg (144 lb 6.4 oz), SpO2 97 %, not currently breastfeeding.    Constitutional:   Body Habitus: Body mass index is 21.96 kg/m².  Cooperation: Fully cooperates with exam  Appearance: Well-groomed, well-nourished, not disheveled     Eyes: No scleral icterus to suggest severe liver disease, no proptosis to suggest severe hyperthyroid    ENT -no obvious auditory deficits, no obvious tongue lesions, tongue midline    Skin -no rashes or lesions noted     Respiratory-  breathing comfortable on room air, no audible wheezing    Cardiovascular- " capillary refills less than 2 seconds. No lower extremity edema is noted.     Gastrointestinal - no obvious abdominal masses, No tenderness to palpation in the abdomen    Psychiatric- alert and oriented ×3. Normal affect.     Gait - normal gait, no use of ambulatory device, nonantalgic.     Musculoskeletal -   Cervical spine   Inspection: No deformities of the skin over the cervical spine. No rashes or lesions.    full  A/P ROM in all directions, without  pain      Spurling’s sign: negative bilaterally    No signs of muscular atrophy in bilateral upper extremities     No tenderness to palpation of the cervical facets        Neuro         Motor Exam Upper Extremities   ? Myotome R L   Shoulder flexion C5 5 5   Elbow flexion C5 5 5   Wrist extension C6 5 5   Elbow extension C7 5 5   Finger flexion C8 5 5   Finger abduction T1 5 5         Motor Exam Lower Extremities    ? Myotome R L   Hip flexion L2 5 5   Knee extension L3 5 5   Ankle dorsiflexion L4 5 5   Toe extension L5 5 5   Ankle plantarflexion S1 5 5         CN  Cranial Nerves:     II - PERRL     III, IV, VI - EOMI     V -mild decreased sensation to light touch on the right side of the face.     VII - Face and smile with slight right facial droop     VIII - Hearing normal to finger rubbing bilaterally     IX - Gag not tested     X - Uvula midline     XI - Shoulder shrugs equal     XII - Tongue protrusion midline, normal control       MEDICAL DECISION MAKING    Medical records review: see under HPI section.     DATA    Labs:   Lab Results   Component Value Date/Time    SODIUM 139 08/28/2018 07:38 AM    POTASSIUM 4.6 08/28/2018 07:38 AM    CHLORIDE 107 08/28/2018 07:38 AM    CO2 21 08/28/2018 07:38 AM    ANION 11.0 08/28/2018 07:38 AM    GLUCOSE 74 08/28/2018 07:38 AM    BUN 20 08/28/2018 07:38 AM    CREATININE 0.81 08/28/2018 07:38 AM    CALCIUM 9.2 08/28/2018 07:38 AM    ASTSGOT 25 08/28/2018 07:38 AM    ALTSGPT 9 08/28/2018 07:38 AM    TBILIRUBIN 0.5 08/28/2018  07:38 AM    ALBUMIN 4.0 08/28/2018 07:38 AM    TOTPROTEIN 6.6 08/28/2018 07:38 AM    GLOBULIN 2.6 08/28/2018 07:38 AM    AGRATIO 1.5 08/28/2018 07:38 AM   ]    Lab Results   Component Value Date/Time    PROTHROMBTM 14.5 03/05/2018 04:35 AM    INR 1.16 (H) 03/05/2018 04:35 AM        Lab Results   Component Value Date/Time    WBC 5.3 08/28/2018 07:38 AM    RBC 4.44 08/28/2018 07:38 AM    HEMOGLOBIN 14.1 08/28/2018 07:38 AM    HEMATOCRIT 44.8 08/28/2018 07:38 AM    .9 (H) 08/28/2018 07:38 AM    MCH 31.8 08/28/2018 07:38 AM    MCHC 31.5 (L) 08/28/2018 07:38 AM    MPV 8.7 (L) 08/28/2018 07:38 AM    NEUTSPOLYS 49.00 08/28/2018 07:38 AM    LYMPHOCYTES 39.00 08/28/2018 07:38 AM    MONOCYTES 4.00 08/28/2018 07:38 AM    EOSINOPHILS 5.00 08/28/2018 07:38 AM    BASOPHILS 1.00 08/28/2018 07:38 AM    HYPOCHROMIA 1+ 03/12/2018 04:12 AM    ANISOCYTOSIS 1+ 08/28/2018 07:38 AM        No results found for: HBA1C     Imaging:   I personally reviewed following images, these are my reads  MRI brain 7/18/2018  No acute changes.  No acute hemorrhages.    IMAGING radiology reads. I reviewed the following radiology reads         Results for orders placed during the hospital encounter of 07/18/18   MR-BRAIN-WITH & W/O    Impression 1.  Mild cerebral atrophy.  2.  Interval resolution of posttraumatic intracranial subarachnoid and subdural hemorrhages with residual hemosiderin deposition as described above.  3.  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 No clinical significance. No follow-up imaging required.  4.  Minimal supratentorial white matter disease with a single punctate focus of FLAIR hyperintensity in the left peritrigonal deep white matter. Nonspecific finding consistent with microvascular ischemic change versus demyelination or gliosis.  5.  Chronic sequela of central pontine myelinolysis.  6.  Note, in the medical record, the rehabilitation history and physical and post admission evaluation from 3/20/2018 mentions complications while in hospital  of hypokalemia and hyponatremia with severe alcohol withdrawal. History of hyponatremia may be   pertinent to the CPM.                                                                                                                                      Results for orders placed in visit on 12/10/17   DX-FOREARM LEFT    Impression Negative left forearm series        Results for orders placed during the hospital encounter of 03/04/18   DX-HAND 3+ LEFT    Impression 1.  No acute traumatic bony injury.  2.  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 Appears stable since prior.                                                               Diagnosis   Visit Diagnoses     ICD-10-CM   1. Intractable chronic post-traumatic headache G44.321   2. Central pontine myelinolysis (HCC) G37.2   3. Intracranial hemorrhage following injury with prolonged (more than 24 hours) loss of consciousness with return to pre-existing conscious level, subsequent encounter S06.305D   4. Tobacco dependence F17.200           ASSESSMENT:  Terri Makenzie Krishnan 48 y.o. female with diffuse intractable headache status post traumatic brain injury, multiple visits to the ER, failed all conservative management, failed multiple medications.  I am increasing the dose of gabapentin of 100 mg 3 times daily to 300 mg 3 times daily also consider increasing this.  I do not believe that the patient is a good candidate for occipital nerve blocks given that the headaches are diffuse and mostly in the anterior aspect.  I referred the patient to neuropsychology and to consider neuropsychological testing with her as well.  I have also ordered botulinum toxin for the headache protocol for this patient.  This was originally studied for migraine headaches however given the diffuse headaches for the patient is reasonable for this is a potential treatment to avoid further ER visits  and to avoid opioid habituation a post traumatic brain injury patient.     Terri was seen today for new patient.    Diagnoses and all orders for this visit:    Intractable chronic post-traumatic headache  -     gabapentin (NEURONTIN) 300 MG Cap; Take 1 Cap by mouth 3 times a day.  -     REFERRAL TO PHYSIATRY (PMR)  -     REFERRAL TO PSYCHOLOGY  -     REFERRAL TO PHYSIATRY (PMR)    Central pontine myelinolysis (HCC)    Intracranial hemorrhage following injury with prolonged (more than 24 hours) loss of consciousness with return to pre-existing conscious level, subsequent encounter    Tobacco dependence    I advised quitting smoking and we discussed the health benefits of quitting smoking. I also provided information for QUIT tobacco program at [facility]. The patient was instructed to call [phone] or to visit our website at [email or web address]. We discussed the patient may be a candidate for counseling through the program. This discussion was 5 minutes of counseling.          Follow-up: 11/15/2018         Please note that this dictation was created using voice recognition software. I have made every reasonable attempt to correct obvious errors but there may be errors of grammar and content that I may have overlooked prior to finalization of this note.      Chase Blas MD  Physical Medicine and Rehabilitation  Interventional Spine and Sports Physiatry  [facility]               CC Cyndie West M.D.   CC Earlene Rebolledo P.A.-C.

## 2018-10-23 NOTE — Clinical Note
Dear Earlene Rebolledo P.A.-C. and Cyndie West M.D.,   Thank you for the referral of Terri Banegas Ariella.  The patient is failed multiple medications, continues to have severe intractable headaches.  I have ordered botulinum toxin for the headache protocol for this patient which I will perform.  Given that the headaches are so diffuse including the anterior aspect I do not believe that occipital nerve blocks to be helpful for her.  Please see my note for more details    Should you have any questions or concerns please do not hesitate to contact me.  Chase Blas M.D.

## 2018-10-29 ENCOUNTER — TELEPHONE (OUTPATIENT)
Dept: PHYSICAL MEDICINE AND REHAB | Facility: MEDICAL CENTER | Age: 48
End: 2018-10-29

## 2018-10-29 NOTE — TELEPHONE ENCOUNTER
Forwarded message to Dr. Blas so that he can advise what medication she can take.    Thank you  Luzma

## 2018-10-29 NOTE — TELEPHONE ENCOUNTER
Needs something else for her headaches, the gabapentin is not doing anything in her words. Please advise.

## 2018-10-30 ENCOUNTER — TELEPHONE (OUTPATIENT)
Dept: PHYSICAL MEDICINE AND REHAB | Facility: MEDICAL CENTER | Age: 48
End: 2018-10-30

## 2018-10-30 NOTE — TELEPHONE ENCOUNTER
Called Pt and notified that I spoke to Dr. Blas about her severe headache that she already taking Gabapentin 600 mg TID and doesn't help on her pain.    As per Dr. Blas mentioned that Pt is a rosemary risk and there is no other option right now just to continue taking the Gabapentin and have a Botox injection.    I did notified Pt that her Insurance denied the Botox injection and we are working for peer to peer to get approved.    Pt agreed.    Thank you  Luzma

## 2018-10-31 NOTE — PROGRESS NOTES
The patient has had intractable headaches for several months since a traumatic brain injury.  Multiple medications and multiple categories as well as a significant number of conservative treatments have been tried to manage the patient's headaches and these have all been unsuccessful.  The patient is seen on a traumatic brain injury specialist as well as our neuro rehabilitation stroke specialist and multiple interventions have been tried however these have been unsuccessful.  The patient has had multiple episodes of severe headaches and going to the emergency department.  I do not consider the patient a candidate for chronic narcotic therapy given the patient's history and high risk of abuse.  I have requested botulinum toxin for injections for the headache protocol for treatment of these headaches.  This was denied by the patient's insurance company.  I requested a peer to peer to discuss this case which was not granted.  We will place a written inquiry on the patient's behalf.    Chase Blas MD  Physical Medicine and Rehabilitation  Interventional Spine and Sports Physiatry  [facility]

## 2018-11-01 ENCOUNTER — TELEPHONE (OUTPATIENT)
Dept: PHYSICAL MEDICINE AND REHAB | Facility: MEDICAL CENTER | Age: 48
End: 2018-11-01

## 2018-11-01 NOTE — TELEPHONE ENCOUNTER
Spoke to Pt and notified the update about the appeal of her Botox injection.    I just mailed it today and I'm not sure how long is the turn around time for the decision.    I did notified her about the referral that was sent to [facility] in [place] that her insurance is not contracted by them.  [facility] is the contracted one and I spoke to Terri to change the referral to [facility].    Thank you  Luzma

## 2018-11-15 ENCOUNTER — OFFICE VISIT (OUTPATIENT)
Dept: PHYSICAL MEDICINE AND REHAB | Facility: MEDICAL CENTER | Age: 48
End: 2018-11-15
Payer: MEDICAID

## 2018-11-15 VITALS
HEART RATE: 86 BPM | BODY MASS INDEX: 22.52 KG/M2 | OXYGEN SATURATION: 95 % | SYSTOLIC BLOOD PRESSURE: 110 MMHG | DIASTOLIC BLOOD PRESSURE: 72 MMHG | HEIGHT: 68 IN | TEMPERATURE: 97.6 F | WEIGHT: 148.59 LBS

## 2018-11-15 DIAGNOSIS — S06.305D: ICD-10-CM

## 2018-11-15 DIAGNOSIS — M53.82 MUSCULOSKELETAL DISORDER OF THE SUBOCCIPITAL: ICD-10-CM

## 2018-11-15 DIAGNOSIS — F10.10 ALCOHOL ABUSE: ICD-10-CM

## 2018-11-15 DIAGNOSIS — F17.200 TOBACCO DEPENDENCE: ICD-10-CM

## 2018-11-15 DIAGNOSIS — G37.2 CENTRAL PONTINE MYELINOLYSIS (HCC): ICD-10-CM

## 2018-11-15 DIAGNOSIS — G44.321 INTRACTABLE CHRONIC POST-TRAUMATIC HEADACHE: ICD-10-CM

## 2018-11-15 DIAGNOSIS — M54.81 BILATERAL OCCIPITAL NEURALGIA: ICD-10-CM

## 2018-11-15 PROCEDURE — 99214 OFFICE O/P EST MOD 30 MIN: CPT | Performed by: PHYSICAL MEDICINE & REHABILITATION

## 2018-11-15 RX ORDER — GABAPENTIN 300 MG/1
600 CAPSULE ORAL 3 TIMES DAILY
Qty: 90 CAP | Refills: 6 | Status: SHIPPED | OUTPATIENT
Start: 2018-11-15 | End: 2018-11-27 | Stop reason: SDUPTHER

## 2018-11-15 NOTE — PATIENT INSTRUCTIONS
I advised quitting smoking and we discussed the health benefits of quitting smoking. I also provided information [facility]. The patient was instructed to call [phone] or to visit our website at [email or web address]. We discussed the patient may be a candidate for counseling through the program. This discussion was 5 minutes of counseling.

## 2018-11-19 NOTE — PROGRESS NOTES
Physiatry (physical medicine and  Rehabilitation), interventional spine and sports medicine        Chief complaint:   Chief Complaint   Patient presents with   • Follow-Up     Intractable chronic post-traumatic headache        HISTORY    HPI: Terri Krishnan 48 y.o. female who presents today with Diagnoses of Intractable chronic post-traumatic headache, Central pontine myelinolysis (HCC), Intracranial hemorrhage following injury with prolonged (more than 24 hours) loss of consciousness with return to pre-existing conscious level, subsequent encounter, Musculoskeletal disorder of the suboccipital, Bilateral occipital neuralgia, and Tobacco dependence were pertinent to this visit.    In the interim the patient continues to have severe, chronic, daily headaches 10/10 in intensity, involving the whole head.  In discussing this further with the patient it seems that the headaches start in the posterior aspect of the head and work through the occiput towards the anterior aspect.  They also start in the upper cervical spine.  Aching in quality.  Minimal improvement with increased dose of gabapentin.  She is wondering if there are any other treatments available.  Denies any new neurological injury.  Denies suicidal and homicidal ideations.  Denies recent drug use.      ROS:   Red Flags ROS:   Fever, Chills, Sweats: Denies  Involuntary Weight Loss: Denies  Bladder Incontinence: Denies  Bowel Incontinence: denies  Saddle Anesthesia: Denies    All other systems reviewed and negative.       PMHx:   Past Medical History:   Diagnosis Date   • Alcohol abuse    • Surgical menopause    • TBI (traumatic brain injury) (HCC)    • Tobacco dependence        PSHx:   Past Surgical History:   Procedure Laterality Date   • ABDOMINAL HYSTERECTOMY TOTAL  age 27    Total       Family history   Family History   Problem Relation Age of Onset   • Cancer Neg Hx    • Diabetes Neg Hx    • Heart Disease Neg Hx    • Stroke Neg Hx    •  "Hyperlipidemia Neg Hx    • Hypertension Neg Hx          Medications: reviewed on epic.   Outpatient Prescriptions Marked as Taking for the 11/15/18 encounter (Office Visit) with Chase Blas M.D.   Medication Sig Dispense Refill   • gabapentin (NEURONTIN) 300 MG Cap Take 2 Caps by mouth 3 times a day. 90 Cap 6        Allergies:   Allergies   Allergen Reactions   • Shrimp (Diagnostic) Anaphylaxis     poa stated that this patient had a reaction a couple years ago after eating shrimp   • Shrimp Flavor Anaphylaxis       Social Hx:   Social History     Social History   • Marital status: Single     Spouse name: N/A   • Number of children: N/A   • Years of education: N/A     Occupational History   • Not on file.     Social History Main Topics   • Smoking status: Current Every Day Smoker     Packs/day: 0.25     Years: 15.00     Types: Cigarettes   • Smokeless tobacco: Never Used   • Alcohol use No      Comment: quit in March 2018   • Drug use: No   • Sexual activity: Yes     Partners: Male     Birth control/ protection: Surgical     Other Topics Concern   •  Service No   • Blood Transfusions No   • Caffeine Concern No   • Occupational Exposure No   • Hobby Hazards No   • Sleep Concern Yes   • Stress Concern Yes   • Weight Concern No   • Special Diet No   • Back Care No   • Exercise No   • Bike Helmet Yes   • Seat Belt Yes   • Self-Exams Yes     Social History Narrative   • No narrative on file         EXAMINATION     Physical Exam:   Vitals: Blood pressure 110/72, pulse 86, temperature 36.4 °C (97.6 °F), height 1.727 m (5' 8\"), weight 67.4 kg (148 lb 9.4 oz), SpO2 95 %, not currently breastfeeding.    Constitutional:   Body Habitus: Body mass index is 22.59 kg/m².  Cooperation: Fully cooperates with exam  Appearance: Well-groomed, well-nourished, not disheveled     Eyes: No scleral icterus to suggest severe liver disease, no proptosis to suggest severe hyperthyroid    ENT -no obvious auditory deficits, no " obvious tongue lesions, tongue midline    Skin -no rashes or lesions noted     Respiratory-  breathing comfortable on room air, no audible wheezing    Cardiovascular- capillary refills less than 2 seconds. No lower extremity edema is noted.     Gastrointestinal - no obvious abdominal masses, No tenderness to palpation in the abdomen    Psychiatric- alert and oriented ×3. Normal affect.     Gait - normal gait, no use of ambulatory device, nonantalgic.     Musculoskeletal -   Cervical spine   Inspection: No deformities of the skin over the cervical spine. No rashes or lesions.    full  A/P ROM in all directions, without  pain      Spurling’s sign: negative bilaterally    No signs of muscular atrophy in bilateral upper extremities     No tenderness to palpation of the cervical facets        Neuro         Motor Exam Upper Extremities   ? Myotome R L   Shoulder flexion C5 5 5   Elbow flexion C5 5 5   Wrist extension C6 5 5   Elbow extension C7 5 5   Finger flexion C8 5 5   Finger abduction T1 5 5         Motor Exam Lower Extremities    ? Myotome R L   Hip flexion L2 5 5   Knee extension L3 5 5   Ankle dorsiflexion L4 5 5   Toe extension L5 5 5   Ankle plantarflexion S1 5 5         CN  Cranial Nerves:     II - PERRL     III, IV, VI - EOMI     V -mild decreased sensation to light touch on the right side of the face.     VII - Face and smile with slight right facial droop     VIII - Hearing normal to finger rubbing bilaterally     IX - Gag not tested     X - Uvula midline     XI - Shoulder shrugs equal     XII - Tongue protrusion midline, normal control       MEDICAL DECISION MAKING    Medical records review: see under HPI section.     DATA    Labs:   Lab Results   Component Value Date/Time    SODIUM 139 08/28/2018 07:38 AM    POTASSIUM 4.6 08/28/2018 07:38 AM    CHLORIDE 107 08/28/2018 07:38 AM    CO2 21 08/28/2018 07:38 AM    ANION 11.0 08/28/2018 07:38 AM    GLUCOSE 74 08/28/2018 07:38 AM    BUN 20 08/28/2018 07:38 AM     CREATININE 0.81 08/28/2018 07:38 AM    CALCIUM 9.2 08/28/2018 07:38 AM    ASTSGOT 25 08/28/2018 07:38 AM    ALTSGPT 9 08/28/2018 07:38 AM    TBILIRUBIN 0.5 08/28/2018 07:38 AM    ALBUMIN 4.0 08/28/2018 07:38 AM    TOTPROTEIN 6.6 08/28/2018 07:38 AM    GLOBULIN 2.6 08/28/2018 07:38 AM    AGRATIO 1.5 08/28/2018 07:38 AM   ]    Lab Results   Component Value Date/Time    PROTHROMBTM 14.5 03/05/2018 04:35 AM    INR 1.16 (H) 03/05/2018 04:35 AM        Lab Results   Component Value Date/Time    WBC 5.3 08/28/2018 07:38 AM    RBC 4.44 08/28/2018 07:38 AM    HEMOGLOBIN 14.1 08/28/2018 07:38 AM    HEMATOCRIT 44.8 08/28/2018 07:38 AM    .9 (H) 08/28/2018 07:38 AM    MCH 31.8 08/28/2018 07:38 AM    MCHC 31.5 (L) 08/28/2018 07:38 AM    MPV 8.7 (L) 08/28/2018 07:38 AM    NEUTSPOLYS 49.00 08/28/2018 07:38 AM    LYMPHOCYTES 39.00 08/28/2018 07:38 AM    MONOCYTES 4.00 08/28/2018 07:38 AM    EOSINOPHILS 5.00 08/28/2018 07:38 AM    BASOPHILS 1.00 08/28/2018 07:38 AM    HYPOCHROMIA 1+ 03/12/2018 04:12 AM    ANISOCYTOSIS 1+ 08/28/2018 07:38 AM        No results found for: HBA1C     Imaging:   I personally reviewed following images, these are my reads  MRI brain 7/18/2018  No acute changes.  No acute hemorrhages.    IMAGING radiology reads. I reviewed the following radiology reads         Results for orders placed during the hospital encounter of 07/18/18   MR-BRAIN-WITH & W/O    Impression 1.  Mild cerebral atrophy.  2.  Interval resolution of posttraumatic intracranial subarachnoid and subdural hemorrhages with residual hemosiderin deposition as described above.  3.  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 No clinical significance. No follow-up imaging required.  4.  Minimal supratentorial white matter disease with a single punctate focus of FLAIR hyperintensity in the left peritrigonal deep white matter. Nonspecific finding consistent with microvascular ischemic change versus demyelination or gliosis.  5.  Chronic sequela of central pontine  myelinolysis.  6.  Note, in the medical record, the rehabilitation history and physical and post admission evaluation from 3/20/2018 mentions complications while in hospital of hypokalemia and hyponatremia with severe alcohol withdrawal. History of hyponatremia may be   pertinent to the CPM.                                                                                                                                      Results for orders placed in visit on 12/10/17   DX-FOREARM LEFT    Impression Negative left forearm series        Results for orders placed during the hospital encounter of 03/04/18   DX-HAND 3+ LEFT    Impression 1.  No acute traumatic bony injury.  2.  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 Appears stable since prior.                                                               Diagnosis   Visit Diagnoses     ICD-10-CM   1. Intractable chronic post-traumatic headache G44.321   2. Central pontine myelinolysis (HCC) G37.2   3. Intracranial hemorrhage following injury with prolonged (more than 24 hours) loss of consciousness with return to pre-existing conscious level, subsequent encounter S06.305D   4. Musculoskeletal disorder of the suboccipital M53.82   5. Bilateral occipital neuralgia M54.81   6. Tobacco dependence F17.200           ASSESSMENT:  Terri Banegas Krishnan 48 y.o. female with diffuse intractable headache status post traumatic brain injury, multiple visits to the ER, failed all conservative management, failed multiple medications.      Terri was seen today for follow-up.    Diagnoses and all orders for this visit:    Intractable chronic post-traumatic headache  Comments:  I requested botulinum toxin headache protocol.  Patient failed multiple medications.  Orders:  -     gabapentin (NEURONTIN) 300 MG Cap; Take 2 Caps by mouth 3 times a day.  -     REFERRAL TO PHYSIATRY (PMR)    Central  pontine myelinolysis (HCC)  Comments:  stable    Intracranial hemorrhage following injury with prolonged (more than 24 hours) loss of consciousness with return to pre-existing conscious level, subsequent encounter    Musculoskeletal disorder of the suboccipital  -     REFERRAL TO PHYSIATRY (PMR)    Bilateral occipital neuralgia  Comments:  Given the more posterior headaches, chronicity, history of ER visits I scheduled the patient for bilateral occipital nerve blocks with ultrasound guidance  Orders:  -     gabapentin (NEURONTIN) 300 MG Cap; Take 2 Caps by mouth 3 times a day.  -     REFERRAL TO PHYSIATRY (PMR)    Tobacco dependence    Alcohol abuse. history of.  I recommend avoiding chronic narcotics if possible.    I advised quitting smoking and we discussed the health benefits of quitting smoking. I also provided information for QUIT tobacco program at [facility]. The patient was instructed to call [phone] or to visit our website at [email or web address]. We discussed the patient may be a candidate for counseling through the program. This discussion was 5 minutes of counseling.     I believe the patient is a good candidate for botulinum toxin headache protocol.  This is an effort to prevent opioid habituation in a patient with a history of opioid addiction and multiple ER visits.  This was initially declined by the patient's insurance company and I placed an appeal  and a request for a peer to peer.    Follow-up: 11/27/2018         Please note that this dictation was created using voice recognition software. I have made every reasonable attempt to correct obvious errors but there may be errors of grammar and content that I may have overlooked prior to finalization of this note.      Chase Blas MD  Physical Medicine and Rehabilitation  Interventional Spine and Sports Physiatry  [facility]               Cyndie Pereyra M.D. CC Whitney May, P.A.-C.

## 2018-11-21 ENCOUNTER — TELEPHONE (OUTPATIENT)
Dept: PHYSICAL MEDICINE AND REHAB | Facility: MEDICAL CENTER | Age: 48
End: 2018-11-21

## 2018-11-26 ENCOUNTER — TELEPHONE (OUTPATIENT)
Dept: PHYSICAL MEDICINE AND REHAB | Facility: MEDICAL CENTER | Age: 48
End: 2018-11-26

## 2018-11-26 NOTE — TELEPHONE ENCOUNTER
Called Pt and LM to call us back in regards to her US Guided sched for 11/26/18 that her Insurance denied it due to This request has been denied by the patient's insurance as experimental and not medically necessary.     To request a [id number] discussion call [phone].    Thank you  Luzma

## 2018-11-27 ENCOUNTER — OFFICE VISIT (OUTPATIENT)
Dept: PHYSICAL MEDICINE AND REHAB | Facility: MEDICAL CENTER | Age: 48
End: 2018-11-27
Payer: MEDICAID

## 2018-11-27 VITALS
SYSTOLIC BLOOD PRESSURE: 108 MMHG | DIASTOLIC BLOOD PRESSURE: 70 MMHG | WEIGHT: 149.25 LBS | HEART RATE: 103 BPM | TEMPERATURE: 98 F | HEIGHT: 68 IN | OXYGEN SATURATION: 96 % | BODY MASS INDEX: 22.62 KG/M2

## 2018-11-27 DIAGNOSIS — F10.10 ALCOHOL ABUSE: ICD-10-CM

## 2018-11-27 DIAGNOSIS — G43.709 CHRONIC MIGRAINE WITHOUT AURA WITHOUT STATUS MIGRAINOSUS, NOT INTRACTABLE: ICD-10-CM

## 2018-11-27 DIAGNOSIS — F17.200 TOBACCO DEPENDENCE: ICD-10-CM

## 2018-11-27 DIAGNOSIS — G44.321 INTRACTABLE CHRONIC POST-TRAUMATIC HEADACHE: ICD-10-CM

## 2018-11-27 PROCEDURE — 99213 OFFICE O/P EST LOW 20 MIN: CPT | Mod: 25 | Performed by: PHYSICAL MEDICINE & REHABILITATION

## 2018-11-27 PROCEDURE — 99406 BEHAV CHNG SMOKING 3-10 MIN: CPT | Performed by: PHYSICAL MEDICINE & REHABILITATION

## 2018-11-27 RX ORDER — GABAPENTIN 300 MG/1
600-900 CAPSULE ORAL 3 TIMES DAILY
Qty: 270 CAP | Refills: 3 | Status: SHIPPED | OUTPATIENT
Start: 2018-11-27 | End: 2018-12-14 | Stop reason: CLARIF

## 2018-11-27 ASSESSMENT — PAIN SCALES - GENERAL: PAINLEVEL: 10=SEVERE PAIN

## 2018-11-27 NOTE — PROGRESS NOTES
Physiatry (physical medicine and  Rehabilitation), interventional spine and sports medicine        Chief complaint:   Chief Complaint   Patient presents with   • Follow-Up     post-traumatic headache        HISTORY    HPI: Terri Krishnan 48 y.o. female who presents today with Diagnoses of Intractable chronic post-traumatic headache, Chronic migraine without aura without status migrainosus, not intractable, Alcohol abuse. history of reports sober for 9 months.  I recommend avoiding chronic narcotics if possible., and Tobacco dependence were pertinent to this visit.      Chronic daily headaches waking the patient up from sleep, severe, range from 8/10 intensity to 10/10 in intensity, involves the whole head and a squeeze like and occasionally pulsatile pattern.  Associated irritability and sugar cravings especially when headaches are worst.  Stress can also make the headaches worse.  Denies visual and audio disturbances.  Patient with numbness in the right side of the face which occasionally is worse with severe headaches.    Denies any new neurological changes.  Denies suicidal homicidal ideation, denies recent drug use, patient notes that she is 9 months sober.      ROS:   Red Flags ROS:   Fever, Chills, Sweats: Denies  Involuntary Weight Loss: Denies  Bladder Incontinence: Denies  Bowel Incontinence: denies  Saddle Anesthesia: Denies    All other systems reviewed and negative.       PMHx:   Past Medical History:   Diagnosis Date   • Alcohol abuse    • Surgical menopause    • TBI (traumatic brain injury) (HCC)    • Tobacco dependence        PSHx:   Past Surgical History:   Procedure Laterality Date   • ABDOMINAL HYSTERECTOMY TOTAL  age 27    Total       Family history   Family History   Problem Relation Age of Onset   • Cancer Neg Hx    • Diabetes Neg Hx    • Heart Disease Neg Hx    • Stroke Neg Hx    • Hyperlipidemia Neg Hx    • Hypertension Neg Hx          Medications: reviewed on [facility].   Outpatient  "Prescriptions Marked as Taking for the 11/27/18 encounter (Office Visit) with Chase Blas M.D.   Medication Sig Dispense Refill   • gabapentin (NEURONTIN) 300 MG Cap Take 2-3 Caps by mouth 3 times a day. 600/600/900 270 Cap 3        Allergies:   Allergies   Allergen Reactions   • Shrimp (Diagnostic) Anaphylaxis     poa stated that this patient had a reaction a couple years ago after eating shrimp   • Shrimp Flavor Anaphylaxis       Social Hx:   Social History     Social History   • Marital status: Single     Spouse name: N/A   • Number of children: N/A   • Years of education: N/A     Occupational History   • Not on file.     Social History Main Topics   • Smoking status: Current Every Day Smoker     Packs/day: 0.25     Years: 15.00     Types: Cigarettes   • Smokeless tobacco: Never Used   • Alcohol use No      Comment: quit in March 2018   • Drug use: No   • Sexual activity: Yes     Partners: Male     Birth control/ protection: Surgical     Other Topics Concern   •  Service No   • Blood Transfusions No   • Caffeine Concern No   • Occupational Exposure No   • Hobby Hazards No   • Sleep Concern Yes   • Stress Concern Yes   • Weight Concern No   • Special Diet No   • Back Care No   • Exercise No   • Bike Helmet Yes   • Seat Belt Yes   • Self-Exams Yes     Social History Narrative   • No narrative on file         EXAMINATION     Physical Exam:   Vitals: Blood pressure 108/70, pulse (!) 103, temperature 36.7 °C (98 °F), temperature source Temporal, height 1.727 m (5' 8\"), weight 67.7 kg (149 lb 4 oz), SpO2 96 %, not currently breastfeeding.    Constitutional:   Body Habitus: Body mass index is 22.69 kg/m².  Cooperation: Fully cooperates with exam  Appearance: Well-groomed, well-nourished, not disheveled     Eyes: No scleral icterus to suggest severe liver disease, no proptosis to suggest severe hyperthyroid    ENT -no obvious auditory deficits, no obvious tongue lesions, tongue midline    Skin -no rashes or " lesions noted     Respiratory-  breathing comfortable on room air, no audible wheezing    Cardiovascular- capillary refills less than 2 seconds. No lower extremity edema is noted.     Gastrointestinal - no obvious abdominal masses, No tenderness to palpation in the abdomen    Psychiatric- alert and oriented ×3. Normal affect.     Gait - normal gait, no use of ambulatory device, nonantalgic.     Musculoskeletal -   Cervical spine   Inspection: No deformities of the skin over the cervical spine. No rashes or lesions.    full  A/P ROM in all directions, without  pain      Spurling’s sign: negative bilaterally    No signs of muscular atrophy in bilateral upper extremities     No tenderness to palpation of the cervical facets        Neuro         Motor Exam Upper Extremities   ? Myotome R L   Shoulder flexion C5 5 5   Elbow flexion C5 5 5   Wrist extension C6 5 5   Elbow extension C7 5 5   Finger flexion C8 5 5   Finger abduction T1 5 5         CN  Cranial Nerves:     II - PERRL     III, IV, VI - EOMI     V -mild decreased sensation to light touch on the right side of the face.     VII - Face and smile with slight right facial droop     VIII - Hearing normal to finger rubbing bilaterally     IX - Gag not tested     X - Uvula midline     XI - Shoulder shrugs equal     XII - Tongue protrusion midline, normal control       MEDICAL DECISION MAKING    Medical records review: see under HPI section.     DATA    Labs:   Lab Results   Component Value Date/Time    SODIUM 139 08/28/2018 07:38 AM    POTASSIUM 4.6 08/28/2018 07:38 AM    CHLORIDE 107 08/28/2018 07:38 AM    CO2 21 08/28/2018 07:38 AM    ANION 11.0 08/28/2018 07:38 AM    GLUCOSE 74 08/28/2018 07:38 AM    BUN 20 08/28/2018 07:38 AM    CREATININE 0.81 08/28/2018 07:38 AM    CALCIUM 9.2 08/28/2018 07:38 AM    ASTSGOT 25 08/28/2018 07:38 AM    ALTSGPT 9 08/28/2018 07:38 AM    TBILIRUBIN 0.5 08/28/2018 07:38 AM    ALBUMIN 4.0 08/28/2018 07:38 AM    TOTPROTEIN 6.6 08/28/2018  07:38 AM    GLOBULIN 2.6 08/28/2018 07:38 AM    AGRATIO 1.5 08/28/2018 07:38 AM   ]    Lab Results   Component Value Date/Time    PROTHROMBTM 14.5 03/05/2018 04:35 AM    INR 1.16 (H) 03/05/2018 04:35 AM        Lab Results   Component Value Date/Time    WBC 5.3 08/28/2018 07:38 AM    RBC 4.44 08/28/2018 07:38 AM    HEMOGLOBIN 14.1 08/28/2018 07:38 AM    HEMATOCRIT 44.8 08/28/2018 07:38 AM    .9 (H) 08/28/2018 07:38 AM    MCH 31.8 08/28/2018 07:38 AM    MCHC 31.5 (L) 08/28/2018 07:38 AM    MPV 8.7 (L) 08/28/2018 07:38 AM    NEUTSPOLYS 49.00 08/28/2018 07:38 AM    LYMPHOCYTES 39.00 08/28/2018 07:38 AM    MONOCYTES 4.00 08/28/2018 07:38 AM    EOSINOPHILS 5.00 08/28/2018 07:38 AM    BASOPHILS 1.00 08/28/2018 07:38 AM    HYPOCHROMIA 1+ 03/12/2018 04:12 AM    ANISOCYTOSIS 1+ 08/28/2018 07:38 AM        No results found for: HBA1C     Imaging:   I personally reviewed following images, these are my reads  MRI brain 7/18/2018  No acute changes.  No acute hemorrhages.    IMAGING radiology reads. I reviewed the following radiology reads         Results for orders placed during the hospital encounter of 07/18/18   MR-BRAIN-WITH & W/O    Impression 1.  Mild cerebral atrophy.  2.  Interval resolution of posttraumatic intracranial subarachnoid and subdural hemorrhages with residual hemosiderin deposition as described above.  3.  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 No clinical significance. No follow-up imaging required.  4.  Minimal supratentorial white matter disease with a single punctate focus of FLAIR hyperintensity in the left peritrigonal deep white matter. Nonspecific finding consistent with microvascular ischemic change versus demyelination or gliosis.  5.  Chronic sequela of central pontine myelinolysis.  6.  Note, in the medical record, the rehabilitation history and physical and post admission evaluation from 3/20/2018 mentions complications while in hospital of hypokalemia and hyponatremia with severe alcohol withdrawal. History  of hyponatremia may be   pertinent to the CPM.                                                                                                                                      Results for orders placed in visit on 12/10/17   DX-FOREARM LEFT    Impression Negative left forearm series        Results for orders placed during the hospital encounter of 03/04/18   DX-HAND 3+ LEFT    Impression 1.  No acute traumatic bony injury.  2.  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 Appears stable since prior.                                                               Diagnosis   Visit Diagnoses     ICD-10-CM   1. Intractable chronic post-traumatic headache G44.321   2. Chronic migraine without aura without status migrainosus, not intractable G43.709   3. Alcohol abuse. history of reports sober for 9 months.  I recommend avoiding chronic narcotics if possible. F10.10   4. Tobacco dependence F17.200           ASSESSMENT:  Terri Krishnan 48 y.o. female with diffuse intractable headache status post traumatic brain injury, multiple visits to the ER, failed all conservative management, failed multiple medications.      Terri was seen today for follow-up.    Diagnoses and all orders for this visit:    Intractable chronic post-traumatic headache  -     gabapentin (NEURONTIN) 300 MG Cap; Take 2-3 Caps by mouth 3 times a day. 600/600/900    Chronic migraine without aura without status migrainosus, not intractable  -     gabapentin (NEURONTIN) 300 MG Cap; Take 2-3 Caps by mouth 3 times a day. 600/600/900  -     REFERRAL TO PHYSIATRY (PMR)    Alcohol abuse. history of reports sober for 9 months.  I recommend avoiding chronic narcotics if possible.    Tobacco dependence      I advised quitting smoking and we discussed the health benefits of quitting smoking. I also provided information for QUIT tobacco program at [facility]. The patient  was instructed to call [phone] or to visit our website at [email or web address]. We discussed the patient may be a candidate for counseling through the program. She has been cutting down on smoking and now at 1/4 pack per day. This discussion was 5 minutes of counseling.     I completed a peer to peer with a medical director from [employer], the case was initially denied for botulinum toxin injections for occipital nerve blocks.  I reevaluate the patient today and I placed a new order.    I believe there are some migraine features to her headaches and her intractable chronic posttraumatic headaches may have converted to migraine type headaches.  I believe the patient would benefit from botulinum toxin protocol for control of her headaches.    The patient has been tried on NSAIDs, tricyclic antidepressants, gabapentin, acetaminophen, other neuropathic pain medications, physical therapy, occupational therapy with no significant improvements.  She has had several visits to the ER.       Follow-up: 12/27/2018         Please note that this dictation was created using voice recognition software. I have made every reasonable attempt to correct obvious errors but there may be errors of grammar and content that I may have overlooked prior to finalization of this note.      Chase Blas MD  Physical Medicine and Rehabilitation  Interventional Spine and Sports Physiatry  [facility]               CC Cyndie West M.D.   CC Earlene Rebolledo P.A.-C.

## 2018-11-27 NOTE — PATIENT INSTRUCTIONS
I advise quitting smoking with multiple health benefits for you have those around you. Please call [phone] or  visit our website at [email or web address] to see if you are a candidate for the QUIT tobacco program at [facility]. .

## 2018-12-05 ENCOUNTER — APPOINTMENT (OUTPATIENT)
Dept: NEUROLOGY | Facility: MEDICAL CENTER | Age: 48
End: 2018-12-05
Payer: MEDICAID

## 2018-12-14 ENCOUNTER — APPOINTMENT (OUTPATIENT)
Dept: RADIOLOGY | Facility: MEDICAL CENTER | Age: 48
End: 2018-12-14
Attending: EMERGENCY MEDICINE
Payer: MEDICAID

## 2018-12-14 ENCOUNTER — HOSPITAL ENCOUNTER (EMERGENCY)
Facility: MEDICAL CENTER | Age: 48
End: 2018-12-14
Attending: EMERGENCY MEDICINE
Payer: MEDICAID

## 2018-12-14 VITALS
BODY MASS INDEX: 23.05 KG/M2 | TEMPERATURE: 97 F | RESPIRATION RATE: 18 BRPM | HEART RATE: 98 BPM | SYSTOLIC BLOOD PRESSURE: 115 MMHG | WEIGHT: 152.12 LBS | HEIGHT: 68 IN | DIASTOLIC BLOOD PRESSURE: 72 MMHG | OXYGEN SATURATION: 98 %

## 2018-12-14 DIAGNOSIS — R51.9 CHRONIC INTRACTABLE HEADACHE, UNSPECIFIED HEADACHE TYPE: ICD-10-CM

## 2018-12-14 DIAGNOSIS — G89.29 CHRONIC INTRACTABLE HEADACHE, UNSPECIFIED HEADACHE TYPE: ICD-10-CM

## 2018-12-14 PROCEDURE — A9270 NON-COVERED ITEM OR SERVICE: HCPCS | Performed by: EMERGENCY MEDICINE

## 2018-12-14 PROCEDURE — 96372 THER/PROPH/DIAG INJ SC/IM: CPT

## 2018-12-14 PROCEDURE — 99284 EMERGENCY DEPT VISIT MOD MDM: CPT

## 2018-12-14 PROCEDURE — 700111 HCHG RX REV CODE 636 W/ 250 OVERRIDE (IP): Performed by: EMERGENCY MEDICINE

## 2018-12-14 PROCEDURE — 72050 X-RAY EXAM NECK SPINE 4/5VWS: CPT

## 2018-12-14 PROCEDURE — 700102 HCHG RX REV CODE 250 W/ 637 OVERRIDE(OP): Performed by: EMERGENCY MEDICINE

## 2018-12-14 RX ORDER — CYCLOBENZAPRINE HCL 10 MG
10 TABLET ORAL ONCE
Status: COMPLETED | OUTPATIENT
Start: 2018-12-14 | End: 2018-12-14

## 2018-12-14 RX ORDER — KETOROLAC TROMETHAMINE 30 MG/ML
30 INJECTION, SOLUTION INTRAMUSCULAR; INTRAVENOUS ONCE
Status: COMPLETED | OUTPATIENT
Start: 2018-12-14 | End: 2018-12-14

## 2018-12-14 RX ORDER — AMITRIPTYLINE HYDROCHLORIDE 50 MG/1
50 TABLET, FILM COATED ORAL NIGHTLY
Status: SHIPPED | COMMUNITY
End: 2019-04-10

## 2018-12-14 RX ORDER — ESTRADIOL 0.5 MG/1
0.5 TABLET ORAL DAILY
Status: SHIPPED | COMMUNITY
End: 2019-06-23

## 2018-12-14 RX ORDER — CYCLOBENZAPRINE HCL 10 MG
10 TABLET ORAL 3 TIMES DAILY PRN
Qty: 30 TAB | Refills: 0 | Status: SHIPPED | OUTPATIENT
Start: 2018-12-14 | End: 2019-04-10

## 2018-12-14 RX ADMIN — KETOROLAC TROMETHAMINE 30 MG: 30 INJECTION, SOLUTION INTRAMUSCULAR at 10:08

## 2018-12-14 RX ADMIN — MAGNESIUM OXIDE TAB 400 MG (241.3 MG ELEMENTAL MG) 400 MG: 400 (241.3 MG) TAB at 10:10

## 2018-12-14 RX ADMIN — CYCLOBENZAPRINE HYDROCHLORIDE 10 MG: 10 TABLET, FILM COATED ORAL at 10:08

## 2018-12-14 ASSESSMENT — PAIN SCALES - GENERAL
PAINLEVEL_OUTOF10: 2
PAINLEVEL_OUTOF10: 4

## 2018-12-14 NOTE — ED PROVIDER NOTES
ED Provider Note    CHIEF COMPLAINT  Chief Complaint   Patient presents with   • Headache       HPI  Terri Krishnan is a 48 y.o. female who presents planing of chronic neck pain and headaches ever since she sustained a traumatic brain injury in March.  She is on amitriptyline for her headaches and was on gabapentin but it was not working.  She has had intermittent use of narcotics to try and help with the pain as well as anti-inflammatories.  She is trying to get in to see a chiropractor but they will not work on her until she has had x-rays done of her neck.  The patient states that the headache is severe all the time it starts in the front of her forehead and her base of her neck.  She denies any extremity numbness or tingling.  When she sustained a head injury she did not require any surgical intervention for it.       REVIEW OF SYSTEMS  See HPI for further details. All other systems are negative.     PAST MEDICAL HISTORY  Past Medical History:   Diagnosis Date   • Alcohol abuse    • Surgical menopause    • TBI (traumatic brain injury) (HCC)    • Tobacco dependence        FAMILY HISTORY  Family History   Problem Relation Age of Onset   • Cancer Neg Hx    • Diabetes Neg Hx    • Heart Disease Neg Hx    • Stroke Neg Hx    • Hyperlipidemia Neg Hx    • Hypertension Neg Hx        SOCIAL HISTORY  Social History     Social History   • Marital status: Single     Spouse name: N/A   • Number of children: N/A   • Years of education: N/A     Social History Main Topics   • Smoking status: Current Every Day Smoker     Packs/day: 0.25     Years: 15.00     Types: Cigarettes   • Smokeless tobacco: Never Used   • Alcohol use No      Comment: quit in March 2018   • Drug use: No   • Sexual activity: Yes     Partners: Male     Birth control/ protection: Surgical     Other Topics Concern   •  Service No   • Blood Transfusions No   • Caffeine Concern No   • Occupational Exposure No   • Hobby Hazards No   • Sleep Concern  "Yes   • Stress Concern Yes   • Weight Concern No   • Special Diet No   • Back Care No   • Exercise No   • Bike Helmet Yes   • Seat Belt Yes   • Self-Exams Yes     Social History Narrative   • No narrative on file       SURGICAL HISTORY  Past Surgical History:   Procedure Laterality Date   • ABDOMINAL HYSTERECTOMY TOTAL  age 27    Total       CURRENT MEDICATIONS  Home Medications     Reviewed by Jazzy Gorodn (Pharmacy Tech) on 12/14/18 at 0907  Med List Status: Complete   Medication Last Dose Status   amitriptyline (ELAVIL) 50 MG Tab 12/13/2018 Active   diphenhydrAMINE (BENADRYL) 25 MG Tab 12/13/2018 Active   estradiol (ESTRACE) 0.5 MG tablet 12/14/2018 Active                ALLERGIES  Allergies   Allergen Reactions   • Shrimp (Diagnostic) Anaphylaxis     poa stated that this patient had a reaction a couple years ago after eating shrimp   • Shrimp Flavor Anaphylaxis       PHYSICAL EXAM  VITAL SIGNS: /72   Pulse 98   Temp 36.1 °C (97 °F) (Temporal)   Resp 18   Ht 1.727 m (5' 8\")   Wt 69 kg (152 lb 1.9 oz)   SpO2 98%   BMI 23.13 kg/m²       Constitutional:  Well developed, Well nourished, No acute distress, Non-toxic appearance.   HENT: Normocephalic atraumatic no sinus tenderness  Eyes: PERRLA, EOMI, Conjunctiva normal, No discharge.   Neck: Normal range of motion, No tenderness, Supple, No stridor.   Lymphatic: No lymphadenopathy noted.   Cardiovascular: Normal heart rate, Normal rhythm, No murmurs, No rubs, No gallops.   Thorax & Lungs: Normal breath sounds, No respiratory distress, No wheezing, No chest tenderness.   Skin: Warm, Dry, No erythema, No rash.   Extremities: Intact distal pulses, No edema, No tenderness, No cyanosis, No clubbing.   Neurologic: Alert & oriented x 3, Normal motor function, Normal sensory function, No focal deficits noted.   Psychiatric: Affect normal, Judgment normal, Mood normal.     RADIOLOGY/PROCEDURES  DX-CERVICAL SPINE-4+ VIEWS   Final Result      Minimal C3/4 " degenerative disc disease and uncovertebral hypertrophy      Trace fixed C6/C7 anterolisthesis            COURSE & MEDICAL DECISION MAKING  Pertinent Labs & Imaging studies reviewed. (See chart for details)  Differential diagnosis: Chronic headache, migraine, cervical spine injury    I ordered the cervical spine and 4 views including the flexion extension so the patient can see the chiropractor to possibly help her chronic pain.  I also will discharge her home with Flexeril for muscle relaxant as well as magnesium.  I recommend that she continue the medication she is already taking for her headaches.  I also gave her referral to pain management.  Patient will be discharged home in stable condition.      Earlene Rebolledo P.A.-C.  [address] [place] [address]  [phone]    Call in 1 day  for recheck    [facility]  [id number]  [place] [address]  [phone]  Call in 1 day  for recheck, to establish care    Current Outpatient Prescriptions   Medication Sig Dispense Refill   • estradiol (ESTRACE) 0.5 MG tablet Take 0.5 mg by mouth every day.     • amitriptyline (ELAVIL) 50 MG Tab Take 50 mg by mouth every evening.     • cyclobenzaprine (FLEXERIL) 10 MG Tab Take 1 Tab by mouth 3 times a day as needed. 30 Tab 0   • magnesium oxide (MAG-OX) 400 (241.3 Mg) MG Tab tablet Take 1 Tab by mouth every day. 30 Tab 0   • diphenhydrAMINE (BENADRYL) 25 MG Tab Take 25 mg by mouth every 6 hours as needed for Sleep.         FINAL IMPRESSION  1. Chronic intractable headache, unspecified headache type            Electronically signed by: Mona Cm, 12/14/2018 9:11 AM

## 2018-12-14 NOTE — ED NOTES
Presents for evaluation of ongoing headache. The patient was a victim of a traumatic brain injury with intracranial hemorrhage this past March 4 th. She has been evaluated for recurring pain since the trauma.

## 2018-12-14 NOTE — ED NOTES
Pt back from imaging, Pt medicated as directed by EVA ledesma, poc update given to pt. Further orders and dispo pending at this time. No further questions from pt at this time

## 2018-12-14 NOTE — ED NOTES
1035 All results back, chart up for MD for re evaluation. poc update given to pt. No further questions at this time. Further orders and dispo pending  1050 D/c pt home with family , 2 rx given , sent directly to pharmacy . Pt aware of f/u instructions , aware to return for any changes or concerns. No further questions upon d/c home from ed

## 2018-12-27 ENCOUNTER — APPOINTMENT (OUTPATIENT)
Dept: PHYSICAL MEDICINE AND REHAB | Facility: MEDICAL CENTER | Age: 48
End: 2018-12-27
Payer: MEDICAID

## 2019-01-16 ENCOUNTER — APPOINTMENT (OUTPATIENT)
Dept: PHYSICAL MEDICINE AND REHAB | Facility: MEDICAL CENTER | Age: 49
End: 2019-01-16
Payer: MEDICAID

## 2019-01-23 ENCOUNTER — OFFICE VISIT (OUTPATIENT)
Dept: PHYSICAL MEDICINE AND REHAB | Facility: MEDICAL CENTER | Age: 49
End: 2019-01-23
Payer: MEDICAID

## 2019-01-23 VITALS
SYSTOLIC BLOOD PRESSURE: 108 MMHG | HEART RATE: 120 BPM | BODY MASS INDEX: 23.62 KG/M2 | TEMPERATURE: 98.4 F | DIASTOLIC BLOOD PRESSURE: 72 MMHG | OXYGEN SATURATION: 94 % | HEIGHT: 68 IN | WEIGHT: 155.87 LBS

## 2019-01-23 DIAGNOSIS — G37.2 CENTRAL PONTINE MYELINOLYSIS (HCC): ICD-10-CM

## 2019-01-23 DIAGNOSIS — M47.812 SPONDYLOSIS OF CERVICAL REGION WITHOUT MYELOPATHY OR RADICULOPATHY: ICD-10-CM

## 2019-01-23 DIAGNOSIS — G44.321 INTRACTABLE CHRONIC POST-TRAUMATIC HEADACHE: ICD-10-CM

## 2019-01-23 DIAGNOSIS — M50.30 OTHER CERVICAL DISC DEGENERATION, UNSPECIFIED CERVICAL REGION: ICD-10-CM

## 2019-01-23 DIAGNOSIS — S06.305D: ICD-10-CM

## 2019-01-23 PROCEDURE — 99214 OFFICE O/P EST MOD 30 MIN: CPT | Performed by: PHYSICAL MEDICINE & REHABILITATION

## 2019-01-23 ASSESSMENT — PATIENT HEALTH QUESTIONNAIRE - PHQ9: CLINICAL INTERPRETATION OF PHQ2 SCORE: 0

## 2019-01-23 ASSESSMENT — PAIN SCALES - GENERAL: PAINLEVEL: 10=SEVERE PAIN

## 2019-01-23 NOTE — PROGRESS NOTES
Physiatry (physical medicine and  Rehabilitation), interventional spine and sports medicine    Chief complaint:   Chief Complaint   Patient presents with   • Follow-Up     neck pain        HISTORY    HPI: Terri Krishnan 48 y.o. female who presents today with Diagnoses of Other cervical disc degeneration, unspecified cervical region, Spondylosis of cervical region without myelopathy or radiculopathy, Central pontine myelinolysis (HCC), Intracranial hemorrhage following injury with prolonged (more than 24 hours) loss of consciousness with return to pre-existing conscious level, subsequent encounter, and Intractable chronic post-traumatic headache were pertinent to this visit.    Chronic daily headache every day, severe, ranges from 8/10 in intensity to 10/10 in intensity.  Several trips to the emergency room.  Previously these were squeezing type headaches.  Now the headaches are more posterior starting in the upper cervical spine radiating to the posterior head.  Aching in quality.          ROS:   Red Flags ROS:   Fever, Chills, Sweats: Denies  Involuntary Weight Loss: Denies  Bladder Incontinence: Denies  Bowel Incontinence: denies  Saddle Anesthesia: Denies    All other systems reviewed and negative.       PMHx:   Past Medical History:   Diagnosis Date   • Alcohol abuse    • Surgical menopause    • TBI (traumatic brain injury) (HCC)    • Tobacco dependence        PSHx:   Past Surgical History:   Procedure Laterality Date   • ABDOMINAL HYSTERECTOMY TOTAL  age 27    Total       Family history   Family History   Problem Relation Age of Onset   • Cancer Neg Hx    • Diabetes Neg Hx    • Heart Disease Neg Hx    • Stroke Neg Hx    • Hyperlipidemia Neg Hx    • Hypertension Neg Hx          Medications: reviewed on [facility].   Outpatient Prescriptions Marked as Taking for the 1/23/19 encounter (Office Visit) with Chase Blas M.D.   Medication Sig Dispense Refill   • estradiol (ESTRACE) 0.5 MG tablet Take 0.5 mg by  "mouth every day.     • cyclobenzaprine (FLEXERIL) 10 MG Tab Take 1 Tab by mouth 3 times a day as needed. 30 Tab 0   • diphenhydrAMINE (BENADRYL) 25 MG Tab Take 25 mg by mouth every 6 hours as needed for Sleep.          Allergies:   Allergies   Allergen Reactions   • Shrimp (Diagnostic) Anaphylaxis     poa stated that this patient had a reaction a couple years ago after eating shrimp   • Shrimp Flavor Anaphylaxis       Social Hx:   Social History     Social History   • Marital status: Single     Spouse name: N/A   • Number of children: N/A   • Years of education: N/A     Occupational History   • Not on file.     Social History Main Topics   • Smoking status: Current Every Day Smoker     Packs/day: 0.25     Years: 15.00     Types: Cigarettes   • Smokeless tobacco: Never Used   • Alcohol use No      Comment: quit in March 2018   • Drug use: No   • Sexual activity: Yes     Partners: Male     Birth control/ protection: Surgical     Other Topics Concern   •  Service No   • Blood Transfusions No   • Caffeine Concern No   • Occupational Exposure No   • Hobby Hazards No   • Sleep Concern Yes   • Stress Concern Yes   • Weight Concern No   • Special Diet No   • Back Care No   • Exercise No   • Bike Helmet Yes   • Seat Belt Yes   • Self-Exams Yes     Social History Narrative   • No narrative on file         EXAMINATION     Physical Exam:   Vitals: Blood pressure 108/72, pulse (!) 120, temperature 36.9 °C (98.4 °F), temperature source Temporal, height 1.727 m (5' 8\"), weight 70.7 kg (155 lb 13.8 oz), SpO2 94 %, not currently breastfeeding.    Constitutional:   Body Habitus: Body mass index is 23.7 kg/m².  Cooperation: Fully cooperates with exam  Appearance: Well-groomed, well-nourished, not disheveled     Eyes: No scleral icterus to suggest severe liver disease, no proptosis to suggest severe hyperthyroid    ENT -no obvious auditory deficits, no obvious tongue lesions, tongue midline    Skin -no rashes or lesions noted "     Respiratory-  breathing comfortable on room air, no audible wheezing    Cardiovascular- capillary refills less than 2 seconds. No lower extremity edema is noted.     Gastrointestinal - no obvious abdominal masses, No tenderness to palpation in the abdomen    Psychiatric- alert and oriented ×3. Normal affect.     Gait - normal gait, no use of ambulatory device, nonantalgic.     Musculoskeletal -   Cervical spine   Inspection: No deformities of the skin over the cervical spine. No rashes or lesions.    full  A/P ROM in all directions, without  pain      Spurling’s sign: negative bilaterally    No signs of muscular atrophy in bilateral upper extremities     Positive tenderness to palpation of the mid and upper cervical facets bilaterally         Neuro         Motor Exam Upper Extremities   ? Myotome R L   Shoulder flexion C5 5 5   Elbow flexion C5 5 5   Wrist extension C6 5 5   Elbow extension C7 5 5   Finger flexion C8 5 5   Finger abduction T1 5 5         CN  Cranial Nerves:     II - PERRL     III, IV, VI - EOMI     V -mild decreased sensation to light touch on the right side of the face.     VII - Face and smile with slight right facial droop     VIII - Hearing normal to finger rubbing bilaterally     IX - Gag not tested     X - Uvula midline     XI - Shoulder shrugs equal     XII - Tongue protrusion midline, normal control       MEDICAL DECISION MAKING    Medical records review: see under HPI section.     DATA    Labs:   Lab Results   Component Value Date/Time    SODIUM 139 08/28/2018 07:38 AM    POTASSIUM 4.6 08/28/2018 07:38 AM    CHLORIDE 107 08/28/2018 07:38 AM    CO2 21 08/28/2018 07:38 AM    ANION 11.0 08/28/2018 07:38 AM    GLUCOSE 74 08/28/2018 07:38 AM    BUN 20 08/28/2018 07:38 AM    CREATININE 0.81 08/28/2018 07:38 AM    CALCIUM 9.2 08/28/2018 07:38 AM    ASTSGOT 25 08/28/2018 07:38 AM    ALTSGPT 9 08/28/2018 07:38 AM    TBILIRUBIN 0.5 08/28/2018 07:38 AM    ALBUMIN 4.0 08/28/2018 07:38 AM     TOTPROTEIN 6.6 08/28/2018 07:38 AM    GLOBULIN 2.6 08/28/2018 07:38 AM    AGRATIO 1.5 08/28/2018 07:38 AM   ]    Lab Results   Component Value Date/Time    PROTHROMBTM 14.5 03/05/2018 04:35 AM    INR 1.16 (H) 03/05/2018 04:35 AM        Lab Results   Component Value Date/Time    WBC 5.3 08/28/2018 07:38 AM    RBC 4.44 08/28/2018 07:38 AM    HEMOGLOBIN 14.1 08/28/2018 07:38 AM    HEMATOCRIT 44.8 08/28/2018 07:38 AM    .9 (H) 08/28/2018 07:38 AM    MCH 31.8 08/28/2018 07:38 AM    MCHC 31.5 (L) 08/28/2018 07:38 AM    MPV 8.7 (L) 08/28/2018 07:38 AM    NEUTSPOLYS 49.00 08/28/2018 07:38 AM    LYMPHOCYTES 39.00 08/28/2018 07:38 AM    MONOCYTES 4.00 08/28/2018 07:38 AM    EOSINOPHILS 5.00 08/28/2018 07:38 AM    BASOPHILS 1.00 08/28/2018 07:38 AM    HYPOCHROMIA 1+ 03/12/2018 04:12 AM    ANISOCYTOSIS 1+ 08/28/2018 07:38 AM        No results found for: HBA1C     Imaging:   I personally reviewed following images, these are my reads  MRI brain 7/18/2018  No acute changes.  No acute hemorrhages.    X-ray cervical spine 12/14/2018  Cervical spondylosis and uncovertebral hypertrophy which is worse C3-4.  C2-3 facets are  not well defined on any of the x-rays.    IMAGING radiology reads. I reviewed the following radiology reads         Impression       Minimal C3/4 degenerative disc disease and uncovertebral hypertrophy    Trace fixed C6/C7 anterolisthesis         Results for orders placed during the hospital encounter of 07/18/18   MR-BRAIN-WITH & W/O    Impression 1.  Mild cerebral atrophy.  2.  Interval resolution of posttraumatic intracranial subarachnoid and subdural hemorrhages with residual hemosiderin deposition as described above.  3.  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 No clinical significance. No follow-up imaging required.  4.  Minimal supratentorial white matter disease with a single punctate focus of FLAIR hyperintensity in the left peritrigonal deep white matter. Nonspecific finding consistent with microvascular ischemic  change versus demyelination or gliosis.  5.  Chronic sequela of central pontine myelinolysis.  6.  Note, in the medical record, the rehabilitation history and physical and post admission evaluation from 3/20/2018 mentions complications while in hospital of hypokalemia and hyponatremia with severe alcohol withdrawal. History of hyponatremia may be   pertinent to the CPM.                                                                                                                                      Results for orders placed in visit on 12/10/17   DX-FOREARM LEFT    Impression Negative left forearm series        Results for orders placed during the hospital encounter of 03/04/18   DX-HAND 3+ LEFT    Impression 1.  No acute traumatic bony injury.  2.  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 Appears stable since prior.                                                               Diagnosis   Visit Diagnoses     ICD-10-CM   1. Other cervical disc degeneration, unspecified cervical region M50.30   2. Spondylosis of cervical region without myelopathy or radiculopathy M47.812   3. Central pontine myelinolysis (HCC) G37.2   4. Intracranial hemorrhage following injury with prolonged (more than 24 hours) loss of consciousness with return to pre-existing conscious level, subsequent encounter S06.305D   5. Intractable chronic post-traumatic headache G44.321           ASSESSMENT:  Terri Krishnan 48 y.o. female with diffuse intractable headache status post traumatic brain injury, multiple visits to the ER, failed all conservative management, failed multiple medications. The patient has been tried on NSAIDs, tricyclic antidepressants, gabapentin, acetaminophen, other neuropathic pain medications, physical therapy, occupational therapy with no significant improvements.  She has had several visits to the ER.  I completed a peer to  peer with deborah for botulinum toxin injections however this was declined twice.     Terri was seen today for follow-up.    Diagnoses and all orders for this visit:    Other cervical disc degeneration, unspecified cervical region  -     MR-CERVICAL SPINE-W/O; Future    Spondylosis of cervical region without myelopathy or radiculopathy  -     MR-CERVICAL SPINE-W/O; Future    Central pontine myelinolysis (HCC)    Intracranial hemorrhage following injury with prolonged (more than 24 hours) loss of consciousness with return to pre-existing conscious level, subsequent encounter    Intractable chronic post-traumatic headache      X-ray was positive for cervical spondylosis.  I suspect this may be contributing to the patient's headache significantly.  I have ordered an MRI for further evaluation.  I will follow-up with the patient after the diagnostic study.      Please note that this dictation was created using voice recognition software. I have made every reasonable attempt to correct obvious errors but there may be errors of grammar and content that I may have overlooked prior to finalization of this note.      Chase Blas MD  Physical Medicine and Rehabilitation  Interventional Spine and Sports Physiatry  [facility]

## 2019-01-29 ENCOUNTER — TELEPHONE (OUTPATIENT)
Dept: PHYSICAL MEDICINE AND REHAB | Facility: MEDICAL CENTER | Age: 49
End: 2019-01-29

## 2019-01-29 NOTE — TELEPHONE ENCOUNTER
Pt called about her MRI that is scheduled for 2/7. She is requesting a sedative due to her claustrophobia. Please advise.

## 2019-01-31 ENCOUNTER — OFFICE VISIT (OUTPATIENT)
Dept: PHYSICAL MEDICINE AND REHAB | Facility: MEDICAL CENTER | Age: 49
End: 2019-01-31
Payer: MEDICAID

## 2019-01-31 VITALS
BODY MASS INDEX: 21.98 KG/M2 | HEART RATE: 97 BPM | DIASTOLIC BLOOD PRESSURE: 62 MMHG | WEIGHT: 145 LBS | TEMPERATURE: 98.6 F | SYSTOLIC BLOOD PRESSURE: 116 MMHG | HEIGHT: 68 IN | OXYGEN SATURATION: 92 %

## 2019-01-31 DIAGNOSIS — M54.81 BILATERAL OCCIPITAL NEURALGIA: ICD-10-CM

## 2019-01-31 DIAGNOSIS — F40.240 CLAUSTROPHOBIA: ICD-10-CM

## 2019-01-31 DIAGNOSIS — G44.321 INTRACTABLE CHRONIC POST-TRAUMATIC HEADACHE: ICD-10-CM

## 2019-01-31 DIAGNOSIS — M47.812 SPONDYLOSIS OF CERVICAL REGION WITHOUT MYELOPATHY OR RADICULOPATHY: ICD-10-CM

## 2019-01-31 DIAGNOSIS — M50.30 OTHER CERVICAL DISC DEGENERATION, UNSPECIFIED CERVICAL REGION: ICD-10-CM

## 2019-01-31 DIAGNOSIS — G43.709 CHRONIC MIGRAINE WITHOUT AURA WITHOUT STATUS MIGRAINOSUS, NOT INTRACTABLE: ICD-10-CM

## 2019-01-31 DIAGNOSIS — S06.305D: ICD-10-CM

## 2019-01-31 DIAGNOSIS — G37.2 CENTRAL PONTINE MYELINOLYSIS (HCC): ICD-10-CM

## 2019-01-31 PROCEDURE — 99214 OFFICE O/P EST MOD 30 MIN: CPT | Performed by: PHYSICAL MEDICINE & REHABILITATION

## 2019-01-31 RX ORDER — ALPRAZOLAM 1 MG/1
1 TABLET ORAL PRN
Qty: 2 TAB | Refills: 0 | Status: SHIPPED | OUTPATIENT
Start: 2019-01-31 | End: 2019-01-31 | Stop reason: SDUPTHER

## 2019-01-31 RX ORDER — ALPRAZOLAM 1 MG/1
1 TABLET ORAL PRN
Qty: 2 TAB | Refills: 0 | Status: SHIPPED | OUTPATIENT
Start: 2019-01-31 | End: 2019-02-01

## 2019-01-31 ASSESSMENT — PATIENT HEALTH QUESTIONNAIRE - PHQ9: CLINICAL INTERPRETATION OF PHQ2 SCORE: 0

## 2019-01-31 NOTE — PATIENT INSTRUCTIONS
Do not drive while you are taking the Xanax.  You need a  to take you home from your MRI.  Only use this medication for the MRI as directed.

## 2019-01-31 NOTE — TELEPHONE ENCOUNTER
Called and LM to call us back to sched a [facility] to discuss a sedation during her MR done.    Thank you  Luzma

## 2019-02-01 NOTE — PROGRESS NOTES
Physiatry (physical medicine and  Rehabilitation), interventional spine and sports medicine    Chief complaint:   Chief Complaint   Patient presents with   • Follow-Up     Neck pain        HISTORY    HPI: Terri Krishnan 48 y.o. female who presents today with Diagnoses of Other cervical disc degeneration, unspecified cervical region, Spondylosis of cervical region without myelopathy or radiculopathy, Central pontine myelinolysis (HCC), Intracranial hemorrhage following injury with prolonged (more than 24 hours) loss of consciousness with return to pre-existing conscious level, subsequent encounter, Intractable chronic post-traumatic headache, Chronic migraine without aura without status migrainosus, not intractable, Bilateral occipital neuralgia, and Claustrophobia were pertinent to this visit.    Patient continues to have chronic headaches, currently 10/10 in intensity, similar to this every day, squeezing, posterior worse than anterior, associated neck pain, aching in quality, constant.  The patient also has claustrophobia and states she would not be able to get the MRI cervical spine as previously ordered unless she has oral sedation which she was able to tolerate previous MRIs with.        ROS:   Red Flags ROS:   Fever, Chills, Sweats: Denies  Involuntary Weight Loss: Denies  Bladder Incontinence: Denies  Bowel Incontinence: denies  Saddle Anesthesia: Denies    All other systems reviewed and negative.       PMHx:   Past Medical History:   Diagnosis Date   • Alcohol abuse    • Surgical menopause    • TBI (traumatic brain injury) (HCC)    • Tobacco dependence        PSHx:   Past Surgical History:   Procedure Laterality Date   • ABDOMINAL HYSTERECTOMY TOTAL  age 27    Total       Family history   Family History   Problem Relation Age of Onset   • Cancer Neg Hx    • Diabetes Neg Hx    • Heart Disease Neg Hx    • Stroke Neg Hx    • Hyperlipidemia Neg Hx    • Hypertension Neg Hx          Medications: reviewed  on epic.   Outpatient Prescriptions Marked as Taking for the 1/31/19 encounter (Office Visit) with Chase Blas M.D.   Medication Sig Dispense Refill   • ALPRAZolam (XANAX) 1 MG Tab Take 1 Tab by mouth as needed for Sleep (take one tab 1 hour prior to MRI. OK to repeat x 1. do not drive on this med) for up to 1 day. 2 Tab 0   • estradiol (ESTRACE) 0.5 MG tablet Take 0.5 mg by mouth every day.     • amitriptyline (ELAVIL) 50 MG Tab Take 50 mg by mouth every evening.     • cyclobenzaprine (FLEXERIL) 10 MG Tab Take 1 Tab by mouth 3 times a day as needed. 30 Tab 0   • magnesium oxide (MAG-OX) 400 (241.3 Mg) MG Tab tablet Take 1 Tab by mouth every day. 30 Tab 0   • diphenhydrAMINE (BENADRYL) 25 MG Tab Take 25 mg by mouth every 6 hours as needed for Sleep.          Allergies:   Allergies   Allergen Reactions   • Shrimp (Diagnostic) Anaphylaxis     poa stated that this patient had a reaction a couple years ago after eating shrimp   • Shrimp Flavor Anaphylaxis       Social Hx:   Social History     Social History   • Marital status: Single     Spouse name: N/A   • Number of children: N/A   • Years of education: N/A     Occupational History   • Not on file.     Social History Main Topics   • Smoking status: Current Every Day Smoker     Packs/day: 0.25     Years: 15.00     Types: Cigarettes   • Smokeless tobacco: Never Used   • Alcohol use No      Comment: quit in March 2018   • Drug use: No   • Sexual activity: Yes     Partners: Male     Birth control/ protection: Surgical     Other Topics Concern   •  Service No   • Blood Transfusions No   • Caffeine Concern No   • Occupational Exposure No   • Hobby Hazards No   • Sleep Concern Yes   • Stress Concern Yes   • Weight Concern No   • Special Diet No   • Back Care No   • Exercise No   • Bike Helmet Yes   • Seat Belt Yes   • Self-Exams Yes     Social History Narrative   • No narrative on file         EXAMINATION     Physical Exam:   Vitals: Blood pressure 116/62, pulse  "97, temperature 37 °C (98.6 °F), temperature source Temporal, height 1.727 m (5' 8\"), weight 65.8 kg (145 lb), SpO2 92 %, not currently breastfeeding.    Constitutional:   Body Habitus: Body mass index is 22.05 kg/m².  Cooperation: Fully cooperates with exam  Appearance: Well-groomed, well-nourished, not disheveled     Eyes: No scleral icterus to suggest severe liver disease, no proptosis to suggest severe hyperthyroid    ENT -no obvious auditory deficits, no obvious tongue lesions, tongue midline    Skin -no rashes or lesions noted     Respiratory-  breathing comfortable on room air, no audible wheezing    Cardiovascular- capillary refills less than 2 seconds. No lower extremity edema is noted.     Gastrointestinal - no obvious abdominal masses, No tenderness to palpation in the abdomen    Psychiatric- alert and oriented ×3. Normal affect.     Gait - normal gait, no use of ambulatory device, nonantalgic.     Musculoskeletal -   Cervical spine   Inspection: No deformities of the skin over the cervical spine. No rashes or lesions.    full  A/P ROM in all directions, without  pain      Spurling’s sign: negative bilaterally    No signs of muscular atrophy in bilateral upper extremities     Positive tenderness to palpation of the mid and upper cervical facets bilaterally         Neuro         Motor Exam Upper Extremities   ? Myotome R L   Shoulder flexion C5 5 5   Elbow flexion C5 5 5   Wrist extension C6 5 5   Elbow extension C7 5 5   Finger flexion C8 5 5   Finger abduction T1 5 5         CN  Cranial Nerves:     II - PERRL     III, IV, VI - EOMI     V -mild decreased sensation to light touch on the right side of the face.     VII - Face and smile with slight right facial droop     VIII - Hearing normal to finger rubbing bilaterally     IX - Gag not tested     X - Uvula midline     XI - Shoulder shrugs equal     XII - Tongue protrusion midline, normal control       MEDICAL DECISION MAKING    Medical records review: see " under HPI section.     DATA    Labs:   Lab Results   Component Value Date/Time    SODIUM 139 08/28/2018 07:38 AM    POTASSIUM 4.6 08/28/2018 07:38 AM    CHLORIDE 107 08/28/2018 07:38 AM    CO2 21 08/28/2018 07:38 AM    ANION 11.0 08/28/2018 07:38 AM    GLUCOSE 74 08/28/2018 07:38 AM    BUN 20 08/28/2018 07:38 AM    CREATININE 0.81 08/28/2018 07:38 AM    CALCIUM 9.2 08/28/2018 07:38 AM    ASTSGOT 25 08/28/2018 07:38 AM    ALTSGPT 9 08/28/2018 07:38 AM    TBILIRUBIN 0.5 08/28/2018 07:38 AM    ALBUMIN 4.0 08/28/2018 07:38 AM    TOTPROTEIN 6.6 08/28/2018 07:38 AM    GLOBULIN 2.6 08/28/2018 07:38 AM    AGRATIO 1.5 08/28/2018 07:38 AM   ]    Lab Results   Component Value Date/Time    PROTHROMBTM 14.5 03/05/2018 04:35 AM    INR 1.16 (H) 03/05/2018 04:35 AM        Lab Results   Component Value Date/Time    WBC 5.3 08/28/2018 07:38 AM    RBC 4.44 08/28/2018 07:38 AM    HEMOGLOBIN 14.1 08/28/2018 07:38 AM    HEMATOCRIT 44.8 08/28/2018 07:38 AM    .9 (H) 08/28/2018 07:38 AM    MCH 31.8 08/28/2018 07:38 AM    MCHC 31.5 (L) 08/28/2018 07:38 AM    MPV 8.7 (L) 08/28/2018 07:38 AM    NEUTSPOLYS 49.00 08/28/2018 07:38 AM    LYMPHOCYTES 39.00 08/28/2018 07:38 AM    MONOCYTES 4.00 08/28/2018 07:38 AM    EOSINOPHILS 5.00 08/28/2018 07:38 AM    BASOPHILS 1.00 08/28/2018 07:38 AM    HYPOCHROMIA 1+ 03/12/2018 04:12 AM    ANISOCYTOSIS 1+ 08/28/2018 07:38 AM        No results found for: HBA1C     Imaging:   I personally reviewed following images, these are my reads  MRI brain 7/18/2018  No acute changes.  No acute hemorrhages.    X-ray cervical spine 12/14/2018  Cervical spondylosis and uncovertebral hypertrophy which is worse C3-4.  C2-3 facets are  not well defined on any of the x-rays.    IMAGING radiology reads. I reviewed the following radiology reads         Impression       Minimal C3/4 degenerative disc disease and uncovertebral hypertrophy    Trace fixed C6/C7 anterolisthesis         Results for orders placed during the  hospital encounter of 07/18/18   MR-BRAIN-WITH & W/O    Impression 1.  Mild cerebral atrophy.  2.  Interval resolution of posttraumatic intracranial subarachnoid and subdural hemorrhages with residual hemosiderin deposition as described above.  3.  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 No clinical significance. No follow-up imaging required.  4.  Minimal supratentorial white matter disease with a single punctate focus of FLAIR hyperintensity in the left peritrigonal deep white matter. Nonspecific finding consistent with microvascular ischemic change versus demyelination or gliosis.  5.  Chronic sequela of central pontine myelinolysis.  6.  Note, in the medical record, the rehabilitation history and physical and post admission evaluation from 3/20/2018 mentions complications while in hospital of hypokalemia and hyponatremia with severe alcohol withdrawal. History of hyponatremia may be   pertinent to the CPM.                                                                                                                                      Results for orders placed in visit on 12/10/17   DX-FOREARM LEFT    Impression Negative left forearm series        Results for orders placed during the hospital encounter of 03/04/18   DX-HAND 3+ LEFT    Impression 1.  No acute traumatic bony injury.  2.  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 Appears stable since prior.                                                               Diagnosis   Visit Diagnoses     ICD-10-CM   1. Other cervical disc degeneration, unspecified cervical region M50.30   2. Spondylosis of cervical region without myelopathy or radiculopathy M47.812   3. Central pontine myelinolysis (HCC) G37.2   4. Intracranial hemorrhage following injury with prolonged (more than 24 hours) loss of consciousness with return to pre-existing conscious level, subsequent encounter S06.305D   5.  Intractable chronic post-traumatic headache G44.321   6. Chronic migraine without aura without status migrainosus, not intractable G43.709   7. Bilateral occipital neuralgia M54.81   8. Claustrophobia F40.240           ASSESSMENT:  Terri Krishnan 48 y.o. female with diffuse intractable headache status post traumatic brain injury, multiple visits to the ER, failed all conservative management, failed multiple medications. The patient has been tried on NSAIDs, tricyclic antidepressants, gabapentin, acetaminophen, other neuropathic pain medications, physical therapy, occupational therapy with no significant improvements.  She has had several visits to the ER.  I completed a peer to peer with deborah for botulinum toxin injections however this was declined twice.     Terri was seen today for follow-up.    Diagnoses and all orders for this visit:    Other cervical disc degeneration, unspecified cervical region  -     Discontinue: ALPRAZolam (XANAX) 1 MG Tab; Take 1 Tab by mouth as needed for Sleep (take one tab 1 hour prior to MRI. OK to repeat x 1. do not drive on this med) for up to 1 day.  -     ALPRAZolam (XANAX) 1 MG Tab; Take 1 Tab by mouth as needed for Sleep (take one tab 1 hour prior to MRI. OK to repeat x 1. do not drive on this med) for up to 1 day.    Spondylosis of cervical region without myelopathy or radiculopathy  -     Discontinue: ALPRAZolam (XANAX) 1 MG Tab; Take 1 Tab by mouth as needed for Sleep (take one tab 1 hour prior to MRI. OK to repeat x 1. do not drive on this med) for up to 1 day.  -     ALPRAZolam (XANAX) 1 MG Tab; Take 1 Tab by mouth as needed for Sleep (take one tab 1 hour prior to MRI. OK to repeat x 1. do not drive on this med) for up to 1 day.    Central pontine myelinolysis (HCC)  -     Discontinue: ALPRAZolam (XANAX) 1 MG Tab; Take 1 Tab by mouth as needed for Sleep (take one tab 1 hour prior to MRI. OK to repeat x 1. do not drive on this med) for up to 1 day.  -      ALPRAZolam (XANAX) 1 MG Tab; Take 1 Tab by mouth as needed for Sleep (take one tab 1 hour prior to MRI. OK to repeat x 1. do not drive on this med) for up to 1 day.    Intracranial hemorrhage following injury with prolonged (more than 24 hours) loss of consciousness with return to pre-existing conscious level, subsequent encounter  -     Discontinue: ALPRAZolam (XANAX) 1 MG Tab; Take 1 Tab by mouth as needed for Sleep (take one tab 1 hour prior to MRI. OK to repeat x 1. do not drive on this med) for up to 1 day.  -     ALPRAZolam (XANAX) 1 MG Tab; Take 1 Tab by mouth as needed for Sleep (take one tab 1 hour prior to MRI. OK to repeat x 1. do not drive on this med) for up to 1 day.    Intractable chronic post-traumatic headache  -     Discontinue: ALPRAZolam (XANAX) 1 MG Tab; Take 1 Tab by mouth as needed for Sleep (take one tab 1 hour prior to MRI. OK to repeat x 1. do not drive on this med) for up to 1 day.  -     ALPRAZolam (XANAX) 1 MG Tab; Take 1 Tab by mouth as needed for Sleep (take one tab 1 hour prior to MRI. OK to repeat x 1. do not drive on this med) for up to 1 day.    Chronic migraine without aura without status migrainosus, not intractable  -     Discontinue: ALPRAZolam (XANAX) 1 MG Tab; Take 1 Tab by mouth as needed for Sleep (take one tab 1 hour prior to MRI. OK to repeat x 1. do not drive on this med) for up to 1 day.  -     ALPRAZolam (XANAX) 1 MG Tab; Take 1 Tab by mouth as needed for Sleep (take one tab 1 hour prior to MRI. OK to repeat x 1. do not drive on this med) for up to 1 day.    Bilateral occipital neuralgia  -     Discontinue: ALPRAZolam (XANAX) 1 MG Tab; Take 1 Tab by mouth as needed for Sleep (take one tab 1 hour prior to MRI. OK to repeat x 1. do not drive on this med) for up to 1 day.  -     ALPRAZolam (XANAX) 1 MG Tab; Take 1 Tab by mouth as needed for Sleep (take one tab 1 hour prior to MRI. OK to repeat x 1. do not drive on this med) for up to 1 day.    Claustrophobia  -      Discontinue: ALPRAZolam (XANAX) 1 MG Tab; Take 1 Tab by mouth as needed for Sleep (take one tab 1 hour prior to MRI. OK to repeat x 1. do not drive on this med) for up to 1 day.  -     ALPRAZolam (XANAX) 1 MG Tab; Take 1 Tab by mouth as needed for Sleep (take one tab 1 hour prior to MRI. OK to repeat x 1. do not drive on this med) for up to 1 day.      X-ray was positive for cervical spondylosis.  I suspect this may be contributing to the patient's headache significantly.  I have ordered an MRI for further evaluation.  The patient will require oral sedation for this procedure.  We discussed that this is an addictive medication and this is not a long-term solution.  I advised to not use alcohol or any other drugs while using this medication.  I also advised her not to drive or operate heavy machinery.  The patient will need a ride home from her MRI.  I will follow-up with the patient after the diagnostic study.      Please note that this dictation was created using voice recognition software. I have made every reasonable attempt to correct obvious errors but there may be errors of grammar and content that I may have overlooked prior to finalization of this note.      Chase Blas MD  Physical Medicine and Rehabilitation  Interventional Spine and Sports Physiatry  [facility]

## 2019-02-06 ENCOUNTER — APPOINTMENT (OUTPATIENT)
Dept: PHYSICAL MEDICINE AND REHAB | Facility: MEDICAL CENTER | Age: 49
End: 2019-02-06
Payer: MEDICAID

## 2019-02-07 ENCOUNTER — HOSPITAL ENCOUNTER (OUTPATIENT)
Dept: RADIOLOGY | Facility: MEDICAL CENTER | Age: 49
End: 2019-02-07
Attending: PHYSICAL MEDICINE & REHABILITATION
Payer: MEDICAID

## 2019-02-07 DIAGNOSIS — M50.30 OTHER CERVICAL DISC DEGENERATION, UNSPECIFIED CERVICAL REGION: ICD-10-CM

## 2019-02-07 DIAGNOSIS — M47.812 SPONDYLOSIS OF CERVICAL REGION WITHOUT MYELOPATHY OR RADICULOPATHY: ICD-10-CM

## 2019-02-07 PROCEDURE — 72141 MRI NECK SPINE W/O DYE: CPT

## 2019-02-07 NOTE — PROGRESS NOTES
Pt stated she could not find her glasses after her mri exam was done. Pt was not wearing any glasses before or during the time she was at [place] for her mri exam.

## 2019-02-13 RX ORDER — ESTRADIOL 1 MG/1
TABLET ORAL
Qty: 90 TAB | Refills: 0 | Status: SHIPPED | OUTPATIENT
Start: 2019-02-13 | End: 2019-05-15 | Stop reason: SDUPTHER

## 2019-02-14 ENCOUNTER — OFFICE VISIT (OUTPATIENT)
Dept: PHYSICAL MEDICINE AND REHAB | Facility: MEDICAL CENTER | Age: 49
End: 2019-02-14
Payer: MEDICAID

## 2019-02-14 VITALS
BODY MASS INDEX: 24.06 KG/M2 | TEMPERATURE: 98.6 F | OXYGEN SATURATION: 93 % | WEIGHT: 158.73 LBS | HEIGHT: 68 IN | DIASTOLIC BLOOD PRESSURE: 70 MMHG | SYSTOLIC BLOOD PRESSURE: 110 MMHG | HEART RATE: 78 BPM

## 2019-02-14 DIAGNOSIS — M47.812 SPONDYLOSIS OF CERVICAL REGION WITHOUT MYELOPATHY OR RADICULOPATHY: ICD-10-CM

## 2019-02-14 DIAGNOSIS — F17.200 TOBACCO DEPENDENCE: ICD-10-CM

## 2019-02-14 DIAGNOSIS — G37.2 CENTRAL PONTINE MYELINOLYSIS (HCC): ICD-10-CM

## 2019-02-14 DIAGNOSIS — M53.82 MUSCULOSKELETAL DISORDER OF THE SUBOCCIPITAL: ICD-10-CM

## 2019-02-14 DIAGNOSIS — S06.305D: ICD-10-CM

## 2019-02-14 DIAGNOSIS — F10.10 ALCOHOL ABUSE: ICD-10-CM

## 2019-02-14 DIAGNOSIS — M50.30 OTHER CERVICAL DISC DEGENERATION, UNSPECIFIED CERVICAL REGION: ICD-10-CM

## 2019-02-14 PROCEDURE — 99406 BEHAV CHNG SMOKING 3-10 MIN: CPT | Performed by: PHYSICAL MEDICINE & REHABILITATION

## 2019-02-14 PROCEDURE — 99214 OFFICE O/P EST MOD 30 MIN: CPT | Mod: 25 | Performed by: PHYSICAL MEDICINE & REHABILITATION

## 2019-02-14 NOTE — PATIENT INSTRUCTIONS
Your procedure will be at the [facility] special procedure suite.    [facility] [place] [address]       PRE-PROCEDURE INSTRUCTIONS  You may take your regular medications except:   · No Anti-inflammatories 5 days prior to your procedure. Anti-inflammatories include medicines such as  ibuprofen (Motrin, Advil), Excedrin, Naproxen (Aleve, Anaprox, Naprelan, Naprosyn), Celecoxib (Celebrex), Diclofenac (Voltaren-XR tab), and Meloxicam (Mobic).   · No Glucophage or Metformin 24 hours before your procedure. You may resume next day after your procedure.  · Call the physiatry office if you are taking or prescribed anti-biotics within five days of procedure.  · Please ask provider if you are taking any new diabetes medication.  · Pain medications will not be prescribed on the procedure day. Procedural pain medication may be used by your provider   · Call your doctor's office performing the procedure if you have a fever, chills, rash or new illness prior to your procedure    Anticoagulation/antiplatelet medications  No Blood thinning medications such as Coumadin or Plavix 5 days prior to procedure unless your doctor said to continue these medications. Call your doctor if a new medication is prescribed in this class.     Restrictions for eating before procedure:   · If you are getting procedural sedation, then do not eat to for 8 hours prior to procedure appointment time. Do not drink fluids for four hours prior to your procedure time.   · If you are not having procedural sedation, then Skip the meal prior to your procedure. If you have a morning procedure then skip breakfast. If you have an afternoon procedure then skip lunch.   · You may drink clear liquids up to 2 hours prior to your procedure  · You must have a  the day of procedure to accompany you home.      POST PROCEDURE INSTRUCTIONS   · No heavy lifting, strenuous bending or strenuous exercise for 3 days after your procedure.  · No hot tubs,  baths, swimming for 3 days after your procedure  · You can remove the bandage the day after the procedure.  · If you received a diagnostic procedure (such as a medial branch block), please note that we do expect this injection to wear off. It is important to complete the pain diary and list the pain score only for the region where the procedure was and bring this to your follow up visit.   · Your leg may feel heavy, weak and numb for up to 1-2 days. Be very careful walking.   ·  You may resume normal activities 3 days after procedure.

## 2019-02-14 NOTE — PROGRESS NOTES
Physiatry (physical medicine and  Rehabilitation), interventional spine and sports medicine    Chief complaint:   Chief Complaint   Patient presents with   • Follow-Up     Neck pain        HISTORY    HPI: Terri Krishnan 48 y.o. female who presents today with Diagnoses of Spondylosis of cervical region without myelopathy or radiculopathy, Central pontine myelinolysis (HCC), Intracranial hemorrhage following injury with prolonged (more than 24 hours) loss of consciousness with return to pre-existing conscious level, subsequent encounter, Alcohol abuse. history of reports sober for 9 months.  I recommend avoiding chronic narcotics if possible., Musculoskeletal disorder of the suboccipital, Other cervical disc degeneration, unspecified cervical region, and Tobacco dependence were pertinent to this visit.      The patient notes that she has been trying to cut back on her smoking and now smokes 1/7 pack of cigarettes per day.  This is a significant decrease for her in a conscious effort.    The patient continues to have severe intractable headaches, neck aches, 10/10 in intensity, similar every day, squeezing and pressure, worse with neck movements, right worse than left.        ROS:   Red Flags ROS:   Fever, Chills, Sweats: Denies  Involuntary Weight Loss: Denies  Bladder Incontinence: Denies  Bowel Incontinence: denies  Saddle Anesthesia: Denies    All other systems reviewed and negative.       PMHx:   Past Medical History:   Diagnosis Date   • Alcohol abuse    • Surgical menopause    • TBI (traumatic brain injury) (HCC)    • Tobacco dependence        PSHx:   Past Surgical History:   Procedure Laterality Date   • ABDOMINAL HYSTERECTOMY TOTAL  age 27    Total       Family history   Family History   Problem Relation Age of Onset   • Cancer Neg Hx    • Diabetes Neg Hx    • Heart Disease Neg Hx    • Stroke Neg Hx    • Hyperlipidemia Neg Hx    • Hypertension Neg Hx          Medications: reviewed on epic.   No  outpatient prescriptions have been marked as taking for the 2/14/19 encounter (Office Visit) with Chase Blas M.D..        Allergies:   Allergies   Allergen Reactions   • Shrimp (Diagnostic) Anaphylaxis     poa stated that this patient had a reaction a couple years ago after eating shrimp   • Shrimp Flavor Anaphylaxis       Social Hx:   Social History     Social History   • Marital status: Single     Spouse name: N/A   • Number of children: N/A   • Years of education: N/A     Occupational History   • Not on file.     Social History Main Topics   • Smoking status: Current Every Day Smoker     Packs/day: 0.25     Years: 15.00     Types: Cigarettes   • Smokeless tobacco: Never Used   • Alcohol use No      Comment: quit in March 2018   • Drug use: No   • Sexual activity: Yes     Partners: Male     Birth control/ protection: Surgical     Other Topics Concern   •  Service No   • Blood Transfusions No   • Caffeine Concern No   • Occupational Exposure No   • Hobby Hazards No   • Sleep Concern Yes   • Stress Concern Yes   • Weight Concern No   • Special Diet No   • Back Care No   • Exercise No   • Bike Helmet Yes   • Seat Belt Yes   • Self-Exams Yes     Social History Narrative   • No narrative on file         EXAMINATION     Physical Exam:   Vitals: not currently breastfeeding.    Constitutional:   Body Habitus: There is no height or weight on file to calculate BMI.  Cooperation: Fully cooperates with exam  Appearance: Well-groomed, well-nourished, not disheveled     Skin -no rashes or lesions noted     Respiratory-  breathing comfortable on room air, no audible wheezing    Cardiovascular- capillary refills less than 2 seconds. No lower extremity edema is noted.     Psychiatric- alert and oriented ×3. Normal affect.          MEDICAL DECISION MAKING    Medical records review: see under HPI section.     DATA    Labs:   Lab Results   Component Value Date/Time    SODIUM 139 08/28/2018 07:38 AM    POTASSIUM 4.6  08/28/2018 07:38 AM    CHLORIDE 107 08/28/2018 07:38 AM    CO2 21 08/28/2018 07:38 AM    ANION 11.0 08/28/2018 07:38 AM    GLUCOSE 74 08/28/2018 07:38 AM    BUN 20 08/28/2018 07:38 AM    CREATININE 0.81 08/28/2018 07:38 AM    CALCIUM 9.2 08/28/2018 07:38 AM    ASTSGOT 25 08/28/2018 07:38 AM    ALTSGPT 9 08/28/2018 07:38 AM    TBILIRUBIN 0.5 08/28/2018 07:38 AM    ALBUMIN 4.0 08/28/2018 07:38 AM    TOTPROTEIN 6.6 08/28/2018 07:38 AM    GLOBULIN 2.6 08/28/2018 07:38 AM    AGRATIO 1.5 08/28/2018 07:38 AM   ]    Lab Results   Component Value Date/Time    PROTHROMBTM 14.5 03/05/2018 04:35 AM    INR 1.16 (H) 03/05/2018 04:35 AM        Lab Results   Component Value Date/Time    WBC 5.3 08/28/2018 07:38 AM    RBC 4.44 08/28/2018 07:38 AM    HEMOGLOBIN 14.1 08/28/2018 07:38 AM    HEMATOCRIT 44.8 08/28/2018 07:38 AM    .9 (H) 08/28/2018 07:38 AM    MCH 31.8 08/28/2018 07:38 AM    MCHC 31.5 (L) 08/28/2018 07:38 AM    MPV 8.7 (L) 08/28/2018 07:38 AM    NEUTSPOLYS 49.00 08/28/2018 07:38 AM    LYMPHOCYTES 39.00 08/28/2018 07:38 AM    MONOCYTES 4.00 08/28/2018 07:38 AM    EOSINOPHILS 5.00 08/28/2018 07:38 AM    BASOPHILS 1.00 08/28/2018 07:38 AM    HYPOCHROMIA 1+ 03/12/2018 04:12 AM    ANISOCYTOSIS 1+ 08/28/2018 07:38 AM        No results found for: HBA1C     Imaging:   I personally reviewed following images, these are my reads  MRI brain 7/18/2018  No acute changes.  No acute hemorrhages.    X-ray cervical spine 12/14/2018  Cervical spondylosis and uncovertebral hypertrophy which is worse C3-4.  C2-3 facets are  not well defined on any of the x-rays.    IMAGING radiology reads. I reviewed the following radiology reads         Impression       Minimal C3/4 degenerative disc disease and uncovertebral hypertrophy    Trace fixed C6/C7 anterolisthesis         Results for orders placed during the hospital encounter of 07/18/18   MR-BRAIN-WITH & W/O    Impression 1.  Mild cerebral atrophy.  2.  Interval resolution of  posttraumatic intracranial subarachnoid and subdural hemorrhages with residual hemosiderin deposition as described above.  3.  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 No clinical significance. No follow-up imaging required.  4.  Minimal supratentorial white matter disease with a single punctate focus of FLAIR hyperintensity in the left peritrigonal deep white matter. Nonspecific finding consistent with microvascular ischemic change versus demyelination or gliosis.  5.  Chronic sequela of central pontine myelinolysis.  6.  Note, in the medical record, the rehabilitation history and physical and post admission evaluation from 3/20/2018 mentions complications while in hospital of hypokalemia and hyponatremia with severe alcohol withdrawal. History of hyponatremia may be   pertinent to the CPM.                                                                                                                                      Results for orders placed in visit on 12/10/17   DX-FOREARM LEFT    Impression Negative left forearm series        Results for orders placed during the hospital encounter of 03/04/18   DX-HAND 3+ LEFT    Impression 1.  No acute traumatic bony injury.  2.  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 Appears stable since prior.                                                               Diagnosis   Visit Diagnoses     ICD-10-CM   1. Spondylosis of cervical region without myelopathy or radiculopathy M47.812   2. Central pontine myelinolysis (HCC) G37.2   3. Intracranial hemorrhage following injury with prolonged (more than 24 hours) loss of consciousness with return to pre-existing conscious level, subsequent encounter S06.305D   4. Alcohol abuse. history of reports sober for 9 months.  I recommend avoiding chronic narcotics if possible. F10.10   5. Musculoskeletal disorder of the suboccipital M53.82   6. Other cervical disc  degeneration, unspecified cervical region M50.30   7. Tobacco dependence F17.200           ASSESSMENT:  Terri Krishnan 48 y.o. female with diffuse intractable headache status post traumatic brain injury, multiple visits to the ER, failed all conservative management, failed multiple medications. The patient has been tried on NSAIDs, tricyclic antidepressants, gabapentin, acetaminophen, other neuropathic pain medications, physical therapy, occupational therapy with no significant improvements.  She has had several visits to the ER.  I completed a peer to peer with deborah for botulinum toxin injections however this was declined twice.     Terri was seen today for follow-up.    Diagnoses and all orders for this visit:    Spondylosis of cervical region without myelopathy or radiculopathy  -     REFERRAL TO PHYSICIAL MEDICINE REHAB  -     REFERRAL TO BEHAVIORAL HEALTH    Central pontine myelinolysis (HCC)    Intracranial hemorrhage following injury with prolonged (more than 24 hours) loss of consciousness with return to pre-existing conscious level, subsequent encounter  -     REFERRAL TO BEHAVIORAL HEALTH    Alcohol abuse. history of reports sober for 9 months.  I recommend avoiding chronic narcotics if possible.    Musculoskeletal disorder of the suboccipital  -     REFERRAL TO BEHAVIORAL HEALTH    Other cervical disc degeneration, unspecified cervical region  -     REFERRAL TO BEHAVIORAL HEALTH    Tobacco dependence        I advised quitting smoking and we discussed the health benefits of quitting smoking. I also provided information for QUIT tobacco program at [employer]. The patient was instructed to call [phone] or to visit our website at [email or web address]. We discussed the patient may be a candidate for counseling through the program. This discussion was 4 minutes of counseling.        I scheduled the patient for a right C2-3 and C3-4 diagnostic medial branch block.  This will be with fluoroscopic  guidance in the special procedure suite by next available visit.    The risks benefits and alternatives to this procedure were discussed and the patient wishes to proceed with the procedure. Risks include but are not limited to damage to surrounding structures, infection, bleeding, worsening of pain which can be permanent, weakness which can be permanent. Benefits include pain relief, improved function. Alternatives includes not doing the procedure.           Please note that this dictation was created using voice recognition software. I have made every reasonable attempt to correct obvious errors but there may be errors of grammar and content that I may have overlooked prior to finalization of this note.      Chase Blas MD  Physical Medicine and Rehabilitation  Interventional Spine and Sports Physiatry  [facility]

## 2019-02-20 ENCOUNTER — TELEPHONE (OUTPATIENT)
Dept: PHYSICAL MEDICINE AND REHAB | Facility: MEDICAL CENTER | Age: 49
End: 2019-02-20

## 2019-02-21 NOTE — TELEPHONE ENCOUNTER
Called Pt and notified that S/P that was scheduled on 2/27/19 need to be cancel due to Insurance denied and schedule on 2/28/19 for peer to peer w/ Dr. Blas.    Pt agreed in regards to this case.    As per Terri wrote:   This request has been denied because there is no documentation that the patient has been experiencing neck pain for 3 months or more.      To schedule a [id number] review call ([phone]).  Thank you.         Thank you  Luzma

## 2019-02-27 ENCOUNTER — APPOINTMENT (OUTPATIENT)
Dept: PAIN MANAGEMENT | Facility: REHABILITATION | Age: 49
End: 2019-02-27
Attending: PHYSICAL MEDICINE & REHABILITATION
Payer: MEDICAID

## 2019-02-28 NOTE — PROGRESS NOTES
I discussed the case with a peer to peer review with Dr. Mcrae from [employer].  They require the patient to have documentation specifically about the neck and not about headaches.  They also require documentation from physical therapy about neck treatments.  I asked my staff to have the patient make a clinic visit so that we can discuss this process.    Chase Blsa MD  Physical Medicine and Rehabilitation  Interventional Spine and Sports Physiatry  [facility]

## 2019-03-05 ENCOUNTER — OFFICE VISIT (OUTPATIENT)
Dept: PHYSICAL MEDICINE AND REHAB | Facility: MEDICAL CENTER | Age: 49
End: 2019-03-05
Payer: MEDICAID

## 2019-03-05 VITALS
HEIGHT: 68 IN | BODY MASS INDEX: 24.59 KG/M2 | SYSTOLIC BLOOD PRESSURE: 116 MMHG | HEART RATE: 97 BPM | WEIGHT: 162.26 LBS | OXYGEN SATURATION: 96 % | DIASTOLIC BLOOD PRESSURE: 72 MMHG | TEMPERATURE: 96.8 F

## 2019-03-05 DIAGNOSIS — S06.305D: ICD-10-CM

## 2019-03-05 DIAGNOSIS — M47.812 SPONDYLOSIS OF CERVICAL REGION WITHOUT MYELOPATHY OR RADICULOPATHY: ICD-10-CM

## 2019-03-05 DIAGNOSIS — F17.200 TOBACCO DEPENDENCE: ICD-10-CM

## 2019-03-05 DIAGNOSIS — F10.11 ALCOHOL ABUSE, IN REMISSION: ICD-10-CM

## 2019-03-05 PROCEDURE — 99214 OFFICE O/P EST MOD 30 MIN: CPT | Performed by: PHYSICAL MEDICINE & REHABILITATION

## 2019-03-05 ASSESSMENT — PAIN SCALES - GENERAL: PAINLEVEL: 10=SEVERE PAIN

## 2019-03-05 NOTE — PROGRESS NOTES
Physiatry (physical medicine and  Rehabilitation), interventional spine and sports medicine    Chief complaint: neck pain    HISTORY    HPI: Terri Krishnan 48 y.o. female who presents today with Diagnoses of Spondylosis of cervical region without myelopathy or radiculopathy, Intracranial hemorrhage following injury with prolonged (more than 24 hours) loss of consciousness with return to pre-existing conscious level, subsequent encounter, Alcohol abuse, in remission. sober since 3/4/2018, and Tobacco dependence were pertinent to this visit.      The patient continues to have daily severe headaches, neck aches in the upper cervical spine, right side worse than left, 10/10 in intensity, happening daily, constant, squeezing and pressure-like, worse with neck movements right worse than left.  Causing significant deficits in daily living.  She has been going to physical therapy for this with no improvement.  She is tried several medications with no improvement.        ROS:   Red Flags ROS:   Fever, Chills, Sweats: Denies  Involuntary Weight Loss: Denies  Bladder Incontinence: Denies  Bowel Incontinence: denies  Saddle Anesthesia: Denies    All other systems reviewed and negative.       PMHx:   Past Medical History:   Diagnosis Date   • Alcohol abuse    • Surgical menopause    • TBI (traumatic brain injury) ([facility]ly [facility])    • Tobacco dependence        PSHx:   Past Surgical History:   Procedure Laterality Date   • ABDOMINAL HYSTERECTOMY TOTAL  age 27    Total       Family history   Family History   Problem Relation Age of Onset   • Cancer Neg Hx    • Diabetes Neg Hx    • Heart Disease Neg Hx    • Stroke Neg Hx    • Hyperlipidemia Neg Hx    • Hypertension Neg Hx          Medications: reviewed on [facility].   Outpatient Prescriptions Marked as Taking for the 3/5/19 encounter (Office Visit) with Chase Blas M.D.   Medication Sig Dispense Refill   • estradiol (ESTRACE) 1 MG Tab TAKE 1 TABLET BY MOUTH ONCE DAILY 90 Tab 0   •  "estradiol (ESTRACE) 0.5 MG tablet Take 0.5 mg by mouth every day.     • amitriptyline (ELAVIL) 50 MG Tab Take 50 mg by mouth every evening.     • cyclobenzaprine (FLEXERIL) 10 MG Tab Take 1 Tab by mouth 3 times a day as needed. 30 Tab 0   • magnesium oxide (MAG-OX) 400 (241.3 Mg) MG Tab tablet Take 1 Tab by mouth every day. 30 Tab 0   • diphenhydrAMINE (BENADRYL) 25 MG Tab Take 25 mg by mouth every 6 hours as needed for Sleep.          Allergies:   Allergies   Allergen Reactions   • Shrimp (Diagnostic) Anaphylaxis     poa stated that this patient had a reaction a couple years ago after eating shrimp   • Shrimp Flavor Anaphylaxis       Social Hx:   Social History     Social History   • Marital status: Single     Spouse name: N/A   • Number of children: N/A   • Years of education: N/A     Occupational History   • Not on file.     Social History Main Topics   • Smoking status: Current Every Day Smoker     Packs/day: 0.25     Years: 15.00     Types: Cigarettes   • Smokeless tobacco: Never Used   • Alcohol use No      Comment: quit in March 2018   • Drug use: No   • Sexual activity: Yes     Partners: Male     Birth control/ protection: Surgical     Other Topics Concern   •  Service No   • Blood Transfusions No   • Caffeine Concern No   • Occupational Exposure No   • Hobby Hazards No   • Sleep Concern Yes   • Stress Concern Yes   • Weight Concern No   • Special Diet No   • Back Care No   • Exercise No   • Bike Helmet Yes   • Seat Belt Yes   • Self-Exams Yes     Social History Narrative   • No narrative on file         EXAMINATION     Physical Exam:   Vitals: Blood pressure 116/72, pulse 97, temperature 36 °C (96.8 °F), temperature source Temporal, height 1.727 m (5' 8\"), weight 73.6 kg (162 lb 4.1 oz), SpO2 96 %, not currently breastfeeding.    Constitutional:   Body Habitus: Body mass index is 24.67 kg/m².  Cooperation: Fully cooperates with exam  Appearance: Well-groomed, well-nourished, not disheveled     Skin " -no rashes or lesions noted     Respiratory-  breathing comfortable on room air, no audible wheezing    Cardiovascular- capillary refills less than 2 seconds. No lower extremity edema is noted.     Psychiatric- alert and oriented ×3. Normal affect.     MSK: Full range of motion in the cervical spine however this with the pain in the upper cervical spine.  Tenderness palpation of the right upper cervical facets.  Spurling's is negative.  Strength is 5/5 in the bilateral upper extremities.  Sensation is intact in the bilateral upper extremities.       MEDICAL DECISION MAKING    Medical records review: see under HPI section.     DATA    Labs:   Lab Results   Component Value Date/Time    SODIUM 139 08/28/2018 07:38 AM    POTASSIUM 4.6 08/28/2018 07:38 AM    CHLORIDE 107 08/28/2018 07:38 AM    CO2 21 08/28/2018 07:38 AM    ANION 11.0 08/28/2018 07:38 AM    GLUCOSE 74 08/28/2018 07:38 AM    BUN 20 08/28/2018 07:38 AM    CREATININE 0.81 08/28/2018 07:38 AM    CALCIUM 9.2 08/28/2018 07:38 AM    ASTSGOT 25 08/28/2018 07:38 AM    ALTSGPT 9 08/28/2018 07:38 AM    TBILIRUBIN 0.5 08/28/2018 07:38 AM    ALBUMIN 4.0 08/28/2018 07:38 AM    TOTPROTEIN 6.6 08/28/2018 07:38 AM    GLOBULIN 2.6 08/28/2018 07:38 AM    AGRATIO 1.5 08/28/2018 07:38 AM   ]    Lab Results   Component Value Date/Time    PROTHROMBTM 14.5 03/05/2018 04:35 AM    INR 1.16 (H) 03/05/2018 04:35 AM        Lab Results   Component Value Date/Time    WBC 5.3 08/28/2018 07:38 AM    RBC 4.44 08/28/2018 07:38 AM    HEMOGLOBIN 14.1 08/28/2018 07:38 AM    HEMATOCRIT 44.8 08/28/2018 07:38 AM    .9 (H) 08/28/2018 07:38 AM    MCH 31.8 08/28/2018 07:38 AM    MCHC 31.5 (L) 08/28/2018 07:38 AM    MPV 8.7 (L) 08/28/2018 07:38 AM    NEUTSPOLYS 49.00 08/28/2018 07:38 AM    LYMPHOCYTES 39.00 08/28/2018 07:38 AM    MONOCYTES 4.00 08/28/2018 07:38 AM    EOSINOPHILS 5.00 08/28/2018 07:38 AM    BASOPHILS 1.00 08/28/2018 07:38 AM    HYPOCHROMIA 1+ 03/12/2018 04:12 AM     ANISOCYTOSIS 1+ 08/28/2018 07:38 AM        No results found for: HBA1C     Imaging:   I personally reviewed following images, these are my reads  MRI brain 7/18/2018  No acute changes.  No acute hemorrhages.    X-ray cervical spine 12/14/2018  Cervical spondylosis and uncovertebral hypertrophy which is worse C3-4.  C2-3 facets are  not well defined on any of the x-rays.    IMAGING radiology reads. I reviewed the following radiology reads         Impression       Minimal C3/4 degenerative disc disease and uncovertebral hypertrophy    Trace fixed C6/C7 anterolisthesis         Results for orders placed during the hospital encounter of 07/18/18   MR-BRAIN-WITH & W/O    Impression 1.  Mild cerebral atrophy.  2.  Interval resolution of posttraumatic intracranial subarachnoid and subdural hemorrhages with residual hemosiderin deposition as described above.  3.  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 No clinical significance. No follow-up imaging required.  4.  Minimal supratentorial white matter disease with a single punctate focus of FLAIR hyperintensity in the left peritrigonal deep white matter. Nonspecific finding consistent with microvascular ischemic change versus demyelination or gliosis.  5.  Chronic sequela of central pontine myelinolysis.  6.  Note, in the medical record, the rehabilitation history and physical and post admission evaluation from 3/20/2018 mentions complications while in hospital of hypokalemia and hyponatremia with severe alcohol withdrawal. History of hyponatremia may be   pertinent to the CPM.                                                                                                                                      Results for orders placed in visit on 12/10/17   DX-FOREARM LEFT    Impression Negative left forearm series        Results for orders placed during the hospital encounter of 03/04/18   DX-HAND 3+ LEFT    Impression 1.  No acute traumatic bony injury.  2.  Corticated bony fragment at the distal  radial ulnar joint, could represent congenital nonfusion of apophysis or prior healed fracture. Appears stable since prior.                                                               Diagnosis   Visit Diagnoses     ICD-10-CM   1. Spondylosis of cervical region without myelopathy or radiculopathy M47.812   2. Intracranial hemorrhage following injury with prolonged (more than 24 hours) loss of consciousness with return to pre-existing conscious level, subsequent encounter S06.305D   3. Alcohol abuse, in remission. sober since 3/4/2018 F10.11   4. Tobacco dependence F17.200           ASSESSMENT:  Terri Krishnan 48 y.o. female with diffuse intractable headache status post traumatic brain injury, multiple visits to the ER, failed all conservative management, failed multiple medications. The patient has been tried on NSAIDs, tricyclic antidepressants, gabapentin, acetaminophen, other neuropathic pain medications, physical therapy, occupational therapy with no significant improvements.  She has had several visits to the ER.  I completed a peer to peer with deborah for botulinum toxin injections however this was declined twice.     Terri was seen today for follow-up.    Diagnoses and all orders for this visit:    Spondylosis of cervical region without myelopathy or radiculopathy  -     REFERRAL TO PHYSICAL THERAPY Reason for Therapy: Eval/Treat/Report    Intracranial hemorrhage following injury with prolonged (more than 24 hours) loss of consciousness with return to pre-existing conscious level, subsequent encounter    Alcohol abuse, in remission. sober since 3/4/2018    Tobacco dependence        I think the patient would benefit from a right C2-3 and C3-4 diagnostic medial branch block.  I performed a peer to peer however this procedure was declined at the previous visit.  They stated that physical therapy was required.  The patient has been going to physical therapy but they wanted specifically a physical  therapy referral with a diagnosis of cervical spondylosis.  I placed this order today and gave the patient the order to take to her physical therapist.  Should the physical therapist think patient would not progress with PT or if the patient completes 6 weeks of physical therapy with no significant improvement then I would consider her again for the above procedure.    I also discussed the case with the patient's roommate who was at today's visit.    Please note that this dictation was created using voice recognition software. I have made every reasonable attempt to correct obvious errors but there may be errors of grammar and content that I may have overlooked prior to finalization of this note.      Chase Blas MD  Physical Medicine and Rehabilitation  Interventional Spine and Sports Physiatry  [facility]           CC Earlene Rebolledo P.A.-C.

## 2019-03-26 ENCOUNTER — TELEPHONE (OUTPATIENT)
Dept: PHYSICAL MEDICINE AND REHAB | Facility: MEDICAL CENTER | Age: 49
End: 2019-03-26

## 2019-03-26 DIAGNOSIS — R26.89 BALANCE DISORDER: ICD-10-CM

## 2019-03-26 DIAGNOSIS — G44.321 INTRACTABLE CHRONIC POST-TRAUMATIC HEADACHE: ICD-10-CM

## 2019-03-26 DIAGNOSIS — S06.305D: ICD-10-CM

## 2019-04-10 ENCOUNTER — OFFICE VISIT (OUTPATIENT)
Dept: NEUROLOGY | Facility: MEDICAL CENTER | Age: 49
End: 2019-04-10
Payer: MEDICAID

## 2019-04-10 VITALS
BODY MASS INDEX: 24.83 KG/M2 | HEART RATE: 109 BPM | OXYGEN SATURATION: 92 % | WEIGHT: 163.8 LBS | DIASTOLIC BLOOD PRESSURE: 58 MMHG | SYSTOLIC BLOOD PRESSURE: 102 MMHG | TEMPERATURE: 97.6 F | RESPIRATION RATE: 16 BRPM | HEIGHT: 68 IN

## 2019-04-10 DIAGNOSIS — S06.9X9S TRAUMATIC BRAIN INJURY WITH LOSS OF CONSCIOUSNESS, SEQUELA (HCC): ICD-10-CM

## 2019-04-10 DIAGNOSIS — G43.709 CHRONIC MIGRAINE W/O AURA W/O STATUS MIGRAINOSUS, NOT INTRACTABLE: ICD-10-CM

## 2019-04-10 DIAGNOSIS — G43.019 MIGRAINE WITHOUT AURA, INTRACTABLE: ICD-10-CM

## 2019-04-10 DIAGNOSIS — G44.221 CHRONIC TENSION-TYPE HEADACHE, INTRACTABLE: ICD-10-CM

## 2019-04-10 PROCEDURE — 99215 OFFICE O/P EST HI 40 MIN: CPT

## 2019-04-10 RX ORDER — ZOLMITRIPTAN 5 MG/1
1 SPRAY NASAL
Qty: 1 EACH | Refills: 3 | Status: SHIPPED | OUTPATIENT
Start: 2019-04-10 | End: 2019-07-01

## 2019-04-10 RX ORDER — TIZANIDINE 4 MG/1
4 TABLET ORAL 3 TIMES DAILY PRN
Qty: 60 TAB | Refills: 3 | Status: SHIPPED | OUTPATIENT
Start: 2019-04-10 | End: 2019-10-15

## 2019-04-10 RX ORDER — NORTRIPTYLINE HYDROCHLORIDE 25 MG/1
25 CAPSULE ORAL DAILY
Qty: 90 CAP | Refills: 3 | Status: SHIPPED | OUTPATIENT
Start: 2019-04-10 | End: 2019-05-15

## 2019-04-10 ASSESSMENT — PAIN SCALES - GENERAL: PAINLEVEL: 8=MODERATE-SEVERE PAIN
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
## 2019-04-11 ENCOUNTER — TELEPHONE (OUTPATIENT)
Dept: NEUROLOGY | Facility: MEDICAL CENTER | Age: 49
End: 2019-04-11

## 2019-04-11 NOTE — TELEPHONE ENCOUNTER
Pt called states her MRI is scheduled on 4/22/19 needs something for Anxiety for her MRI. Pharmacy on file please. Thank you

## 2019-04-15 RX ORDER — LORAZEPAM 1 MG/1
1 TABLET ORAL ONCE
Qty: 1 TAB | Refills: 0 | Status: CANCELLED | OUTPATIENT
Start: 2019-04-15 | End: 2019-04-15

## 2019-04-15 NOTE — TELEPHONE ENCOUNTER
Unfortunately my REMA is not recognized by [employer] yet (Annabella is working on this) I can not order Ativan 1 mg one tablet for her.  We will have to ask someone else to put an order in.  If you could task to another provider that would be great - until my REMA is recognized by the system.  Thank you

## 2019-04-17 DIAGNOSIS — F41.9 ANXIETY: ICD-10-CM

## 2019-04-17 RX ORDER — LORAZEPAM 1 MG/1
TABLET ORAL
Qty: 2 TAB | Refills: 0 | Status: SHIPPED | OUTPATIENT
Start: 2019-04-17 | End: 2019-05-17

## 2019-04-17 NOTE — PROGRESS NOTES
Patient of Dr. Osborne seen for headaches.  Pending MRI brain.  Dr. Osborne's REMA number not recognized yet.  Patient chart reviewed, no contraindication to dose of Ativan for anxiety and MRI.  Will recommend she has a  and assistance after MRI.  Prescription written. NarxCheck performed. No red flags.     Ella Alonzo MD  Neurology - Neurophysiology  [facility]  Office: [phone]

## 2019-04-17 NOTE — TELEPHONE ENCOUNTER
No problem. Brunilda, can you please ask patient to have a  after the MRI since the ativan can cause sedation and driving concerns? Thanks. (Routing comment)

## 2019-04-18 ENCOUNTER — TELEPHONE (OUTPATIENT)
Dept: NEUROLOGY | Facility: MEDICAL CENTER | Age: 49
End: 2019-04-18

## 2019-04-18 NOTE — TELEPHONE ENCOUNTER
Received denial from [employer] for medication ZOMIG nasal spray -- they are denying the medication because the patient needs to try their preferred drugs including sumatriptan nasal spray or naratriptan tablets. Please let me know if we can give these preferred drugs to pt instead or if you would like to appeal the denial, thank you.

## 2019-04-19 RX ORDER — SUMATRIPTAN 20 MG/1
1 SPRAY NASAL PRN
Qty: 2 EACH | Refills: 3 | Status: SHIPPED | OUTPATIENT
Start: 2019-04-19 | End: 2020-02-11

## 2019-04-19 NOTE — TELEPHONE ENCOUNTER
Called pt and LVM that Dr. ROSALES ordered imitrex nasal spray -- I advised to call me back if there are any further concerns on getting the medication.

## 2019-04-22 ENCOUNTER — APPOINTMENT (OUTPATIENT)
Dept: RADIOLOGY | Facility: MEDICAL CENTER | Age: 49
End: 2019-04-22
Payer: MEDICAID

## 2019-04-22 DIAGNOSIS — S06.9X9S TRAUMATIC BRAIN INJURY WITH LOSS OF CONSCIOUSNESS, SEQUELA (HCC): ICD-10-CM

## 2019-04-22 DIAGNOSIS — G44.221 CHRONIC TENSION-TYPE HEADACHE, INTRACTABLE: ICD-10-CM

## 2019-04-22 PROCEDURE — 70551 MRI BRAIN STEM W/O DYE: CPT

## 2019-04-24 ENCOUNTER — TELEPHONE (OUTPATIENT)
Dept: NEUROLOGY | Facility: MEDICAL CENTER | Age: 49
End: 2019-04-24

## 2019-04-24 NOTE — TELEPHONE ENCOUNTER
Spoke to pt and let her know sumatriptan nasal spray needs a PA according to pharmacy -- in PA denial sumatriptan nasal spray is listed as one of the preferred drugs. I advised pt I will call [employer] tomorrow and notify her on what they had said.

## 2019-05-15 ENCOUNTER — OFFICE VISIT (OUTPATIENT)
Dept: NEUROLOGY | Facility: MEDICAL CENTER | Age: 49
End: 2019-05-15
Payer: MEDICAID

## 2019-05-15 VITALS
WEIGHT: 166 LBS | SYSTOLIC BLOOD PRESSURE: 100 MMHG | HEIGHT: 68 IN | HEART RATE: 104 BPM | DIASTOLIC BLOOD PRESSURE: 56 MMHG | OXYGEN SATURATION: 94 % | BODY MASS INDEX: 25.16 KG/M2 | RESPIRATION RATE: 14 BRPM

## 2019-05-15 DIAGNOSIS — M54.2 CERVICALGIA OF OCCIPITO-ATLANTO-AXIAL REGION: ICD-10-CM

## 2019-05-15 DIAGNOSIS — G43.819 OTHER MIGRAINE WITHOUT STATUS MIGRAINOSUS, INTRACTABLE: ICD-10-CM

## 2019-05-15 DIAGNOSIS — G89.29 OTHER CHRONIC PAIN: ICD-10-CM

## 2019-05-15 PROCEDURE — 99214 OFFICE O/P EST MOD 30 MIN: CPT

## 2019-05-15 RX ORDER — METHYLPREDNISOLONE 4 MG/1
4 TABLET ORAL DAILY
Qty: 21 TAB | Refills: 0 | Status: SHIPPED | OUTPATIENT
Start: 2019-05-15 | End: 2019-07-01

## 2019-05-15 RX ORDER — NORTRIPTYLINE HYDROCHLORIDE 25 MG/1
50 CAPSULE ORAL EVERY EVENING
Qty: 90 CAP | Refills: 3 | Status: SHIPPED | OUTPATIENT
Start: 2019-05-15 | End: 2019-10-15

## 2019-05-15 RX ORDER — VITAMIN B COMPLEX
1 CAPSULE ORAL DAILY
Qty: 90 CAP | Refills: 3 | Status: SHIPPED | OUTPATIENT
Start: 2019-05-15

## 2019-05-17 ASSESSMENT — ENCOUNTER SYMPTOMS: HEADACHES: 1

## 2019-05-17 NOTE — PROGRESS NOTES
Follow up for headaches.  States pain is persistent.  MRI brain-here to review results.      Review of Systems   Neurological: Positive for headaches.     Current Outpatient Prescriptions on File Prior to Visit   Medication Sig Dispense Refill   • sumatriptan (IMITREX) 20 MG/ACT nasal spray Spray 1 Spray in nose as needed for Migraine. 2 Each 3   • tizanidine (ZANAFLEX) 4 MG Tab Take 1 Tab by mouth 3 times a day as needed. 60 Tab 3   • diphenhydrAMINE (BENADRYL) 25 MG Tab Take 25 mg by mouth every 6 hours as needed for Sleep.     • LORazepam (ATIVAN) 1 MG Tab Take 1 mg 30 minutes before MRI brain. (Patient not taking: Reported on 5/15/2019) 2 Tab 0   • zolmitriptan (ZOMIG) 5 MG nasal solution Spray 1 Spray in nose Once PRN for Headache for up to 1 dose. 1 Each 3   • estradiol (ESTRACE) 0.5 MG tablet Take 0.5 mg by mouth every day.     • magnesium oxide (MAG-OX) 400 (241.3 Mg) MG Tab tablet Take 1 Tab by mouth every day. (Patient not taking: Reported on 5/15/2019) 30 Tab 0     No current facility-administered medications on file prior to visit.      Constitutional: Well-developed, well-nourished,anxious.  Cardiovascular: RRR, with no murmurs, rubs or gallops. No carotid bruits.   Respiratory: Lungs CTA B/L, no W/R/R.   Abdomen: Soft Non-tender to Palpation. Non-distended.  Extremities: No peripheral edema, pedal pulses intact.   Skin: Warm, dry, intact. No rashes observed.  Eyes: Sclera anicteric              Funduscopic: Optic discs flat with no evidence of papilledema or pallor.   Neurologic:              Mental Status: Awake, alert, oriented x 3. Tearful.              Speech: Fluent with normal prosody.              Memory: Able to recall recent and remote events accurately.               Concentration: Attentive. Able to focus on history and follow multi-step commands.                         Fund of Knowledge: Appropriate              Cranial Nerves:                          CN II: PERRL. No afferent pupillary  defect.                          CN III, IV, VI: Good eye closure, EOMI.                           CN V: Facial sensation intact and symmetric.                           CN VII: No facial asymmetry.                           CN VIII: Hearing intact.                           CN IX and X: Palate elevates symmetrically. Normal gag reflex.                          CN XI: Symmetric shoulder shrug.                           CN XII: Tongue midline.               Sensory: Intact light touch, vibration and temperature.               Coordination: No evidence of past-pointing on finger to nose testing, no dysdiadochokinesia. Heel to shin intact.                        DTR's: 2+ throughout without clonus.               Babinski: Toes downgoing bilaterally.              Romberg: Negative.              Movements: No tremors observed.   Musculoskeletal:               Strength: 5/5 in upper and lower extremities bilaterally.              Gait: Steady, narrow based.               Tone: Normal bulk and tone.              Joints: No swelling.      Labs:  Component Results      Component Value Ref Range & Units Status   Sodium 139  135 - 145 mmol/L Final   Potassium 4.6  3.6 - 5.5 mmol/L Final   Comment:   Specimen slightly hemolyzed.   Chloride 107  96 - 112 mmol/L Final   Co2 21  20 - 33 mmol/L Final   Anion Gap 11.0  0.0 - 11.9 Final   Glucose 74  65 - 99 mg/dL Final   Bun 20  8 - 22 mg/dL Final   Creatinine 0.81  0.50 - 1.40 mg/dL Final   Calcium 9.2  8.5 - 10.5 mg/dL Final   AST(SGOT) 25  12 - 45 U/L Final   ALT(SGPT) 9  2 - 50 U/L Final   Alkaline Phosphatase 68  30 - 99 U/L Final   Total Bilirubin 0.5  0.1 - 1.5 mg/dL Final   Albumin 4.0  3.2 - 4.9 g/dL Final   Total Protein 6.6  6.0 - 8.2 g/dL Final   Globulin 2.6  1.9 - 3.5 g/dL Final   A-G Ratio 1.5  g/dL Final         WBC 5.3  4.8 - 10.8 K/uL Final   RBC 4.44  4.20 - 5.40 M/uL Final   Hemoglobin 14.1  12.0 - 16.0 g/dL Final   Hematocrit 44.8  37.0 - 47.0 % Final   MCV  100.9   81.4 - 97.8 fL Final   MCH 31.8  27.0 - 33.0 pg Final   MCHC 31.5   33.6 - 35.0 g/dL Final   RDW 49.2  35.9 - 50.0 fL Final   Platelet Count 327  164 - 446 K/uL Final   MPV 8.7   9.0 - 12.9 fL Final   Neutrophils-Polys 49.00  44.00 - 72.00 % Final   Lymphocytes 39.00  22.00 - 41.00 % Final   Monocytes 4.00  0.00 - 13.40 % Final   Eosinophils 5.00  0.00 - 6.90 % Final   Basophils 1.00  0.00 - 1.80 % Final   Nucleated RBC 0.00  /100 WBC Final   Neutrophils (Absolute) 2.70  2.00 - 7.15 K/uL Final   Comment:   Includes immature neutrophils, if present.   Lymphs (Absolute) 2.07  1.00 - 4.80 K/uL Final   Monos (Absolute) 0.21  0.00 - 0.85 K/uL Final   Eos (Absolute) 0.27  0.00 - 0.51 K/uL Final   Baso (Absolute) 0.05  0.00 - 0.12 K/uL Final   NRBC (Absolute) 0.00  K/uL Final   Anisocytosis 1+    Final   Macrocytosis 1+    Final   Narrative      Request patient fasting?->No       DIFFERENTIAL MANUAL (Order [id number]) - Reflex for Order [id number]   Component Results      Component Value Ref Range & Units Status   Bands-Stabs 2.00  0.00 - 10.00 % Final   Manual Diff Status PERFORMED    Final         Reviewed      MRI brain 7/2018 reviewed personally  Mild cerebral atrophy  Central pontine myelinolysis sequela  Microvascular ischemic changes   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  Mild cerebral atrophy.  2.  Interval resolution of posttraumatic intracranial subarachnoid and subdural hemorrhages with residual hemosiderin deposition as described above.  3.  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 No clinical significance. No follow-up imaging required.  4.  Minimal supratentorial white matter disease with a single punctate focus of FLAIR hyperintensity in the left peritrigonal deep white matter. Nonspecific finding consistent with microvascular ischemic change versus demyelination or gliosis.  5.  Chronic sequela of central pontine myelinolysis.  6.  Note, in the medical record, the rehabilitation history and physical and post admission  evaluation from 3/20/2018 mentions complications while in hospital of hypokalemia and hyponatremia with severe alcohol withdrawal. History of hyponatremia may be   pertinent to the CPM.      MRI brain 2019 may  No changes from above MRI on my review    Impression       1.  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 No significant change from prior exam. Doubtful clinical significance.  2.  Minimal supratentorial white matter disease. Nonspecific findings consistent with microvascular ischemic change versus demyelination or gliosis. No progression since 7/18/2018.  3.  Hemosiderin deposition in the right sylvian fissure and perisylvian sulci consistent with chronic sequela of old hemorrhage. 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 No change from prior exam.  4.  Punctate focus of gradient echo hypointensity at the left far posterior-medial temporal lobe versus a dural calcification of the tentorial incisura. No change from prior exam.  5.  Triangular area of encephalomalacia in the central hernán with morphology most consistent with chronic sequela of central pontine myelinolysis. No change from prior exam.            48 yo woman with history of TBI, SAH,SDH,hyponatremia with central pontine myelinolysis,anxiety and depression presents for intractable daily headaches. Her headaches appear to be mainly tension type with occasional migraines without aura and associated with photo and phonophobia.Insomniua,stress and mood disorders aggravate headaches. She is sober for more than a year now and admits to drinking heavily every day for many years.  She had been referred to pain management however Botox injections were not approved for her headaches and she continues to experience daily 10/10 holocephalic pain which radiates behind her eyes.  She takes NSAIDs daily multiple times therefore there is a component of medication overuse headaches. I had advised her to stop taking OTCs for headache multiple times per day and that all  abortive Therapised should add up to no more than 15 tablets per month. She voiced understanding.  I will try to slowly titrate Nortriptyline up as this should hel her insomnia and depression and decreas the number of headache days. recommended mag oxide and B2 vitamin 400 mg B2 and 400 mg Magnesium oxide  daily as preventative.  We will give Zomig nasal spray prn for true migraines she was instructed to use this very sparingly and only when true migraine happens.  Tizanidine for cervicalgia and spasms in neck and head however I had warned her not to use more than once per day and preferably at night because it may cause sedation and she should definitely not drive when she takes muscle relaxants.  We will dc elavil as she does not take it for a while now and also Flexeril (does not work for her).  MRI brain reviewed and no changes from her prior one and certainly not explanatory of her headaches,    Plan    Pain management - patient requesting appointment   TENS unit   Zolmitriptan nasal solution prn   Nortriptyline increase to 50 mg q nightly   And if tolerated to 75 mg q nightly in 1 week  Mag and B2  No pain meds at this time she will see pain specialist  I recommeded anxiety depression management and exercise helthy lifestyle     Adali Osborne MD PhD   Board certified neurologist   Behavioral Neurologist     Patient was seen for 30  minutes face to face of which > 50% of appointment time was spent on counseling and coordination of care regarding the above.

## 2019-05-20 ENCOUNTER — TELEPHONE (OUTPATIENT)
Dept: PHYSICAL MEDICINE AND REHAB | Facility: MEDICAL CENTER | Age: 49
End: 2019-05-20

## 2019-05-20 NOTE — TELEPHONE ENCOUNTER
Patient returned call and and was advised to come in and see Dr gustafson to discuss referral placed by Neuro and discuss management options available to her. Patient willing to comply and appt scheduled.

## 2019-05-20 NOTE — TELEPHONE ENCOUNTER
Called and LM for patient to advised current urgent referral to our practice.  Patient was referred to our practice per her Neurologist to see Dr Li. Patient is current patient of Dr Blas. Per our Providers preference patient is to be seen with Dr Blas and discuss further management options. Will attempt another call back to patient if no call back received.

## 2019-05-22 NOTE — CARE PLAN
Problem: Safety  Goal: Will remain free from falls  Outcome: PROGRESSING AS EXPECTED  Educated patient on the use of her call light, patient verbalized understanding. She is currently laying down in her bed which is locked and in the lowest position with 3 bed rails up. Her bedside table is next to her with her call light in reach. All needs met at this time, will continue to monitor.    Problem: Discharge Barriers/Planning  Goal: Patient's continuum of care needs will be met    Intervention: Involve patient and significant other/support system in setting and prioritizing goals for hospital stay and discharge  Discussed the plan of care for the evening with her, patient verbalized understanding. She feels she is ready for discharge and will be able to care for herself independently. All questions answered, hourly rounding in place.         Refill approved as requested.

## 2019-05-28 ENCOUNTER — HOSPITAL ENCOUNTER (OUTPATIENT)
Dept: RADIOLOGY | Facility: MEDICAL CENTER | Age: 49
End: 2019-05-28
Attending: PHYSICIAN ASSISTANT
Payer: MEDICAID

## 2019-05-28 DIAGNOSIS — Z12.39 BREAST CANCER SCREENING: ICD-10-CM

## 2019-05-28 PROCEDURE — 77067 SCR MAMMO BI INCL CAD: CPT

## 2019-06-04 ENCOUNTER — OFFICE VISIT (OUTPATIENT)
Dept: NEUROLOGY | Facility: MEDICAL CENTER | Age: 49
End: 2019-06-04
Payer: MEDICAID

## 2019-06-04 VITALS
HEART RATE: 100 BPM | SYSTOLIC BLOOD PRESSURE: 112 MMHG | BODY MASS INDEX: 25.16 KG/M2 | DIASTOLIC BLOOD PRESSURE: 70 MMHG | HEIGHT: 68 IN | WEIGHT: 166 LBS | RESPIRATION RATE: 16 BRPM | OXYGEN SATURATION: 98 %

## 2019-06-04 PROCEDURE — 64615 CHEMODENERV MUSC MIGRAINE: CPT | Performed by: NURSE PRACTITIONER

## 2019-06-04 RX ORDER — PROPRANOLOL HYDROCHLORIDE 10 MG/1
10 TABLET ORAL 3 TIMES DAILY
COMMUNITY
End: 2019-07-15

## 2019-06-04 RX ORDER — KETOROLAC TROMETHAMINE 30 MG/ML
60 INJECTION, SOLUTION INTRAMUSCULAR; INTRAVENOUS ONCE
Status: COMPLETED | OUTPATIENT
Start: 2019-06-04 | End: 2019-06-04

## 2019-06-04 RX ADMIN — KETOROLAC TROMETHAMINE 60 MG: 30 INJECTION, SOLUTION INTRAMUSCULAR; INTRAVENOUS at 08:42

## 2019-06-04 NOTE — PROGRESS NOTES
"Subjective:      Terri Krishnan is a 49 y.o. female who presents with Botox Injection (Other migraine without status migrainosus, intractable)            HPI  Here for initiation of Botox.    Needle phobia.      Current Outpatient Prescriptions   Medication Sig Dispense Refill   • propranolol (INDERAL) 10 MG Tab Take 10 mg by mouth 3 times a day.     • estradiol (ESTRACE) 1 MG Tab TAKE 1 TABLET BY MOUTH EVERY DAY 30 Tab 0   • nortriptyline (PAMELOR) 25 MG Cap Take 2 Caps by mouth every evening. 90 Cap 3   • sumatriptan (IMITREX) 20 MG/ACT nasal spray Spray 1 Spray in nose as needed for Migraine. 2 Each 3   • tizanidine (ZANAFLEX) 4 MG Tab Take 1 Tab by mouth 3 times a day as needed. 60 Tab 3   • zolmitriptan (ZOMIG) 5 MG nasal solution Spray 1 Spray in nose Once PRN for Headache for up to 1 dose. 1 Each 3   • estradiol (ESTRACE) 0.5 MG tablet Take 0.5 mg by mouth every day.     • magnesium oxide (MAG-OX) 400 (241.3 Mg) MG Tab tablet Take 1 Tab by mouth every day. 30 Tab 0   • diphenhydrAMINE (BENADRYL) 25 MG Tab Take 25 mg by mouth every 6 hours as needed for Sleep.     • methylPREDNISolone (MEDROL) 4 MG Tab Take 1 Tab by mouth every day. Take six tablets on day one and decrease by one every day until off. 21 Tab 0   • b complex vitamins capsule Take 1 Cap by mouth every day. 90 Cap 3     No current facility-administered medications for this visit.        ROS       Objective:     /70 (BP Location: Left arm, Patient Position: Sitting, BP Cuff Size: Adult)   Pulse 100   Resp 16   Ht 1.727 m (5' 7.99\")   Wt 75.3 kg (166 lb)   SpO2 98%   BMI 25.25 kg/m²      Physical Exam            Assessment/Plan:       Chronic Migraine:  Initiation of Botox today.     I treated this patient in clinic today with BotoxA 155 units according to the dosing/injection paradigm currently mandated by the FDA for the management of chronic migraine. Specifically, I injected 5 units to the procerus, 5 units to the corrugators " bilaterally, a total of 20 units to the frontalis musculature, 20 units to the temporalis bilaterally, 15 units to the occipitalis bilaterally, 10 units to the cervical paraspinals bilaterally and 15 units to the trapezius musculature bilaterally. The remainder of the Botox 200 units mixed but not administered was discarded as wastage per FDA guidelines.     Education/counseling/reduction in medications if pt has medication overuse headache; Frequency of headaches is >15 days monthly with at least 8 migraines monthly; Migraines include at least two of the following: worsened with activity or avoidance of activity with migraines (ie they go lie down), moderate to severe pain intensity, pulsing headache, unilateral headache AND they have either nausea or vomiting OR sensitivity to light and sound.     Although this patient is responding to botox they are NOT migraine free, however, and it is my recommendation Botox be continued at this time.    This patient has chronic daily migraines, defined as having 15 or more headaches days per month, 8 of which are migraines, over a minimum of the last three months. Episodes last more than 4 hours (untreated).  Pt has 2 or more of following (see initial note) -  Headache worsened with activity, pain is moderate to severe intensity, pulsing in nature, unilateral and patient either has nausea/vomiting OR sensitivity to light and sound.  This patient does/does not also have medication overuse headache.  However our plan to address this includes education, occipital nerve shots, restricting use as directed.    Toradol 60mg IM provided per MA.    Return for follow-up in 3 months for 2nd set of Botox and evaluation of need for Botox.

## 2019-06-12 ENCOUNTER — HOSPITAL ENCOUNTER (EMERGENCY)
Facility: MEDICAL CENTER | Age: 49
End: 2019-06-12
Attending: EMERGENCY MEDICINE
Payer: MEDICAID

## 2019-06-12 ENCOUNTER — OFFICE VISIT (OUTPATIENT)
Dept: PHYSICAL MEDICINE AND REHAB | Facility: MEDICAL CENTER | Age: 49
End: 2019-06-12
Payer: MEDICAID

## 2019-06-12 ENCOUNTER — TELEPHONE (OUTPATIENT)
Dept: NEUROLOGY | Facility: MEDICAL CENTER | Age: 49
End: 2019-06-12

## 2019-06-12 VITALS
HEIGHT: 68 IN | OXYGEN SATURATION: 98 % | HEART RATE: 124 BPM | BODY MASS INDEX: 24.69 KG/M2 | DIASTOLIC BLOOD PRESSURE: 78 MMHG | WEIGHT: 162.92 LBS | TEMPERATURE: 97.6 F | SYSTOLIC BLOOD PRESSURE: 138 MMHG

## 2019-06-12 VITALS
TEMPERATURE: 98.1 F | HEART RATE: 86 BPM | HEIGHT: 68 IN | OXYGEN SATURATION: 92 % | SYSTOLIC BLOOD PRESSURE: 123 MMHG | RESPIRATION RATE: 10 BRPM | WEIGHT: 163.8 LBS | DIASTOLIC BLOOD PRESSURE: 73 MMHG | BODY MASS INDEX: 24.83 KG/M2

## 2019-06-12 DIAGNOSIS — G44.321 INTRACTABLE CHRONIC POST-TRAUMATIC HEADACHE: ICD-10-CM

## 2019-06-12 DIAGNOSIS — M47.812 SPONDYLOSIS OF CERVICAL REGION WITHOUT MYELOPATHY OR RADICULOPATHY: ICD-10-CM

## 2019-06-12 DIAGNOSIS — S06.305D: ICD-10-CM

## 2019-06-12 DIAGNOSIS — M53.82 MUSCULOSKELETAL DISORDER OF THE SUBOCCIPITAL: ICD-10-CM

## 2019-06-12 DIAGNOSIS — M54.81 BILATERAL OCCIPITAL NEURALGIA: ICD-10-CM

## 2019-06-12 DIAGNOSIS — M50.30 OTHER CERVICAL DISC DEGENERATION, UNSPECIFIED CERVICAL REGION: ICD-10-CM

## 2019-06-12 DIAGNOSIS — G37.2 CENTRAL PONTINE MYELINOLYSIS (HCC): ICD-10-CM

## 2019-06-12 DIAGNOSIS — G43.709 CHRONIC MIGRAINE WITHOUT AURA WITHOUT STATUS MIGRAINOSUS, NOT INTRACTABLE: ICD-10-CM

## 2019-06-12 DIAGNOSIS — G44.221 CHRONIC TENSION-TYPE HEADACHE, INTRACTABLE: ICD-10-CM

## 2019-06-12 PROCEDURE — 96375 TX/PRO/DX INJ NEW DRUG ADDON: CPT

## 2019-06-12 PROCEDURE — 700111 HCHG RX REV CODE 636 W/ 250 OVERRIDE (IP): Performed by: EMERGENCY MEDICINE

## 2019-06-12 PROCEDURE — 99214 OFFICE O/P EST MOD 30 MIN: CPT | Performed by: PHYSICAL MEDICINE & REHABILITATION

## 2019-06-12 PROCEDURE — 99284 EMERGENCY DEPT VISIT MOD MDM: CPT

## 2019-06-12 PROCEDURE — 96374 THER/PROPH/DIAG INJ IV PUSH: CPT

## 2019-06-12 PROCEDURE — 700101 HCHG RX REV CODE 250: Performed by: EMERGENCY MEDICINE

## 2019-06-12 PROCEDURE — 64405 NJX AA&/STRD GR OCPL NRV: CPT

## 2019-06-12 RX ORDER — BUPIVACAINE HYDROCHLORIDE 5 MG/ML
10 INJECTION, SOLUTION EPIDURAL; INTRACAUDAL ONCE
Status: COMPLETED | OUTPATIENT
Start: 2019-06-12 | End: 2019-06-12

## 2019-06-12 RX ORDER — MIDAZOLAM HYDROCHLORIDE 1 MG/ML
0.5 INJECTION INTRAMUSCULAR; INTRAVENOUS ONCE
Status: COMPLETED | OUTPATIENT
Start: 2019-06-12 | End: 2019-06-12

## 2019-06-12 RX ORDER — KETOROLAC TROMETHAMINE 30 MG/ML
30 INJECTION, SOLUTION INTRAMUSCULAR; INTRAVENOUS ONCE
Status: COMPLETED | OUTPATIENT
Start: 2019-06-12 | End: 2019-06-12

## 2019-06-12 RX ORDER — ONDANSETRON 2 MG/ML
4 INJECTION INTRAMUSCULAR; INTRAVENOUS ONCE
Status: COMPLETED | OUTPATIENT
Start: 2019-06-12 | End: 2019-06-12

## 2019-06-12 RX ADMIN — KETOROLAC TROMETHAMINE 30 MG: 30 INJECTION, SOLUTION INTRAMUSCULAR at 13:36

## 2019-06-12 RX ADMIN — BUPIVACAINE HYDROCHLORIDE 3 ML: 5 INJECTION, SOLUTION EPIDURAL; INTRACAUDAL; PERINEURAL at 14:45

## 2019-06-12 RX ADMIN — KETAMINE HYDROCHLORIDE 25 MG: 10 INJECTION, SOLUTION INTRAMUSCULAR; INTRAVENOUS at 13:42

## 2019-06-12 RX ADMIN — MIDAZOLAM HYDROCHLORIDE 0.5 MG: 1 INJECTION, SOLUTION INTRAMUSCULAR; INTRAVENOUS at 13:42

## 2019-06-12 RX ADMIN — ONDANSETRON 4 MG: 2 INJECTION INTRAMUSCULAR; INTRAVENOUS at 13:37

## 2019-06-12 ASSESSMENT — PATIENT HEALTH QUESTIONNAIRE - PHQ9
CLINICAL INTERPRETATION OF PHQ2 SCORE: 4
5. POOR APPETITE OR OVEREATING: 2 - MORE THAN HALF THE DAYS
SUM OF ALL RESPONSES TO PHQ QUESTIONS 1-9: 16

## 2019-06-12 ASSESSMENT — PAIN SCALES - GENERAL: PAINLEVEL: 10=SEVERE PAIN

## 2019-06-12 NOTE — ED TRIAGE NOTES
Chief Complaint   Patient presents with   • Headache     For 1 year from a traumatic brain injury. Also co neck pain but this is not similair to other headaches.

## 2019-06-12 NOTE — ED NOTES
MD at bedside for re eval   PT/OT recommending home with [facility]HC. LSW met with the pt and her daughter. Discharge plan remains home with daughter and [facility]. RN updated.  Electronically signed by [place] on 3/6/18 at 4:56 PM

## 2019-06-12 NOTE — ED NOTES
Dc instructions discussed with patient and friend at bedside. Pt signed and verbalized informed consent about driving etc. States friend will drive her home. VSS. All questions answered at this time. IV removed. Pt ambulated to lobby with steady gait.

## 2019-06-12 NOTE — ED NOTES
Pt is complaining of a headache x3 days with pain radiating down her neck and nausea. Pt states that she had a head injury about year ago but has had no other head trauma since. Pt states she is not sensitive to light our sound at this time. Pt denies CP and SOB.

## 2019-06-12 NOTE — TELEPHONE ENCOUNTER
Patient called stating she was returning my call.I let her know I don't think it was me. Maybe  someone else called her not from our office.

## 2019-06-12 NOTE — ED PROVIDER NOTES
ED Provider Note  CHIEF COMPLAINT  Chief Complaint   Patient presents with   • Headache     For 1 year from a traumatic brain injury. Also co neck pain but this is not similair to other headaches.       HPI  Terri Krishnan is a 49 y.o. female who presents stating that she had a traumatic brain injury she suffered last year and states that she has had acute on chronic headache over the past 3 days which is much more severe than usual.  She describes this as increasing in severity slowly and has not been associated with vomiting but has had nausea.  She reports the back of her neck is quite sore and points to her occipital nerves bilaterally at the basion of the skull.  Denies any fever, chills, sweats, photophobia or other complaints and has been recently treated with Botox about 3 weeks ago for headaches which did not improve    REVIEW OF SYSTEMS  See HPI for further details. All other systems are negative.     PAST MEDICAL HISTORY  Past Medical History:   Diagnosis Date   • Alcohol abuse    • Head ache    • Surgical menopause    • TBI (traumatic brain injury) (HCC)    • Tobacco dependence        FAMILY HISTORY  Family History   Problem Relation Age of Onset   • Cancer Neg Hx    • Diabetes Neg Hx    • Heart Disease Neg Hx    • Stroke Neg Hx    • Hyperlipidemia Neg Hx    • Hypertension Neg Hx        SOCIAL HISTORY   reports that she has been smoking Cigarettes.  She has a 3.75 pack-year smoking history. She has never used smokeless tobacco. She reports that she does not drink alcohol or use drugs.    SURGICAL HISTORY  Past Surgical History:   Procedure Laterality Date   • ABDOMINAL HYSTERECTOMY TOTAL  age 27    Total       CURRENT MEDICATIONS  Home Medications    **Home medications have not yet been reviewed for this encounter**         ALLERGIES  Allergies   Allergen Reactions   • Shrimp (Diagnostic) Anaphylaxis     poa stated that this patient had a reaction a couple years ago after eating shrimp   • Shrimp  "Flavor Anaphylaxis       PHYSICAL EXAM  VITAL SIGNS: /73   Pulse 81   Temp 36.9 °C (98.4 °F) (Temporal)   Resp (!) 21   Ht 1.727 m (5' 8\")   Wt 74.3 kg (163 lb 12.8 oz)   SpO2 96%   BMI 24.91 kg/m²    Constitutional: Well developed, Well nourished, acute pain distress, uncomfortable appearance.   HENT: Normocephalic, Atraumatic, Bilateral external ears normal, Oropharynx is clear mucous membranes are dry. No oral exudates or nasal discharge.   Eyes: Pupils are equal round and reactive, EOMI, Conjunctiva normal, No discharge.   Neck: Normal range of motion, bilateral occipital nerve tenderness at the base of the skull, left greater than right, Supple, No stridor. No meningismus.  Lymphatic: No lymphadenopathy noted.   Cardiovascular: Regular rate and rhythm without murmur rub or gallop.  Thorax & Lungs: Clear breath sounds bilaterally without wheezes, rhonchi or rales. There is no chest wall tenderness.   Abdomen: Soft non-tender non-distended. There is no rebound or guarding. No organomegaly is appreciated. Bowel sounds are normal.  Skin: Normal without rash.   Back: No CVA or spinal tenderness.   Extremities: Intact distal pulses, No edema, No tenderness, No cyanosis, No clubbing. Capillary refill is less than 2 seconds.  Musculoskeletal: Good range of motion in all major joints. No tenderness to palpation or major deformities noted.   Neurologic: Alert & oriented x 3, Normal motor function, Normal sensory function, No focal deficits noted. Reflexes are normal.  Psychiatric: Affect normal, Judgment normal, Mood somewhat depressed. There is no suicidal ideation or patient reported hallucinations.       RADIOLOGY/PROCEDURES  MRI recently 2 months ago showed no evidence of acute abnormalities and therefore imaging is not repeated    COURSE & MEDICAL DECISION MAKING  Pertinent Labs & Imaging studies reviewed. (See chart for details)  The patient has failed a number of outpatient treatment regimens and " therefore I offered her ketamine therapy and pretreated her with 0.5 mg of Versed and they gave her 25 mg of ketamine over the 15-minute.  Along with Toradol 30 mg IV.  She had some moderate relief of pain inside her head however complained of occipital neuralgia as well and therefore additional procedural relief was warranted with Marcaine therapy.  I suggested occipital nerve blocks bilaterally and after explanation of risks and benefits of verbally she agreed.  This was performed as follows:    Following prep and sterilization with alcohol locally I injected a total of 3 cc around the left and then right occipital nerves at the base of the skull.  She had relief about 5 minutes later that was significant.  Patient tolerated procedure well.    She is encouraged to follow-up with her primary care provider for additional discussion on pain management referral as her current therapy team is not affecting significant change in her chronic pain state.  Patient is discharged in stable condition feeling much better and plans on going home taking a nap    FINAL IMPRESSION  1. Chronic tension-type headache, intractable    2. Intractable chronic post-traumatic headache    Occipital nerve block x2 with Marcaine by [facility]         Electronically signed by: Alejandro Morneo, 6/12/2019 2:18 PM

## 2019-06-12 NOTE — PROGRESS NOTES
Physiatry (physical medicine and  Rehabilitation), interventional spine and sports medicine    Chief complaint:   Chief Complaint   Patient presents with   • Follow-Up     Headache        HISTORY    HPI: Terir Krishnan 49 y.o. female who presents today with Diagnoses of Intracranial hemorrhage following injury with prolonged (more than 24 hours) loss of consciousness with return to pre-existing conscious level, subsequent encounter, Intractable chronic post-traumatic headache, Spondylosis of cervical region without myelopathy or radiculopathy, Central pontine myelinolysis (HCC), Musculoskeletal disorder of the suboccipital, Other cervical disc degeneration, unspecified cervical region, Chronic migraine without aura without status migrainosus, not intractable, and Bilateral occipital neuralgia were pertinent to this visit.    Patient has severe right upper cervical spine, 10/10 intensity, aching, daily constant headaches. Worse with neck movements. Does get photophobia. Tried multiple interventions including many medications and had botox injections with no improvements.       ROS:   Red Flags ROS:   Fever, Chills, Sweats: Denies  Involuntary Weight Loss: Denies  Bladder Incontinence: Denies  Bowel Incontinence: denies  Saddle Anesthesia: Denies    All other systems reviewed and negative.       PMHx:   Past Medical History:   Diagnosis Date   • Alcohol abuse    • Head ache    • Surgical menopause    • TBI (traumatic brain injury) (HCC)    • Tobacco dependence        PSHx:   Past Surgical History:   Procedure Laterality Date   • ABDOMINAL HYSTERECTOMY TOTAL  age 27    Total       Family history   Family History   Problem Relation Age of Onset   • Cancer Neg Hx    • Diabetes Neg Hx    • Heart Disease Neg Hx    • Stroke Neg Hx    • Hyperlipidemia Neg Hx    • Hypertension Neg Hx          Medications: reviewed on [facility].   Outpatient Prescriptions Marked as Taking for the 6/12/19 encounter (Office Visit) with Chase  "SAMM Blas M.D.   Medication Sig Dispense Refill   • estradiol (ESTRACE) 1 MG Tab TAKE 1 TABLET BY MOUTH EVERY DAY 30 Tab 0   • tizanidine (ZANAFLEX) 4 MG Tab Take 1 Tab by mouth 3 times a day as needed. 60 Tab 3        Allergies:   Allergies   Allergen Reactions   • Shrimp (Diagnostic) Anaphylaxis     poa stated that this patient had a reaction a couple years ago after eating shrimp   • Shrimp Flavor Anaphylaxis       Social Hx:   Social History     Social History   • Marital status: Single     Spouse name: N/A   • Number of children: N/A   • Years of education: N/A     Occupational History   • Not on file.     Social History Main Topics   • Smoking status: Current Every Day Smoker     Packs/day: 0.25     Years: 15.00     Types: Cigarettes   • Smokeless tobacco: Never Used   • Alcohol use No      Comment: quit in March 2018   • Drug use: No   • Sexual activity: Yes     Partners: Male     Birth control/ protection: Surgical     Other Topics Concern   •  Service No   • Blood Transfusions No   • Caffeine Concern No   • Occupational Exposure No   • Hobby Hazards No   • Sleep Concern Yes   • Stress Concern Yes   • Weight Concern No   • Special Diet No   • Back Care No   • Exercise No   • Bike Helmet Yes   • Seat Belt Yes   • Self-Exams Yes     Social History Narrative   • No narrative on file         EXAMINATION     Physical Exam:   Vitals: /78 (BP Location: Right arm, Patient Position: Sitting, BP Cuff Size: Adult)   Pulse (!) 124   Temp 36.4 °C (97.6 °F) (Temporal)   Ht 1.727 m (5' 8\")   Wt 73.9 kg (162 lb 14.7 oz)   SpO2 98%     Constitutional:   Body Habitus: Body mass index is 24.77 kg/m².  Cooperation: Fully cooperates with exam  Appearance: Well-groomed, well-nourished, not disheveled     Skin -no rashes or lesions noted     Respiratory-  breathing comfortable on room air, no audible wheezing    Cardiovascular- capillary refills less than 2 seconds. No lower extremity edema is noted. "     Psychiatric- alert and oriented ×3. Normal affect.     MSK: see previous physical exams for more details.     Neuro:  CN  Cranial Nerves:     II - PERRL     III, IV, VI - EOMI     V - Equal sensation bilateral face     VII - slight right facial droop     VIII - Hearing normal to finger rubbing bilaterally     IX - Gag not tested     X - Uvula midline     XI - Shoulder shrugs equal     XII - Tongue protrusion midline, normal control          MEDICAL DECISION MAKING    Medical records review: see under HPI section.     DATA    Labs:   Lab Results   Component Value Date/Time    SODIUM 139 08/28/2018 07:38 AM    POTASSIUM 4.6 08/28/2018 07:38 AM    CHLORIDE 107 08/28/2018 07:38 AM    CO2 21 08/28/2018 07:38 AM    ANION 11.0 08/28/2018 07:38 AM    GLUCOSE 74 08/28/2018 07:38 AM    BUN 20 08/28/2018 07:38 AM    CREATININE 0.81 08/28/2018 07:38 AM    CALCIUM 9.2 08/28/2018 07:38 AM    ASTSGOT 25 08/28/2018 07:38 AM    ALTSGPT 9 08/28/2018 07:38 AM    TBILIRUBIN 0.5 08/28/2018 07:38 AM    ALBUMIN 4.0 08/28/2018 07:38 AM    TOTPROTEIN 6.6 08/28/2018 07:38 AM    GLOBULIN 2.6 08/28/2018 07:38 AM    AGRATIO 1.5 08/28/2018 07:38 AM   ]    Lab Results   Component Value Date/Time    PROTHROMBTM 14.5 03/05/2018 04:35 AM    INR 1.16 (H) 03/05/2018 04:35 AM        Lab Results   Component Value Date/Time    WBC 5.3 08/28/2018 07:38 AM    RBC 4.44 08/28/2018 07:38 AM    HEMOGLOBIN 14.1 08/28/2018 07:38 AM    HEMATOCRIT 44.8 08/28/2018 07:38 AM    .9 (H) 08/28/2018 07:38 AM    MCH 31.8 08/28/2018 07:38 AM    MCHC 31.5 (L) 08/28/2018 07:38 AM    MPV 8.7 (L) 08/28/2018 07:38 AM    NEUTSPOLYS 49.00 08/28/2018 07:38 AM    LYMPHOCYTES 39.00 08/28/2018 07:38 AM    MONOCYTES 4.00 08/28/2018 07:38 AM    EOSINOPHILS 5.00 08/28/2018 07:38 AM    BASOPHILS 1.00 08/28/2018 07:38 AM    HYPOCHROMIA 1+ 03/12/2018 04:12 AM    ANISOCYTOSIS 1+ 08/28/2018 07:38 AM        No results found for: HBA1C     Imaging:   I personally reviewed following  images, these are my reads  MRI brain 7/18/2018  No acute changes.  No acute hemorrhages.    X-ray cervical spine 12/14/2018  Cervical spondylosis and uncovertebral hypertrophy which is worse C3-4.  C2-3 facets are  not well defined on any of the x-rays.    IMAGING radiology reads. I reviewed the following radiology reads         Impression       Minimal C3/4 degenerative disc disease and uncovertebral hypertrophy    Trace fixed C6/C7 anterolisthesis         Results for orders placed during the hospital encounter of 07/18/18   MR-BRAIN-WITH & W/O    Impression 1.  Mild cerebral atrophy.  2.  Interval resolution of posttraumatic intracranial subarachnoid and subdural hemorrhages with residual hemosiderin deposition as described above.  3.  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 No clinical significance. No follow-up imaging required.  4.  Minimal supratentorial white matter disease with a single punctate focus of FLAIR hyperintensity in the left peritrigonal deep white matter. Nonspecific finding consistent with microvascular ischemic change versus demyelination or gliosis.  5.  Chronic sequela of central pontine myelinolysis.  6.  Note, in the medical record, the rehabilitation history and physical and post admission evaluation from 3/20/2018 mentions complications while in hospital of hypokalemia and hyponatremia with severe alcohol withdrawal. History of hyponatremia may be   pertinent to the CPM.                                                                                                                                      Results for orders placed in visit on 12/10/17   DX-FOREARM LEFT    Impression Negative left forearm series        Results for orders placed during the hospital encounter of 03/04/18   DX-HAND 3+ LEFT    Impression 1.  No acute traumatic bony injury.  2.  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 Appears stable since  prior.                                                               Diagnosis   Visit Diagnoses     ICD-10-CM   1. 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 Intractable chronic post-traumatic headache G44.321   3. Spondylosis of cervical region without myelopathy or radiculopathy M47.812   4. Central pontine myelinolysis (HCC) G37.2   5. Musculoskeletal disorder of the suboccipital M53.82   6. Other cervical disc degeneration, unspecified cervical region M50.30   7. Chronic migraine without aura without status migrainosus, not intractable G43.709   8. Bilateral occipital neuralgia M54.81           ASSESSMENT:  Terri Krishnan 49 y.o. female with diffuse intractable headache status post traumatic brain injury, multiple visits to the ER, failed all conservative management, failed multiple medications. The patient has been tried on NSAIDs, tricyclic antidepressants, gabapentin, acetaminophen, other neuropathic pain medications, physical therapy, occupational therapy with no significant improvements.  She has had several visits to the ER.  I completed a peer to peer with deborah for botulinum toxin injections however this was declined twice.     Terri was seen today for follow-up.    Diagnoses and all orders for this visit:    Intracranial hemorrhage following injury with prolonged (more than 24 hours) loss of consciousness with return to pre-existing conscious level, subsequent encounter  -     REFERRAL TO PAIN CLINIC    Intractable chronic post-traumatic headache  -     REFERRAL TO PAIN CLINIC    Spondylosis of cervical region without myelopathy or radiculopathy  -     REFERRAL TO PAIN CLINIC    Central pontine myelinolysis (HCC)  -     REFERRAL TO PAIN CLINIC    Musculoskeletal disorder of the suboccipital  -     REFERRAL TO PAIN CLINIC    Other cervical disc degeneration,  unspecified cervical region  -     REFERRAL TO PAIN CLINIC    Chronic migraine without aura without status migrainosus, not intractable  -     REFERRAL TO PAIN CLINIC    Bilateral occipital neuralgia  -     REFERRAL TO PAIN CLINIC      The patient recently had botulinum toxin headache protocol done by neurology.  I have previously ordered a right C2-3 and C3-4 diagnostic medial branch block however this was declined and requested that the patient go to physical therapy.  The patient has gone to physical therapy.  We again discussed this procedure but the patient is not interested in this procedure with me.  They wish for a second opinion and the patient and patient's mother were adamant that they want a second opinion to review Terri's case and did not want to continue seeing me for the headaches and neck pain.  I have placed a referral into a pain specialist who can perform the above procedure if desired. We also discussed that I had done several peer to peer calls for Terri and tried to work with her insurance company.     Total time: 26 minutes face-to-face time. I spent greater than 50% of the time for patient care and coordination on this date, including with the patient as per assessment and plan above.        Chase Blas MD  Physical Medicine and Rehabilitation  Interventional Spine and Sports Physiatry  [facility]           CC Earlene Rebolledo P.A.-C.

## 2019-06-12 NOTE — Clinical Note
Unfortunately most of the interventions that I have requested have been denied by insurance. The patient and her mother were were adamant that this was my fault.  I had done several peer to peer reviews and these were also declined.  They want a second opinion which is reasonable.  I placed a referral to an outside pain provider and the patient will follow up with them.  Chase Blas MD

## 2019-06-12 NOTE — ED NOTES
Pt roomed. Hooked to vitals machine. Call light in reach. Warm blanket provided. VSS. All needs met at this time. Waiting for MD

## 2019-06-21 NOTE — TELEPHONE ENCOUNTER
Was the patient seen in the last year in this department? No     Does patient have an active prescription for medications requested? Yes    Received Request Via: Patient

## 2019-06-23 RX ORDER — ESTRADIOL 1 MG/1
1 TABLET ORAL
Qty: 30 TAB | Refills: 0 | Status: SHIPPED | OUTPATIENT
Start: 2019-06-23 | End: 2019-09-03 | Stop reason: SDUPTHER

## 2019-07-01 ENCOUNTER — OFFICE VISIT (OUTPATIENT)
Dept: MEDICAL GROUP | Age: 49
End: 2019-07-01
Payer: MEDICAID

## 2019-07-01 VITALS
SYSTOLIC BLOOD PRESSURE: 108 MMHG | WEIGHT: 167.6 LBS | HEART RATE: 98 BPM | HEIGHT: 68 IN | OXYGEN SATURATION: 96 % | TEMPERATURE: 97.4 F | BODY MASS INDEX: 25.4 KG/M2 | DIASTOLIC BLOOD PRESSURE: 72 MMHG

## 2019-07-01 DIAGNOSIS — F10.21 ALCOHOL DEPENDENCE IN REMISSION (HCC): ICD-10-CM

## 2019-07-01 PROCEDURE — 99214 OFFICE O/P EST MOD 30 MIN: CPT | Performed by: PHYSICIAN ASSISTANT

## 2019-07-01 NOTE — PROGRESS NOTES
Subjective:     Chief Complaint   Patient presents with   • Headache     Terri Krishnan is a 49 y.o. female here today for evaluation and management of:    Chronic migraine  Uncontrolled since traumatic brain injury and hemorrhage march of 2018.   States she is under the care of both physiatry and neurology without much success.  She is currently taking propranolol 10 mg TID, nortriptyline 50 mg HS, tizanidine 4 mg TID without much relief.   Using Imitrex 20 mg nasal spray PRN-- only provides a couple hours relief.   Reports  She received botox injections by neuro on 6/4-- no relief. Next set of injections late August.  Was advised by neuro could take 3 times before she sees results.   Reports pain is negatively impacting her mood.  States she is losing all of her quality of life and is very down about it.  She is currently seeing a therapist who is helping her some however it is not enough.  She is frustrated with physiatry because she does not feel she is being heard now she has been referred to 2 different places due to them not accepting her insurance.  She is questioning if she should be seen by [facility] because they are focused on spine injuries and she is worried more about her pain.  Did not understand that they are a conventional pain clinic as well.  Reached out to neurologist however medical assistant called and said she is out of the office and did not provide any help.  She is frustrated does not know what the next steps are.  She has not drink any alcohol since her brain injury in March 2018    ROS   Denies any recent fevers or chills.   No nausea or vomiting. No diarrhea.   No chest pains or shortness of breath. No lower extremity edema.    Allergies   Allergen Reactions   • Shrimp (Diagnostic) Anaphylaxis     poa stated that this patient had a reaction a couple years ago after eating shrimp   • Shrimp Flavor Anaphylaxis       Current medicines (including changes today)  Current  "Outpatient Prescriptions   Medication Sig Dispense Refill   • estradiol (ESTRACE) 1 MG Tab Take 1 Tab by mouth every day. 30 Tab 0   • propranolol (INDERAL) 10 MG Tab Take 10 mg by mouth 3 times a day.     • nortriptyline (PAMELOR) 25 MG Cap Take 2 Caps by mouth every evening. 90 Cap 3   • b complex vitamins capsule Take 1 Cap by mouth every day. 90 Cap 3   • sumatriptan (IMITREX) 20 MG/ACT nasal spray Spray 1 Spray in nose as needed for Migraine. 2 Each 3   • tizanidine (ZANAFLEX) 4 MG Tab Take 1 Tab by mouth 3 times a day as needed. 60 Tab 3   • magnesium oxide (MAG-OX) 400 (241.3 Mg) MG Tab tablet Take 1 Tab by mouth every day. 30 Tab 0   • diphenhydrAMINE (BENADRYL) 25 MG Tab Take 25 mg by mouth every 6 hours as needed for Sleep.       No current facility-administered medications for this visit.        Patient Active Problem List    Diagnosis Date Noted   • Traumatic intracranial hemorrhage (HCC) 03/04/2018     Priority: High   • Post-traumatic headache 04/05/2018   • Alcohol abuse 03/20/2018   • Balance disorder 03/20/2018   • CARSON (generalized anxiety disorder) 10/12/2017   • Surgical menopause 03/06/2014   • Tobacco dependence 03/06/2014       Family History   Problem Relation Age of Onset   • Cancer Neg Hx    • Diabetes Neg Hx    • Heart Disease Neg Hx    • Stroke Neg Hx    • Hyperlipidemia Neg Hx    • Hypertension Neg Hx           Objective:     Vitals:    07/01/19 0740   BP: 108/72   BP Location: Right arm   Patient Position: Sitting   BP Cuff Size: Adult   Pulse: 98   Temp: 36.3 °C (97.4 °F)   TempSrc: Temporal   SpO2: 96%   Weight: 76 kg (167 lb 9.6 oz)   Height: 1.727 m (5' 8\")     Body mass index is 25.48 kg/m².     Physical Exam:  Gen: Well developed, well nourished in no acute distress.   Skin: Pink, warm, and dry  HEENT: conjunctiva non-injected, sclera non-icteric. EOMs intact.   Nasal mucosa without edema nor erythema. No facial tenderness  Pinna normal. TM pearly gray.   Oral mucous membranes pink " and moist with no lesions.  Neck: Supple, trachea midline. No adenopathy or masses in the neck or supraclavicular regions.  Lungs: Effort is normal. Clear to auscultation bilaterally with good excursion.  CV: regular rate and rhythm.  Abdomen: soft, nontender, + BS.  Ext: no edema, color normal, vascularity normal, temperature normal  Alert and oriented Eye contact is good, speech goal directed, affect calm    Assessment and Plan:   The following treatment plan was discussed:     1. Chronic migraine  - uncontrolled. As neurologist is out of office and last dose adjustment to nortriptyline appears to have been on 5/15/19, advised patient to increase to 75 mg nightly. Discussed potential side effects of medication with patient.  She will check back in with me in the next 1-2 weeks to see if there is any improvement.  - she will work on getting in with [facility].   -  She will continue to follow-up with physical therapy and her therapist.   -Follow-up with neurologist as directed.   - will continue to follow her mood. Discussed chronic pain and major depressive disorder-- she will think about treatment. May even consider weaning off estradiol to see if any improvement in migraines.     2. Alcohol dependence in remission (HCC)  - applauded patient for year of sobriety. Encouraged her to continue.       -Any change or worsening of signs or symptoms, patient encouraged to follow-up or report to emergency room for further evaluation. Patient verbalizes understanding and agrees.    Followup: Return in about 2 weeks (around 7/15/2019) for follow-up on headaches, sooner if needed.         This dictation was created using voice recognition software. The accuracy of the dictation is limited to the abilities of the software. I expect there may be some errors of grammar and possibly content.

## 2019-07-15 ENCOUNTER — OFFICE VISIT (OUTPATIENT)
Dept: MEDICAL GROUP | Age: 49
End: 2019-07-15
Payer: MEDICAID

## 2019-07-15 VITALS
WEIGHT: 168.04 LBS | BODY MASS INDEX: 25.47 KG/M2 | HEART RATE: 106 BPM | HEIGHT: 68 IN | SYSTOLIC BLOOD PRESSURE: 124 MMHG | OXYGEN SATURATION: 93 % | DIASTOLIC BLOOD PRESSURE: 82 MMHG | TEMPERATURE: 97.9 F

## 2019-07-15 DIAGNOSIS — E89.40 SURGICAL MENOPAUSE: ICD-10-CM

## 2019-07-15 DIAGNOSIS — F17.200 TOBACCO DEPENDENCE: ICD-10-CM

## 2019-07-15 DIAGNOSIS — Z23 NEED FOR VACCINATION: ICD-10-CM

## 2019-07-15 PROBLEM — G44.309 POST-TRAUMATIC HEADACHE: Status: RESOLVED | Noted: 2018-04-05 | Resolved: 2019-07-15

## 2019-07-15 PROBLEM — S06.30AA TRAUMATIC INTRACRANIAL HEMORRHAGE (HCC): Status: RESOLVED | Noted: 2018-03-04 | Resolved: 2019-07-15

## 2019-07-15 PROCEDURE — 99214 OFFICE O/P EST MOD 30 MIN: CPT | Mod: 25 | Performed by: PHYSICIAN ASSISTANT

## 2019-07-15 PROCEDURE — 90471 IMMUNIZATION ADMIN: CPT | Performed by: PHYSICIAN ASSISTANT

## 2019-07-15 PROCEDURE — 90746 HEPB VACCINE 3 DOSE ADULT IM: CPT | Performed by: PHYSICIAN ASSISTANT

## 2019-07-15 ASSESSMENT — PATIENT HEALTH QUESTIONNAIRE - PHQ9
5. POOR APPETITE OR OVEREATING: 0 - NOT AT ALL
CLINICAL INTERPRETATION OF PHQ2 SCORE: 1
SUM OF ALL RESPONSES TO PHQ QUESTIONS 1-9: 4

## 2019-07-15 NOTE — PROGRESS NOTES
Subjective:     Chief Complaint   Patient presents with   • Head Ache     Follow up     Terri Krishnan is a 49 y.o. female here today for evaluation and management of:    Chronic migraine  Persists.  She tried increasing the nortriptyline to 75 mg but it did not help at all.  It did cause some worsening of dry mouth.  She reports she has not been on the propranolol for a while as she was advised to stop.  Unsure why she told me she was taking it last visit.  States she uses the Imitrex very infrequently because it only provides her about an hour relief.  She has been on gabapentin, Cymbalta, Topamax, amitriptyline in the past without any success.  She is currently under the care of neurologist and receiving Botox injections.  She received her last approximately 2 months ago and is due for her next at the end of next month.  Her initial did not provide her any relief.  States she has been trying to get in with her neurologist she was at our office and never heard back.  She has not been sleeping. Reports only a few hours every night due to the pain. She is really suffering. Feels her mood is negatively impacted by the pain-- going to therapy twice monthly.   Depression Screen (PHQ-2/PHQ-9) 1/31/2019 6/12/2019 7/15/2019   PHQ-2 Total Score - - -   PHQ-2 Total Score - - -   PHQ-2 Total Score 0 4 1   PHQ-9 Total Score - - -   PHQ-9 Total Score - 16 4       Interpretation of PHQ-9 Total Score   Score Severity   1-4 No Depression   5-9 Mild Depression   10-14 Moderate Depression   15-19 Moderately Severe Depression   20-27 Severe Depression        She would like to start her Hep B series today.    ROS   + weight gain.  Denies any recent fevers or chills.   No nausea or vomiting. No diarrhea.   No chest pains or shortness of breath. + cough  No lower extremity edema.    Allergies   Allergen Reactions   • Shrimp (Diagnostic) Anaphylaxis     poa stated that this patient had a reaction a couple years ago after eating  "shrimp   • Shrimp Flavor Anaphylaxis       Current medicines (including changes today)  Current Outpatient Prescriptions   Medication Sig Dispense Refill   • estradiol (ESTRACE) 1 MG Tab Take 1 Tab by mouth every day. 30 Tab 0   • nortriptyline (PAMELOR) 25 MG Cap Take 2 Caps by mouth every evening. 90 Cap 3   • b complex vitamins capsule Take 1 Cap by mouth every day. 90 Cap 3   • sumatriptan (IMITREX) 20 MG/ACT nasal spray Spray 1 Spray in nose as needed for Migraine. 2 Each 3   • tizanidine (ZANAFLEX) 4 MG Tab Take 1 Tab by mouth 3 times a day as needed. 60 Tab 3   • diphenhydrAMINE (BENADRYL) 25 MG Tab Take 25 mg by mouth every 6 hours as needed for Sleep.       No current facility-administered medications for this visit.        Patient Active Problem List    Diagnosis Date Noted   • Chronic migraine 07/15/2019   • Alcohol dependence in remission (HCC) 03/20/2018   • Balance disorder 03/20/2018   • CARSON (generalized anxiety disorder) 10/12/2017   • Surgical menopause 03/06/2014   • Tobacco dependence 03/06/2014       Family History   Problem Relation Age of Onset   • Cancer Neg Hx    • Diabetes Neg Hx    • Heart Disease Neg Hx    • Stroke Neg Hx    • Hyperlipidemia Neg Hx    • Hypertension Neg Hx           Objective:     Vitals:    07/15/19 0747   BP: 124/82   BP Location: Left arm   Patient Position: Sitting   BP Cuff Size: Adult   Pulse: (!) 106   Temp: 36.6 °C (97.9 °F)   TempSrc: Temporal   SpO2: 93%   Weight: 76.2 kg (168 lb 0.6 oz)   Height: 1.727 m (5' 8\")     Body mass index is 25.55 kg/m².     Physical Exam:  Gen: Well developed, well nourished in no acute distress.   Skin: Pink, warm, and dry  HEENT: conjunctiva non-injected, sclera non-icteric. EOMs intact.   Neck: Supple, trachea midline. No adenopathy or masses in the neck or supraclavicular regions.  Lungs: Effort is normal. Clear to auscultation bilaterally with good excursion.  CV: regular rate and rhythm.  Ext: no edema, color normal, vascularity " normal, temperature normal  Alert and oriented Eye contact is good, speech goal directed, affect calm    Assessment and Plan:   The following treatment plan was discussed:     1. Chronic migraine  -Uncontrolled. Appears patient didn't read her neurologist's mychart response to her. We were able to call neuro office and get her in next week. At this time, I feel it is best to allow neuro to treat her chronic migraines-- pt verbalizes understanding and agreement. She will back down to nortriptyline 50 mg as 75 mg did not help and only produced more side effects.  - She has not yet heard back from [facility] [place] yet-- she will follow-up on this.  - Discussed possibility of sleep study to see if it can have any impact on her headaches. She is not sleeping so unsure about snoring or apneic events.  -Any change or worsening of signs or symptoms, patient encouraged to follow-up or report to emergency room for further evaluation. Patient verbalizes understanding and agrees.    2. Need for vaccination  - Hepatitis B Vaccine Adult IM    3. Tobacco Dependence  - reports she continues to smoke but is down to 1 pack per week. Reports slight cough. No interest in quitting at this time as she is down from 1/2 ppd.    4. Surgical menopause  - Encouraged her to try weaning off of her estradiol to see if the hormones impact her headache severity at all. She will think about it.      - HM: Up-to-date      Followup: Return in about 2 months (around 9/15/2019) for MA visit for hep B #2.         This dictation was created using voice recognition software. The accuracy of the dictation is limited to the abilities of the software. I expect there may be some errors of grammar and possibly content.

## 2019-07-22 ENCOUNTER — OFFICE VISIT (OUTPATIENT)
Dept: NEUROLOGY | Facility: MEDICAL CENTER | Age: 49
End: 2019-07-22
Payer: MEDICAID

## 2019-07-22 VITALS
SYSTOLIC BLOOD PRESSURE: 130 MMHG | HEIGHT: 68 IN | WEIGHT: 165.5 LBS | BODY MASS INDEX: 25.08 KG/M2 | HEART RATE: 98 BPM | RESPIRATION RATE: 16 BRPM | OXYGEN SATURATION: 94 % | TEMPERATURE: 97.1 F | DIASTOLIC BLOOD PRESSURE: 80 MMHG

## 2019-07-22 DIAGNOSIS — M54.2 CERVICALGIA OF OCCIPITO-ATLANTO-AXIAL REGION: ICD-10-CM

## 2019-07-22 DIAGNOSIS — R26.81 GAIT INSTABILITY: ICD-10-CM

## 2019-07-22 DIAGNOSIS — G89.4 CHRONIC PAIN SYNDROME: ICD-10-CM

## 2019-07-22 PROCEDURE — 99211 OFF/OP EST MAY X REQ PHY/QHP: CPT

## 2019-07-22 RX ORDER — TENS UNITS AND TENS ELECTRODES
1 COMBINATION PACKAGE (EA) MISCELLANEOUS 2 TIMES DAILY PRN
Qty: 1 DEVICE | Refills: 0 | Status: SHIPPED | OUTPATIENT
Start: 2019-07-22 | End: 2020-02-11

## 2019-07-22 NOTE — PROGRESS NOTES
"Follow up for chronic pain disorder     HPi  50 yo woman with complicated medical history,homelessness and chronic pain syndrome including headaches and body pain presents for a follow up.She is mainly here because pain management at [facility] refused to se her in her words.  She states they \" do not want to see me and they would not prescribe any pain medications to treat my pain anyway's\".  She is here for a new referral for pain specialist.  Botox- got it recently with Bonnie cárdenas and states it did not work.    Could not see pain specialist of her choice because of her Insurance. She has an appointment with one pain specialist in October however she states her pain is 10/10.    The pain is \" all over\".    Review of Systems   Neurological: Positive for headaches.   Psychiatric/Behavioral: Positive for depression. The patient is nervous/anxious and has insomnia.      Past Medical History:   Diagnosis Date   • Alcohol abuse    • Head ache    • Surgical menopause    • TBI (traumatic brain injury) (HCC)    • Tobacco dependence      Social History     Social History   • Marital status: Single     Spouse name: N/A   • Number of children: N/A   • Years of education: N/A     Occupational History   • Not on file.     Social History Main Topics   • Smoking status: Current Every Day Smoker     Packs/day: 0.25     Years: 15.00     Types: Cigarettes   • Smokeless tobacco: Never Used   • Alcohol use No      Comment: quit in March 2018   • Drug use: No   • Sexual activity: Yes     Partners: Male     Birth control/ protection: Surgical     Other Topics Concern   •  Service No   • Blood Transfusions No   • Caffeine Concern No   • Occupational Exposure No   • Hobby Hazards No   • Sleep Concern Yes   • Stress Concern Yes   • Weight Concern No   • Special Diet No   • Back Care No   • Exercise No   • Bike Helmet Yes   • Seat Belt Yes   • Self-Exams Yes     Social History Narrative   • No narrative on file " "    Current Outpatient Prescriptions on File Prior to Visit   Medication Sig Dispense Refill   • estradiol (ESTRACE) 1 MG Tab Take 1 Tab by mouth every day. 30 Tab 0   • b complex vitamins capsule Take 1 Cap by mouth every day. 90 Cap 3   • nortriptyline (PAMELOR) 25 MG Cap Take 2 Caps by mouth every evening. (Patient not taking: Reported on 7/22/2019) 90 Cap 3   • sumatriptan (IMITREX) 20 MG/ACT nasal spray Spray 1 Spray in nose as needed for Migraine. (Patient not taking: Reported on 7/22/2019) 2 Each 3   • tizanidine (ZANAFLEX) 4 MG Tab Take 1 Tab by mouth 3 times a day as needed. (Patient not taking: Reported on 7/22/2019) 60 Tab 3   • diphenhydrAMINE (BENADRYL) 25 MG Tab Take 25 mg by mouth every 6 hours as needed for Sleep.       No current facility-administered medications on file prior to visit.      Encounter Vitals  Standard Vitals  Vitals  Blood Pressure: 130/80  Temperature: 36.2 °C (97.1 °F)  Temp src: Temporal  Pulse: 98  Respiration: 16  Pulse Oximetry: 94 %  Height: 172.7 cm (5' 8\")  Weight: 75.1 kg (165 lb 8 oz)  Encounter Vitals  Temperature: 36.2 °C (97.1 °F)  Temp src: Temporal  Blood Pressure: 130/80  Pulse: 98  Respiration: 16  Pulse Oximetry: 94 %  Weight: 75.1 kg (165 lb 8 oz)  Height: 172.7 cm (5' 8\")  BMI (Calculated): 25.16    Physical exam   Mental Status: AAOx4. Able to follow commands/cross midline. Speech fluent/nondysarthric. Language functions intact. No neglect/apraxia.  Cranial Nerves:  PERRL. EOMi. Face symmetric, palate/tongue midline. Visual fields full to confrontation. Facial sensation intact.   Motor:  Normal muscle tone and bulk. Strength is 5/5 throughout. No abnormal movements.  Reflexes:  2/4 throughout. Flexor plantar responses bilaterally. Absent Betancourt's reflex.  Coordination:Finger-nose/heel-shin without ataxia or dysmetria.   Sensation:  Normal to pinprick, soft touch, proprioception.   Gait/Station Normal based gait/stance. Able to toe walk, heel walk, and tandem " walk without assistance.    Imaging and labs   Reviewed    Impression and plan  Chronic pain syndrome   Refractory to multiple medications and Botox   Headache 10/10 all day   Imaging all stable and nonexplanatory   I suspect strong functional (non- organic) reasons   Referral to pain management given   She gets Botox with dao   There are no further recommendations on my part as I had exhausted all pharmaceutical options (she had tried numerous medications in the past and none had worked)    I discussed this with her and her friend     No follow up with me     Patient was seen for 10  minutes face to face of which > 50% of appointment time was spent on counseling and coordination of care regarding the above.

## 2019-07-24 ENCOUNTER — TELEPHONE (OUTPATIENT)
Dept: NEUROLOGY | Facility: MEDICAL CENTER | Age: 49
End: 2019-07-24

## 2019-07-24 NOTE — TELEPHONE ENCOUNTER
Spoke to patient informed the 2 pain management clinics  She was  referred  to are not taking her insurance, wants patient to keep appointment she currently has with pain management. I advised patient to keep calling there office for cancellations. Patient verbalized understanding.

## 2019-07-25 ASSESSMENT — ENCOUNTER SYMPTOMS
HEADACHES: 1
DEPRESSION: 1
INSOMNIA: 1
NERVOUS/ANXIOUS: 1

## 2019-07-25 NOTE — PATIENT INSTRUCTIONS
Chronic Pain, Adult  Chronic pain is a type of pain that lasts or keeps coming back (recurs) for at least six months. You may have chronic headaches, abdominal pain, or body pain. Chronic pain may be related to an illness, such as fibromyalgia or complex regional pain syndrome. Sometimes the cause of chronic pain is not known.  Chronic pain can make it hard for you to do daily activities. If not treated, chronic pain can lead to other health problems, including anxiety and depression. Treatment depends on the cause and severity of your pain. You may need to work with a pain specialist to come up with a treatment plan. The plan may include medicine, counseling, and physical therapy. Many people benefit from a combination of two or more types of treatment to control their pain.  Follow these instructions at home:  Lifestyle  · Consider keeping a pain diary to share with your health care providers.  · Consider talking with a mental health care provider (psychologist) about how to cope with chronic pain.  · Consider joining a chronic pain support group.  · Try to control or lower your stress levels. Talk to your health care provider about strategies to do this.  General instructions  · Take over-the-counter and prescription medicines only as told by your health care provider.  · Follow your treatment plan as told by your health care provider. This may include:  ¨ Gentle, regular exercise.  ¨ Eating a healthy diet that includes foods such as vegetables, fruits, fish, and lean meats.  ¨ Cognitive or behavioral therapy.  ¨ Working with a physical therapist.  ¨ Meditation or yoga.  ¨ Acupuncture or massage therapy.  ¨ Aroma, color, light, or sound therapy.  ¨ Local electrical stimulation.  ¨ Shots (injections) of numbing or pain-relieving medicines into the spine or the area of pain.  · Check your pain level as told by your health care provider. Ask your health care provider if you should use a pain scale.  · Learn as much  as you can about how to manage your chronic pain. Ask your health care provider if an intensive pain rehabilitation program or a chronic pain specialist would be helpful.  · Keep all follow-up visits as told by your health care provider. This is important.  Contact a health care provider if:  · Your pain gets worse.  · You have new pain.  · You have trouble sleeping.  · You have trouble doing your normal activities.  · Your pain is not controlled with treatment.  · Your have side effects from pain medicine.  · You feel weak.  Get help right away if:  · You lose feeling or have numbness in your body.  · You lose control of bowel or bladder function.  · Your pain suddenly gets much worse.  · You develop shaking or chills.  · You develop confusion.  · You develop chest pain.  · You have trouble breathing or shortness of breath.  · You pass out.  · You have thoughts about hurting yourself or others.  This information is not intended to replace advice given to you by your health care provider. Make sure you discuss any questions you have with your health care provider.  Document Released: 09/09/2003 Document Revised: 08/17/2017 Document Reviewed: 06/06/2017  [employer] Interactive Patient Education © 2017 [employer]

## 2019-08-27 ENCOUNTER — OFFICE VISIT (OUTPATIENT)
Dept: NEUROLOGY | Facility: MEDICAL CENTER | Age: 49
End: 2019-08-27
Payer: MEDICAID

## 2019-08-27 VITALS
OXYGEN SATURATION: 95 % | HEART RATE: 85 BPM | RESPIRATION RATE: 16 BRPM | SYSTOLIC BLOOD PRESSURE: 126 MMHG | TEMPERATURE: 98.4 F | DIASTOLIC BLOOD PRESSURE: 82 MMHG | WEIGHT: 165 LBS | BODY MASS INDEX: 24.44 KG/M2 | HEIGHT: 69 IN

## 2019-08-27 PROCEDURE — 64615 CHEMODENERV MUSC MIGRAINE: CPT | Performed by: NURSE PRACTITIONER

## 2019-08-27 RX ORDER — KETOROLAC TROMETHAMINE 30 MG/ML
60 INJECTION, SOLUTION INTRAMUSCULAR; INTRAVENOUS ONCE
Status: COMPLETED | OUTPATIENT
Start: 2019-08-27 | End: 2019-08-27

## 2019-08-27 RX ADMIN — KETOROLAC TROMETHAMINE 60 MG: 30 INJECTION, SOLUTION INTRAMUSCULAR; INTRAVENOUS at 08:25

## 2019-08-27 NOTE — PROGRESS NOTES
"Subjective:      Terri Krishnan is a 49 y.o. female who presents with Botox Injection (Chronic migraine)            HPI  Intercurrently seen per Dr Gray again to review migraine plan of care.  Will proceed with Botox again today.     She reports no recognition of improvement after the 1st set of Botox.    Needle phobic.    Current Outpatient Medications   Medication Sig Dispense Refill   • Misc. Devices Misc 1 Device by Does not apply route 2 times a day as needed. 1 Device 0   • Nerve Stimulator (CEFALY KIT) Device 1 Device by Does not apply route 2 times a day as needed. 1 Device 0   • estradiol (ESTRACE) 1 MG Tab Take 1 Tab by mouth every day. 30 Tab 0   • nortriptyline (PAMELOR) 25 MG Cap Take 2 Caps by mouth every evening. 90 Cap 3   • b complex vitamins capsule Take 1 Cap by mouth every day. 90 Cap 3   • sumatriptan (IMITREX) 20 MG/ACT nasal spray Spray 1 Spray in nose as needed for Migraine. 2 Each 3   • tizanidine (ZANAFLEX) 4 MG Tab Take 1 Tab by mouth 3 times a day as needed. 60 Tab 3   • diphenhydrAMINE (BENADRYL) 25 MG Tab Take 25 mg by mouth every 6 hours as needed for Sleep.       No current facility-administered medications for this visit.          ROS       Objective:     /82 (BP Location: Left arm, Patient Position: Sitting, BP Cuff Size: Adult)   Pulse 85   Temp 36.9 °C (98.4 °F) (Temporal)   Resp 16   Ht 1.757 m (5' 9.17\")   Wt 74.8 kg (165 lb)   SpO2 95%   BMI 24.24 kg/m²      Physical Exam            Assessment/Plan:       Chronic Migraine:  Notices minimal to no improvement in headache profile at this time.     I treated this patient in clinic today with BotoxA 155 units according to the dosing/injection paradigm currently mandated by the FDA for the management of chronic migraine. Specifically, I injected 5 units to the procerus, 5 units to the corrugators bilaterally, a total of 20 units to the frontalis musculature, 20 units to the temporalis bilaterally, 15 units " to the occipitalis bilaterally, 10 units to the cervical paraspinals bilaterally and 15 units to the trapezius musculature bilaterally. The remainder of the Botox 200 units mixed but not administered was discarded as wastage per FDA guidelines.     Education/counseling/reduction in medications if pt has medication overuse headache; Frequency of headaches is >15 days monthly with at least 8 migraines monthly; Migraines include at least two of the following: worsened with activity or avoidance of activity with migraines (ie they go lie down), moderate to severe pain intensity, pulsing headache, unilateral headache AND they have either nausea or vomiting OR sensitivity to light and sound.     Although this patient is responding to botox they are NOT migraine free, however, and it is my recommendation Botox be continued at this time.     This patient has chronic daily migraines, defined as having 15 or more headaches days per month, 8 of which are migraines, over a minimum of the last three months. Episodes last more than 4 hours (untreated).  Pt has 2 or more of following (see initial note) -  Headache worsened with activity, pain is moderate to severe intensity, pulsing in nature, unilateral and patient either has nausea/vomiting OR sensitivity to light and sound.  This patient does/does not also have medication overuse headache.  However our plan to address this includes education, occipital nerve shots, restricting use as directed.     Toradol 60mg IM provided per MA.     Return for follow-up in 3 months for 3rd set of Botox and evaluation of need for Botox.

## 2019-09-03 RX ORDER — ESTRADIOL 1 MG/1
1 TABLET ORAL
Qty: 90 TAB | Refills: 3 | Status: SHIPPED | OUTPATIENT
Start: 2019-09-03 | End: 2020-09-22 | Stop reason: SDUPTHER

## 2019-09-27 ENCOUNTER — HOSPITAL ENCOUNTER (OUTPATIENT)
Dept: RADIOLOGY | Facility: MEDICAL CENTER | Age: 49
End: 2019-09-27
Attending: PHYSICIAN ASSISTANT
Payer: MEDICAID

## 2019-09-27 DIAGNOSIS — S83.412A SPRAIN OF MEDIAL COLLATERAL LIGAMENT OF LEFT KNEE, INITIAL ENCOUNTER: ICD-10-CM

## 2019-09-27 DIAGNOSIS — M25.562 LEFT KNEE PAIN, UNSPECIFIED CHRONICITY: ICD-10-CM

## 2019-09-27 PROCEDURE — 73721 MRI JNT OF LWR EXTRE W/O DYE: CPT | Mod: LT

## 2019-10-15 ENCOUNTER — OFFICE VISIT (OUTPATIENT)
Dept: NEUROLOGY | Facility: MEDICAL CENTER | Age: 49
End: 2019-10-15
Payer: MEDICAID

## 2019-10-15 VITALS
DIASTOLIC BLOOD PRESSURE: 80 MMHG | HEIGHT: 69 IN | OXYGEN SATURATION: 91 % | WEIGHT: 171 LBS | BODY MASS INDEX: 25.33 KG/M2 | TEMPERATURE: 97.1 F | RESPIRATION RATE: 16 BRPM | SYSTOLIC BLOOD PRESSURE: 122 MMHG | HEART RATE: 88 BPM

## 2019-10-15 DIAGNOSIS — G89.4 CHRONIC PAIN SYNDROME: ICD-10-CM

## 2019-10-15 PROCEDURE — 99214 OFFICE O/P EST MOD 30 MIN: CPT

## 2019-10-15 RX ORDER — PREGABALIN 50 MG/1
50 CAPSULE ORAL 3 TIMES DAILY
Qty: 90 CAP | Refills: 3 | Status: SHIPPED | OUTPATIENT
Start: 2019-10-15 | End: 2020-02-12

## 2019-10-15 RX ORDER — METHOCARBAMOL 500 MG/1
500 TABLET, FILM COATED ORAL 3 TIMES DAILY PRN
Qty: 90 TAB | Refills: 2 | Status: SHIPPED | OUTPATIENT
Start: 2019-10-15 | End: 2020-01-13 | Stop reason: SDUPTHER

## 2019-10-15 RX ORDER — NORTRIPTYLINE HYDROCHLORIDE 50 MG/1
100 CAPSULE ORAL DAILY
Qty: 90 CAP | Refills: 3 | Status: SHIPPED | OUTPATIENT
Start: 2019-10-15 | End: 2020-04-10 | Stop reason: SDUPTHER

## 2019-10-15 NOTE — PROGRESS NOTES
"Follow up appointment    HPI  50 yo female with chronic pain syndrome, headaches and fibromyalgia presents for a follow up in light of not being able to get an appointment with pain management and having to wait to schedule a visit.  Her headaches are cantu lasting all day with 9-10 holocephalic throbbing pain.  She has pain \" all over\"  Nothing helps- she got 3 Botox injections now and no relief was provided.  Most recent Botox was 1 week ago.    Review of Systems   Constitutional: Positive for malaise/fatigue.   HENT: Negative.    Eyes: Negative.    Respiratory: Negative.    Cardiovascular: Negative.    Gastrointestinal: Negative.    Genitourinary: Negative.    Musculoskeletal: Positive for myalgias.   Skin: Negative.    Neurological: Positive for dizziness and headaches.   Endo/Heme/Allergies: Negative.    Psychiatric/Behavioral: Positive for depression and substance abuse. The patient is nervous/anxious and has insomnia.      Past Medical History:   Diagnosis Date   • Alcohol abuse    • Head ache    • Surgical menopause    • TBI (traumatic brain injury) (HCC)    • Tobacco dependence      Past Surgical History:   Procedure Laterality Date   • ABDOMINAL HYSTERECTOMY TOTAL  age 27    Total     Current Outpatient Medications on File Prior to Visit   Medication Sig Dispense Refill   • estradiol (ESTRACE) 1 MG Tab Take 1 Tab by mouth every day. 90 Tab 3   • b complex vitamins capsule Take 1 Cap by mouth every day. 90 Cap 3   • sumatriptan (IMITREX) 20 MG/ACT nasal spray Spray 1 Spray in nose as needed for Migraine. 2 Each 3   • diphenhydrAMINE (BENADRYL) 25 MG Tab Take 25 mg by mouth every 6 hours as needed for Sleep.     • Misc. Devices Misc 1 Device by Does not apply route 2 times a day as needed. 1 Device 0   • Nerve Stimulator (CEFALY KIT) Device 1 Device by Does not apply route 2 times a day as needed. (Patient not taking: Reported on 10/15/2019) 1 Device 0     No current facility-administered medications on file " "prior to visit.      Encounter Vitals  Standard Vitals  Vitals  Blood Pressure: 122/80  Temperature: 36.2 °C (97.1 °F)  Temp src: Temporal  Pulse: 88  Respiration: 16  Pulse Oximetry: 91 %  Height: 175.3 cm (5' 9\")  Weight: 77.6 kg (171 lb)  Encounter Vitals  Temperature: 36.2 °C (97.1 °F)  Temp src: Temporal  Blood Pressure: 122/80  Pulse: 88  Respiration: 16  Pulse Oximetry: 91 %  Weight: 77.6 kg (171 lb)  Height: 175.3 cm (5' 9\")  BMI (Calculated): 25.25  Physical exam  Constitutional: Well-developed, well-nourished, good hygiene. Appears stated age.  Cardiovascular: RRR, with no murmurs, rubs or gallops. No carotid bruits.   Respiratory: Lungs CTA B/L, no W/R/R.   Abdomen: Soft Non-tender to Palpation. Non-distended.  Extremities: No peripheral edema, pedal pulses intact.   Skin: Warm, dry, intact. No rashes observed.  Eyes: Sclera anicteric   Funduscopic: Optic discs flat with no evidence of papilledema or pallor.   Neurologic:   Mental Status: Awake, alert, oriented x 3.   Speech: Fluent with normal prosody.   Memory: Able to recall recent and remote events accurately.    Concentration: Attentive. Able to focus on history and follow multi-step commands.              Fund of Knowledge: Appropriate   Cranial Nerves:    CN II: PERRL. No afferent pupillary defect.    CN III, IV, VI: Good eye closure, EOMI.     CN V: Facial sensation intact and symmetric.     CN VII: No facial asymmetry.     CN VIII: Hearing intact.     CN IX and X: Palate elevates symmetrically. Normal gag reflex.    CN XI: Symmetric shoulder shrug.     CN XII: Tongue midline.    Sensory: Intact light touch, vibration and temperature.    Coordination: No evidence of past-pointing on finger to nose testing, no dysdiadochokinesia. Heel to shin intact.             DTR's: 2+ throughout without clonus.    Babinski: Toes downgoing bilaterally.   Romberg: Negative.   Movements: No tremors observed.   Musculoskeletal:    Strength: 5/5 in upper and lower " extremities bilaterally.   Gait: Steady, narrow based.    Tone: Normal bulk and tone.   Joints: No swelling.     Imaging and labs   Reviewed    Impression and plan  Chronic pain syndrome   Refractory to multiple medications and Botox   Headache 10/10 all day   Imaging all stable and nonexplanatory   Referral to pain management given   She gets Botox with dao x3 no clear benefit   > 20 days of headaches per month   Plan  Lyrica 50 mg tid  Try Aimovig injections as preventative  Pamelor increase 100 mg daily  Imitrex nasal spray   Robaxin prn for neck pain   Suspect strong psychiatric cases - therefore recommend psyc consult and also pain management ASAP for chronic pain syndrome    RTC  With me only if needed and in 6 months to 1 year

## 2019-10-17 ASSESSMENT — ENCOUNTER SYMPTOMS
CARDIOVASCULAR NEGATIVE: 1
MYALGIAS: 1
GASTROINTESTINAL NEGATIVE: 1
HEADACHES: 1
DEPRESSION: 1
RESPIRATORY NEGATIVE: 1
INSOMNIA: 1
EYES NEGATIVE: 1
DIZZINESS: 1
NERVOUS/ANXIOUS: 1

## 2019-10-17 ASSESSMENT — LIFESTYLE VARIABLES: SUBSTANCE_ABUSE: 1

## 2019-10-17 NOTE — PATIENT INSTRUCTIONS
Chronic Pain, Adult  Chronic pain is a type of pain that lasts or keeps coming back (recurs) for at least six months. You may have chronic headaches, abdominal pain, or body pain. Chronic pain may be related to an illness, such as fibromyalgia or complex regional pain syndrome. Sometimes the cause of chronic pain is not known.  Chronic pain can make it hard for you to do daily activities. If not treated, chronic pain can lead to other health problems, including anxiety and depression. Treatment depends on the cause and severity of your pain. You may need to work with a pain specialist to come up with a treatment plan. The plan may include medicine, counseling, and physical therapy. Many people benefit from a combination of two or more types of treatment to control their pain.  Follow these instructions at home:  Lifestyle  · Consider keeping a pain diary to share with your health care providers.  · Consider talking with a mental health care provider (psychologist) about how to cope with chronic pain.  · Consider joining a chronic pain support group.  · Try to control or lower your stress levels. Talk to your health care provider about strategies to do this.  General instructions  · Take over-the-counter and prescription medicines only as told by your health care provider.  · Follow your treatment plan as told by your health care provider. This may include:  ¨ Gentle, regular exercise.  ¨ Eating a healthy diet that includes foods such as vegetables, fruits, fish, and lean meats.  ¨ Cognitive or behavioral therapy.  ¨ Working with a physical therapist.  ¨ Meditation or yoga.  ¨ Acupuncture or massage therapy.  ¨ Aroma, color, light, or sound therapy.  ¨ Local electrical stimulation.  ¨ Shots (injections) of numbing or pain-relieving medicines into the spine or the area of pain.  · Check your pain level as told by your health care provider. Ask your health care provider if you should use a pain scale.  · Learn as much  as you can about how to manage your chronic pain. Ask your health care provider if an intensive pain rehabilitation program or a chronic pain specialist would be helpful.  · Keep all follow-up visits as told by your health care provider. This is important.  Contact a health care provider if:  · Your pain gets worse.  · You have new pain.  · You have trouble sleeping.  · You have trouble doing your normal activities.  · Your pain is not controlled with treatment.  · Your have side effects from pain medicine.  · You feel weak.  Get help right away if:  · You lose feeling or have numbness in your body.  · You lose control of bowel or bladder function.  · Your pain suddenly gets much worse.  · You develop shaking or chills.  · You develop confusion.  · You develop chest pain.  · You have trouble breathing or shortness of breath.  · You pass out.  · You have thoughts about hurting yourself or others.  This information is not intended to replace advice given to you by your health care provider. Make sure you discuss any questions you have with your health care provider.  Document Released: 09/09/2003 Document Revised: 08/17/2017 Document Reviewed: 06/06/2017  [employer] Interactive Patient Education © 2017 [employer] Inc[employer]

## 2019-10-29 ENCOUNTER — TELEPHONE (OUTPATIENT)
Dept: NEUROLOGY | Facility: MEDICAL CENTER | Age: 49
End: 2019-10-29

## 2019-11-04 ENCOUNTER — TELEPHONE (OUTPATIENT)
Dept: NEUROLOGY | Facility: MEDICAL CENTER | Age: 49
End: 2019-11-04

## 2019-11-19 ENCOUNTER — OFFICE VISIT (OUTPATIENT)
Dept: NEUROLOGY | Facility: MEDICAL CENTER | Age: 49
End: 2019-11-19
Payer: MEDICAID

## 2019-11-19 ENCOUNTER — TELEPHONE (OUTPATIENT)
Dept: NEUROLOGY | Facility: MEDICAL CENTER | Age: 49
End: 2019-11-19

## 2019-11-19 VITALS
BODY MASS INDEX: 24.73 KG/M2 | WEIGHT: 167 LBS | HEART RATE: 100 BPM | OXYGEN SATURATION: 98 % | HEIGHT: 69 IN | TEMPERATURE: 96.8 F | SYSTOLIC BLOOD PRESSURE: 124 MMHG | RESPIRATION RATE: 16 BRPM | DIASTOLIC BLOOD PRESSURE: 76 MMHG

## 2019-11-19 PROCEDURE — 64615 CHEMODENERV MUSC MIGRAINE: CPT | Performed by: NURSE PRACTITIONER

## 2019-11-19 RX ORDER — KETOROLAC TROMETHAMINE 30 MG/ML
60 INJECTION, SOLUTION INTRAMUSCULAR; INTRAVENOUS ONCE
Status: COMPLETED | OUTPATIENT
Start: 2019-11-19 | End: 2019-11-19

## 2019-11-19 RX ADMIN — KETOROLAC TROMETHAMINE 60 MG: 30 INJECTION, SOLUTION INTRAMUSCULAR; INTRAVENOUS at 08:47

## 2019-11-19 NOTE — PROGRESS NOTES
"Subjective:      Terri Krishnan is a 49 y.o. female who presents with Botox Injection (Chronic migraine)            HPI Intercurrently seen per Dr Gray again to review migraine plan of care.  Will proceed with Botox again today.      She reports no recognition of improvement after the last set of Botox.     Needle phobic.    Described a continuous frontal band-like pain that waxes and wanes.    Current Outpatient Medications   Medication Sig Dispense Refill   • pregabalin (LYRICA) 50 MG capsule Take 1 Cap by mouth 3 times a day for 120 days. 90 Cap 3   • Erenumab (AIMOVIG) 70 MG/ML Solution Auto-injector Inject 1 mL as instructed Q30 DAYS. 1 PEN 3   • methocarbamol (ROBAXIN) 500 MG Tab Take 1 Tab by mouth 3 times a day as needed (pain). 90 Tab 2   • nortriptyline (PAMELOR) 50 MG capsule Take 2 Caps by mouth every day. 90 Cap 3   • estradiol (ESTRACE) 1 MG Tab Take 1 Tab by mouth every day. 90 Tab 3   • Misc. Devices Misc 1 Device by Does not apply route 2 times a day as needed. 1 Device 0   • Nerve Stimulator (CEFALY KIT) Device 1 Device by Does not apply route 2 times a day as needed. 1 Device 0   • b complex vitamins capsule Take 1 Cap by mouth every day. 90 Cap 3   • sumatriptan (IMITREX) 20 MG/ACT nasal spray Spray 1 Spray in nose as needed for Migraine. 2 Each 3   • diphenhydrAMINE (BENADRYL) 25 MG Tab Take 25 mg by mouth every 6 hours as needed for Sleep.       Current Facility-Administered Medications   Medication Dose Route Frequency Provider Last Rate Last Dose   • onabotulinum toxin type A SOLR 155 Units  155 Units Injection Once Janette KAY Jackson, A.P.N.       • ketorolac (TORADOL) injection 60 mg  60 mg Intramuscular Once Janette KAY Jackson, A.P.N.           ROS       Objective:     /76 (BP Location: Right arm, Patient Position: Sitting, BP Cuff Size: Adult)   Pulse 100   Temp 36 °C (96.8 °F) (Temporal)   Resp 16   Ht 1.753 m (5' 9.02\")   Wt 75.8 kg (167 lb)   SpO2 98%   BMI " 24.65 kg/m²      Physical Exam            Assessment/Plan:       Chronic Migraine:  Notices minimal to no improvement in headache profile at this time.     I treated this patient in clinic today with BotoxA 155 units according to the dosing/injection paradigm currently mandated by the FDA for the management of chronic migraine. Specifically, I injected 5 units to the procerus, 5 units to the corrugators bilaterally, a total of 20 units to the frontalis musculature, 20 units to the temporalis bilaterally, 15 units to the occipitalis bilaterally, 10 units to the cervical paraspinals bilaterally and 15 units to the trapezius musculature bilaterally. The remainder of the Botox 200 units mixed but not administered was discarded as wastage per FDA guidelines.     Education/counseling/reduction in medications if pt has medication overuse headache; Frequency of headaches is >15 days monthly with at least 8 migraines monthly; Migraines include at least two of the following: worsened with activity or avoidance of activity with migraines (ie they go lie down), moderate to severe pain intensity, pulsing headache, unilateral headache AND they have either nausea or vomiting OR sensitivity to light and sound.     Although this patient is responding to botox they are NOT migraine free, however, and it is my recommendation Botox be continued at this time.     This patient has chronic daily migraines, defined as having 15 or more headaches days per month, 8 of which are migraines, over a minimum of the last three months. Episodes last more than 4 hours (untreated).  Pt has 2 or more of following (see initial note) -  Headache worsened with activity, pain is moderate to severe intensity, pulsing in nature, unilateral and patient either has nausea/vomiting OR sensitivity to light and sound.  This patient does/does not also have medication overuse headache.  However our plan to address this includes education, occipital nerve shots,  restricting use as directed.     Toradol 60mg IM provided per MA.     Return for follow-up in 3 months for 4th set of Botox and evaluation of need for Botox.  Will continue with Dr Osborne for management.
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
## 2019-11-19 NOTE — TELEPHONE ENCOUNTER
She was referred to pain management as we had unfortunately explored all options available.  I have no additional ways of helping her unfortunately.  No follow up with me.  I had discussed this with both patient and clinic managers.  Thanks

## 2020-01-06 ENCOUNTER — APPOINTMENT (OUTPATIENT)
Dept: NEUROLOGY | Facility: MEDICAL CENTER | Age: 50
End: 2020-01-06
Payer: COMMERCIAL

## 2020-01-13 ENCOUNTER — PATIENT MESSAGE (OUTPATIENT)
Dept: NEUROLOGY | Facility: MEDICAL CENTER | Age: 50
End: 2020-01-13

## 2020-01-13 RX ORDER — METHOCARBAMOL 500 MG/1
500 TABLET, FILM COATED ORAL 3 TIMES DAILY PRN
Qty: 90 TAB | Refills: 2 | Status: SHIPPED | OUTPATIENT
Start: 2020-01-13 | End: 2020-04-10 | Stop reason: SDUPTHER

## 2020-01-13 NOTE — TELEPHONE ENCOUNTER
From: Terri Krishnan  To: Adali Osborne M.D.  Sent: 1/13/2020 1:29 PM PST  Subject: Prescription Question    Dr. ROSALES,   Can I get Methocarbanol RX refilled please? Pharmacy is still [facility] on [facility].    Thank you-  Terri

## 2020-01-13 NOTE — TELEPHONE ENCOUNTER
"MyChart from patient:    \"Dr. ROSALES,   Can I get Methocarbanol RX refilled please? Pharmacy is still [facility].     Thank you-   Terri \"    Was the patient seen in the last year in this department? Yes    Does patient have an active prescription for medications requested? No     Received Request Via: Pharmacy    "

## 2020-02-11 ENCOUNTER — OFFICE VISIT (OUTPATIENT)
Dept: NEUROLOGY | Facility: MEDICAL CENTER | Age: 50
End: 2020-02-11
Payer: MEDICAID

## 2020-02-11 VITALS
SYSTOLIC BLOOD PRESSURE: 120 MMHG | HEART RATE: 103 BPM | TEMPERATURE: 96.7 F | RESPIRATION RATE: 16 BRPM | BODY MASS INDEX: 26.87 KG/M2 | HEIGHT: 69 IN | WEIGHT: 181.4 LBS | DIASTOLIC BLOOD PRESSURE: 74 MMHG | OXYGEN SATURATION: 96 %

## 2020-02-11 PROCEDURE — 64615 CHEMODENERV MUSC MIGRAINE: CPT | Performed by: NURSE PRACTITIONER

## 2020-02-11 RX ORDER — KETOROLAC TROMETHAMINE 30 MG/ML
60 INJECTION, SOLUTION INTRAMUSCULAR; INTRAVENOUS ONCE
Status: COMPLETED | OUTPATIENT
Start: 2020-02-11 | End: 2020-02-11

## 2020-02-11 RX ADMIN — KETOROLAC TROMETHAMINE 60 MG: 30 INJECTION, SOLUTION INTRAMUSCULAR; INTRAVENOUS at 08:34

## 2020-02-11 NOTE — PROGRESS NOTES
Subjective:      Terri Krishnan is a 49 y.o. female who presents with Botox Injection (chronic migraine)            HPI  Intercurrently seen per Dr Gray again to review migraine plan of care.  Will proceed with Botox again today.       Needle phobic.     Described a continuous frontal band-like pain that waxes and wanes.     Current Outpatient Medications   Medication Sig Dispense Refill   • methocarbamol (ROBAXIN) 500 MG Tab Take 1 Tab by mouth 3 times a day as needed (pain). 90 Tab 2   • pregabalin (LYRICA) 50 MG capsule Take 1 Cap by mouth 3 times a day for 120 days. 90 Cap 3   • Erenumab (AIMOVIG) 70 MG/ML Solution Auto-injector Inject 1 mL as instructed Q30 DAYS. 1 PEN 3   • nortriptyline (PAMELOR) 50 MG capsule Take 2 Caps by mouth every day. 90 Cap 3   • estradiol (ESTRACE) 1 MG Tab Take 1 Tab by mouth every day. 90 Tab 3   • b complex vitamins capsule Take 1 Cap by mouth every day. 90 Cap 3   • diphenhydrAMINE (BENADRYL) 25 MG Tab Take 25 mg by mouth every 6 hours as needed for Sleep.     • Misc. Devices Misc 1 Device by Does not apply route 2 times a day as needed. (Patient not taking: Reported on 2/11/2020) 1 Device 0   • Nerve Stimulator (CEFALY KIT) Device 1 Device by Does not apply route 2 times a day as needed. (Patient not taking: Reported on 2/11/2020) 1 Device 0   • sumatriptan (IMITREX) 20 MG/ACT nasal spray Spray 1 Spray in nose as needed for Migraine. (Patient not taking: Reported on 2/11/2020) 2 Each 3     Current Facility-Administered Medications   Medication Dose Route Frequency Provider Last Rate Last Dose   • onabotulinum toxin type A SOLR 155 Units  155 Units Injection Once Janette KAY Jackson, A.P.N.       • ketorolac (TORADOL) injection 60 mg  60 mg Intramuscular Once Janette E Jackson, A.P.N.           ROS       Objective:     /74 (BP Location: Right arm, Patient Position: Sitting, BP Cuff Size: Adult)   Pulse (!) 103   Temp 35.9 °C (96.7 °F) (Temporal)   Resp 16    "Ht 1.753 m (5' 9\")   Wt 82.3 kg (181 lb 6.4 oz)   SpO2 96%   BMI 26.79 kg/m²      Physical Exam            Assessment/Plan:     Chronic Migraine:  Botox therapy has reduced patient’s migraines by more than 7 days and/or 100 hours per month.  Additionally pt has noted a reduction in the amount of medication they are taking to treat their migraines and/or they are having a reduction in intractable migraine/status migrainosis which can result in urgent care or ER visits.     Documentation of patient's symptoms with migraine are included in the initial note with this patient including the following:  Education/counseling/reduction in medications if pt has medication overuse headache;  Frequency of headaches is >15 days monthly with at least 8 migraines monthly;  Migraines include at least two of the following: worsened with activity or avoidance of activity with migraines (ie they go lie down), moderate to severe pain intensity, pulsing headache, unilateral headache AND they have either nausea or vomiting OR sensitivity to light and sound     Although this patient is responding to botox they are NOT migraine free, however, and it is my recommendation botox be continued at this time     I treated this patient in clinic today with BotoxA 155 units according to the dosing/injection paradigm currently mandated by the FDA for the management of chronic migraine. Specifically, I injected 5 units to the procerus, 5 units to the corrugators bilaterally, a total of 20 units to the frontalis musculature, 20 units to the temporalis bilaterally, 15 units to the occipitalis bilaterally, 10 units to the cervical paraspinals bilaterally and 15 units to the trapezius musculature bilaterally. The remainder of the Botox 200 units mixed but not administered was discarded as wastage per FDA guidelines.     Toradol 60mg IM provided per MA.    Return for follow-up in 12 weeks for serial set of Botox.     "

## 2020-02-12 ENCOUNTER — HOSPITAL ENCOUNTER (OUTPATIENT)
Dept: LAB | Facility: MEDICAL CENTER | Age: 50
End: 2020-02-12
Attending: NURSE PRACTITIONER
Payer: MEDICAID

## 2020-02-12 ENCOUNTER — OFFICE VISIT (OUTPATIENT)
Dept: NEUROLOGY | Facility: MEDICAL CENTER | Age: 50
End: 2020-02-12
Payer: MEDICAID

## 2020-02-12 ENCOUNTER — TELEPHONE (OUTPATIENT)
Dept: NEUROLOGY | Facility: MEDICAL CENTER | Age: 50
End: 2020-02-12

## 2020-02-12 VITALS
WEIGHT: 181 LBS | DIASTOLIC BLOOD PRESSURE: 72 MMHG | OXYGEN SATURATION: 96 % | SYSTOLIC BLOOD PRESSURE: 116 MMHG | BODY MASS INDEX: 26.81 KG/M2 | HEIGHT: 69 IN | HEART RATE: 110 BPM | TEMPERATURE: 96.2 F | RESPIRATION RATE: 16 BRPM

## 2020-02-12 DIAGNOSIS — G89.4 CHRONIC PAIN SYNDROME: ICD-10-CM

## 2020-02-12 DIAGNOSIS — F41.1 GAD (GENERALIZED ANXIETY DISORDER): ICD-10-CM

## 2020-02-12 LAB
ALBUMIN SERPL BCP-MCNC: 4.1 G/DL (ref 3.2–4.9)
ALBUMIN/GLOB SERPL: 1.4 G/DL
ALP SERPL-CCNC: 84 U/L (ref 30–99)
ALT SERPL-CCNC: 18 U/L (ref 2–50)
ANION GAP SERPL CALC-SCNC: 8 MMOL/L (ref 0–11.9)
AST SERPL-CCNC: 21 U/L (ref 12–45)
BASOPHILS # BLD AUTO: 0.7 % (ref 0–1.8)
BASOPHILS # BLD: 0.05 K/UL (ref 0–0.12)
BILIRUB SERPL-MCNC: 0.4 MG/DL (ref 0.1–1.5)
BUN SERPL-MCNC: 13 MG/DL (ref 8–22)
CALCIUM SERPL-MCNC: 8.8 MG/DL (ref 8.5–10.5)
CHLORIDE SERPL-SCNC: 104 MMOL/L (ref 96–112)
CO2 SERPL-SCNC: 26 MMOL/L (ref 20–33)
CREAT SERPL-MCNC: 0.97 MG/DL (ref 0.5–1.4)
CRP SERPL HS-MCNC: 4.9 MG/L (ref 0–7.5)
EOSINOPHIL # BLD AUTO: 0.12 K/UL (ref 0–0.51)
EOSINOPHIL NFR BLD: 1.6 % (ref 0–6.9)
ERYTHROCYTE [DISTWIDTH] IN BLOOD BY AUTOMATED COUNT: 46.5 FL (ref 35.9–50)
ERYTHROCYTE [SEDIMENTATION RATE] IN BLOOD BY WESTERGREN METHOD: 16 MM/HOUR (ref 0–20)
EST. AVERAGE GLUCOSE BLD GHB EST-MCNC: 114 MG/DL
GLOBULIN SER CALC-MCNC: 2.9 G/DL (ref 1.9–3.5)
GLUCOSE SERPL-MCNC: 90 MG/DL (ref 65–99)
HBA1C MFR BLD: 5.6 % (ref 0–5.6)
HCT VFR BLD AUTO: 43.2 % (ref 37–47)
HGB BLD-MCNC: 14.4 G/DL (ref 12–16)
IMM GRANULOCYTES # BLD AUTO: 0.02 K/UL (ref 0–0.11)
IMM GRANULOCYTES NFR BLD AUTO: 0.3 % (ref 0–0.9)
LYMPHOCYTES # BLD AUTO: 2.66 K/UL (ref 1–4.8)
LYMPHOCYTES NFR BLD: 36.1 % (ref 22–41)
MCH RBC QN AUTO: 30.8 PG (ref 27–33)
MCHC RBC AUTO-ENTMCNC: 33.3 G/DL (ref 33.6–35)
MCV RBC AUTO: 92.5 FL (ref 81.4–97.8)
MONOCYTES # BLD AUTO: 0.43 K/UL (ref 0–0.85)
MONOCYTES NFR BLD AUTO: 5.8 % (ref 0–13.4)
NEUTROPHILS # BLD AUTO: 4.08 K/UL (ref 2–7.15)
NEUTROPHILS NFR BLD: 55.5 % (ref 44–72)
NRBC # BLD AUTO: 0 K/UL
NRBC BLD-RTO: 0 /100 WBC
PLATELET # BLD AUTO: 338 K/UL (ref 164–446)
PMV BLD AUTO: 9.3 FL (ref 9–12.9)
POTASSIUM SERPL-SCNC: 3.8 MMOL/L (ref 3.6–5.5)
PROT SERPL-MCNC: 7 G/DL (ref 6–8.2)
RBC # BLD AUTO: 4.67 M/UL (ref 4.2–5.4)
SODIUM SERPL-SCNC: 138 MMOL/L (ref 135–145)
WBC # BLD AUTO: 7.4 K/UL (ref 4.8–10.8)

## 2020-02-12 PROCEDURE — 84443 ASSAY THYROID STIM HORMONE: CPT

## 2020-02-12 PROCEDURE — 83036 HEMOGLOBIN GLYCOSYLATED A1C: CPT

## 2020-02-12 PROCEDURE — 85652 RBC SED RATE AUTOMATED: CPT

## 2020-02-12 PROCEDURE — 82306 VITAMIN D 25 HYDROXY: CPT

## 2020-02-12 PROCEDURE — 80053 COMPREHEN METABOLIC PANEL: CPT

## 2020-02-12 PROCEDURE — 86141 C-REACTIVE PROTEIN HS: CPT

## 2020-02-12 PROCEDURE — 82607 VITAMIN B-12: CPT

## 2020-02-12 PROCEDURE — 84439 ASSAY OF FREE THYROXINE: CPT

## 2020-02-12 PROCEDURE — 99214 OFFICE O/P EST MOD 30 MIN: CPT | Mod: 24 | Performed by: NURSE PRACTITIONER

## 2020-02-12 PROCEDURE — 36415 COLL VENOUS BLD VENIPUNCTURE: CPT

## 2020-02-12 PROCEDURE — 85025 COMPLETE CBC W/AUTO DIFF WBC: CPT

## 2020-02-12 RX ORDER — PHENOBARBITAL 15 MG/1
TABLET ORAL
Qty: 60 TAB | Refills: 2 | Status: SHIPPED | OUTPATIENT
Start: 2020-02-12 | End: 2020-05-12

## 2020-02-12 ASSESSMENT — ENCOUNTER SYMPTOMS
SORE THROAT: 1
DIARRHEA: 0
NERVOUS/ANXIOUS: 0
HEADACHES: 1
NAUSEA: 0
DOUBLE VISION: 0
COUGH: 0
SEIZURES: 0
ABDOMINAL PAIN: 0
DEPRESSION: 0
VOMITING: 0
CONSTITUTIONAL NEGATIVE: 1
MUSCULOSKELETAL NEGATIVE: 1

## 2020-02-12 NOTE — TELEPHONE ENCOUNTER
Faxed RX script to [facility] ([phone]) fax # . Medication that sent was Phenobarbital 15 MG
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
## 2020-02-13 LAB
25(OH)D3 SERPL-MCNC: 13 NG/ML (ref 30–100)
T4 FREE SERPL-MCNC: 0.83 NG/DL (ref 0.53–1.43)
TSH SERPL DL<=0.005 MIU/L-ACNC: 1.28 UIU/ML (ref 0.38–5.33)
VIT B12 SERPL-MCNC: 294 PG/ML (ref 211–911)

## 2020-02-14 ENCOUNTER — TELEPHONE (OUTPATIENT)
Dept: NEUROLOGY | Facility: MEDICAL CENTER | Age: 50
End: 2020-02-14

## 2020-02-14 NOTE — TELEPHONE ENCOUNTER
I have reviewed her recent labs.  Of note, the Vitamin D and B12 levels are low.    Recommend starting Vitamin D3 5000IU per day.  Recommend starting Vitamin B12 1000mcg each morning.

## 2020-02-19 ENCOUNTER — PATIENT MESSAGE (OUTPATIENT)
Dept: NEUROLOGY | Facility: MEDICAL CENTER | Age: 50
End: 2020-02-19

## 2020-03-13 DIAGNOSIS — S06.9X9S TRAUMATIC BRAIN INJURY WITH LOSS OF CONSCIOUSNESS, SEQUELA (HCC): ICD-10-CM

## 2020-03-13 DIAGNOSIS — G43.709 CHRONIC MIGRAINE W/O AURA W/O STATUS MIGRAINOSUS, NOT INTRACTABLE: ICD-10-CM

## 2020-03-13 DIAGNOSIS — G89.4 CHRONIC PAIN SYNDROME: ICD-10-CM

## 2020-04-02 ENCOUNTER — OFFICE VISIT (OUTPATIENT)
Dept: NEUROLOGY | Facility: MEDICAL CENTER | Age: 50
End: 2020-04-02
Payer: COMMERCIAL

## 2020-04-02 VITALS
BODY MASS INDEX: 26.81 KG/M2 | WEIGHT: 181 LBS | DIASTOLIC BLOOD PRESSURE: 72 MMHG | HEIGHT: 69 IN | SYSTOLIC BLOOD PRESSURE: 116 MMHG

## 2020-04-02 ASSESSMENT — PATIENT HEALTH QUESTIONNAIRE - PHQ9: CLINICAL INTERPRETATION OF PHQ2 SCORE: 0

## 2020-04-02 ASSESSMENT — FIBROSIS 4 INDEX: FIB4 SCORE: 0.72

## 2020-04-02 NOTE — PROGRESS NOTES
Subjective:      Terri Krishnan is a 50 y.o. female who presents with Follow-Up (Chronic migraine)            HPI     Appointment did not occur.

## 2020-04-08 ENCOUNTER — PATIENT MESSAGE (OUTPATIENT)
Dept: NEUROLOGY | Facility: MEDICAL CENTER | Age: 50
End: 2020-04-08

## 2020-04-10 ENCOUNTER — PATIENT MESSAGE (OUTPATIENT)
Dept: MEDICAL GROUP | Age: 50
End: 2020-04-10

## 2020-04-10 RX ORDER — NORTRIPTYLINE HYDROCHLORIDE 50 MG/1
100 CAPSULE ORAL DAILY
Qty: 90 CAP | Refills: 0 | Status: SHIPPED | OUTPATIENT
Start: 2020-04-10 | End: 2020-04-13 | Stop reason: SDUPTHER

## 2020-04-10 RX ORDER — METHOCARBAMOL 500 MG/1
500 TABLET, FILM COATED ORAL 3 TIMES DAILY PRN
Qty: 90 TAB | Refills: 0 | Status: SHIPPED | OUTPATIENT
Start: 2020-04-10 | End: 2020-11-23 | Stop reason: SDUPTHER

## 2020-04-10 NOTE — PATIENT COMMUNICATION
Spoke to patient advised Dr ROSALES is requesting she ask her pcp to refill medications.Dr ROSALES recommends  psyc consult and also pain management ASAP for chronic pain syndrome.

## 2020-04-10 NOTE — TELEPHONE ENCOUNTER
"From: Terri Krishnan  To: Earlene Rebolledo P.A.-C.  Sent: 4/10/2020 2:47 PM PDT  Subject: Prescription Question    John Henao-    I was hoping that you can o.k. re-fill of two prescriptions for me: Nortriptyline 50 MG 2 Caps everyday, and Methocarbamol 500 MG 1 tab as needed for pain. Same pharmacy that is in my chart.   I spoke with Erma, Dr. Osborne's assistant, whom I have been seeing in Neuro, and, she stated that  \"D\" is no longer \"following my case\", and that maybe you could help?     Thanks for your time-  Terri"

## 2020-04-13 ENCOUNTER — TELEMEDICINE (OUTPATIENT)
Dept: NEUROLOGY | Facility: MEDICAL CENTER | Age: 50
End: 2020-04-13
Payer: MEDICAID

## 2020-04-13 VITALS
SYSTOLIC BLOOD PRESSURE: 116 MMHG | WEIGHT: 181 LBS | DIASTOLIC BLOOD PRESSURE: 72 MMHG | BODY MASS INDEX: 26.81 KG/M2 | HEIGHT: 69 IN

## 2020-04-13 PROCEDURE — 99214 OFFICE O/P EST MOD 30 MIN: CPT | Mod: 95,CR | Performed by: NURSE PRACTITIONER

## 2020-04-13 RX ORDER — QUETIAPINE FUMARATE 100 MG/1
TABLET, FILM COATED ORAL
Qty: 60 TAB | Refills: 2 | Status: SHIPPED | OUTPATIENT
Start: 2020-04-13 | End: 2020-06-10 | Stop reason: SDUPTHER

## 2020-04-13 RX ORDER — NORTRIPTYLINE HYDROCHLORIDE 50 MG/1
100 CAPSULE ORAL DAILY
Qty: 180 CAP | Refills: 1 | Status: SHIPPED
Start: 2020-04-13 | End: 2021-03-09 | Stop reason: SINTOL

## 2020-04-13 RX ORDER — NORTRIPTYLINE HYDROCHLORIDE 50 MG/1
100 CAPSULE ORAL DAILY
Qty: 180 CAP | Refills: 1 | Status: SHIPPED | OUTPATIENT
Start: 2020-04-13 | End: 2020-04-13 | Stop reason: SDUPTHER

## 2020-04-13 ASSESSMENT — FIBROSIS 4 INDEX: FIB4 SCORE: 0.72

## 2020-04-13 NOTE — TELEPHONE ENCOUNTER
Received request via: Pharmacy    Was the patient seen in the last year in this department? Yes    Does the patient have an active prescription (recently filled or refills available) for medication(s) requested? yes, all of neuros scripts look the same as yours does. Not sure what it needs to be or if it is indicating that patient needs to take it three times a day.

## 2020-04-13 NOTE — PROGRESS NOTES
"Telemedicine Visit: Established Patient     This encounter was conducted via Zoom .   Verbal consent was obtained. Patient's identity was verified.    Subjective:   CC: Follow-up Migraines  Terri Krishnan is a 50 y.o. female presenting for evaluation and management of:  Chronic migraines, s/p CHI     She is Sheltered in [place] in [place] with family at this time.  Plans to return to [place] later this week.    She \"is not doing well\".    Headaches are daily-- very intense for the past 7 days.  She reports constant, non-stop pain.      Started phenobarbital intercurrently.  Taking phenobarbital 30mg qhs for approximately 3 weeks.    She continues to take the nortriptyline 100mg qhs per PCP.    Hopeful to return to the area to get prescription pick-up from the pharmacy.     Ref. Range 2/12/2020 10:48   25-Hydroxy   Vitamin D 25 Latest Ref Range: 30 - 100 ng/mL 13 (L)   Vitamin B12 -True Cobalamin Latest Ref Range: 211 - 911 pg/mL 294     4/22/2019 Brain w/o IMPRESSION:     1.  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 No significant change from prior exam. Doubtful clinical significance.  2.  Minimal supratentorial white matter disease. Nonspecific findings consistent with microvascular ischemic change versus demyelination or gliosis. No progression since 7/18/2018.  3.  Hemosiderin deposition in the right sylvian fissure and perisylvian sulci consistent with chronic sequela of old hemorrhage. 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 No change from prior exam.  4.  Punctate focus of gradient echo hypointensity at the left far posterior-medial temporal lobe versus a dural calcification of the tentorial incisura. No change from prior exam.  5.  Triangular area of encephalomalacia in the central hernán with morphology most consistent with chronic sequela of central pontine myelinolysis. No change from prior exam.  6.  No acute findings and overall, no significant change from 7/18/2018.    ROS   Denies any recent fevers or " chills. No nausea or vomiting. No chest pains or shortness of breath.     Allergies   Allergen Reactions   • Shrimp (Diagnostic) Anaphylaxis     poa stated that this patient had a reaction a couple years ago after eating shrimp   • Shrimp Flavor Anaphylaxis       Current medicines (including changes today)  Current Outpatient Medications   Medication Sig Dispense Refill   • methocarbamol (ROBAXIN) 500 MG Tab Take 1 Tab by mouth 3 times a day as needed (pain). 90 Tab 0   • nortriptyline (PAMELOR) 50 MG capsule Take 2 Caps by mouth every day. 90 Cap 0   • phenobarbital 15 MG Tab Take one tablet po qhs X 1 week then 2 tablets po qhs. (Patient taking differently: Take 30 mg by mouth every bedtime. Take one tablet po qhs X 1 week then 2 tablets po qhs.) 60 Tab 2   • Erenumab (AIMOVIG) 70 MG/ML Solution Auto-injector Inject 1 mL as instructed Q30 DAYS. 1 PEN 3   • estradiol (ESTRACE) 1 MG Tab Take 1 Tab by mouth every day. 90 Tab 3   • b complex vitamins capsule Take 1 Cap by mouth every day. 90 Cap 3   • diphenhydrAMINE (BENADRYL) 25 MG Tab Take 25 mg by mouth every 6 hours as needed for Sleep.       No current facility-administered medications for this visit.        Patient Active Problem List    Diagnosis Date Noted   • Chronic migraine 07/15/2019   • Alcohol dependence in remission (HCC) 03/20/2018   • Balance disorder 03/20/2018   • CARSON (generalized anxiety disorder) 10/12/2017   • Surgical menopause 03/06/2014   • Tobacco dependence 03/06/2014       Family History   Problem Relation Age of Onset   • Cancer Neg Hx    • Diabetes Neg Hx    • Heart Disease Neg Hx    • Stroke Neg Hx    • Hyperlipidemia Neg Hx    • Hypertension Neg Hx        She  has a past medical history of Alcohol abuse, Head ache, Surgical menopause, TBI (traumatic brain injury) (HCC), and Tobacco dependence.  She  has a past surgical history that includes abdominal hysterectomy total (age 27).       Objective:   Vitals obtained by  patient:      Physical Exam:  Constitutional: Alert, no distress, well-groomed.  Skin: No rashes in visible areas.  Eye: Round. Conjunctiva clear, lids normal. No icterus.   ENMT: Lips pink without lesions, good dentition, moist mucous membranes. Phonation normal.  Neck: No masses, no thyromegaly. Moves freely without pain.  CV: Pulse as reported by patient  Respiratory: Unlabored respiratory effort, no cough or audible wheeze  Psych: Alert and oriented x3, normal affect and mood.       Assessment and Plan:   The following treatment plan was discussed:     Chronic Migraines s/p CHI March 2018:  Strong component of tension type with migraines with aura and photophobia/phonophobia.  She feels like her head is in a vice.  Wakes with a headache and goes to bed with headache.       Bilateral Occipital Neuralgia:  Bilateral Occipital Nerve blocks auth to be submitted.  Scheduled ideally for May 5, 2020.     Emgality new start prescription discussed.  Due to COVID-19 social implications it is unclear when the [facility] may be available to her.     Obtain labs as ordered.     New start of the Seroquel 100mg qhs X1 week then 200mg qhs thereafter.  Will taper off Phenobarbital by stopping it at this time.    Follow-up: Return for follow-up in 6 weeks or on May 5, 2020 as scheduled.

## 2020-04-22 NOTE — TELEPHONE ENCOUNTER
Received request via: Pharmacy    Was the patient seen in the last year in this department? Yes    Does the patient have an active prescription (recently filled or refills available) for medication(s) requested? No     Next appt 05/05/2020

## 2020-04-23 RX ORDER — ZOLMITRIPTAN 5 MG/1
1 SPRAY NASAL
Qty: 6 EACH | Refills: 3 | Status: SHIPPED | OUTPATIENT
Start: 2020-04-23 | End: 2020-11-23

## 2020-05-05 ENCOUNTER — OFFICE VISIT (OUTPATIENT)
Dept: NEUROLOGY | Facility: MEDICAL CENTER | Age: 50
End: 2020-05-05
Payer: MEDICAID

## 2020-05-05 VITALS
SYSTOLIC BLOOD PRESSURE: 110 MMHG | RESPIRATION RATE: 16 BRPM | TEMPERATURE: 98.6 F | HEART RATE: 75 BPM | BODY MASS INDEX: 27.13 KG/M2 | OXYGEN SATURATION: 98 % | WEIGHT: 183.2 LBS | DIASTOLIC BLOOD PRESSURE: 70 MMHG | HEIGHT: 69 IN

## 2020-05-05 DIAGNOSIS — F41.1 GAD (GENERALIZED ANXIETY DISORDER): ICD-10-CM

## 2020-05-05 DIAGNOSIS — M54.2 CERVICALGIA OF OCCIPITO-ATLANTO-AXIAL REGION: ICD-10-CM

## 2020-05-05 PROCEDURE — 64615 CHEMODENERV MUSC MIGRAINE: CPT | Performed by: NURSE PRACTITIONER

## 2020-05-05 RX ORDER — KETOROLAC TROMETHAMINE 30 MG/ML
60 INJECTION, SOLUTION INTRAMUSCULAR; INTRAVENOUS ONCE
Status: COMPLETED | OUTPATIENT
Start: 2020-05-05 | End: 2020-05-05

## 2020-05-05 RX ADMIN — KETOROLAC TROMETHAMINE 60 MG: 30 INJECTION, SOLUTION INTRAMUSCULAR; INTRAVENOUS at 09:56

## 2020-05-05 ASSESSMENT — FIBROSIS 4 INDEX: FIB4 SCORE: 0.72

## 2020-05-05 NOTE — PROGRESS NOTES
"Subjective:      Terri Krishnan is a 50 y.o. female who presents with Botox Injection (Chronic migraine)            HPI  Here for Botox today.    She is needing a new referral for pain management.  Asking for pain medication at this time.  Most recently was established with Dr Blas.    Utilized the first injection set of Emgality about 1-2 weeks ago.  States \"It's not working\".      Current Outpatient Medications   Medication Sig Dispense Refill   • zolmitriptan (ZOMIG) 5 MG nasal solution Spray 1 Spray in nose Once PRN for Headache for up to 1 dose. 6 Each 3   • QUEtiapine (SEROQUEL) 100 MG Tab Take one tablet po qhs X 1 week then 2 tablets po qhs thereafter. 60 Tab 2   • nortriptyline (PAMELOR) 50 MG capsule Take 2 Caps by mouth every day. 180 Cap 1   • methocarbamol (ROBAXIN) 500 MG Tab Take 1 Tab by mouth 3 times a day as needed (pain). 90 Tab 0   • phenobarbital 15 MG Tab Take one tablet po qhs X 1 week then 2 tablets po qhs. (Patient taking differently: Take 30 mg by mouth every bedtime. Take one tablet po qhs X 1 week then 2 tablets po qhs.) 60 Tab 2   • estradiol (ESTRACE) 1 MG Tab Take 1 Tab by mouth every day. 90 Tab 3   • b complex vitamins capsule Take 1 Cap by mouth every day. 90 Cap 3   • diphenhydrAMINE (BENADRYL) 25 MG Tab Take 25 mg by mouth every 6 hours as needed for Sleep.     • Erenumab (AIMOVIG) 70 MG/ML Solution Auto-injector Inject 1 mL as instructed Q30 DAYS. 1 PEN 3     Current Facility-Administered Medications   Medication Dose Route Frequency Provider Last Rate Last Dose   • onabotulinum toxin type A SOLR 155 Units  155 Units Injection Once NISHA AbdullahiPCECILIO NAVARRO       Objective:     /70 (BP Location: Left arm, Patient Position: Sitting, BP Cuff Size: Adult)   Pulse 75   Temp 37 °C (98.6 °F) (Temporal)   Resp 16   Ht 1.753 m (5' 9.02\")   Wt 83.1 kg (183 lb 3.2 oz)   SpO2 98%   BMI 27.04 kg/m²      Physical Exam            Assessment/Plan: "     Chronic Migraine:  Botox therapy has reduced patient’s migraines by more than 7 days and/or 100 hours per month.  Additionally pt has noted a reduction in the amount of medication they are taking to treat their migraines and/or they are having a reduction in intractable migraine/status migrainosis which can result in urgent care or ER visits.     Documentation of patient's symptoms with migraine are included in the initial note with this patient including the following:  Education/counseling/reduction in medications if pt has medication overuse headache;  Frequency of headaches is >15 days monthly with at least 8 migraines monthly;  Migraines include at least two of the following: worsened with activity or avoidance of activity with migraines (ie they go lie down), moderate to severe pain intensity, pulsing headache, unilateral headache AND they have either nausea or vomiting OR sensitivity to light and sound     Although this patient is responding to botox they are NOT migraine free, however, and it is my recommendation botox be continued at this time     I treated this patient in clinic today with BotoxA 155 units according to the dosing/injection paradigm currently mandated by the FDA for the management of chronic migraine. Specifically, I injected 5 units to the procerus, 5 units to the corrugators bilaterally, a total of 20 units to the frontalis musculature, 20 units to the temporalis bilaterally, 15 units to the occipitalis bilaterally, 10 units to the cervical paraspinals bilaterally and 15 units to the trapezius musculature bilaterally. The remainder of the Botox 200 units mixed but not administered was discarded as wastage per FDA guidelines.     Will continue monthly Emgality injections, CGRP inhibitors.    Toradol 60mg IM provided per MA under direct physician instruction.    Referral placed for Pain Management per request.     Return for follow-up in 12 weeks for serial set of Botox.

## 2020-06-10 RX ORDER — QUETIAPINE FUMARATE 100 MG/1
200 TABLET, FILM COATED ORAL
Qty: 60 TAB | Refills: 2 | Status: SHIPPED | OUTPATIENT
Start: 2020-06-10 | End: 2020-07-06 | Stop reason: SDUPTHER

## 2020-06-10 NOTE — TELEPHONE ENCOUNTER
Received request via: Pharmacy    Was the patient seen in the last year in this department? Yes    Does the patient have an active prescription (recently filled or refills available) for medication(s) requested? No     Next appt 08/04/2020

## 2020-07-06 RX ORDER — QUETIAPINE FUMARATE 100 MG/1
200 TABLET, FILM COATED ORAL
Qty: 60 TAB | Refills: 2 | Status: SHIPPED | OUTPATIENT
Start: 2020-07-06 | End: 2020-11-23

## 2020-07-06 NOTE — TELEPHONE ENCOUNTER
Seroquel just ordered but patient is needing it to go to a different pharmacy as she has moved to [place], [place].

## 2020-10-06 ENCOUNTER — HOSPITAL ENCOUNTER (OUTPATIENT)
Dept: RADIOLOGY | Facility: MEDICAL CENTER | Age: 50
End: 2020-10-06
Attending: PHYSICIAN ASSISTANT
Payer: COMMERCIAL

## 2020-10-06 DIAGNOSIS — Z12.31 ENCOUNTER FOR SCREENING MAMMOGRAM FOR BREAST CANCER: ICD-10-CM

## 2020-10-06 PROCEDURE — 77067 SCR MAMMO BI INCL CAD: CPT

## 2020-11-23 ENCOUNTER — TELEMEDICINE (OUTPATIENT)
Dept: MEDICAL GROUP | Age: 50
End: 2020-11-23
Payer: COMMERCIAL

## 2020-11-23 VITALS — WEIGHT: 150 LBS | BODY MASS INDEX: 22.73 KG/M2 | HEIGHT: 68 IN

## 2020-11-23 DIAGNOSIS — G47.19 EXCESSIVE DAYTIME SLEEPINESS: ICD-10-CM

## 2020-11-23 DIAGNOSIS — F51.01 PRIMARY INSOMNIA: ICD-10-CM

## 2020-11-23 PROCEDURE — 99214 OFFICE O/P EST MOD 30 MIN: CPT | Mod: 95,CR | Performed by: PHYSICIAN ASSISTANT

## 2020-11-23 RX ORDER — DOXEPIN HYDROCHLORIDE 25 MG/1
2 CAPSULE ORAL
COMMUNITY
Start: 2020-06-11 | End: 2021-03-09

## 2020-11-23 RX ORDER — FROVATRIPTAN SUCCINATE 2.5 MG/1
TABLET, FILM COATED ORAL
Qty: 10 TAB | Refills: 1 | Status: SHIPPED | OUTPATIENT
Start: 2020-11-23 | End: 2021-03-09

## 2020-11-23 RX ORDER — DOXEPIN HYDROCHLORIDE 50 MG/1
CAPSULE ORAL
COMMUNITY
Start: 2020-11-20 | End: 2021-03-09

## 2020-11-23 RX ORDER — METHOCARBAMOL 500 MG/1
500 TABLET, FILM COATED ORAL 3 TIMES DAILY PRN
Qty: 90 TAB | Refills: 1 | Status: SHIPPED | OUTPATIENT
Start: 2020-11-23 | End: 2021-01-18 | Stop reason: SDUPTHER

## 2020-11-23 RX ORDER — HYDROCODONE BITARTRATE AND ACETAMINOPHEN 5; 325 MG/1; MG/1
1 TABLET ORAL
COMMUNITY
Start: 2020-07-06

## 2020-11-23 SDOH — HEALTH STABILITY: MENTAL HEALTH: HOW OFTEN DO YOU HAVE 6 OR MORE DRINKS ON ONE OCCASION?: NEVER

## 2020-11-23 SDOH — HEALTH STABILITY: MENTAL HEALTH: HOW OFTEN DO YOU HAVE A DRINK CONTAINING ALCOHOL?: NEVER

## 2020-11-23 ASSESSMENT — FIBROSIS 4 INDEX: FIB4 SCORE: 0.72

## 2020-11-23 NOTE — Clinical Note
Here is that patient I was telling you about where the [facility] headache clinic recommended namenda (records in Care Everywhere).

## 2020-11-23 NOTE — PROGRESS NOTES
"Virtual Visit: Established Patient   This visit was conducted via Zoom using secure and encrypted videoconferencing technology. The patient was in a private location in the [facility].    The patient's identity was confirmed and verbal consent was obtained for this virtual visit.    Subjective:   CC:   Chief Complaint   Patient presents with   • Migraines     Review [facility] Medical Records    • Referral Needed     Neurology for Chronic Migraine        Terri Krishnan is a 50 y.o. 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   States daily headache and they are \"exhausting.\" Reports the pain is at the top her head like crown. Squeezing and throbbing. Only receives mild relief with triptan and robaxin.   Reports she moved to [place], [place] and is getting  in a few weeks. Overall happy.   Reports she had a 2 hour long consultation with [facility] neurologist at headache clinic. Developed treatment plan for her neurologist to follow. Established with a local neurologist in [place] and felt he was dismissive. States that she was able to complete two prescriptions under his care before he said she needed to follow-up with [facility].  Requested referral to endocrinologist but was dismissed. States she is wondering if there could be an issue with her thalamus secondary to TBI.   Sleep study-- has not had it done yet. No mention of ordering sleep study.   States she tried the Melatonin titrated up to 10 mg nightly-- didn't help her sleep. Back on Doxepin-- helps her sleep.  zonegran for 5 weeks without relief.   Valporic acid--allergic reaction (swelling and hives) so stopped.  Memantine was never tried nor was methocarbamol with frovatriptan.      She is also under the care of pain specialist ([facility]) in [place]. She had " an occipital nerve block a 1.5 weeks ago.  Has follow-up on 11/10/2020.  They are also prescribing Norco 5/325 can take up to 3 tabs but typically taking 2.  Unsure next steps.     She is still alcohol free though still smoking. Smoking less.    ROS  Denies any recent fevers or chills.   No nausea or vomiting.   No chest pains or shortness of breath.     Allergies   Allergen Reactions   • Shrimp (Diagnostic) Anaphylaxis     poa stated that this patient had a reaction a couple years ago after eating shrimp   • Shrimp Flavor Anaphylaxis   • Valproic Acid Photosensitivity, Rash, Shortness of Breath and Swelling       Current medicines (including changes today)  Current Outpatient Medications   Medication Sig Dispense Refill   • doxepin (SINEQUAN) 25 MG Cap Take 2 Caps by mouth.     • HYDROcodone-acetaminophen (NORCO) 5-325 MG Tab per tablet Take 1 Tab by mouth.     • methocarbamol (ROBAXIN) 500 MG Tab Take 1 Tab by mouth 3 times a day as needed (pain). 90 Tab 1   • Frovatriptan Succinate 2.5 MG Tab Take 2.5mg PO once; if headache recurs, a second dose may be administered after 2 hours have elapsed since the first dose (maximum: 7.5mg/day) 10 Tab 1   • estradiol (ESTRACE) 1 MG Tab Take 1 Tab by mouth every day. 90 Tab 3   • b complex vitamins capsule Take 1 Cap by mouth every day. 90 Cap 3   • diphenhydrAMINE (BENADRYL) 25 MG Tab Take 25 mg by mouth every 6 hours as needed for Sleep.     • doxepin (SINEQUAN) 50 MG Cap      • nortriptyline (PAMELOR) 50 MG capsule Take 2 Caps by mouth every day. (Patient not taking: Reported on 11/23/2020) 180 Cap 1     No current facility-administered medications for this visit.        Patient Active Problem List    Diagnosis Date Noted   • Chronic migraine 07/15/2019   • Alcohol dependence in remission (HCC) 03/20/2018   • Balance disorder 03/20/2018   • CARSON (generalized anxiety disorder) 10/12/2017   • Surgical menopause 03/06/2014   • Tobacco dependence 03/06/2014       Family History  "  Problem Relation Age of Onset   • Cancer Neg Hx    • Diabetes Neg Hx    • Heart Disease Neg Hx    • Stroke Neg Hx    • Hyperlipidemia Neg Hx    • Hypertension Neg Hx        She  has a past medical history of Alcohol abuse, Head ache, Surgical menopause, TBI (traumatic brain injury) (HCC), and Tobacco dependence.  She  has a past surgical history that includes abdominal hysterectomy total (age 27).       Objective:   Ht 1.727 m (5' 8\") Comment: pt reported  Wt 68 kg (150 lb) Comment: pt reported  BMI 22.81 kg/m²     Physical Exam:  Constitutional: Alert, no distress, well-groomed.  Skin: No rashes in visible areas.  Eye: Round. Conjunctiva clear, lids normal. No icterus.   ENMT: Lips pink without lesions, good dentition, moist mucous membranes. Phonation normal.  Neck: No masses, no thyromegaly. Moves freely without pain.  Respiratory: Unlabored respiratory effort, no cough or audible wheeze  Psych: Alert and oriented x3, normal affect and mood.       Assessment and Plan:   The following treatment plan was discussed:     1. Chronic migraine   Not adequately controlled. Looked into namenda but is off label and no clear guidelines from [facility] neurologist on dosing or admin instructions. Discussed with Dr. Irby, recommends she follow-up with [facility].  Will send in robaxin and frovatriptan per neurologist's plan, but explained to patient likely won't help much. Encouraged her to consider stopping opiate prescription per neurologist recommendation.   - methocarbamol (ROBAXIN) 500 MG Tab; Take 1 Tab by mouth 3 times a day as needed (pain).  Dispense: 90 Tab; Refill: 1  - Frovatriptan Succinate 2.5 MG Tab; Take 2.5mg PO once; if headache recurs, a second dose may be administered after 2 hours have elapsed since the first dose (maximum: 7.5mg/day)  Dispense: 10 Tab; Refill: 1    2. Primary insomnia  Stable with doxepin. Under care of local neurologist.   - REFERRAL TO PULMONARY AND SLEEP MEDICINE  - doxepin (SINEQUAN) 50 MG " Cap    3. Excessive daytime sleepiness  Will work on getting her in with local ([place], [place]) sleep medicine clinic for sleep study and management per neuro recommendation.   - REFERRAL TO PULMONARY AND SLEEP MEDICINE    Encouraged patient to establish care with local PCP as she is now in [place].     Follow-up: Return if symptoms worsen or fail to improve.

## 2020-12-02 ENCOUNTER — SUPERVISING PHYSICIAN REVIEW (OUTPATIENT)
Dept: MEDICAL GROUP | Age: 50
End: 2020-12-02

## 2020-12-02 NOTE — PROGRESS NOTES
I have reviewed and agree with history, assessment and plan for office encounter on 11/23/2020 with Advanced Practice Provider: Earlene Rebolledo.  Face to face encounter/direct observation: No  Suggested changes to plan or follow-up: none   Ratna Irby M.D.

## 2021-03-09 ENCOUNTER — TELEPHONE (OUTPATIENT)
Dept: MEDICAL GROUP | Age: 51
End: 2021-03-09

## 2021-03-09 ENCOUNTER — TELEMEDICINE (OUTPATIENT)
Dept: MEDICAL GROUP | Age: 51
End: 2021-03-09
Payer: COMMERCIAL

## 2021-03-09 VITALS — WEIGHT: 164 LBS | BODY MASS INDEX: 24.86 KG/M2 | HEIGHT: 68 IN

## 2021-03-09 DIAGNOSIS — F10.21 ALCOHOL DEPENDENCE IN REMISSION (HCC): ICD-10-CM

## 2021-03-09 PROCEDURE — 99214 OFFICE O/P EST MOD 30 MIN: CPT | Mod: 95,CR | Performed by: PHYSICIAN ASSISTANT

## 2021-03-09 RX ORDER — DIAZEPAM 10 MG/1
TABLET ORAL
COMMUNITY
Start: 2021-02-23

## 2021-03-09 RX ORDER — IBUPROFEN 800 MG/1
800 TABLET ORAL EVERY 8 HOURS PRN
COMMUNITY
Start: 2021-02-10

## 2021-03-09 ASSESSMENT — PATIENT HEALTH QUESTIONNAIRE - PHQ9: CLINICAL INTERPRETATION OF PHQ2 SCORE: 0

## 2021-03-09 ASSESSMENT — FIBROSIS 4 INDEX: FIB4 SCORE: 0.73

## 2021-03-09 NOTE — PROGRESS NOTES
"Virtual Visit: Established Patient   This visit was conducted via Zoom using secure and encrypted videoconferencing technology. The patient was in a private location in the [facility].    The patient's identity was confirmed and verbal consent was obtained for this virtual visit.    Subjective:   CC:   Chief Complaint   Patient presents with   • Follow-Up on Chronic Migraines     sleep study and on Frovatriptan       Terri Davidson is a 51 y.o. female presenting for evaluation and management of:    Chronic migraines  Trying to get in with [facility] neurology, but communication issues between our two entities. Has not seen neurologist at [facility] yet. Did see neurologist at [facility] (Dr. Koenig) on 1/14/21.She given nervio device that isn't helping.   Frovatriptan is not being covered  Headache daily for three years. Has failed sumitriptan, nortriptyline, botox, amitriptyline, Fioricet, gabapentin, lyrica. Currently taking doxepin 100 mg nightly-- \"crazy dreams\".   Norco and methocarbamol take the edge off but nothing takes it away completely.    Had consultation for sleep study and yesterday sent recommendation for her to complete sleep study.     ETOH in remission  No drinking x 3 years as 3/4/21    Allergies   Allergen Reactions   • Shrimp (Diagnostic) Anaphylaxis     poa stated that this patient had a reaction a couple years ago after eating shrimp   • Shrimp Flavor Anaphylaxis   • Valproic Acid Photosensitivity, Rash, Shortness of Breath and Swelling       Current medicines (including changes today)  Current Outpatient Medications   Medication Sig Dispense Refill   • diazepam (VALIUM) 10 MG tablet For denist     • ibuprofen (MOTRIN) 800 MG Tab Take 800 mg by mouth every 8 hours as needed.     • methocarbamol (ROBAXIN) 500 MG Tab Take 1 Tab by mouth 3 times a day as needed (pain). 90 Tab 3   • doxepin (SINEQUAN) 50 MG Cap Take 1 Cap by mouth every bedtime. X 2 weeks then take 2 capsules by mouth nightly " "thereafter 60 Cap 1   • HYDROcodone-acetaminophen (NORCO) 5-325 MG Tab per tablet Take 1 Tab by mouth.     • estradiol (ESTRACE) 1 MG Tab Take 1 Tab by mouth every day. 90 Tab 3   • b complex vitamins capsule Take 1 Cap by mouth every day. 90 Cap 3   • diphenhydrAMINE (BENADRYL) 25 MG Tab Take 25 mg by mouth every 6 hours as needed for Sleep.     • Frovatriptan Succinate 2.5 MG Tab Take 1 Tab by mouth 1 time a day as needed. a second dose may be administered after 2 hours have elapsed since the first dose (maximum: 7.5 mg/day) (Patient not taking: Reported on 3/9/2021) 10 Tab 3     No current facility-administered medications for this visit.       Patient Active Problem List    Diagnosis Date Noted   • Chronic migraine 07/15/2019   • Alcohol dependence in remission (HCC) 03/20/2018   • Balance disorder 03/20/2018   • CARSON (generalized anxiety disorder) 10/12/2017   • Surgical menopause 03/06/2014   • Tobacco dependence 03/06/2014       Family History   Problem Relation Age of Onset   • Cancer Neg Hx    • Diabetes Neg Hx    • Heart Disease Neg Hx    • Stroke Neg Hx    • Hyperlipidemia Neg Hx    • Hypertension Neg Hx        She  has a past medical history of Alcohol abuse, Head ache, Surgical menopause, TBI (traumatic brain injury) ([facility]), and Tobacco dependence.  She  has a past surgical history that includes abdominal hysterectomy total (age 27).       Objective:   Ht 1.727 m (5' 8\") Comment: Pt. stated  Wt 74.4 kg (164 lb) Comment: Pt. stated  BMI 24.94 kg/m²     Physical Exam:  Constitutional: Alert, no distress, well-groomed.  Skin: No rashes in visible areas.  Eye: Round. Conjunctiva clear, lids normal. No icterus.   ENMT: Lips pink without lesions, good dentition, moist mucous membranes. Phonation normal.  Neck: No masses, no thyromegaly. Moves freely without pain.  Respiratory: Unlabored respiratory effort, no cough or audible wheeze  Psych: Alert and oriented x3, normal affect and mood.     Assessment and " Plan:   The following treatment plan was discussed:     1. Chronic migraine  Chronic, unchanged. Not adequately controlled. Reviewed consult with [facility] headache center from 1/14/21. Sleep medicine consult also reviewed from 2/4/21.  - She will complete sleep study.   - MA mailed labs to patient.  - MA contacted referral department to have referral resubmitted to [facility] neurology.  - RN contacted [place] pharmacy and resubmitted prior authorization for frovatriptan.    2. Alcohol dependence in remission (HCC)  Chronic, applauded patient and congratulated her on such a great accomplishment.    Follow-up: Return if symptoms worsen or fail to improve.         My total time spent caring for the patient on the day of the encounter was 36 minutes.   This does not include time spent on separately billable procedures/tests.

## 2021-03-09 NOTE — TELEPHONE ENCOUNTER
Phone Number Called: [phone] (home)     Call outcome: Spoke to patient regarding message below.    Message: Patient stated that she needed labs mailed to her. Labs printed and mailed to patient. Patient has also not been called for Neurology referral. After looking at the referral it looks like there was a mix up on if the patient has already been seen at [facility]. Sent staff message to Rekha Benito referral specialist to have her clarify for the referral and see if it can be resent.

## 2021-03-09 NOTE — TELEPHONE ENCOUNTER
DOCUMENTATION OF PAR STATUS:    1. Name of Medication & Dose: Frovatriptan Succinate 2.5mg tabs     2. Name of Prescription Coverage Company & phone #: [facility], [phone]     3. Date Prior Auth Submitted: 3/9/2021     4. What information was given to obtain insurance decision? ICD 10, medications pt has tired and failed     5. Prior Auth Status? Pending    6. Patient Notified: yes    Key [id number]

## 2021-03-10 NOTE — TELEPHONE ENCOUNTER
Response from Referrals department for patients Neurology referral.     ----- Message -----   From: Lynda Kay, Med Ass't   Sent: 3/10/2021   6:55 AM PST   To: Earlene Rebolledo P.A.-C.   Subject: FW: nuerology referral                           PALAK I will let Patient know as well.   ----- Message -----   From: Rekha Benito   Sent: 3/9/2021  11:51 PM PST   To: Lynda Kay, Med Ass't   Subject: RE: nuerology referral                           Sorry for the confusion. I re-faxed the referral.   ----- Message -----   From: Lynda Kay Med Ass't   Sent: 3/9/2021   8:22 AM PST   To: Rekha Benito   Subject: nuerology referral                               Hello,   I was just reaching out in regards to patients neurology referral that was placed on 02/13/2021. The referral states that the patient was seen on 01/14/2021 but patient has not been seen at [facility] yet. She has been seen at [facility] but not [facility] for neurology as stated by patient. Can we have the Referral resent to [facility] so patient can schedule with them.

## 2021-03-15 RX ORDER — SUMATRIPTAN 25 MG/1
25-100 TABLET, FILM COATED ORAL
Qty: 20 TABLET | Refills: 3 | Status: SHIPPED | OUTPATIENT
Start: 2021-03-15 | End: 2021-03-30

## 2021-03-15 NOTE — TELEPHONE ENCOUNTER
FINAL PRIOR AUTHORIZATION STATUS:    1.  Name of Medication & Dose: Fovatriptian Succinate 2.5mg tabs     2. Prior Auth Status: Denied.  Reason: Patient needs to have tried and failed at least two other medications: Naratriptan, Sumatriptan, Rizatriptan, and Zolmitriptan    3. Action Taken: Pharmacy Notified: yes Patient Notified: yes

## 2021-03-15 NOTE — TELEPHONE ENCOUNTER
She has tired many medications and failed, but the only one she has tried and failed from this list in Sumatriptan.     Can you order another one off the insurance list so she can try it?

## 2021-03-16 NOTE — TELEPHONE ENCOUNTER
Pt has already tried and failed sumatriptan.     Will you please order Naratriptan, Rizatriptan or Zolmitriptan?     Thanks!

## 2021-03-24 RX ORDER — DOXEPIN HYDROCHLORIDE 50 MG/1
CAPSULE ORAL
Qty: 60 CAPSULE | Refills: 0 | Status: SHIPPED | OUTPATIENT
Start: 2021-03-24 | End: 2021-04-27

## 2021-04-15 ENCOUNTER — TELEPHONE (OUTPATIENT)
Dept: MEDICAL GROUP | Age: 51
End: 2021-04-15

## 2021-04-15 NOTE — TELEPHONE ENCOUNTER
1. Caller Name: [facility]                        Call Back Number: [phone]     Per [facility]; Insurance will only cover max 2/day. Pharmacy would like to split this into 3 Rx:     1. Memantine 5mg #21 QD 7D then BID 7D 0RF  2. Memantine 10mg #11 AM + 1/2PM X 7D  3. Memantine 10mg #60 BID 1RF    Please advise

## 2021-04-16 RX ORDER — MEMANTINE HYDROCHLORIDE 5 MG/1
TABLET ORAL
Qty: 21 EACH | Refills: 0 | Status: SHIPPED | OUTPATIENT
Start: 2021-04-16 | End: 2021-04-30

## 2021-04-16 RX ORDER — MEMANTINE HYDROCHLORIDE 10 MG/1
TABLET ORAL
Qty: 90 TABLET | Refills: 0 | Status: SHIPPED | OUTPATIENT
Start: 2021-04-29

## 2021-04-16 NOTE — TELEPHONE ENCOUNTER
Phone Number Called: [phone]    Call outcome:  Spoke to [facility]    Message: Pharmacy states that all sigs were correct.

## 2021-04-27 RX ORDER — DOXEPIN HYDROCHLORIDE 50 MG/1
100 CAPSULE ORAL
Qty: 180 CAPSULE | Refills: 2 | Status: SHIPPED | OUTPATIENT
Start: 2021-04-27

## 2021-09-04 RX ORDER — ESTRADIOL 1 MG/1
TABLET ORAL
Qty: 90 TABLET | Refills: 0 | Status: SHIPPED | OUTPATIENT
Start: 2021-09-04 | End: 2021-12-06

## 2021-10-05 DIAGNOSIS — E78.00 HIGH CHOLESTEROL: ICD-10-CM

## 2021-10-06 RX ORDER — ATORVASTATIN CALCIUM 20 MG/1
20 TABLET, FILM COATED ORAL
Qty: 90 TABLET | Refills: 3 | OUTPATIENT
Start: 2021-10-06

## 2021-11-14 NOTE — CARE PLAN
Problem: Safety  Goal: Will remain free from injury  Bed controls locked and lowered. Call light within reach. Bed alarm on. Seizure precautions.    Problem: Venous Thromboembolism (VTW)/Deep Vein Thrombosis (DVT) Prevention:  Goal: Patient will participate in Venous Thrombosis (VTE)/Deep Vein Thrombosis (DVT)Prevention Measures  SCDs in place on lower extremities.     Problem: Knowledge Deficit  Goal: Knowledge of disease process/condition, treatment plan, diagnostic tests, and medications will improve  Patient and patient's family educated on plan of care. No concerns at this time.       124.7

## 2021-12-06 RX ORDER — ESTRADIOL 1 MG/1
TABLET ORAL
Qty: 90 TABLET | Refills: 0 | Status: SHIPPED | OUTPATIENT
Start: 2021-12-06 | End: 2022-04-05 | Stop reason: SDUPTHER

## 2022-04-05 ENCOUNTER — PATIENT MESSAGE (OUTPATIENT)
Dept: MEDICAL GROUP | Age: 52
End: 2022-04-05
Payer: COMMERCIAL

## 2022-04-05 DIAGNOSIS — E89.40 SURGICAL MENOPAUSE: ICD-10-CM

## 2022-04-05 RX ORDER — ESTRADIOL 1 MG/1
TABLET ORAL
Qty: 90 TABLET | Refills: 0 | Status: SHIPPED | OUTPATIENT
Start: 2022-04-05

## 2024-06-25 NOTE — THERAPY
SPEECH PATHOLOGY: Spoke with RN, and patient is currently not appropriate for cognitive or swallow evaluations as she is not able to sustain arousal.  Will continue to monitor and complete evaluation as appropriate. Thank you.   botox  Received: Today  Sayra Ortiz J Neu Nurse Msg Pool  Patient has an appointment on 7/18.  This referral will be started the week of 7/1.  Thank you